# Patient Record
Sex: MALE | Race: WHITE | NOT HISPANIC OR LATINO | Employment: OTHER | URBAN - NONMETROPOLITAN AREA
[De-identification: names, ages, dates, MRNs, and addresses within clinical notes are randomized per-mention and may not be internally consistent; named-entity substitution may affect disease eponyms.]

---

## 2023-04-21 LAB
ALBUMIN (G/DL) IN SER/PLAS: 3.3 G/DL (ref 3.4–5)
ALBUMIN (MG/L) IN URINE: 2083.7 MG/L
ALBUMIN/CREATININE (UG/MG) IN URINE: 2782 UG/MG CRT (ref 0–30)
ANION GAP IN SER/PLAS: 13 MMOL/L (ref 10–20)
APPEARANCE, URINE: CLEAR
BILIRUBIN, URINE: NEGATIVE
BLOOD, URINE: NEGATIVE
CALCIUM (MG/DL) IN SER/PLAS: 8.3 MG/DL (ref 8.6–10.3)
CARBON DIOXIDE, TOTAL (MMOL/L) IN SER/PLAS: 23 MMOL/L (ref 21–32)
CHLORIDE (MMOL/L) IN SER/PLAS: 108 MMOL/L (ref 98–107)
COLOR, URINE: YELLOW
CREATININE (MG/DL) IN SER/PLAS: 2.15 MG/DL (ref 0.5–1.3)
CREATININE (MG/DL) IN URINE: 74.9 MG/DL (ref 20–370)
CREATININE (MG/DL) IN URINE: 74.9 MG/DL (ref 20–370)
GFR MALE: 34 ML/MIN/1.73M2
GLUCOSE (MG/DL) IN SER/PLAS: 211 MG/DL (ref 74–99)
GLUCOSE, URINE: ABNORMAL MG/DL
KETONES, URINE: NEGATIVE MG/DL
LEUKOCYTE ESTERASE, URINE: NEGATIVE
MUCUS, URINE: NORMAL /LPF
NITRITE, URINE: NEGATIVE
PH, URINE: 6 (ref 5–8)
PHOSPHATE (MG/DL) IN SER/PLAS: 4.1 MG/DL (ref 2.5–4.9)
POTASSIUM (MMOL/L) IN SER/PLAS: 3.8 MMOL/L (ref 3.5–5.3)
PROTEIN (MG/DL) IN URINE: 306 MG/DL (ref 5–25)
PROTEIN, URINE: ABNORMAL MG/DL
PROTEIN/CREATININE (MG/MG) IN URINE: 4.09 MG/MG CREAT (ref 0–0.17)
RBC, URINE: NORMAL /HPF (ref 0–5)
SODIUM (MMOL/L) IN SER/PLAS: 140 MMOL/L (ref 136–145)
SPECIFIC GRAVITY, URINE: 1.01 (ref 1–1.03)
SQUAMOUS EPITHELIAL CELLS, URINE: 1 /HPF
URATE (MG/DL) IN SER/PLAS: 9.3 MG/DL (ref 4–7.5)
UREA NITROGEN (MG/DL) IN SER/PLAS: 57 MG/DL (ref 6–23)
UROBILINOGEN, URINE: <2 MG/DL (ref 0–1.9)
WBC, URINE: NORMAL /HPF (ref 0–5)

## 2023-04-25 LAB — ANTI-HISTONE ANTIBODIES: 1 UNITS (ref 0–0.9)

## 2023-05-25 DIAGNOSIS — R53.83 OTHER FATIGUE: ICD-10-CM

## 2023-05-25 DIAGNOSIS — I10 BENIGN HYPERTENSION: ICD-10-CM

## 2023-05-25 PROBLEM — E11.9 DIABETES MELLITUS (MULTI): Status: ACTIVE | Noted: 2023-05-25

## 2023-05-25 PROBLEM — D64.9 ANEMIA: Status: ACTIVE | Noted: 2023-05-25

## 2023-05-25 RX ORDER — HYDRALAZINE HYDROCHLORIDE 50 MG/1
TABLET, FILM COATED ORAL
Qty: 90 TABLET | Refills: 0 | Status: SHIPPED | OUTPATIENT
Start: 2023-05-25 | End: 2023-09-19 | Stop reason: SDUPTHER

## 2023-05-25 RX ORDER — FOLIC ACID 1 MG/1
TABLET ORAL
Qty: 150 TABLET | Refills: 0 | Status: SHIPPED | OUTPATIENT
Start: 2023-05-25 | End: 2023-09-19 | Stop reason: SDUPTHER

## 2023-07-28 LAB
ALBUMIN (G/DL) IN SER/PLAS: 3.8 G/DL (ref 3.4–5)
ALBUMIN (MG/L) IN URINE: 328.9 MG/L
ALBUMIN/CREATININE (UG/MG) IN URINE: 548.2 UG/MG CRT (ref 0–30)
ANION GAP IN SER/PLAS: 15 MMOL/L (ref 10–20)
CALCIUM (MG/DL) IN SER/PLAS: 8.9 MG/DL (ref 8.6–10.3)
CARBON DIOXIDE, TOTAL (MMOL/L) IN SER/PLAS: 24 MMOL/L (ref 21–32)
CHLORIDE (MMOL/L) IN SER/PLAS: 103 MMOL/L (ref 98–107)
CREATININE (MG/DL) IN SER/PLAS: 3 MG/DL (ref 0.5–1.3)
CREATININE (MG/DL) IN URINE: 60 MG/DL (ref 20–370)
ERYTHROCYTE DISTRIBUTION WIDTH (RATIO) BY AUTOMATED COUNT: 12.6 % (ref 11.5–14.5)
ERYTHROCYTE MEAN CORPUSCULAR HEMOGLOBIN CONCENTRATION (G/DL) BY AUTOMATED: 32.8 G/DL (ref 32–36)
ERYTHROCYTE MEAN CORPUSCULAR VOLUME (FL) BY AUTOMATED COUNT: 95 FL (ref 80–100)
ERYTHROCYTES (10*6/UL) IN BLOOD BY AUTOMATED COUNT: 3.16 X10E12/L (ref 4.5–5.9)
FERRITIN (UG/LL) IN SER/PLAS: 286 UG/L (ref 20–300)
GFR MALE: 23 ML/MIN/1.73M2
GLUCOSE (MG/DL) IN SER/PLAS: 271 MG/DL (ref 74–99)
HEMATOCRIT (%) IN BLOOD BY AUTOMATED COUNT: 29.9 % (ref 41–52)
HEMOGLOBIN (G/DL) IN BLOOD: 9.8 G/DL (ref 13.5–17.5)
IRON (UG/DL) IN SER/PLAS: 67 UG/DL (ref 35–150)
IRON BINDING CAPACITY (UG/DL) IN SER/PLAS: 293 UG/DL (ref 240–445)
IRON SATURATION (%) IN SER/PLAS: 23 % (ref 25–45)
LEUKOCYTES (10*3/UL) IN BLOOD BY AUTOMATED COUNT: 7.8 X10E9/L (ref 4.4–11.3)
PHOSPHATE (MG/DL) IN SER/PLAS: 5.4 MG/DL (ref 2.5–4.9)
PLATELETS (10*3/UL) IN BLOOD AUTOMATED COUNT: 225 X10E9/L (ref 150–450)
POTASSIUM (MMOL/L) IN SER/PLAS: 4.5 MMOL/L (ref 3.5–5.3)
SODIUM (MMOL/L) IN SER/PLAS: 137 MMOL/L (ref 136–145)
URATE (MG/DL) IN SER/PLAS: 6.4 MG/DL (ref 4–7.5)
UREA NITROGEN (MG/DL) IN SER/PLAS: 69 MG/DL (ref 6–23)

## 2023-07-29 LAB — PARATHYRIN INTACT (PG/ML) IN SER/PLAS: 98.5 PG/ML (ref 18.5–88)

## 2023-08-23 LAB
BASOPHILS (10*3/UL) IN BLOOD BY AUTOMATED COUNT: 0.12 X10E9/L (ref 0–0.1)
BASOPHILS/100 LEUKOCYTES IN BLOOD BY AUTOMATED COUNT: 1.4 % (ref 0–2)
EOSINOPHILS (10*3/UL) IN BLOOD BY AUTOMATED COUNT: 0.45 X10E9/L (ref 0–0.7)
EOSINOPHILS/100 LEUKOCYTES IN BLOOD BY AUTOMATED COUNT: 5.2 % (ref 0–6)
ERYTHROCYTE DISTRIBUTION WIDTH (RATIO) BY AUTOMATED COUNT: 13 % (ref 11.5–14.5)
ERYTHROCYTE MEAN CORPUSCULAR HEMOGLOBIN CONCENTRATION (G/DL) BY AUTOMATED: 33.5 G/DL (ref 32–36)
ERYTHROCYTE MEAN CORPUSCULAR VOLUME (FL) BY AUTOMATED COUNT: 93 FL (ref 80–100)
ERYTHROCYTES (10*6/UL) IN BLOOD BY AUTOMATED COUNT: 3.35 X10E12/L (ref 4.5–5.9)
HEMATOCRIT (%) IN BLOOD BY AUTOMATED COUNT: 31.3 % (ref 41–52)
HEMOGLOBIN (G/DL) IN BLOOD: 10.5 G/DL (ref 13.5–17.5)
IMMATURE GRANULOCYTES/100 LEUKOCYTES IN BLOOD BY AUTOMATED COUNT: 0.3 % (ref 0–0.9)
LEUKOCYTES (10*3/UL) IN BLOOD BY AUTOMATED COUNT: 8.6 X10E9/L (ref 4.4–11.3)
LYMPHOCYTES (10*3/UL) IN BLOOD BY AUTOMATED COUNT: 1.69 X10E9/L (ref 1.2–4.8)
LYMPHOCYTES/100 LEUKOCYTES IN BLOOD BY AUTOMATED COUNT: 19.6 % (ref 13–44)
MONOCYTES (10*3/UL) IN BLOOD BY AUTOMATED COUNT: 1 X10E9/L (ref 0.1–1)
MONOCYTES/100 LEUKOCYTES IN BLOOD BY AUTOMATED COUNT: 11.6 % (ref 2–10)
NEUTROPHILS (10*3/UL) IN BLOOD BY AUTOMATED COUNT: 5.33 X10E9/L (ref 1.2–7.7)
NEUTROPHILS/100 LEUKOCYTES IN BLOOD BY AUTOMATED COUNT: 61.9 % (ref 40–80)
PLATELETS (10*3/UL) IN BLOOD AUTOMATED COUNT: 222 X10E9/L (ref 150–450)

## 2023-09-13 LAB
ALBUMIN (G/DL) IN SER/PLAS: 3.6 G/DL (ref 3.4–5)
ANION GAP IN SER/PLAS: 10 MMOL/L (ref 10–20)
CALCIUM (MG/DL) IN SER/PLAS: 8.8 MG/DL (ref 8.6–10.3)
CARBON DIOXIDE, TOTAL (MMOL/L) IN SER/PLAS: 26 MMOL/L (ref 21–32)
CHLORIDE (MMOL/L) IN SER/PLAS: 104 MMOL/L (ref 98–107)
CREATININE (MG/DL) IN SER/PLAS: 2.22 MG/DL (ref 0.5–1.3)
ERYTHROCYTE DISTRIBUTION WIDTH (RATIO) BY AUTOMATED COUNT: 13.4 % (ref 11.5–14.5)
ERYTHROCYTE MEAN CORPUSCULAR HEMOGLOBIN CONCENTRATION (G/DL) BY AUTOMATED: 32.2 G/DL (ref 32–36)
ERYTHROCYTE MEAN CORPUSCULAR VOLUME (FL) BY AUTOMATED COUNT: 94 FL (ref 80–100)
ERYTHROCYTES (10*6/UL) IN BLOOD BY AUTOMATED COUNT: 3.43 X10E12/L (ref 4.5–5.9)
FERRITIN (UG/LL) IN SER/PLAS: 341 UG/L (ref 20–300)
GFR MALE: 32 ML/MIN/1.73M2
GLUCOSE (MG/DL) IN SER/PLAS: 273 MG/DL (ref 74–99)
HEMATOCRIT (%) IN BLOOD BY AUTOMATED COUNT: 32.3 % (ref 41–52)
HEMOGLOBIN (G/DL) IN BLOOD: 10.4 G/DL (ref 13.5–17.5)
IRON (UG/DL) IN SER/PLAS: 71 UG/DL (ref 35–150)
IRON BINDING CAPACITY (UG/DL) IN SER/PLAS: 275 UG/DL (ref 240–445)
IRON SATURATION (%) IN SER/PLAS: 26 % (ref 25–45)
LEUKOCYTES (10*3/UL) IN BLOOD BY AUTOMATED COUNT: 7.5 X10E9/L (ref 4.4–11.3)
PHOSPHATE (MG/DL) IN SER/PLAS: 4 MG/DL (ref 2.5–4.9)
PLATELETS (10*3/UL) IN BLOOD AUTOMATED COUNT: 206 X10E9/L (ref 150–450)
POTASSIUM (MMOL/L) IN SER/PLAS: 4.1 MMOL/L (ref 3.5–5.3)
SODIUM (MMOL/L) IN SER/PLAS: 136 MMOL/L (ref 136–145)
URATE (MG/DL) IN SER/PLAS: 6.3 MG/DL (ref 4–7.5)
UREA NITROGEN (MG/DL) IN SER/PLAS: 42 MG/DL (ref 6–23)

## 2023-09-14 LAB
ALBUMIN (MG/L) IN URINE: 1025.8 MG/L
ALBUMIN/CREATININE (UG/MG) IN URINE: 2284.6 UG/MG CRT (ref 0–30)
CALCIDIOL (25 OH VITAMIN D3) (NG/ML) IN SER/PLAS: 19 NG/ML
CREATININE (MG/DL) IN URINE: 44.9 MG/DL (ref 20–370)
PARATHYRIN INTACT (PG/ML) IN SER/PLAS: 97.4 PG/ML (ref 18.5–88)

## 2023-09-18 PROBLEM — L97.509 DIABETIC FOOT ULCER (MULTI): Status: RESOLVED | Noted: 2022-05-06 | Resolved: 2023-09-18

## 2023-09-18 PROBLEM — E11.621 DIABETIC FOOT ULCER (MULTI): Status: RESOLVED | Noted: 2022-05-06 | Resolved: 2023-09-18

## 2023-09-18 PROBLEM — I73.9 PAD (PERIPHERAL ARTERY DISEASE) (CMS-HCC): Status: ACTIVE | Noted: 2023-09-18

## 2023-09-18 PROBLEM — R53.83 FATIGUE: Status: ACTIVE | Noted: 2023-09-18

## 2023-09-18 PROBLEM — E55.9 VITAMIN D DEFICIENCY: Status: ACTIVE | Noted: 2023-09-18

## 2023-09-18 PROBLEM — E87.6 HYPOKALEMIA: Status: ACTIVE | Noted: 2022-05-06

## 2023-09-18 PROBLEM — N04.9 NEPHROTIC SYNDROME: Status: ACTIVE | Noted: 2023-09-18

## 2023-09-18 PROBLEM — U07.1 COVID-19 VIRUS INFECTION: Status: RESOLVED | Noted: 2023-09-18 | Resolved: 2023-09-18

## 2023-09-18 PROBLEM — N40.0 BENIGN PROSTATIC HYPERPLASIA: Status: ACTIVE | Noted: 2023-09-18

## 2023-09-18 PROBLEM — I50.31 ACUTE DIASTOLIC HEART FAILURE (MULTI): Status: RESOLVED | Noted: 2022-11-23 | Resolved: 2023-09-18

## 2023-09-18 PROBLEM — Z28.310 UNVACCINATED FOR COVID-19: Status: ACTIVE | Noted: 2022-05-06

## 2023-09-18 PROBLEM — J81.1 PULMONARY EDEMA (HHS-HCC): Status: ACTIVE | Noted: 2023-09-18

## 2023-09-18 PROBLEM — N17.9 AKI (ACUTE KIDNEY INJURY) (CMS-HCC): Status: RESOLVED | Noted: 2023-09-18 | Resolved: 2023-09-18

## 2023-09-18 PROBLEM — E78.5 HLD (HYPERLIPIDEMIA): Status: ACTIVE | Noted: 2023-09-18

## 2023-09-18 PROBLEM — F41.9 ANXIETY: Status: ACTIVE | Noted: 2023-09-18

## 2023-09-18 PROBLEM — I82.409 DVT (DEEP VENOUS THROMBOSIS) (MULTI): Status: RESOLVED | Noted: 2023-09-18 | Resolved: 2023-09-18

## 2023-09-18 PROBLEM — N18.4 CHRONIC KIDNEY DISEASE, STAGE 4 (SEVERE) (MULTI): Status: ACTIVE | Noted: 2023-09-18

## 2023-09-18 PROBLEM — E78.5 DYSLIPIDEMIA: Status: ACTIVE | Noted: 2023-09-18

## 2023-09-18 PROBLEM — Z98.890 HISTORY OF FOOT OPERATION: Status: RESOLVED | Noted: 2023-09-18 | Resolved: 2023-09-18

## 2023-09-18 PROBLEM — H35.89: Status: ACTIVE | Noted: 2023-09-18

## 2023-09-18 RX ORDER — BLOOD SUGAR DIAGNOSTIC
STRIP MISCELLANEOUS
COMMUNITY
Start: 2016-08-22 | End: 2024-04-04 | Stop reason: ALTCHOICE

## 2023-09-18 RX ORDER — EPOETIN ALFA-EPBX 40000 [IU]/ML
40000 INJECTION, SOLUTION INTRAVENOUS; SUBCUTANEOUS
COMMUNITY
Start: 2022-11-23

## 2023-09-18 RX ORDER — PEN NEEDLE, DIABETIC 32 GX3/16"
NEEDLE, DISPOSABLE MISCELLANEOUS
COMMUNITY
Start: 2023-07-20 | End: 2024-01-11 | Stop reason: SDUPTHER

## 2023-09-18 RX ORDER — FUROSEMIDE 40 MG/1
40 TABLET ORAL 2 TIMES DAILY
COMMUNITY
End: 2024-04-24

## 2023-09-18 RX ORDER — PIOGLITAZONEHYDROCHLORIDE 45 MG/1
45 TABLET ORAL DAILY
COMMUNITY
Start: 2018-07-26 | End: 2023-12-21 | Stop reason: WASHOUT

## 2023-09-18 RX ORDER — CARVEDILOL 25 MG/1
25 TABLET ORAL
COMMUNITY
End: 2023-09-19 | Stop reason: SDUPTHER

## 2023-09-18 RX ORDER — VIT A/VIT C/VIT E/ZINC/COPPER 7160-113
1 TABLET, DELAYED RELEASE (ENTERIC COATED) ORAL EVERY 12 HOURS
COMMUNITY
Start: 2016-03-31 | End: 2023-12-21 | Stop reason: WASHOUT

## 2023-09-18 RX ORDER — POTASSIUM CHLORIDE 1500 MG/1
20 TABLET, EXTENDED RELEASE ORAL 2 TIMES DAILY
COMMUNITY
End: 2023-09-19 | Stop reason: WASHOUT

## 2023-09-18 RX ORDER — ALLOPURINOL 100 MG/1
100 TABLET ORAL DAILY
COMMUNITY
End: 2023-09-19 | Stop reason: WASHOUT

## 2023-09-18 RX ORDER — LISINOPRIL 40 MG/1
40 TABLET ORAL EVERY MORNING
COMMUNITY
End: 2023-12-21 | Stop reason: SDUPTHER

## 2023-09-18 RX ORDER — DAPAGLIFLOZIN 10 MG/1
10 TABLET, FILM COATED ORAL DAILY
COMMUNITY
End: 2024-03-20 | Stop reason: WASHOUT

## 2023-09-18 RX ORDER — INSULIN GLARGINE 100 [IU]/ML
20 INJECTION, SOLUTION SUBCUTANEOUS NIGHTLY
COMMUNITY
End: 2023-12-21 | Stop reason: SDUPTHER

## 2023-09-18 RX ORDER — INSULIN LISPRO 100 [IU]/ML
INJECTION, SOLUTION INTRAVENOUS; SUBCUTANEOUS
COMMUNITY
Start: 2022-02-18 | End: 2023-12-11 | Stop reason: SDUPTHER

## 2023-09-18 NOTE — PROGRESS NOTES
Subjective     HPI   Gregorio North is a 62 y.o. year old male patient with presenting to clinic with concern for   Chief Complaint   Patient presents with    New Patient Visit     Establish care.     Med Refill       Mr. North presents to clinic to establish care as a new patient. He was previously seen by Dr Haddad and then Aaliyah Yousif CNP.    Lyme Dx 2015.  Charcot Carolynn Tooth L foot.  DM last A1c reportedly ~6 at Dr Ferreira's around February, but unable to see results anywhere.     Needs meds refilled  Sees Dr Jacques PRN    NEEDS eye exam has been at least a few years    Skipping Budureon for DM- too expensive, skipping every other dose.    Patient Active Problem List   Diagnosis    Benign essential hypertension    Diabetes mellitus (CMS/HCC)    Anemia    Chronic kidney disease, stage 4 (severe) (CMS/HCC)    Dyslipidemia    Benign prostatic hyperplasia    Anxiety    Fatigue    Hypokalemia    HLD (hyperlipidemia)    Macular exudate    Nephrotic syndrome    Nystagmus    Optic atrophy    PAD (peripheral artery disease) (CMS/HCC)    Pulmonary edema    Vitamin D deficiency    Unvaccinated for covid-19       Past Medical History:   Diagnosis Date    Acute diastolic heart failure (CMS/HCC) 11/23/2022    STEVE (acute kidney injury) (CMS/HCC) 09/18/2023    COVID-19 virus infection 09/18/2023    Cutaneous abscess of back (any part, except buttock) 01/25/2021    Abscess of back    Deficiency of other specified B group vitamins 06/09/2021    Folate deficiency    Diabetic foot ulcer (CMS/HCC) 05/06/2022    Encounter for general adult medical examination without abnormal findings 05/19/2020    Medicare annual wellness visit, subsequent    Encounter for screening for malignant neoplasm of colon 02/21/2019    Screening for malignant neoplasm of colon    Encounter for screening for malignant neoplasm of prostate 08/12/2021    Prostate cancer screening    Focal chorioretinal inflammation, macular or paramacular, left  eye 10/15/2014    Acute macular neuroretinitis of left eye    Focal chorioretinal inflammation, macular or paramacular, left eye 11/05/2014    Acute macular neuroretinitis of left eye    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    History of foot operation 09/18/2023    Ischemic optic neuropathy, 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    Localized edema 01/11/2021    Bilateral edema of lower extremity    Other muscle spasm 04/24/2017    Trapezius muscle spasm    Other specified abnormal immunological findings in serum 09/17/2014    Positive Lyme disease serology    Other specified cataract 01/27/2016    Cataract, mature    Other specified health status     No pertinent past surgical history    Other specified postprocedural states 06/18/2015    Status post foot surgery    Other specified retinal disorders 01/27/2016    Macular exudate    Other symptoms and signs involving the musculoskeletal system 04/24/2017    Left arm weakness    Pain in right shoulder 04/24/2017    Right shoulder pain    Personal history of diseases of the skin and subcutaneous tissue 05/25/2021    History of cellulitis    Personal history of other diseases of male genital organs 01/11/2021    History of benign prostatic hyperplasia    Personal history of other diseases of the circulatory system 09/03/2014    History of hypertension    Personal history of other diseases of the musculoskeletal system and connective tissue 11/21/2016    History of joint pain    Personal history of other diseases of the musculoskeletal system and connective tissue 11/21/2016    History of muscle pain    Personal history of other diseases of the nervous system and sense organs 09/08/2014    History of nystagmus    Personal history of other diseases of the nervous system and sense organs 01/27/2016    History of optic atrophy    Personal history of other diseases of the nervous system and sense organs  09/08/2014    History of papilledema    Personal history of other diseases of the nervous system and sense organs 08/22/2016    History of tremor    Personal history of other endocrine, nutritional and metabolic disease 01/11/2021    History of elevated lipids    Personal history of other specified conditions     History of impaired glucose tolerance    Personal history of other specified conditions 09/08/2014    History of palpitations    Personal history of other specified conditions 08/12/2021    History of chronic fatigue    Personal history of other specified conditions 08/09/2018    History of headache    Personal history of other specified conditions 08/09/2018    History of balance disorder    Sebaceous cyst 11/20/2018    Infected sebaceous cyst of skin    Unspecified cataract 01/27/2016    Total cataract    Unspecified visual loss 02/18/2022    Vision loss      Past Surgical History:   Procedure Laterality Date    CT GUIDED PERCUTANEOUS BIOPSY BONE DEEP  8/29/2022    CT GUIDED PERCUTANEOUS BIOPSY BONE DEEP 8/29/2022 [facility]    FOOT SURGERY  03/31/2016    Foot Surgery    MR HEAD ANGIO WO IV CONTRAST  9/17/2014    MR HEAD ANGIO WO IV CONTRAST 9/17/2014 [facility]    OTHER SURGICAL HISTORY  02/04/2015    Eye Surgery Results Vision      Family History   Problem Relation Name Age of Onset    No Known Problems Mother      Heart attack Father      Diabetes Father        Social History     Tobacco Use    Smoking status: Never    Smokeless tobacco: Never   Substance Use Topics    Alcohol use: Not Currently        Current Outpatient Medications:     amLODIPine (Norvasc) 5 mg tablet, TAKE ONE TABLET BY MOUTH DAILY, Disp: 90 tablet, Rfl: 0    atorvastatin (Lipitor) 20 mg tablet, TAKE ONE TABLET BY MOUTH EVERY EVENING, Disp: 90 tablet, Rfl: 0    blood sugar diagnostic (ReliOn Prime Test Strips) strip, TEST 3 TIMES DAILY., Disp: , Rfl:     Bydureon BCise 2 mg/0.85 mL auto-injector, INJECT 0.85 ML  "SUBCUTANEOUSLY ONCE WEEKLY., Disp: 13.6 mL, Rfl: 0    CareFine Pen Needle 32 gauge x 3/16\" needle, USE 1 PEN NEEDLE TO INJECT INSULIN 3 TIMES A DAY BEFORE MEALS AND AT BEDTIME., Disp: , Rfl:     carvedilol (Coreg) 25 mg tablet, Take 1 tablet (25 mg) by mouth 2 times a day with meals., Disp: , Rfl:     clopidogrel (Plavix) 75 mg tablet, TAKE ONE TABLET BY MOUTH EVERY DAY, Disp: 90 tablet, Rfl: 0    cyanocobalamin, vitamin B-12, 500 mcg tablet, sublingual, Place 1 tablet under the tongue once daily., Disp: , Rfl:     folic acid (Folvite) 1 mg tablet, TAKE FIVE TABLETS BY MOUTH EVERY DAY, Disp: 150 tablet, Rfl: 0    furosemide (Lasix) 40 mg tablet, 1 tablet (40 mg)., Disp: , Rfl:     HumaLOG KwikPen Insulin 100 unit/mL injection, Inject under the skin. 8 units before meals, Disp: , Rfl:     Lantus Solostar U-100 Insulin 100 unit/mL (3 mL) pen, Inject 20 Units under the skin once daily at bedtime., Disp: , Rfl:     lisinopril 40 mg tablet, Take 1 tablet (40 mg) by mouth once daily in the morning., Disp: , Rfl:     pioglitazone (Actos) 45 mg tablet, Take 1 tablet (45 mg) by mouth once daily., Disp: , Rfl:     potassium chloride CR 20 mEq ER tablet, Take 1 tablet (20 mEq) by mouth once daily. Do not crush or chew., Disp: , Rfl:     epoetin mckenna-epbx (Retacrit) 40,000 unit/mL injection, Inject 1 mL (40,000 Units) under the skin every 30 (thirty) days., Disp: , Rfl:     Farxiga 10 mg, Take 1 tablet (10 mg) by mouth once daily., Disp: , Rfl:     hydrALAZINE (Apresoline) 50 mg tablet, TAKE ONE TABLET BY MOUTH THREE TIMES A DAY, Disp: 90 tablet, Rfl: 0    multivit-min-FA-lut-zeaxanth (Icaps MV) 100-1.66-0.83 mcg-mg-mg tablet,delayed release (DR/EC), Take 1 tablet by mouth every 12 hours., Disp: , Rfl:      Review of Systems  Constitutional: Denies fever  HEENT: Denies ST, earache  CVS: Denies Chest pain  Pulmonary: Denies wheezing, SOB  GI: Denies N/V  : Denies dysuria  Musculoskeletal:  Denies myalgia  Neuro: Denies focal " "weakness or numbness.  Skin: Denies Rashes.  *Review of Systems is negative unless otherwise mentioned in HPI or ROS above.    Objective   /80   Pulse 58   Temp 37 °C (98.6 °F)   Ht 1.753 m (5' 9\")   Wt 117 kg (258 lb 9.6 oz)   SpO2 97%   BMI 38.19 kg/m²  reviewed Body mass index is 38.19 kg/m².     Physical Exam  Constitutional: NAD.  Resting comfortably.  Head: Atraumatic, normocephalic.  ENT: Moist oral mucosa. Nasal mucosa wnl.   Cardiac: Regular rate & rhythm.   Pulmonary: Lungs clear bilat  GI: Soft, Nontender, nondistended.   Musculoskeletal: No peripheral edema.   Skin: No evidence of trauma. No rashes  Psych: Intact judgement and insight.    .Assessment/Plan   Problem List Items Addressed This Visit       Benign essential hypertension - Primary    Relevant Medications    carvedilol (Coreg) 25 mg tablet    Dyslipidemia    Relevant Medications    atorvastatin (Lipitor) 20 mg tablet    clopidogrel (Plavix) 75 mg tablet    Fatigue    Relevant Medications    folic acid (Folvite) 1 mg tablet     Other Visit Diagnoses       Benign hypertension        Relevant Medications    hydrALAZINE (Apresoline) 50 mg tablet    Primary hypertension        Relevant Medications    amLODIPine (Norvasc) 5 mg tablet          Concern for cost of bydureon.    "

## 2023-09-19 ENCOUNTER — OFFICE VISIT (OUTPATIENT)
Dept: PRIMARY CARE | Facility: CLINIC | Age: 63
End: 2023-09-19
Payer: MEDICARE

## 2023-09-19 VITALS
WEIGHT: 258.6 LBS | TEMPERATURE: 98.6 F | SYSTOLIC BLOOD PRESSURE: 142 MMHG | HEIGHT: 69 IN | BODY MASS INDEX: 38.3 KG/M2 | DIASTOLIC BLOOD PRESSURE: 80 MMHG | OXYGEN SATURATION: 97 % | HEART RATE: 58 BPM

## 2023-09-19 DIAGNOSIS — I10 PRIMARY HYPERTENSION: ICD-10-CM

## 2023-09-19 DIAGNOSIS — N18.4 CHRONIC KIDNEY DISEASE, STAGE 4 (SEVERE) (MULTI): ICD-10-CM

## 2023-09-19 DIAGNOSIS — I50.9 CHRONIC CONGESTIVE HEART FAILURE, UNSPECIFIED HEART FAILURE TYPE (MULTI): ICD-10-CM

## 2023-09-19 DIAGNOSIS — I10 BENIGN HYPERTENSION: ICD-10-CM

## 2023-09-19 DIAGNOSIS — E78.5 DYSLIPIDEMIA: ICD-10-CM

## 2023-09-19 DIAGNOSIS — R53.83 OTHER FATIGUE: ICD-10-CM

## 2023-09-19 DIAGNOSIS — E11.22 TYPE 2 DIABETES MELLITUS WITH STAGE 4 CHRONIC KIDNEY DISEASE, WITH LONG-TERM CURRENT USE OF INSULIN (MULTI): ICD-10-CM

## 2023-09-19 DIAGNOSIS — N18.4 TYPE 2 DIABETES MELLITUS WITH STAGE 4 CHRONIC KIDNEY DISEASE, WITH LONG-TERM CURRENT USE OF INSULIN (MULTI): ICD-10-CM

## 2023-09-19 DIAGNOSIS — Z79.4 TYPE 2 DIABETES MELLITUS WITH STAGE 4 CHRONIC KIDNEY DISEASE, WITH LONG-TERM CURRENT USE OF INSULIN (MULTI): ICD-10-CM

## 2023-09-19 DIAGNOSIS — I10 BENIGN ESSENTIAL HYPERTENSION: Primary | ICD-10-CM

## 2023-09-19 LAB — POC HEMOGLOBIN A1C: 8.7 % (ref 4.2–6.5)

## 2023-09-19 PROCEDURE — 83036 HEMOGLOBIN GLYCOSYLATED A1C: CPT | Performed by: PHYSICIAN ASSISTANT

## 2023-09-19 PROCEDURE — 3079F DIAST BP 80-89 MM HG: CPT | Performed by: PHYSICIAN ASSISTANT

## 2023-09-19 PROCEDURE — 3077F SYST BP >= 140 MM HG: CPT | Performed by: PHYSICIAN ASSISTANT

## 2023-09-19 PROCEDURE — 99213 OFFICE O/P EST LOW 20 MIN: CPT | Performed by: PHYSICIAN ASSISTANT

## 2023-09-19 PROCEDURE — 4010F ACE/ARB THERAPY RXD/TAKEN: CPT | Performed by: PHYSICIAN ASSISTANT

## 2023-09-19 PROCEDURE — 3066F NEPHROPATHY DOC TX: CPT | Performed by: PHYSICIAN ASSISTANT

## 2023-09-19 PROCEDURE — 1036F TOBACCO NON-USER: CPT | Performed by: PHYSICIAN ASSISTANT

## 2023-09-19 RX ORDER — HYDRALAZINE HYDROCHLORIDE 50 MG/1
50 TABLET, FILM COATED ORAL 3 TIMES DAILY
Qty: 270 TABLET | Refills: 0 | Status: SHIPPED | OUTPATIENT
Start: 2023-09-19 | End: 2023-12-21 | Stop reason: SDUPTHER

## 2023-09-19 RX ORDER — CARVEDILOL 25 MG/1
25 TABLET ORAL
Qty: 180 TABLET | Refills: 0 | Status: SHIPPED | OUTPATIENT
Start: 2023-09-19 | End: 2023-12-21 | Stop reason: SDUPTHER

## 2023-09-19 RX ORDER — POTASSIUM CHLORIDE 20 MEQ/1
20 TABLET, EXTENDED RELEASE ORAL DAILY
COMMUNITY
End: 2023-12-21 | Stop reason: WASHOUT

## 2023-09-19 RX ORDER — FOLIC ACID 1 MG/1
5 TABLET ORAL DAILY
Qty: 450 TABLET | Refills: 0 | Status: SHIPPED | OUTPATIENT
Start: 2023-09-19 | End: 2023-12-21 | Stop reason: SDUPTHER

## 2023-09-19 RX ORDER — CLOPIDOGREL BISULFATE 75 MG/1
75 TABLET ORAL DAILY
Qty: 90 TABLET | Refills: 0 | Status: SHIPPED | OUTPATIENT
Start: 2023-09-19 | End: 2023-12-21 | Stop reason: SDUPTHER

## 2023-09-19 RX ORDER — AMLODIPINE BESYLATE 5 MG/1
5 TABLET ORAL DAILY
Qty: 90 TABLET | Refills: 0 | Status: SHIPPED | OUTPATIENT
Start: 2023-09-19 | End: 2023-12-21 | Stop reason: SDUPTHER

## 2023-09-19 RX ORDER — ATORVASTATIN CALCIUM 20 MG/1
20 TABLET, FILM COATED ORAL NIGHTLY
Qty: 90 TABLET | Refills: 0 | Status: SHIPPED | OUTPATIENT
Start: 2023-09-19 | End: 2023-12-21 | Stop reason: SDUPTHER

## 2023-09-19 RX ORDER — CYANOCOBALAMIN (VITAMIN B-12) 1000 MCG
1 TABLET, SUBLINGUAL SUBLINGUAL DAILY
COMMUNITY
End: 2023-12-21 | Stop reason: WASHOUT

## 2023-09-19 ASSESSMENT — ANXIETY QUESTIONNAIRES
IF YOU CHECKED OFF ANY PROBLEMS ON THIS QUESTIONNAIRE, HOW DIFFICULT HAVE THESE PROBLEMS MADE IT FOR YOU TO DO YOUR WORK, TAKE CARE OF THINGS AT HOME, OR GET ALONG WITH OTHER PEOPLE: NOT DIFFICULT AT ALL
3. WORRYING TOO MUCH ABOUT DIFFERENT THINGS: SEVERAL DAYS
GAD7 TOTAL SCORE: 3
7. FEELING AFRAID AS IF SOMETHING AWFUL MIGHT HAPPEN: NOT AT ALL
5. BEING SO RESTLESS THAT IT IS HARD TO SIT STILL: NOT AT ALL
4. TROUBLE RELAXING: NOT AT ALL
2. NOT BEING ABLE TO STOP OR CONTROL WORRYING: NOT AT ALL
6. BECOMING EASILY ANNOYED OR IRRITABLE: SEVERAL DAYS
1. FEELING NERVOUS, ANXIOUS, OR ON EDGE: SEVERAL DAYS

## 2023-09-19 ASSESSMENT — PATIENT HEALTH QUESTIONNAIRE - PHQ9
SUM OF ALL RESPONSES TO PHQ9 QUESTIONS 1 AND 2: 0
10. IF YOU CHECKED OFF ANY PROBLEMS, HOW DIFFICULT HAVE THESE PROBLEMS MADE IT FOR YOU TO DO YOUR WORK, TAKE CARE OF THINGS AT HOME, OR GET ALONG WITH OTHER PEOPLE: NOT DIFFICULT AT ALL
8. MOVING OR SPEAKING SO SLOWLY THAT OTHER PEOPLE COULD HAVE NOTICED. OR THE OPPOSITE, BEING SO FIGETY OR RESTLESS THAT YOU HAVE BEEN MOVING AROUND A LOT MORE THAN USUAL: NOT AT ALL
3. TROUBLE FALLING OR STAYING ASLEEP OR SLEEPING TOO MUCH: SEVERAL DAYS
2. FEELING DOWN, DEPRESSED OR HOPELESS: NOT AT ALL
1. LITTLE INTEREST OR PLEASURE IN DOING THINGS: NOT AT ALL
9. THOUGHTS THAT YOU WOULD BE BETTER OFF DEAD, OR OF HURTING YOURSELF: NOT AT ALL
6. FEELING BAD ABOUT YOURSELF - OR THAT YOU ARE A FAILURE OR HAVE LET YOURSELF OR YOUR FAMILY DOWN: SEVERAL DAYS
SUM OF ALL RESPONSES TO PHQ QUESTIONS 1-9: 3
4. FEELING TIRED OR HAVING LITTLE ENERGY: SEVERAL DAYS
5. POOR APPETITE OR OVEREATING: NOT AT ALL
7. TROUBLE CONCENTRATING ON THINGS, SUCH AS READING THE NEWSPAPER OR WATCHING TELEVISION: NOT AT ALL

## 2023-09-19 NOTE — PATIENT INSTRUCTIONS
Please make an appointment with your ophthomologist.    Please make an appointment with the nutritionist.

## 2023-09-26 DIAGNOSIS — N18.4 ANEMIA DUE TO STAGE 4 CHRONIC KIDNEY DISEASE (MULTI): Primary | ICD-10-CM

## 2023-09-26 DIAGNOSIS — D63.1 ANEMIA DUE TO STAGE 4 CHRONIC KIDNEY DISEASE (MULTI): Primary | ICD-10-CM

## 2023-09-26 RX ORDER — ALBUTEROL SULFATE 0.83 MG/ML
3 SOLUTION RESPIRATORY (INHALATION) AS NEEDED
Status: CANCELLED | OUTPATIENT
Start: 2023-10-11

## 2023-09-26 RX ORDER — HEPARIN 100 UNIT/ML
500 SYRINGE INTRAVENOUS AS NEEDED
Status: CANCELLED | OUTPATIENT
Start: 2023-09-30

## 2023-09-26 RX ORDER — DIPHENHYDRAMINE HYDROCHLORIDE 50 MG/ML
50 INJECTION INTRAMUSCULAR; INTRAVENOUS AS NEEDED
Status: CANCELLED | OUTPATIENT
Start: 2023-10-11

## 2023-09-26 RX ORDER — HEPARIN SODIUM,PORCINE/PF 10 UNIT/ML
50 SYRINGE (ML) INTRAVENOUS AS NEEDED
Status: CANCELLED | OUTPATIENT
Start: 2023-09-30

## 2023-09-26 RX ORDER — EPINEPHRINE 0.3 MG/.3ML
0.3 INJECTION SUBCUTANEOUS EVERY 5 MIN PRN
Status: CANCELLED | OUTPATIENT
Start: 2023-10-11

## 2023-09-26 RX ORDER — FAMOTIDINE 10 MG/ML
20 INJECTION INTRAVENOUS ONCE AS NEEDED
Status: CANCELLED | OUTPATIENT
Start: 2023-10-11

## 2023-10-10 PROBLEM — Z85.528 HISTORY OF KIDNEY CANCER: Status: ACTIVE | Noted: 2022-08-10

## 2023-10-10 PROBLEM — M25.50 ARTHRALGIA: Status: ACTIVE | Noted: 2023-10-10

## 2023-10-10 PROBLEM — R26.89 IMPAIRMENT OF BALANCE: Status: ACTIVE | Noted: 2023-10-10

## 2023-10-10 PROBLEM — D50.9 IRON DEFICIENCY ANEMIA: Status: ACTIVE | Noted: 2022-08-29

## 2023-10-10 PROBLEM — E66.9 OBESITY WITH BODY MASS INDEX 30 OR GREATER: Status: ACTIVE | Noted: 2022-05-06

## 2023-10-10 PROBLEM — R80.9 PROTEINURIA: Status: ACTIVE | Noted: 2023-03-07

## 2023-10-10 PROBLEM — N17.9 ACUTE RENAL FAILURE (CMS-HCC): Status: ACTIVE | Noted: 2023-03-07

## 2023-10-10 PROBLEM — M35.00 SJOGREN'S SYNDROME (MULTI): Status: ACTIVE | Noted: 2023-03-07

## 2023-10-10 PROBLEM — Z86.79 HISTORY OF HYPERTENSION: Status: ACTIVE | Noted: 2023-10-10

## 2023-10-10 PROBLEM — R00.2 PALPITATIONS: Status: ACTIVE | Noted: 2023-10-10

## 2023-10-10 PROBLEM — M79.10 MUSCLE PAIN: Status: ACTIVE | Noted: 2023-10-10

## 2023-10-10 PROBLEM — R06.02 SHORTNESS OF BREATH: Status: ACTIVE | Noted: 2022-08-03

## 2023-10-10 PROBLEM — E66.01 CLASS 3 SEVERE OBESITY DUE TO EXCESS CALORIES IN ADULT (MULTI): Status: ACTIVE | Noted: 2023-10-10

## 2023-10-10 PROBLEM — A69.20 LYME DISEASE: Status: ACTIVE | Noted: 2022-05-06

## 2023-10-10 PROBLEM — J81.1 CHRONIC PULMONARY EDEMA (HHS-HCC): Status: ACTIVE | Noted: 2022-07-28

## 2023-10-10 PROBLEM — Z86.39 HISTORY OF ENDOCRINE DISORDER: Status: ACTIVE | Noted: 2023-10-10

## 2023-10-10 PROBLEM — R51.9 HEADACHE: Status: ACTIVE | Noted: 2023-10-10

## 2023-10-10 PROBLEM — R25.1 TREMOR: Status: ACTIVE | Noted: 2023-10-10

## 2023-10-10 PROBLEM — J96.90 RESPIRATORY FAILURE (MULTI): Status: ACTIVE | Noted: 2023-10-10

## 2023-10-10 PROBLEM — M79.89 OTHER SPECIFIED SOFT TISSUE DISORDERS: Status: ACTIVE | Noted: 2022-12-20

## 2023-10-10 PROBLEM — E66.813 CLASS 3 SEVERE OBESITY DUE TO EXCESS CALORIES IN ADULT: Status: ACTIVE | Noted: 2023-10-10

## 2023-10-10 PROBLEM — M79.89 SWELLING OF UPPER EXTREMITY: Status: ACTIVE | Noted: 2023-10-10

## 2023-10-10 RX ORDER — CHOLECALCIFEROL (VITAMIN D3) 125 MCG
5000 CAPSULE ORAL DAILY
COMMUNITY

## 2023-10-10 RX ORDER — DAPAGLIFLOZIN 5 MG/1
1 TABLET, FILM COATED ORAL
COMMUNITY
Start: 2022-06-15 | End: 2023-12-21 | Stop reason: WASHOUT

## 2023-10-10 RX ORDER — ALLOPURINOL 100 MG/1
100 TABLET ORAL
COMMUNITY
Start: 2023-10-09 | End: 2023-12-21 | Stop reason: SDUPTHER

## 2023-10-10 RX ORDER — HYDROCHLOROTHIAZIDE 25 MG/1
25 TABLET ORAL DAILY
COMMUNITY
Start: 2017-11-02 | End: 2023-12-21 | Stop reason: WASHOUT

## 2023-10-10 RX ORDER — DOXYCYCLINE HYCLATE 100 MG
100 TABLET ORAL EVERY 12 HOURS
COMMUNITY
Start: 2022-02-10 | End: 2023-12-21 | Stop reason: WASHOUT

## 2023-10-10 RX ORDER — ERGOCALCIFEROL 1.25 MG/1
1 CAPSULE ORAL WEEKLY
COMMUNITY
Start: 2022-10-27 | End: 2024-03-20 | Stop reason: WASHOUT

## 2023-10-10 RX ORDER — TAMSULOSIN HYDROCHLORIDE 0.4 MG/1
0.4 CAPSULE ORAL DAILY
COMMUNITY
Start: 2020-02-19 | End: 2023-12-21 | Stop reason: WASHOUT

## 2023-10-10 RX ORDER — NAPROXEN 500 MG/1
500 TABLET ORAL
COMMUNITY
Start: 2013-08-06 | End: 2023-12-21 | Stop reason: WASHOUT

## 2023-10-10 RX ORDER — EPINEPHRINE 0.22MG
100 AEROSOL WITH ADAPTER (ML) INHALATION DAILY
COMMUNITY
Start: 2016-10-20 | End: 2023-12-21 | Stop reason: WASHOUT

## 2023-10-10 RX ORDER — CLINDAMYCIN HYDROCHLORIDE 150 MG/1
3 CAPSULE ORAL EVERY 8 HOURS
COMMUNITY
Start: 2021-01-11 | End: 2023-12-21 | Stop reason: WASHOUT

## 2023-10-10 RX ORDER — ASPIRIN 81 MG/1
81 TABLET ORAL DAILY
COMMUNITY
Start: 2018-08-09 | End: 2023-12-21 | Stop reason: WASHOUT

## 2023-10-10 RX ORDER — METFORMIN HYDROCHLORIDE 1000 MG/1
500 TABLET ORAL
COMMUNITY
Start: 2014-09-08 | End: 2023-12-21 | Stop reason: WASHOUT

## 2023-10-10 RX ORDER — GLIPIZIDE 5 MG/1
5 TABLET, FILM COATED, EXTENDED RELEASE ORAL
COMMUNITY
Start: 2018-08-09 | End: 2023-12-21 | Stop reason: WASHOUT

## 2023-10-10 RX ORDER — OXYCODONE AND ACETAMINOPHEN 5; 325 MG/1; MG/1
1 TABLET ORAL EVERY 4 HOURS PRN
COMMUNITY
Start: 2015-06-26 | End: 2023-12-21 | Stop reason: WASHOUT

## 2023-10-10 RX ORDER — LANOLIN ALCOHOL/MO/W.PET/CERES
1000 CREAM (GRAM) TOPICAL DAILY
COMMUNITY
End: 2023-12-21 | Stop reason: WASHOUT

## 2023-10-10 RX ORDER — VIT C/E/ZN/COPPR/LUTEIN/ZEAXAN 250MG-90MG
1 CAPSULE ORAL 2 TIMES DAILY
COMMUNITY
End: 2023-12-21 | Stop reason: WASHOUT

## 2023-10-10 RX ORDER — AMLODIPINE BESYLATE 10 MG/1
1 TABLET ORAL DAILY
COMMUNITY
Start: 2022-02-10 | End: 2023-12-21 | Stop reason: SDUPTHER

## 2023-10-11 ENCOUNTER — LAB (OUTPATIENT)
Dept: LAB | Facility: LAB | Age: 63
End: 2023-10-11
Payer: MEDICARE

## 2023-10-11 ENCOUNTER — INFUSION (OUTPATIENT)
Dept: HEMATOLOGY/ONCOLOGY | Facility: HOSPITAL | Age: 63
End: 2023-10-11
Payer: MEDICARE

## 2023-10-11 VITALS
TEMPERATURE: 98.1 F | SYSTOLIC BLOOD PRESSURE: 132 MMHG | OXYGEN SATURATION: 96 % | RESPIRATION RATE: 17 BRPM | HEART RATE: 57 BPM | DIASTOLIC BLOOD PRESSURE: 58 MMHG

## 2023-10-11 DIAGNOSIS — N18.4 ANEMIA DUE TO STAGE 4 CHRONIC KIDNEY DISEASE (MULTI): ICD-10-CM

## 2023-10-11 DIAGNOSIS — D63.1 ANEMIA DUE TO STAGE 4 CHRONIC KIDNEY DISEASE (MULTI): ICD-10-CM

## 2023-10-11 LAB
BASOPHILS # BLD AUTO: 0.11 X10*3/UL (ref 0–0.1)
BASOPHILS NFR BLD AUTO: 1.6 %
EOSINOPHIL # BLD AUTO: 0.48 X10*3/UL (ref 0–0.7)
EOSINOPHIL NFR BLD AUTO: 6.9 %
ERYTHROCYTE [DISTWIDTH] IN BLOOD BY AUTOMATED COUNT: 13.8 % (ref 11.5–14.5)
FERRITIN SERPL-MCNC: 241 NG/ML (ref 20–300)
HCT VFR BLD AUTO: 32 % (ref 41–52)
HGB BLD-MCNC: 10.3 G/DL (ref 13.5–17.5)
IMM GRANULOCYTES # BLD AUTO: 0.01 X10*3/UL (ref 0–0.7)
IMM GRANULOCYTES NFR BLD AUTO: 0.1 % (ref 0–0.9)
IRON SATN MFR SERPL: 28 % (ref 25–45)
IRON SERPL-MCNC: 82 UG/DL (ref 35–150)
LYMPHOCYTES # BLD AUTO: 1.38 X10*3/UL (ref 1.2–4.8)
LYMPHOCYTES NFR BLD AUTO: 19.8 %
MCH RBC QN AUTO: 30.5 PG (ref 26–34)
MCHC RBC AUTO-ENTMCNC: 32.2 G/DL (ref 32–36)
MCV RBC AUTO: 95 FL (ref 80–100)
MONOCYTES # BLD AUTO: 0.88 X10*3/UL (ref 0.1–1)
MONOCYTES NFR BLD AUTO: 12.6 %
NEUTROPHILS # BLD AUTO: 4.12 X10*3/UL (ref 1.2–7.7)
NEUTROPHILS NFR BLD AUTO: 59 %
NRBC BLD-RTO: 0 /100 WBCS (ref 0–0)
PLATELET # BLD AUTO: 200 X10*3/UL (ref 150–450)
PMV BLD AUTO: 11.3 FL (ref 7.5–11.5)
RBC # BLD AUTO: 3.38 X10*6/UL (ref 4.5–5.9)
TIBC SERPL-MCNC: 288 UG/DL (ref 240–445)
UIBC SERPL-MCNC: 206 UG/DL (ref 110–370)
WBC # BLD AUTO: 7 X10*3/UL (ref 4.4–11.3)

## 2023-10-11 PROCEDURE — 83540 ASSAY OF IRON: CPT | Performed by: INTERNAL MEDICINE

## 2023-10-11 PROCEDURE — 6350000001 HC RX 635 EPOETIN >10,000 UNITS: Mod: JZ | Performed by: INTERNAL MEDICINE

## 2023-10-11 PROCEDURE — 36415 COLL VENOUS BLD VENIPUNCTURE: CPT

## 2023-10-11 PROCEDURE — 85025 COMPLETE CBC W/AUTO DIFF WBC: CPT

## 2023-10-11 PROCEDURE — 82728 ASSAY OF FERRITIN: CPT | Performed by: INTERNAL MEDICINE

## 2023-10-11 PROCEDURE — 96372 THER/PROPH/DIAG INJ SC/IM: CPT

## 2023-10-11 RX ORDER — FAMOTIDINE 10 MG/ML
20 INJECTION INTRAVENOUS ONCE AS NEEDED
Status: CANCELLED | OUTPATIENT
Start: 2023-10-25

## 2023-10-11 RX ORDER — EPINEPHRINE 0.3 MG/.3ML
0.3 INJECTION SUBCUTANEOUS EVERY 5 MIN PRN
Status: CANCELLED | OUTPATIENT
Start: 2023-10-25

## 2023-10-11 RX ORDER — DIPHENHYDRAMINE HYDROCHLORIDE 50 MG/ML
50 INJECTION INTRAMUSCULAR; INTRAVENOUS AS NEEDED
Status: CANCELLED | OUTPATIENT
Start: 2023-10-25

## 2023-10-11 RX ORDER — ALBUTEROL SULFATE 0.83 MG/ML
3 SOLUTION RESPIRATORY (INHALATION) AS NEEDED
Status: CANCELLED | OUTPATIENT
Start: 2023-10-25

## 2023-10-11 RX ADMIN — ERYTHROPOIETIN 10000 UNITS: 10000 INJECTION, SOLUTION INTRAVENOUS; SUBCUTANEOUS at 11:09

## 2023-10-11 ASSESSMENT — LIFESTYLE VARIABLES
SKIP TO QUESTIONS 9-10: 1
HOW OFTEN DO YOU HAVE A DRINK CONTAINING ALCOHOL: NEVER
AUDIT-C TOTAL SCORE: 0
HOW MANY STANDARD DRINKS CONTAINING ALCOHOL DO YOU HAVE ON A TYPICAL DAY: PATIENT DOES NOT DRINK
HOW OFTEN DO YOU HAVE SIX OR MORE DRINKS ON ONE OCCASION: NEVER

## 2023-10-11 ASSESSMENT — COLUMBIA-SUICIDE SEVERITY RATING SCALE - C-SSRS
2. HAVE YOU ACTUALLY HAD ANY THOUGHTS OF KILLING YOURSELF?: NO
1. IN THE PAST MONTH, HAVE YOU WISHED YOU WERE DEAD OR WISHED YOU COULD GO TO SLEEP AND NOT WAKE UP?: NO
6. HAVE YOU EVER DONE ANYTHING, STARTED TO DO ANYTHING, OR PREPARED TO DO ANYTHING TO END YOUR LIFE?: NO

## 2023-10-11 ASSESSMENT — PATIENT HEALTH QUESTIONNAIRE - PHQ9
SUM OF ALL RESPONSES TO PHQ9 QUESTIONS 1 AND 2: 0
1. LITTLE INTEREST OR PLEASURE IN DOING THINGS: NOT AT ALL
2. FEELING DOWN, DEPRESSED OR HOPELESS: NOT AT ALL

## 2023-10-11 ASSESSMENT — PAIN SCALES - GENERAL: PAINLEVEL: 0-NO PAIN

## 2023-10-19 ENCOUNTER — NUTRITION (OUTPATIENT)
Dept: NUTRITION | Facility: HOSPITAL | Age: 63
End: 2023-10-19
Payer: MEDICARE

## 2023-10-19 VITALS — WEIGHT: 262.2 LBS | BODY MASS INDEX: 38.83 KG/M2 | HEIGHT: 69 IN

## 2023-10-19 DIAGNOSIS — E11.22 TYPE 2 DIABETES MELLITUS WITH STAGE 4 CHRONIC KIDNEY DISEASE, WITH LONG-TERM CURRENT USE OF INSULIN (MULTI): ICD-10-CM

## 2023-10-19 DIAGNOSIS — I50.9 CHRONIC CONGESTIVE HEART FAILURE, UNSPECIFIED HEART FAILURE TYPE (MULTI): ICD-10-CM

## 2023-10-19 DIAGNOSIS — N18.4 TYPE 2 DIABETES MELLITUS WITH STAGE 4 CHRONIC KIDNEY DISEASE, WITH LONG-TERM CURRENT USE OF INSULIN (MULTI): ICD-10-CM

## 2023-10-19 DIAGNOSIS — N18.4 CHRONIC KIDNEY DISEASE, STAGE 4 (SEVERE) (MULTI): ICD-10-CM

## 2023-10-19 DIAGNOSIS — I10 BENIGN ESSENTIAL HYPERTENSION: ICD-10-CM

## 2023-10-19 DIAGNOSIS — Z79.4 TYPE 2 DIABETES MELLITUS WITH STAGE 4 CHRONIC KIDNEY DISEASE, WITH LONG-TERM CURRENT USE OF INSULIN (MULTI): ICD-10-CM

## 2023-10-19 PROCEDURE — 97802 MEDICAL NUTRITION INDIV IN: CPT | Performed by: PHYSICIAN ASSISTANT

## 2023-10-19 NOTE — PROGRESS NOTES
"Assessment     Reason for Visit:  Gregorio North is a 62 y.o. male who presents for Diabetes, Chronic Kidney Disease, Congestive Heart Failure, and Hypertension    Anthropometrics:  Anthropometrics  Height: 175.3 cm (5' 9.02\")  Weight: 119 kg (262 lb 3.2 oz)  BMI (Calculated): 38.7  IBW/kg (Dietitian Calculated): 72.7 kg  Percent of IBW: 164 %    Height:  5' 9\"  Weight 10/19/23:  262.2 lbs  BMI 10/19/23:  38.7    Labs:  9/27/23:  H/H 10.5/33.3 (L); MCHC 31.5 (L)  9/13/23:  Glu 273 (H); BUN/Cr 42/2.22 (H); GFR 32 (A); 25-OH Vit D19 (deficiency)       Food And Nutrient Intake:  Food and Nutrient History  Food and Nutrient History: Gregorio is a 62 year old male who states that he presents to nutrition consult due to diabetes and kidney problems.  His step-daughter Shelli and friend Iman also present.  Pt consented to nutrition consult with them present.  Says he was told to avoid salt, sugar, dairy.  Also states that is has a ~70 oz daily fluid limit; reports history of CHF and recent 30 lbs weight loss that he attributes to fluid.  He endorses history of healed foot wound, s/p 7 surgeries on foot last year.  Pt states that he has been working making diet and lifestyle changes.  Does reports food cravings at night.  Denies daily SMBG, although states that he is planning to start.  He reports weight loss goal of 10 lbs by December.  Energy Intake: Good > 75 %  Fluid Intake: Beverages- 68 oz water per day, milk, 12 oz to 16 oz juice, coffee (pt reports fluid limit of ~70 oz per day although has been consuming more than this amount).  Food Allergy: pt states that artificial sugars give him a headache.  Food Intolerance: none reported.  GI Symptoms: none  GI Symptoms greater than 2 weeks: n/a  Oral Problems: denies     Food Intake  Meal 1: B- bowl of cereal (Special K or Frosted Flakes) with whole milk OR 3 eggs cooked in unsalted butter, 2 slices ww toast, 2 sausage links, hashbrowns  Meal 2: L- spaghetti with 2 " "meatballs and garlic bread (from restaurant)  Meal 3: D- tossed salad with italian dressing, black olives, tomatoes, lettuce, cheese (from restaurant)  Snacks: fruit, microwave popcorn with butter    Food Preparation  Cooking: Family  Grocery Shopping: Family  Dining Out: 1 to 3 times a week  Other: [employer] two times per week                                  Micronutrient Intake  Vitamin Intake: D  Mineral/Element Intake: Magnesium                Food And Nutrient Administration:                        Factors:                         Physical Activities:  Physical Activity  Physical Activity History: Pt reports walking 1500 to 2000 steps per day.  Consistency: Yes           Knowledge Beliefs Attitudes and Behavior                                       Nutrition Focused Physical Exam:  Subcutaneous Fat Loss  Orbital Fat Pads: Defer (Pt with adequately nourished appearance, eating adequately, malnutrition not suspected.)        Energy Needs  Calculated Energy Needs Using Equations  Height: 175.3 cm (5' 9.02\")  Weight Used for Equation Calculations: 119 kg (262 lb 3.2 oz)  Jessika Suarez Equation (Overweight or Obese Patients): 1975  Equation Chosen to Use by RD: Dameon Hardin  Activity Factor: 1.2  Estimated Energy Needs  Total Energy Estimated Needs (kCal): 1870 kCal  Method for Estimating Needs: MSJ x AF 1.2 less 500 kcal per day for weight loss.  Estimated Fluid Needs  Method for Estimating Needs: per medical team  Estimated Protein Needs  Total Protein Estimated Needs (g): 72 g  Total Protein Estimated Needs (g/kg): 0.6 g/kg  Method for Estimating Needs: 72 to 95 g per day (0.6 to 0.8 g/kg actual weight)  Estimated Carbohydrate Needs  Method for Estimating Needs: 45 to 60 grams carbs per meal and 15 to 30 grams carbs per snack.  Micronutrient Needs  Total Estimated Mineral Needs: Sodium  Method for Estimating Mineral Needs: 1500 to 2000 mg per day        Diagnosis      Nutrition Diagnosis  Patient has " Nutrition Diagnosis: Yes  Diagnosis Status (1): New  Nutrition Diagnosis 1: Food and nutrition related knowledge deficit  Related to (1): lack of prior diabetes and CKD diet education  As Evidenced by (1): recent diagnosis of CKD 4, diet recall reflecting intake of restaurant foods, foods high in sodium    Interventions/Recommendations   Food and Nutrition Delivery  Meals & Snacks: Carbohydrate-modified diet  Goals: Eat 3 meals per day on a regular schedule and 2 to 3 small snacks; Limit carbohydrate to 45 to 60 grams per meal and 15 to 30 grams per snack each day; Limit sodium to 1500 to 2000 mg per day;  Choose foods low in phosphorus; read food labels to determine carbohydrate, sodium and phosphorus content.  Nutrition Education  Nutrition Education Content: Content related nutrition education  Goals: 1.  CKD 3 to 5 Nutrition Therapy (AND); 2.  Sodium Restricted Diet (AND); 3.  Phosphorus Diet Education (AND); 4.  List of non-starchy vegetables (AND); My Plate; Pt will demonstrate good understanding of nutrition education provided today.  Nutrition Counseling  Theoretical Basis/Approach: Nutrition counseling based on cognitive behavioral theory approach, Nutrition counseling based on transtheoretical model stages of change approach  Goals: Stage of chage:  preparation  Strategies: Nutrition counseling based on goal setting strategy, Nutrition counseling based on social support strategy  Coordination of Nutrition Care by a Nutrition Professional  Collaboration and referral of nutrition care: Collaboration by nutrition professional with other providers  Goals: cc:  referring provider and Nephrologist        Patient Instructions   Nutrition Goals and Recommendations:  1.  Eat 3 meals per day on a regular schedule plus 1 to 2 small snacks  -->  Use My Plate Meal Planning Tool  -->  Choose more home-prepared foods and fewer restaurant foods    2.   Limit carbohydrate to 3 to 4 portions (45 to 60 g) per meal and 1 to 2  "portions (15 to 30 g) per one snack each day     3.  Measure portions of carbohydrate containing foods with measuring cups and/or food scale    4.  Eat a lean protein source at each meal- ideas discussed    5.  Limit Sodium to 1500 to 2,000 mg per day  -->  choose \"low sodium\" foods  -->  avoid potassium-containing salt substitutes    6.  Choose foods that are low in phosphorus  -->  refer to food lists provided  -->  read food labels to determine phosphorus ingredients    7.  Fluid limit per Cardiology    Education Provided:  Chronic Kidney Disease Stage 3 to 5 Diet Education (AND)  Low Sodium Nutrition Therapy (AND)  Phosphorus Content of Foods (AND)  List of Non-Starchy vegetables (AND)  Sodium Free Seasoning Suggestions (AND)  My Plate    Monitoring and Evaluation   Food/Nutrient Related History Monitoring  Monitoring and Evaluation Plan: Mineral/element intake, Carbohydrate intake  Estimated carbohydrate intake: Estimated carbohydrate intake  Criteria: Pt will limit carbohydrate to 45 to 60 grams per meal and 15 to 30 grams per snack each day.  Mineral/Element Intake: Measured mineral/element intake  Criteria: Pt will limit intake of sodium to 1500 to 2000 mg per day; Pt will choose foods low in phosphorus (refer to list)  Additional Plan: Evaluate nutrition intervention as compared to nutrition goal(s) and estimated nutrient need criteria.  Knowledge/Beliefs/Attitudes  Monitoring and Evaluation Plan: Food and nutrition knowledge  Food and nutrition knowledge: Nutrition knowledge of individual client  Criteria: patient will demonstrate knowledge by implementing nutrition education presented today.  Biochemical Data, Medical Tests and Procedures  Monitoring and Evaluation Plan: Electrolyte/renal panel, Glucose/endocrine profile  Electrolyte and Renal Panel: Phosphorus  Criteria: Serum phosphorus wnl  Glucose/Endocrine Profile: Glucose, casual, Glucose, fasting  Criteria: Serum Glucose 80 to < 180 " mg/dL        Time Spent  Time spent directly with patient, family or caregiver: 60 minutes        Readiness to Change : Good  Level of Understanding : Good  Anticipated Compliant : Good

## 2023-10-19 NOTE — PATIENT INSTRUCTIONS
"Nutrition Goals and Recommendations:  1.  Eat 3 meals per day on a regular schedule plus 1 to 2 small snacks  -->  Use My Plate Meal Planning Tool  -->  Choose more home-prepared foods and fewer restaurant foods    2.   Limit carbohydrate to 3 to 4 portions (45 to 60 g) per meal and 1 to 2 portions (15 to 30 g) per one snack each day     3.  Measure portions of carbohydrate containing foods with measuring cups and/or food scale    4.  Eat a lean protein source at each meal- ideas discussed    5.  Limit Sodium to 1500 to 2,000 mg per day  -->  choose \"low sodium\" foods  -->  avoid potassium-containing salt substitutes    6.  Choose foods that are low in phosphorus  -->  refer to food lists provided  -->  read food labels to determine phosphorus ingredients    7.  Fluid limit per Cardiology    Education Provided:  Chronic Kidney Disease Stage 3 to 5 Diet Education (AND)  Low Sodium Nutrition Therapy (AND)  Phosphorus Content of Foods (AND)  List of Non-Starchy vegetables (AND)  Sodium Free Seasoning Suggestions (AND)  My Plate  "

## 2023-10-25 ENCOUNTER — INFUSION (OUTPATIENT)
Dept: HEMATOLOGY/ONCOLOGY | Facility: HOSPITAL | Age: 63
End: 2023-10-25
Payer: MEDICARE

## 2023-10-25 VITALS
SYSTOLIC BLOOD PRESSURE: 121 MMHG | OXYGEN SATURATION: 95 % | RESPIRATION RATE: 18 BRPM | DIASTOLIC BLOOD PRESSURE: 59 MMHG | TEMPERATURE: 97.5 F | HEART RATE: 61 BPM

## 2023-10-25 DIAGNOSIS — N18.4 ANEMIA DUE TO STAGE 4 CHRONIC KIDNEY DISEASE (MULTI): ICD-10-CM

## 2023-10-25 DIAGNOSIS — D63.1 ANEMIA DUE TO STAGE 4 CHRONIC KIDNEY DISEASE (MULTI): ICD-10-CM

## 2023-10-25 LAB
BASOPHILS # BLD AUTO: 0.1 X10*3/UL (ref 0–0.1)
BASOPHILS NFR BLD AUTO: 1.7 %
EOSINOPHIL # BLD AUTO: 0.3 X10*3/UL (ref 0–0.7)
EOSINOPHIL NFR BLD AUTO: 5.2 %
ERYTHROCYTE [DISTWIDTH] IN BLOOD BY AUTOMATED COUNT: 13.5 % (ref 11.5–14.5)
HCT VFR BLD AUTO: 34.2 % (ref 41–52)
HGB BLD-MCNC: 11 G/DL (ref 13.5–17.5)
IMM GRANULOCYTES # BLD AUTO: 0.02 X10*3/UL (ref 0–0.7)
IMM GRANULOCYTES NFR BLD AUTO: 0.3 % (ref 0–0.9)
LYMPHOCYTES # BLD AUTO: 1.24 X10*3/UL (ref 1.2–4.8)
LYMPHOCYTES NFR BLD AUTO: 21.6 %
MCH RBC QN AUTO: 30.3 PG (ref 26–34)
MCHC RBC AUTO-ENTMCNC: 32.2 G/DL (ref 32–36)
MCV RBC AUTO: 94 FL (ref 80–100)
MONOCYTES # BLD AUTO: 0.71 X10*3/UL (ref 0.1–1)
MONOCYTES NFR BLD AUTO: 12.4 %
NEUTROPHILS # BLD AUTO: 3.37 X10*3/UL (ref 1.2–7.7)
NEUTROPHILS NFR BLD AUTO: 58.8 %
NRBC BLD-RTO: 0 /100 WBCS (ref 0–0)
PLATELET # BLD AUTO: 208 X10*3/UL (ref 150–450)
PMV BLD AUTO: 11.2 FL (ref 7.5–11.5)
RBC # BLD AUTO: 3.63 X10*6/UL (ref 4.5–5.9)
WBC # BLD AUTO: 5.7 X10*3/UL (ref 4.4–11.3)

## 2023-10-25 PROCEDURE — 85025 COMPLETE CBC W/AUTO DIFF WBC: CPT

## 2023-10-25 PROCEDURE — 6350000001 HC RX 635 EPOETIN >10,000 UNITS: Mod: JZ | Performed by: INTERNAL MEDICINE

## 2023-10-25 PROCEDURE — 36415 COLL VENOUS BLD VENIPUNCTURE: CPT

## 2023-10-25 PROCEDURE — 96372 THER/PROPH/DIAG INJ SC/IM: CPT

## 2023-10-25 RX ORDER — DIPHENHYDRAMINE HYDROCHLORIDE 50 MG/ML
50 INJECTION INTRAMUSCULAR; INTRAVENOUS AS NEEDED
Status: DISCONTINUED | OUTPATIENT
Start: 2023-10-25 | End: 2023-10-25 | Stop reason: HOSPADM

## 2023-10-25 RX ORDER — FAMOTIDINE 10 MG/ML
20 INJECTION INTRAVENOUS ONCE AS NEEDED
Status: DISCONTINUED | OUTPATIENT
Start: 2023-10-25 | End: 2023-10-25 | Stop reason: HOSPADM

## 2023-10-25 RX ORDER — EPINEPHRINE 0.3 MG/.3ML
0.3 INJECTION SUBCUTANEOUS EVERY 5 MIN PRN
Status: DISCONTINUED | OUTPATIENT
Start: 2023-10-25 | End: 2023-10-25 | Stop reason: HOSPADM

## 2023-10-25 RX ORDER — EPINEPHRINE 0.3 MG/.3ML
0.3 INJECTION SUBCUTANEOUS EVERY 5 MIN PRN
Status: CANCELLED | OUTPATIENT
Start: 2023-11-08

## 2023-10-25 RX ORDER — ALBUTEROL SULFATE 0.83 MG/ML
3 SOLUTION RESPIRATORY (INHALATION) AS NEEDED
Status: CANCELLED | OUTPATIENT
Start: 2023-11-08

## 2023-10-25 RX ORDER — FAMOTIDINE 10 MG/ML
20 INJECTION INTRAVENOUS ONCE AS NEEDED
Status: CANCELLED | OUTPATIENT
Start: 2023-11-08

## 2023-10-25 RX ORDER — DIPHENHYDRAMINE HYDROCHLORIDE 50 MG/ML
50 INJECTION INTRAMUSCULAR; INTRAVENOUS AS NEEDED
Status: CANCELLED | OUTPATIENT
Start: 2023-11-08

## 2023-10-25 RX ORDER — ALBUTEROL SULFATE 0.83 MG/ML
3 SOLUTION RESPIRATORY (INHALATION) AS NEEDED
Status: DISCONTINUED | OUTPATIENT
Start: 2023-10-25 | End: 2023-10-25 | Stop reason: HOSPADM

## 2023-10-25 RX ADMIN — ERYTHROPOIETIN 10000 UNITS: 10000 INJECTION, SOLUTION INTRAVENOUS; SUBCUTANEOUS at 10:43

## 2023-10-25 ASSESSMENT — PAIN SCALES - GENERAL: PAINLEVEL: 0-NO PAIN

## 2023-11-08 ENCOUNTER — INFUSION (OUTPATIENT)
Dept: HEMATOLOGY/ONCOLOGY | Facility: HOSPITAL | Age: 63
End: 2023-11-08
Payer: MEDICARE

## 2023-11-08 VITALS
OXYGEN SATURATION: 95 % | SYSTOLIC BLOOD PRESSURE: 134 MMHG | TEMPERATURE: 97.5 F | RESPIRATION RATE: 18 BRPM | HEART RATE: 54 BPM | DIASTOLIC BLOOD PRESSURE: 52 MMHG

## 2023-11-08 DIAGNOSIS — D63.1 ANEMIA DUE TO STAGE 4 CHRONIC KIDNEY DISEASE (MULTI): ICD-10-CM

## 2023-11-08 DIAGNOSIS — N18.4 ANEMIA DUE TO STAGE 4 CHRONIC KIDNEY DISEASE (MULTI): ICD-10-CM

## 2023-11-08 LAB
BASOPHILS # BLD AUTO: 0.1 X10*3/UL (ref 0–0.1)
BASOPHILS NFR BLD AUTO: 1.7 %
EOSINOPHIL # BLD AUTO: 0.3 X10*3/UL (ref 0–0.7)
EOSINOPHIL NFR BLD AUTO: 5 %
ERYTHROCYTE [DISTWIDTH] IN BLOOD BY AUTOMATED COUNT: 13.8 % (ref 11.5–14.5)
HCT VFR BLD AUTO: 34.8 % (ref 41–52)
HGB BLD-MCNC: 11.2 G/DL (ref 13.5–17.5)
IMM GRANULOCYTES # BLD AUTO: 0.02 X10*3/UL (ref 0–0.7)
IMM GRANULOCYTES NFR BLD AUTO: 0.3 % (ref 0–0.9)
LYMPHOCYTES # BLD AUTO: 1.33 X10*3/UL (ref 1.2–4.8)
LYMPHOCYTES NFR BLD AUTO: 22.3 %
MCH RBC QN AUTO: 30.2 PG (ref 26–34)
MCHC RBC AUTO-ENTMCNC: 32.2 G/DL (ref 32–36)
MCV RBC AUTO: 94 FL (ref 80–100)
MONOCYTES # BLD AUTO: 0.67 X10*3/UL (ref 0.1–1)
MONOCYTES NFR BLD AUTO: 11.2 %
NEUTROPHILS # BLD AUTO: 3.54 X10*3/UL (ref 1.2–7.7)
NEUTROPHILS NFR BLD AUTO: 59.5 %
NRBC BLD-RTO: 0 /100 WBCS (ref 0–0)
PLATELET # BLD AUTO: 192 X10*3/UL (ref 150–450)
RBC # BLD AUTO: 3.71 X10*6/UL (ref 4.5–5.9)
WBC # BLD AUTO: 6 X10*3/UL (ref 4.4–11.3)

## 2023-11-08 PROCEDURE — 99195 PHLEBOTOMY: CPT

## 2023-11-08 PROCEDURE — 36415 COLL VENOUS BLD VENIPUNCTURE: CPT

## 2023-11-08 PROCEDURE — 85025 COMPLETE CBC W/AUTO DIFF WBC: CPT

## 2023-11-08 RX ORDER — FAMOTIDINE 10 MG/ML
20 INJECTION INTRAVENOUS ONCE AS NEEDED
Status: CANCELLED | OUTPATIENT
Start: 2023-11-22

## 2023-11-08 RX ORDER — DIPHENHYDRAMINE HYDROCHLORIDE 50 MG/ML
50 INJECTION INTRAMUSCULAR; INTRAVENOUS AS NEEDED
Status: CANCELLED | OUTPATIENT
Start: 2023-11-22

## 2023-11-08 RX ORDER — EPINEPHRINE 0.3 MG/.3ML
0.3 INJECTION SUBCUTANEOUS EVERY 5 MIN PRN
Status: CANCELLED | OUTPATIENT
Start: 2023-11-22

## 2023-11-08 RX ORDER — ALBUTEROL SULFATE 0.83 MG/ML
3 SOLUTION RESPIRATORY (INHALATION) AS NEEDED
Status: CANCELLED | OUTPATIENT
Start: 2023-11-22

## 2023-11-08 ASSESSMENT — PAIN SCALES - GENERAL: PAINLEVEL: 0-NO PAIN

## 2023-11-22 ENCOUNTER — INFUSION (OUTPATIENT)
Dept: HEMATOLOGY/ONCOLOGY | Facility: HOSPITAL | Age: 63
End: 2023-11-22
Payer: MEDICARE

## 2023-11-22 VITALS
OXYGEN SATURATION: 97 % | HEART RATE: 59 BPM | RESPIRATION RATE: 18 BRPM | DIASTOLIC BLOOD PRESSURE: 62 MMHG | TEMPERATURE: 98.4 F | SYSTOLIC BLOOD PRESSURE: 153 MMHG

## 2023-11-22 DIAGNOSIS — N18.4 ANEMIA DUE TO STAGE 4 CHRONIC KIDNEY DISEASE (MULTI): ICD-10-CM

## 2023-11-22 DIAGNOSIS — D63.1 ANEMIA DUE TO STAGE 4 CHRONIC KIDNEY DISEASE (MULTI): ICD-10-CM

## 2023-11-22 LAB
BASOPHILS # BLD AUTO: 0.11 X10*3/UL (ref 0–0.1)
BASOPHILS NFR BLD AUTO: 1.6 %
EOSINOPHIL # BLD AUTO: 0.38 X10*3/UL (ref 0–0.7)
EOSINOPHIL NFR BLD AUTO: 5.6 %
ERYTHROCYTE [DISTWIDTH] IN BLOOD BY AUTOMATED COUNT: 13.9 % (ref 11.5–14.5)
HCT VFR BLD AUTO: 34 % (ref 41–52)
HGB BLD-MCNC: 11.1 G/DL (ref 13.5–17.5)
IMM GRANULOCYTES # BLD AUTO: 0.02 X10*3/UL (ref 0–0.7)
IMM GRANULOCYTES NFR BLD AUTO: 0.3 % (ref 0–0.9)
LYMPHOCYTES # BLD AUTO: 1.32 X10*3/UL (ref 1.2–4.8)
LYMPHOCYTES NFR BLD AUTO: 19.3 %
MCH RBC QN AUTO: 30.5 PG (ref 26–34)
MCHC RBC AUTO-ENTMCNC: 32.6 G/DL (ref 32–36)
MCV RBC AUTO: 93 FL (ref 80–100)
MONOCYTES # BLD AUTO: 0.85 X10*3/UL (ref 0.1–1)
MONOCYTES NFR BLD AUTO: 12.4 %
NEUTROPHILS # BLD AUTO: 4.16 X10*3/UL (ref 1.2–7.7)
NEUTROPHILS NFR BLD AUTO: 60.8 %
NRBC BLD-RTO: 0 /100 WBCS (ref 0–0)
PLATELET # BLD AUTO: 201 X10*3/UL (ref 150–450)
RBC # BLD AUTO: 3.64 X10*6/UL (ref 4.5–5.9)
WBC # BLD AUTO: 6.8 X10*3/UL (ref 4.4–11.3)

## 2023-11-22 PROCEDURE — 36415 COLL VENOUS BLD VENIPUNCTURE: CPT

## 2023-11-22 PROCEDURE — 85025 COMPLETE CBC W/AUTO DIFF WBC: CPT

## 2023-11-22 RX ORDER — ALBUTEROL SULFATE 0.83 MG/ML
3 SOLUTION RESPIRATORY (INHALATION) AS NEEDED
Status: CANCELLED | OUTPATIENT
Start: 2023-12-06

## 2023-11-22 RX ORDER — FAMOTIDINE 10 MG/ML
20 INJECTION INTRAVENOUS ONCE AS NEEDED
Status: CANCELLED | OUTPATIENT
Start: 2023-12-06

## 2023-11-22 RX ORDER — DIPHENHYDRAMINE HYDROCHLORIDE 50 MG/ML
50 INJECTION INTRAMUSCULAR; INTRAVENOUS AS NEEDED
Status: CANCELLED | OUTPATIENT
Start: 2023-12-06

## 2023-11-22 RX ORDER — EPINEPHRINE 0.3 MG/.3ML
0.3 INJECTION SUBCUTANEOUS EVERY 5 MIN PRN
Status: CANCELLED | OUTPATIENT
Start: 2023-12-06

## 2023-11-22 ASSESSMENT — PAIN SCALES - GENERAL: PAINLEVEL: 0-NO PAIN

## 2023-11-22 NOTE — PROGRESS NOTES
Pt. Hgb lab result 11.1; per provider order parameters, no Procrit injection required today. Pt. updated.

## 2023-12-05 ENCOUNTER — LAB (OUTPATIENT)
Dept: LAB | Facility: LAB | Age: 63
End: 2023-12-05
Payer: MEDICARE

## 2023-12-05 DIAGNOSIS — D63.1 ANEMIA DUE TO STAGE 4 CHRONIC KIDNEY DISEASE (MULTI): ICD-10-CM

## 2023-12-05 DIAGNOSIS — N18.4 CHRONIC KIDNEY DISEASE, STAGE 4 (SEVERE) (MULTI): Primary | ICD-10-CM

## 2023-12-05 DIAGNOSIS — N18.4 ANEMIA DUE TO STAGE 4 CHRONIC KIDNEY DISEASE (MULTI): ICD-10-CM

## 2023-12-05 LAB
BASOPHILS # BLD AUTO: 0.12 X10*3/UL (ref 0–0.1)
BASOPHILS NFR BLD AUTO: 1.3 %
EOSINOPHIL # BLD AUTO: 0.4 X10*3/UL (ref 0–0.7)
EOSINOPHIL NFR BLD AUTO: 4.2 %
ERYTHROCYTE [DISTWIDTH] IN BLOOD BY AUTOMATED COUNT: 14.1 % (ref 11.5–14.5)
HCT VFR BLD AUTO: 35 % (ref 41–52)
HGB BLD-MCNC: 10.9 G/DL (ref 13.5–17.5)
IMM GRANULOCYTES # BLD AUTO: 0.02 X10*3/UL (ref 0–0.7)
IMM GRANULOCYTES NFR BLD AUTO: 0.2 % (ref 0–0.9)
LYMPHOCYTES # BLD AUTO: 1.91 X10*3/UL (ref 1.2–4.8)
LYMPHOCYTES NFR BLD AUTO: 20.1 %
MCH RBC QN AUTO: 29.8 PG (ref 26–34)
MCHC RBC AUTO-ENTMCNC: 31.1 G/DL (ref 32–36)
MCV RBC AUTO: 96 FL (ref 80–100)
MONOCYTES # BLD AUTO: 1.01 X10*3/UL (ref 0.1–1)
MONOCYTES NFR BLD AUTO: 10.6 %
NEUTROPHILS # BLD AUTO: 6.04 X10*3/UL (ref 1.2–7.7)
NEUTROPHILS NFR BLD AUTO: 63.6 %
NRBC BLD-RTO: 0 /100 WBCS (ref 0–0)
PLATELET # BLD AUTO: 230 X10*3/UL (ref 150–450)
RBC # BLD AUTO: 3.66 X10*6/UL (ref 4.5–5.9)
WBC # BLD AUTO: 9.5 X10*3/UL (ref 4.4–11.3)

## 2023-12-05 PROCEDURE — 36415 COLL VENOUS BLD VENIPUNCTURE: CPT

## 2023-12-05 PROCEDURE — 82570 ASSAY OF URINE CREATININE: CPT

## 2023-12-05 PROCEDURE — 82043 UR ALBUMIN QUANTITATIVE: CPT

## 2023-12-06 ENCOUNTER — INFUSION (OUTPATIENT)
Dept: HEMATOLOGY/ONCOLOGY | Facility: HOSPITAL | Age: 63
End: 2023-12-06
Payer: MEDICARE

## 2023-12-06 VITALS
DIASTOLIC BLOOD PRESSURE: 54 MMHG | BODY MASS INDEX: 39.62 KG/M2 | OXYGEN SATURATION: 98 % | WEIGHT: 268.41 LBS | TEMPERATURE: 97.2 F | HEART RATE: 57 BPM | RESPIRATION RATE: 18 BRPM | SYSTOLIC BLOOD PRESSURE: 110 MMHG

## 2023-12-06 DIAGNOSIS — N18.4 ANEMIA DUE TO STAGE 4 CHRONIC KIDNEY DISEASE (MULTI): ICD-10-CM

## 2023-12-06 DIAGNOSIS — D63.1 ANEMIA DUE TO STAGE 4 CHRONIC KIDNEY DISEASE (MULTI): ICD-10-CM

## 2023-12-06 LAB
CREAT UR-MCNC: 47 MG/DL (ref 20–370)
MICROALBUMIN UR-MCNC: 329.2 MG/L
MICROALBUMIN/CREAT UR: 700.4 UG/MG CREAT

## 2023-12-06 PROCEDURE — 96372 THER/PROPH/DIAG INJ SC/IM: CPT

## 2023-12-06 PROCEDURE — 2500000004 HC RX 250 GENERAL PHARMACY W/ HCPCS (ALT 636 FOR OP/ED): Mod: JZ | Performed by: INTERNAL MEDICINE

## 2023-12-06 RX ORDER — DIPHENHYDRAMINE HYDROCHLORIDE 50 MG/ML
50 INJECTION INTRAMUSCULAR; INTRAVENOUS AS NEEDED
Status: CANCELLED | OUTPATIENT
Start: 2023-12-20

## 2023-12-06 RX ORDER — FAMOTIDINE 10 MG/ML
20 INJECTION INTRAVENOUS ONCE AS NEEDED
Status: CANCELLED | OUTPATIENT
Start: 2023-12-20

## 2023-12-06 RX ORDER — ALBUTEROL SULFATE 0.83 MG/ML
3 SOLUTION RESPIRATORY (INHALATION) AS NEEDED
Status: CANCELLED | OUTPATIENT
Start: 2023-12-20

## 2023-12-06 RX ORDER — EPINEPHRINE 0.3 MG/.3ML
0.3 INJECTION SUBCUTANEOUS EVERY 5 MIN PRN
Status: CANCELLED | OUTPATIENT
Start: 2023-12-20

## 2023-12-06 RX ADMIN — ERYTHROPOIETIN 10000 UNITS: 10000 INJECTION, SOLUTION INTRAVENOUS; SUBCUTANEOUS at 10:39

## 2023-12-06 ASSESSMENT — PAIN SCALES - GENERAL: PAINLEVEL: 0-NO PAIN

## 2023-12-07 ENCOUNTER — LAB (OUTPATIENT)
Dept: LAB | Facility: LAB | Age: 63
End: 2023-12-07
Payer: MEDICARE

## 2023-12-07 ENCOUNTER — OFFICE VISIT (OUTPATIENT)
Dept: NEPHROLOGY | Facility: CLINIC | Age: 63
End: 2023-12-07
Payer: MEDICARE

## 2023-12-07 VITALS
DIASTOLIC BLOOD PRESSURE: 64 MMHG | WEIGHT: 267.4 LBS | HEART RATE: 60 BPM | HEIGHT: 69 IN | TEMPERATURE: 98 F | SYSTOLIC BLOOD PRESSURE: 121 MMHG | BODY MASS INDEX: 39.6 KG/M2 | OXYGEN SATURATION: 97 % | RESPIRATION RATE: 16 BRPM

## 2023-12-07 DIAGNOSIS — I73.9 PAD (PERIPHERAL ARTERY DISEASE) (CMS-HCC): ICD-10-CM

## 2023-12-07 DIAGNOSIS — N18.32 STAGE 3B CHRONIC KIDNEY DISEASE (MULTI): ICD-10-CM

## 2023-12-07 DIAGNOSIS — E87.6 HYPOKALEMIA: ICD-10-CM

## 2023-12-07 DIAGNOSIS — N04.9 NEPHROTIC SYNDROME: ICD-10-CM

## 2023-12-07 DIAGNOSIS — E78.00 PURE HYPERCHOLESTEROLEMIA: ICD-10-CM

## 2023-12-07 DIAGNOSIS — I10 BENIGN ESSENTIAL HYPERTENSION: ICD-10-CM

## 2023-12-07 DIAGNOSIS — E08.00 DIABETES MELLITUS DUE TO UNDERLYING CONDITION WITH HYPEROSMOLARITY WITHOUT COMA, WITHOUT LONG-TERM CURRENT USE OF INSULIN (MULTI): ICD-10-CM

## 2023-12-07 DIAGNOSIS — E55.9 VITAMIN D DEFICIENCY: ICD-10-CM

## 2023-12-07 DIAGNOSIS — E66.01 CLASS 3 SEVERE OBESITY DUE TO EXCESS CALORIES WITH SERIOUS COMORBIDITY IN ADULT, UNSPECIFIED BMI (MULTI): ICD-10-CM

## 2023-12-07 DIAGNOSIS — N18.32 STAGE 3B CHRONIC KIDNEY DISEASE (MULTI): Primary | ICD-10-CM

## 2023-12-07 DIAGNOSIS — I50.20 SYSTOLIC CONGESTIVE HEART FAILURE, UNSPECIFIED HF CHRONICITY (MULTI): ICD-10-CM

## 2023-12-07 LAB
ALBUMIN SERPL BCP-MCNC: 3.9 G/DL (ref 3.4–5)
ANION GAP SERPL CALC-SCNC: 16 MMOL/L (ref 10–20)
BUN SERPL-MCNC: 60 MG/DL (ref 6–23)
CALCIUM SERPL-MCNC: 8.6 MG/DL (ref 8.6–10.3)
CHLORIDE SERPL-SCNC: 108 MMOL/L (ref 98–107)
CO2 SERPL-SCNC: 21 MMOL/L (ref 21–32)
CREAT SERPL-MCNC: 2.63 MG/DL (ref 0.5–1.3)
GFR SERPL CREATININE-BSD FRML MDRD: 26 ML/MIN/1.73M*2
GLUCOSE SERPL-MCNC: 236 MG/DL (ref 74–99)
PHOSPHATE SERPL-MCNC: 4.6 MG/DL (ref 2.5–4.9)
POTASSIUM SERPL-SCNC: 4.8 MMOL/L (ref 3.5–5.3)
SODIUM SERPL-SCNC: 140 MMOL/L (ref 136–145)

## 2023-12-07 PROCEDURE — 80069 RENAL FUNCTION PANEL: CPT

## 2023-12-07 PROCEDURE — 99215 OFFICE O/P EST HI 40 MIN: CPT | Performed by: INTERNAL MEDICINE

## 2023-12-07 PROCEDURE — 4010F ACE/ARB THERAPY RXD/TAKEN: CPT | Performed by: INTERNAL MEDICINE

## 2023-12-07 PROCEDURE — 36415 COLL VENOUS BLD VENIPUNCTURE: CPT

## 2023-12-07 PROCEDURE — 3062F POS MACROALBUMINURIA REV: CPT | Performed by: INTERNAL MEDICINE

## 2023-12-07 PROCEDURE — 3074F SYST BP LT 130 MM HG: CPT | Performed by: INTERNAL MEDICINE

## 2023-12-07 PROCEDURE — 3078F DIAST BP <80 MM HG: CPT | Performed by: INTERNAL MEDICINE

## 2023-12-07 PROCEDURE — 3066F NEPHROPATHY DOC TX: CPT | Performed by: INTERNAL MEDICINE

## 2023-12-07 PROCEDURE — 1036F TOBACCO NON-USER: CPT | Performed by: INTERNAL MEDICINE

## 2023-12-07 NOTE — PROGRESS NOTES
Subjective     Mr. 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  We dropped Lasix due to dizziness-significantly improved.  We continued Farxiga 10 mg and lisinopril 40 mg.  Today, Mr. North came alone.  Improved leg swelling.  No kidney related complaints or concerns.  He missed diabetes medication for few weeks-hemoglobin A1c is worsening 8.7.  He did not do blood work before the visit as instructed-we will get it done.  He continues to get Epogen shots for anemia-improved.  We discussed starting finerenone today for diabetic nephropathy management and discussed risk for hyperkalemia      Prior notes  Last office visit April 2023. Was started Farxiga 10 mg for volume/hypertension/CKD management. Was started allopurinol 100 mg for hyperuricemia. Today, Mr. North reports feeling well. He started feeling dizzy when he stands up after starting Farxiga. I advised to go down on furosemide dose for possible volume depletion. He had blood work done last week that showed slightly worsening serum creatinine 3 from 2.1 and GFR down to 23 from 34 from 40 prior. Possibly due to hemodynamic injury. Uric acid is now normal after starting allopurinol. Leg swelling improved. No shortness of breath. He does not see hematology anymore in [place] shots have been on hold. Repeat CBC shows worsening hemoglobin 9.8. He requested us to take care of his anemia management as part of CKD management-we will do     Per prior notes     Last office visit February 2023. Significant hypervolemia with nephrotic syndrome. We obtained kidney biopsy-came back with insufficient tissue not able to get much information. We uptitrated diuretics. We uptitrated her  ACE inhibitors. In the interim, Mr. North reports marked improvement in leg swelling and shortness of breath. He is adherent to low-salt diet. Today he came with his stepdaughter. He is aware of the insufficient sample of kidney biopsy-mutual decision not to repeat kidney biopsy at this time. He had blood work done recently and results were discussed with him in details including worsening CKD and within normal electrolytes. Improved albuminuria and proteinuria-remains to be significant but much better from prior. Blood pressure is accepted     Her prior notes         Patient has a past medical history of T2DM, Lyme disease, HTN, CHF, FILOMENA, Neuronitis, CKD, and prostate CA. 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 He denies a family history of kidney problems, but does endorse a family history of heart attack and diabetes. He was hospitalized in February 2022 for foot pain. He was placed on Vancomycin and Zosyn for a foot infection. He states that there is foam in his urine and has some eye swelling in the mornings. He denies NSAID use and only uses Tylenol for pain. He was diagnosed with Lyme disease in November 2015 and was on Doxycycline. Bloodwork was obtained and reviewed. GFR was 43 and Cr was 1.76. UA in October 2022 showed +3 protein. UA micro revealed RBCs. Alb/Cr ratio in October was significantly elevated. Since January of 2022, he has gained roughly 20 pounds. A kidney US done in February 2022 revealed large, but unremarkable kidneys. Discussed repeating bloodwork after today's visit for most up to date labs. We will increase Lasix to 80 mg Am and 40 mg PM. We will increase potassium chloride as a result of the increased Lasix. Continue Lisinopril 40 mg daily - beneficial for protein leak. Continue Atorvastatin. We discussed need for a kidney biopsy for further testing, and Plavix will need to be stopped 5 days prior to biopsy. Follow-up in one month with repeat bloodwork before the visit. If you  become anuric, swelling increases, or you become SOB go to the Emergency Room immediately. Educated on a low salt diet with fresh fruits and vegetables.            Objective   There were no vitals taken for this visit.  Wt Readings from Last 3 Encounters:   12/06/23 122 kg (268 lb 6.6 oz)   10/19/23 119 kg (262 lb 3.2 oz)   09/19/23 117 kg (258 lb 9.6 oz)       Physical Exam    General appearance: no distress awake and alert on room air, euvolemic on exam  Eyes: non-icteric  HEENT: atrumatic head, PEERLA, moist mucosa  Skin: no apparent rash  Heart: NSR, S1, S2 normal, no murmur or gallop  Lungs: Symmetrical expansion,CTA bilat no wheezing/crackles  Abdomen: soft, nt/nd, obese  Extremities: no edema bilat  Neuro: No FND,asterixis, no focal deficits noticed        Review of Systems     Constitutional: no fever, no chills, no recent weight gain and no recent weight loss.   Eyes: no blurred vision and no diplopia.   ENT: no hearing loss, no earache, no sore throat, no swollen glands in the neck and no nasal discharge.   Cardiovascular: no chest pain, no palpitations and no lower extremity edema.   Respiratory: no shortness of breath, no chronic cough and no shortness of breath during exertion.   Gastrointestinal: no abdominal pain, no constipation, no heartburn, no vomiting, no bloody stools and no change in bowel movements.   Genitourinary: no dysuria and no hematuria.   Musculoskeletal: no arthralgias and no myalgias.   Skin: no rashes and no skin lesions.   Neurological: no headaches and no dizziness.   Psychiatric: no confusion, no depression and no anxiety.   Endocrine: no heat intolerance, no cold intolerance, appetite not increased, no thyroid disorder, no increased urinary frequency and no dry skin.   Hematologic/Lymphatic: does not bleed easily and does not bruise easily.   All other systems have been reviewed and are negative for complaint.         Data Review        Results from last 7 days   Lab Units  "12/05/23  0906   WBC AUTO x10*3/uL 9.5   HEMOGLOBIN g/dL 10.9*   HEMATOCRIT % 35.0*   PLATELETS AUTO x10*3/uL 230              No lab exists for component: \"LABALBU\"    Lab Results   Component Value Date    URICACID 6.3 09/13/2023       No components found for: \"[id number]\"      Lab Results   Component Value Date    HGBA1C 8.7 (A) 09/19/2023         Lab Results   Component Value Date    CALCIUM 8.8 09/13/2023    PHOS 4.0 09/13/2023         No results found for requested labs within last 365 days.              No lab exists for component: \"CR\", \"PHOSPHORUS\"          Albumin/Creatine Ratio   Date Value Ref Range Status   12/05/2023 700.4 (H) <30.0 ug/mg Creat Final   09/13/2023 2,284.6 (H) 0.0 - 30.0 ug/mg crt Final   07/28/2023 548.2 (H) 0.0 - 30.0 ug/mg crt Final       Recent Labs     09/13/23  0959 07/28/23  1010 04/21/23  1146 02/09/23  1349 01/05/23  0825 12/30/22  1300 10/17/22  1205 06/02/22  0929 05/06/22  0604 05/05/22  0124 04/14/22  1247 02/09/22  0921 02/08/22  0623 02/07/22  0719    137 140 140 141 143 141   < > 142   < >  --    < > 140 140   K 4.1 4.5 3.8 4.0 3.8 3.8 4.2   < > 3.3*   < >  --    < > 3.5 3.4*   CO2 26 24 23 21 24 23 24   < > 21   < >  --    < > 23 22   BUN 42* 69* 57* 28* 33* 39* 31*   < > 22   < > 23   < > 16 16   GLUCOSE 273* 271* 211* 155* 189* 126* 227*   < > 109*   < >  --    < > 152* 161*   CALCIUM 8.8 8.9 8.3* 8.0* 8.6 8.3* 8.7   < > 8.4*   < >  --    < > 7.5* 7.7*   PHOS 4.0 5.4* 4.1 4.5  --   --   --   --  3.8  --   --   --  4.0 4.3   CREATININE 2.22* 3.00* 2.15* 1.90* 1.76* 1.86* 1.74*   < > 1.51*   < > 1.5   < > 2.17* 2.08*   EGFR  --   --   --   --   --   --   --   --   --   --  52  --   --   --    ANIONGAP 10 15 13 12 9* 11 11   < > 12   < >  --    < > 14 14    < > = values in this interval not displayed.            Current Outpatient Medications:     allopurinol (Zyloprim) 100 mg tablet, 1 tablet (100 mg)., Disp: , Rfl:     amLODIPine (Norvasc) 5 mg tablet, Take 1 " "tablet (5 mg) by mouth once daily., Disp: 90 tablet, Rfl: 0    atorvastatin (Lipitor) 20 mg tablet, Take 1 tablet (20 mg) by mouth once daily at bedtime., Disp: 90 tablet, Rfl: 0    blood sugar diagnostic (ReliOn Prime Test Strips) strip, TEST 3 TIMES DAILY., Disp: , Rfl:     Bydureon BCise 2 mg/0.85 mL auto-injector, INJECT 0.85 ML SUBCUTANEOUSLY ONCE WEEKLY., Disp: 13.6 mL, Rfl: 0    CareFine Pen Needle 32 gauge x 3/16\" needle, USE 1 PEN NEEDLE TO INJECT INSULIN 3 TIMES A DAY BEFORE MEALS AND AT BEDTIME., Disp: , Rfl:     carvedilol (Coreg) 25 mg tablet, Take 1 tablet (25 mg) by mouth 2 times a day with meals., Disp: 180 tablet, Rfl: 0    clopidogrel (Plavix) 75 mg tablet, Take 1 tablet (75 mg) by mouth once daily., Disp: 90 tablet, Rfl: 0    cyanocobalamin, vitamin B-12, 500 mcg tablet, sublingual, Place 1 tablet under the tongue once daily., Disp: , Rfl:     epoetin mckenna-epbx (Retacrit) 40,000 unit/mL injection, Inject 1 mL (40,000 Units) under the skin every 30 (thirty) days., Disp: , Rfl:     Farxiga 10 mg, Take 1 tablet (10 mg) by mouth once daily., Disp: , Rfl:     folic acid (Folvite) 1 mg tablet, Take 5 tablets (5 mg) by mouth once daily., Disp: 450 tablet, Rfl: 0    furosemide (Lasix) 40 mg tablet, 1 tablet (40 mg)., Disp: , Rfl:     HumaLOG KwikPen Insulin 100 unit/mL injection, Inject under the skin. 8 units before meals, Disp: , Rfl:     hydrALAZINE (Apresoline) 50 mg tablet, Take 1 tablet (50 mg) by mouth 3 times a day., Disp: 270 tablet, Rfl: 0    hydroCHLOROthiazide (HYDRODiuril) 25 mg tablet, Take 1 tablet (25 mg) by mouth once daily., Disp: , Rfl:     Lantus Solostar U-100 Insulin 100 unit/mL (3 mL) pen, Inject 20 Units under the skin once daily at bedtime., Disp: , Rfl:     lisinopril 40 mg tablet, Take 1 tablet (40 mg) by mouth once daily in the morning., Disp: , Rfl:     MAGNESIUM ORAL, Take 1 tablet by mouth once daily., Disp: , Rfl:     potassium chloride CR 20 mEq ER tablet, Take 1 tablet " (20 mEq) by mouth once daily. Do not crush or chew., Disp: , Rfl:     rivaroxaban (Xarelto) 20 mg tablet, Take 1 tablet (20 mg) by mouth once daily at bedtime., Disp: , Rfl:     tamsulosin (Flomax) 0.4 mg 24 hr capsule, Take 1 capsule (0.4 mg) by mouth once daily., Disp: , Rfl:     amLODIPine (Norvasc) 10 mg tablet, Take 1 tablet (10 mg) by mouth once daily., Disp: , Rfl:     aspirin 81 mg EC tablet, Take 1 tablet (81 mg) by mouth once daily., Disp: , Rfl:     cholecalciferol (Vitamin D-3) 1,250 mcg (50,000 unit) capsule, Take 1 capsule (50,000 Units) by mouth 1 (one) time per week., Disp: , Rfl:     clindamycin (Cleocin) 150 mg capsule, Take 3 capsules (450 mg) by mouth every 8 hours., Disp: , Rfl:     coenzyme Q-10 100 mg capsule, Take 1 capsule (100 mg) by mouth once daily., Disp: , Rfl:     cyanocobalamin (Vitamin B-12) 1,000 mcg tablet, Take 1 tablet (1,000 mcg) by mouth once daily., Disp: , Rfl:     dapagliflozin propanediol (Farxiga) 5 mg, Take 1 tablet (5 mg) by mouth once daily with a meal., Disp: , Rfl:     doxycycline (Vibra-Tabs) 100 mg tablet, Take 1 tablet (100 mg) by mouth every 12 hours., Disp: , Rfl:     ergocalciferol (Vitamin D-2) 1.25 MG (29889 UT) capsule, Take 1 capsule (1,250 mcg) by mouth once a week., Disp: , Rfl:     finerenone (Kerendia) 10 mg tablet tablet, Take 1 tablet (10 mg) by mouth once daily., Disp: 90 tablet, Rfl: 3    glipiZIDE XL (Glucotrol XL) 5 mg 24 hr tablet, Take 1 tablet (5 mg) by mouth 2 times a day with meals., Disp: , Rfl:     metFORMIN (Glucophage) 1,000 mg tablet, Take 0.5 tablets (500 mg) by mouth 2 times a day with meals., Disp: , Rfl:     multivit-min-FA-lut-zeaxanth (Icaps MV) 100-1.66-0.83 mcg-mg-mg tablet,delayed release (DR/EC), Take 1 tablet by mouth every 12 hours., Disp: , Rfl:     naproxen (Naprosyn) 500 mg tablet, Take 1 tablet (500 mg) by mouth 2 times a day with meals., Disp: , Rfl:     oxyCODONE-acetaminophen (Percocet) 5-325 mg tablet, Take 1 tablet by  mouth every 4 hours if needed for moderate pain (4 - 6)., Disp: , Rfl:     pioglitazone (Actos) 45 mg tablet, Take 1 tablet (45 mg) by mouth once daily., Disp: , Rfl:     SITagliptin phosphate (Januvia) 100 mg tablet, Take 1 tablet (100 mg) by mouth once daily., Disp: , Rfl:     vit C,G-Vu-isxrn-lutein-zeaxan (PreserVision AREDS-2) 250-90-40-1 mg capsule, Take 1 capsule by mouth 2 times a day., Disp: , Rfl:      Assessment and Plan     Mr. 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 He had hospital admission in February 2022 with infected foot received vancomycin and Zosyn (elevated trough levels of vancomycin noticed). During the admission he developed acute kidney injury with a serum creatinine up to 1.7-2, GFR 35-40. Serum creatinine has been stable since then with no improvement. He had another admission in April with respiratory issues.  -Urine dipstick earlier was significant albuminuria. Prior spot test albumin creatinine shows above limit of the lab concerning for nephrotic range proteinuria/nephrotic syndrome. He gained about 30 pounds in the last year  -He got nephrology evaluation in February 2022 that revealed negative basic GN work-up (C3, C4, syphilis, hep C, hep B, HIV, SPEP, UPEP). More work-up came back negative for antiplat 2R antibodies, antihistone. Attempted kidney biopsy in February 2023-insufficient tissue sampling. [place] decision to hold off repeat kidney biopsy at this time  -Chronic kidney  disease and nephrotic range proteinuria-most likely related to diabetic nephropathy        #Relatively improved albuminuria from outside the lab limit then 2.7 g/g then most recent spot test albumin creatinine ratio 548 mg/g in July 2023. Improved proteinuria as well.  Most recent  mg/g-worsening from prior.  We will start finerenone  -We will continue conservative measures. We will continue maximum tolerated dose of RAAS inhibitor. We will continue SGL 2 inhibitors.      #Lyme disease 2015-status post doxycycline. Less concerns about Lyme disease induced kidney injury     ##Hypertension-current medication carvedilol 25 mg twice daily, hydralazine 50 mg 3 times daily, amlodipine 5 mg, lisinopril 40 mg, Lasix 40 mg in the morning, 40 mg in the p.m (reduced due to dizziness-improved).  Will hold potassium chloride 20 mg twice daily since we are starting Kerendia-will monitor potassium in 2 weeks. Blood pressure is excellent.        #Chronic anemia- He used to get Retacrit 40,000 units every 2 weeks.  Currently he gets 1000 units every 2 weeks.  Will take care of anemia management       #Peripheral artery disease status post stent-on Plavix.      #Type 2 diabetes-uncontrolled with neuropathy and retinopathy.  Continue SGL 2 inhibitors for GFR preservation     #Significant hyperuricemia uric acid 9.3-now improved 6.4-continue allopurinol 100 mg     #CKD-MBD-secondary hyperparathyroidism 98 with elevated phosphorus 5.4-discussed low phosphorus diet today. Low threshold to start phosphate binders as needed     Blood work today  Blood work in 2 weeks to monitor potassium     Follow-up in 3 months with repeat blood work and urinalysis prior to next visit        Imani Kaplan MD, MS, WILBER KIDD  Clinical  - [employer]  Nephrologist - [facility] - [facility]

## 2023-12-11 DIAGNOSIS — E11.9 TYPE 2 DIABETES MELLITUS WITHOUT COMPLICATION, UNSPECIFIED WHETHER LONG TERM INSULIN USE (MULTI): ICD-10-CM

## 2023-12-11 RX ORDER — INSULIN LISPRO 100 [IU]/ML
8 INJECTION, SOLUTION INTRAVENOUS; SUBCUTANEOUS
Qty: 15 EACH | Refills: 3 | Status: SHIPPED | OUTPATIENT
Start: 2023-12-11 | End: 2024-04-10 | Stop reason: SDUPTHER

## 2023-12-20 ENCOUNTER — INFUSION (OUTPATIENT)
Dept: HEMATOLOGY/ONCOLOGY | Facility: HOSPITAL | Age: 63
End: 2023-12-20
Payer: MEDICARE

## 2023-12-20 VITALS
DIASTOLIC BLOOD PRESSURE: 53 MMHG | HEART RATE: 51 BPM | BODY MASS INDEX: 38.35 KG/M2 | WEIGHT: 259.7 LBS | OXYGEN SATURATION: 97 % | SYSTOLIC BLOOD PRESSURE: 113 MMHG | TEMPERATURE: 98.1 F | RESPIRATION RATE: 18 BRPM

## 2023-12-20 DIAGNOSIS — D63.1 ANEMIA DUE TO STAGE 4 CHRONIC KIDNEY DISEASE (MULTI): ICD-10-CM

## 2023-12-20 DIAGNOSIS — I10 BENIGN ESSENTIAL HYPERTENSION: ICD-10-CM

## 2023-12-20 DIAGNOSIS — I73.9 PAD (PERIPHERAL ARTERY DISEASE) (CMS-HCC): ICD-10-CM

## 2023-12-20 DIAGNOSIS — E87.6 HYPOKALEMIA: ICD-10-CM

## 2023-12-20 DIAGNOSIS — E08.00 DIABETES MELLITUS DUE TO UNDERLYING CONDITION WITH HYPEROSMOLARITY WITHOUT COMA, WITHOUT LONG-TERM CURRENT USE OF INSULIN (MULTI): ICD-10-CM

## 2023-12-20 DIAGNOSIS — N18.4 ANEMIA DUE TO STAGE 4 CHRONIC KIDNEY DISEASE (MULTI): ICD-10-CM

## 2023-12-20 DIAGNOSIS — E66.01 CLASS 3 SEVERE OBESITY DUE TO EXCESS CALORIES WITH SERIOUS COMORBIDITY IN ADULT, UNSPECIFIED BMI (MULTI): ICD-10-CM

## 2023-12-20 DIAGNOSIS — E55.9 VITAMIN D DEFICIENCY: ICD-10-CM

## 2023-12-20 DIAGNOSIS — N04.9 NEPHROTIC SYNDROME: ICD-10-CM

## 2023-12-20 DIAGNOSIS — I50.20 SYSTOLIC CONGESTIVE HEART FAILURE, UNSPECIFIED HF CHRONICITY (MULTI): ICD-10-CM

## 2023-12-20 DIAGNOSIS — N18.32 STAGE 3B CHRONIC KIDNEY DISEASE (MULTI): ICD-10-CM

## 2023-12-20 DIAGNOSIS — E78.00 PURE HYPERCHOLESTEROLEMIA: ICD-10-CM

## 2023-12-20 LAB
ALBUMIN SERPL BCP-MCNC: 3.9 G/DL (ref 3.4–5)
ANION GAP SERPL CALC-SCNC: 12 MMOL/L (ref 10–20)
BASOPHILS # BLD AUTO: 0.08 X10*3/UL (ref 0–0.1)
BASOPHILS NFR BLD AUTO: 1.3 %
BUN SERPL-MCNC: 74 MG/DL (ref 6–23)
CALCIUM SERPL-MCNC: 8.7 MG/DL (ref 8.6–10.3)
CHLORIDE SERPL-SCNC: 104 MMOL/L (ref 98–107)
CO2 SERPL-SCNC: 23 MMOL/L (ref 21–32)
CREAT SERPL-MCNC: 3.11 MG/DL (ref 0.5–1.3)
EOSINOPHIL # BLD AUTO: 0.29 X10*3/UL (ref 0–0.7)
EOSINOPHIL NFR BLD AUTO: 4.6 %
ERYTHROCYTE [DISTWIDTH] IN BLOOD BY AUTOMATED COUNT: 14.6 % (ref 11.5–14.5)
GFR SERPL CREATININE-BSD FRML MDRD: 22 ML/MIN/1.73M*2
GLUCOSE SERPL-MCNC: 194 MG/DL (ref 74–99)
HCT VFR BLD AUTO: 33.2 % (ref 41–52)
HGB BLD-MCNC: 10.8 G/DL (ref 13.5–17.5)
IMM GRANULOCYTES # BLD AUTO: 0.01 X10*3/UL (ref 0–0.7)
IMM GRANULOCYTES NFR BLD AUTO: 0.2 % (ref 0–0.9)
LYMPHOCYTES # BLD AUTO: 1.52 X10*3/UL (ref 1.2–4.8)
LYMPHOCYTES NFR BLD AUTO: 24.1 %
MCH RBC QN AUTO: 29.7 PG (ref 26–34)
MCHC RBC AUTO-ENTMCNC: 32.5 G/DL (ref 32–36)
MCV RBC AUTO: 91 FL (ref 80–100)
MONOCYTES # BLD AUTO: 0.69 X10*3/UL (ref 0.1–1)
MONOCYTES NFR BLD AUTO: 11 %
NEUTROPHILS # BLD AUTO: 3.71 X10*3/UL (ref 1.2–7.7)
NEUTROPHILS NFR BLD AUTO: 58.8 %
NRBC BLD-RTO: 0 /100 WBCS (ref 0–0)
PHOSPHATE SERPL-MCNC: 5 MG/DL (ref 2.5–4.9)
PLATELET # BLD AUTO: 206 X10*3/UL (ref 150–450)
POTASSIUM SERPL-SCNC: 4.8 MMOL/L (ref 3.5–5.3)
RBC # BLD AUTO: 3.64 X10*6/UL (ref 4.5–5.9)
SODIUM SERPL-SCNC: 134 MMOL/L (ref 136–145)
WBC # BLD AUTO: 6.3 X10*3/UL (ref 4.4–11.3)

## 2023-12-20 PROCEDURE — 36415 COLL VENOUS BLD VENIPUNCTURE: CPT

## 2023-12-20 PROCEDURE — 96372 THER/PROPH/DIAG INJ SC/IM: CPT

## 2023-12-20 PROCEDURE — 2500000004 HC RX 250 GENERAL PHARMACY W/ HCPCS (ALT 636 FOR OP/ED): Mod: JZ | Performed by: INTERNAL MEDICINE

## 2023-12-20 PROCEDURE — 80069 RENAL FUNCTION PANEL: CPT

## 2023-12-20 PROCEDURE — 85025 COMPLETE CBC W/AUTO DIFF WBC: CPT

## 2023-12-20 RX ORDER — FAMOTIDINE 10 MG/ML
20 INJECTION INTRAVENOUS ONCE AS NEEDED
Status: CANCELLED | OUTPATIENT
Start: 2024-01-03

## 2023-12-20 RX ORDER — ALBUTEROL SULFATE 0.83 MG/ML
3 SOLUTION RESPIRATORY (INHALATION) AS NEEDED
Status: CANCELLED | OUTPATIENT
Start: 2024-01-03

## 2023-12-20 RX ORDER — DIPHENHYDRAMINE HYDROCHLORIDE 50 MG/ML
50 INJECTION INTRAMUSCULAR; INTRAVENOUS AS NEEDED
Status: CANCELLED | OUTPATIENT
Start: 2024-01-03

## 2023-12-20 RX ORDER — EPINEPHRINE 0.3 MG/.3ML
0.3 INJECTION SUBCUTANEOUS EVERY 5 MIN PRN
Status: CANCELLED | OUTPATIENT
Start: 2024-01-03

## 2023-12-20 RX ADMIN — ERYTHROPOIETIN 10000 UNITS: 10000 INJECTION, SOLUTION INTRAVENOUS; SUBCUTANEOUS at 10:16

## 2023-12-20 ASSESSMENT — PATIENT HEALTH QUESTIONNAIRE - PHQ9
2. FEELING DOWN, DEPRESSED OR HOPELESS: NOT AT ALL
SUM OF ALL RESPONSES TO PHQ9 QUESTIONS 1 & 2: 0
1. LITTLE INTEREST OR PLEASURE IN DOING THINGS: NOT AT ALL

## 2023-12-20 ASSESSMENT — PAIN SCALES - GENERAL: PAINLEVEL: 1

## 2023-12-21 ENCOUNTER — OFFICE VISIT (OUTPATIENT)
Dept: PRIMARY CARE | Facility: CLINIC | Age: 63
End: 2023-12-21
Payer: MEDICARE

## 2023-12-21 ENCOUNTER — TELEPHONE (OUTPATIENT)
Dept: PRIMARY CARE | Facility: CLINIC | Age: 63
End: 2023-12-21

## 2023-12-21 VITALS
WEIGHT: 262.2 LBS | HEIGHT: 69 IN | SYSTOLIC BLOOD PRESSURE: 130 MMHG | OXYGEN SATURATION: 96 % | BODY MASS INDEX: 38.83 KG/M2 | DIASTOLIC BLOOD PRESSURE: 70 MMHG | HEART RATE: 56 BPM | TEMPERATURE: 95.7 F

## 2023-12-21 DIAGNOSIS — Z12.11 SPECIAL SCREENING FOR MALIGNANT NEOPLASM OF COLON: ICD-10-CM

## 2023-12-21 DIAGNOSIS — I10 BENIGN ESSENTIAL HYPERTENSION: ICD-10-CM

## 2023-12-21 DIAGNOSIS — E11.21 TYPE 2 DIABETES MELLITUS WITH DIABETIC NEPHROPATHY, WITHOUT LONG-TERM CURRENT USE OF INSULIN (MULTI): Primary | ICD-10-CM

## 2023-12-21 DIAGNOSIS — E78.5 DYSLIPIDEMIA: ICD-10-CM

## 2023-12-21 DIAGNOSIS — M1A.30X1 CHRONIC GOUT DUE TO RENAL IMPAIRMENT WITH TOPHUS, UNSPECIFIED SITE: ICD-10-CM

## 2023-12-21 DIAGNOSIS — I10 PRIMARY HYPERTENSION: ICD-10-CM

## 2023-12-21 DIAGNOSIS — M86.471 CHRONIC OSTEOMYELITIS WITH DRAINING SINUS, RIGHT ANKLE AND FOOT (MULTI): ICD-10-CM

## 2023-12-21 DIAGNOSIS — I10 BENIGN HYPERTENSION: ICD-10-CM

## 2023-12-21 DIAGNOSIS — I70.223 ATHEROSCLEROSIS OF NATIVE ARTERIES OF EXTREMITIES WITH REST PAIN, BILATERAL LEGS (MULTI): ICD-10-CM

## 2023-12-21 DIAGNOSIS — L97.424 NON-PRESSURE CHRONIC ULCER OF LEFT HEEL AND MIDFOOT WITH NECROSIS OF BONE (MULTI): ICD-10-CM

## 2023-12-21 DIAGNOSIS — R53.83 OTHER FATIGUE: ICD-10-CM

## 2023-12-21 DIAGNOSIS — Z91.89 AT INCREASED RISK FOR CARDIOVASCULAR DISEASE: ICD-10-CM

## 2023-12-21 DIAGNOSIS — M35.00 SJOGREN'S SYNDROME, WITH UNSPECIFIED ORGAN INVOLVEMENT (MULTI): ICD-10-CM

## 2023-12-21 PROBLEM — J96.90 RESPIRATORY FAILURE (MULTI): Status: RESOLVED | Noted: 2023-10-10 | Resolved: 2023-12-21

## 2023-12-21 PROBLEM — M79.89 OTHER SPECIFIED SOFT TISSUE DISORDERS: Status: RESOLVED | Noted: 2022-12-20 | Resolved: 2023-12-21

## 2023-12-21 PROBLEM — N04.9 NEPHROTIC SYNDROME: Status: RESOLVED | Noted: 2023-09-18 | Resolved: 2023-12-21

## 2023-12-21 PROBLEM — N17.9 ACUTE RENAL FAILURE (CMS-HCC): Status: RESOLVED | Noted: 2023-03-07 | Resolved: 2023-12-21

## 2023-12-21 PROCEDURE — 99214 OFFICE O/P EST MOD 30 MIN: CPT | Performed by: PHYSICIAN ASSISTANT

## 2023-12-21 PROCEDURE — 1036F TOBACCO NON-USER: CPT | Performed by: PHYSICIAN ASSISTANT

## 2023-12-21 PROCEDURE — 3062F POS MACROALBUMINURIA REV: CPT | Performed by: PHYSICIAN ASSISTANT

## 2023-12-21 PROCEDURE — 4010F ACE/ARB THERAPY RXD/TAKEN: CPT | Performed by: PHYSICIAN ASSISTANT

## 2023-12-21 PROCEDURE — 3078F DIAST BP <80 MM HG: CPT | Performed by: PHYSICIAN ASSISTANT

## 2023-12-21 PROCEDURE — 3066F NEPHROPATHY DOC TX: CPT | Performed by: PHYSICIAN ASSISTANT

## 2023-12-21 PROCEDURE — 3075F SYST BP GE 130 - 139MM HG: CPT | Performed by: PHYSICIAN ASSISTANT

## 2023-12-21 RX ORDER — ALLOPURINOL 100 MG/1
100 TABLET ORAL DAILY
Qty: 90 TABLET | Refills: 3 | Status: SHIPPED | OUTPATIENT
Start: 2023-12-21 | End: 2024-12-20

## 2023-12-21 RX ORDER — CARVEDILOL 25 MG/1
25 TABLET ORAL
Qty: 180 TABLET | Refills: 3 | Status: SHIPPED | OUTPATIENT
Start: 2023-12-21 | End: 2024-02-03 | Stop reason: HOSPADM

## 2023-12-21 RX ORDER — FOLIC ACID 1 MG/1
5 TABLET ORAL DAILY
Qty: 450 TABLET | Refills: 3 | Status: SHIPPED | OUTPATIENT
Start: 2023-12-21 | End: 2024-12-20

## 2023-12-21 RX ORDER — CLOPIDOGREL BISULFATE 75 MG/1
75 TABLET ORAL DAILY
Qty: 90 TABLET | Refills: 3 | Status: SHIPPED | OUTPATIENT
Start: 2023-12-21 | End: 2024-12-20

## 2023-12-21 RX ORDER — AMLODIPINE BESYLATE 10 MG/1
10 TABLET ORAL DAILY
Qty: 90 TABLET | Refills: 3 | Status: SHIPPED | OUTPATIENT
Start: 2023-12-21 | End: 2024-02-03 | Stop reason: HOSPADM

## 2023-12-21 RX ORDER — ATORVASTATIN CALCIUM 20 MG/1
20 TABLET, FILM COATED ORAL NIGHTLY
Qty: 90 TABLET | Refills: 3 | Status: SHIPPED | OUTPATIENT
Start: 2023-12-21 | End: 2024-12-20

## 2023-12-21 RX ORDER — HYDRALAZINE HYDROCHLORIDE 50 MG/1
50 TABLET, FILM COATED ORAL 3 TIMES DAILY
Qty: 270 TABLET | Refills: 3 | Status: SHIPPED | OUTPATIENT
Start: 2023-12-21 | End: 2024-02-03 | Stop reason: HOSPADM

## 2023-12-21 RX ORDER — AMLODIPINE BESYLATE 5 MG/1
5 TABLET ORAL DAILY
Qty: 90 TABLET | Refills: 3 | Status: SHIPPED | OUTPATIENT
Start: 2023-12-21 | End: 2023-12-21 | Stop reason: DRUGHIGH

## 2023-12-21 RX ORDER — INSULIN GLARGINE 100 [IU]/ML
20 INJECTION, SOLUTION SUBCUTANEOUS NIGHTLY
Qty: 18 ML | Refills: 0 | Status: SHIPPED | OUTPATIENT
Start: 2023-12-21 | End: 2024-04-10 | Stop reason: SDUPTHER

## 2023-12-21 RX ORDER — LISINOPRIL 40 MG/1
40 TABLET ORAL EVERY MORNING
Qty: 90 TABLET | Refills: 3 | Status: SHIPPED | OUTPATIENT
Start: 2023-12-21 | End: 2024-12-20

## 2023-12-21 ASSESSMENT — ACTIVITIES OF DAILY LIVING (ADL)
GROCERY_SHOPPING: INDEPENDENT
DOING_HOUSEWORK: INDEPENDENT
BATHING: INDEPENDENT
MANAGING_FINANCES: INDEPENDENT
DRESSING: INDEPENDENT
TAKING_MEDICATION: INDEPENDENT

## 2023-12-21 ASSESSMENT — PATIENT HEALTH QUESTIONNAIRE - PHQ9
2. FEELING DOWN, DEPRESSED OR HOPELESS: NOT AT ALL
SUM OF ALL RESPONSES TO PHQ9 QUESTIONS 1 AND 2: 0
1. LITTLE INTEREST OR PLEASURE IN DOING THINGS: NOT AT ALL

## 2023-12-21 NOTE — ASSESSMENT & PLAN NOTE
Recommend lubricating eye drops, referred to ophtho. Recommended biotene, should see dentist annually also.

## 2023-12-21 NOTE — PATIENT INSTRUCTIONS
"  Current Outpatient Medications:     allopurinol (Zyloprim) 100 mg tablet, Take 1 tablet (100 mg) by mouth once daily., Disp: 90 tablet, Rfl: 3    amLODIPine (Norvasc) 10 mg tablet, Take 1 tablet (10 mg) by mouth once daily., Disp: 90 tablet, Rfl: 3    amLODIPine (Norvasc) 5 mg tablet, Take 1 tablet (5 mg) by mouth once daily., Disp: 90 tablet, Rfl: 3    atorvastatin (Lipitor) 20 mg tablet, Take 1 tablet (20 mg) by mouth once daily at bedtime., Disp: 90 tablet, Rfl: 3    blood sugar diagnostic (ReliOn Prime Test Strips) strip, TEST 3 TIMES DAILY., Disp: , Rfl:     Bydureon BCise 2 mg/0.85 mL auto-injector, INJECT 0.85 ML SUBCUTANEOUSLY ONCE WEEKLY., Disp: 13.6 mL, Rfl: 0    CareFine Pen Needle 32 gauge x 3/16\" needle, USE 1 PEN NEEDLE TO INJECT INSULIN 3 TIMES A DAY BEFORE MEALS AND AT BEDTIME., Disp: , Rfl:     carvedilol (Coreg) 25 mg tablet, Take 1 tablet (25 mg) by mouth 2 times a day with meals., Disp: 180 tablet, Rfl: 3    cholecalciferol (Vitamin D-3) 1,250 mcg (50,000 unit) capsule, Take 1 capsule (50,000 Units) by mouth 1 (one) time per week., Disp: , Rfl:     clopidogrel (Plavix) 75 mg tablet, Take 1 tablet (75 mg) by mouth once daily., Disp: 90 tablet, Rfl: 3    epoetin mckenna-epbx (Retacrit) 40,000 unit/mL injection, Inject 1 mL (40,000 Units) under the skin every 30 (thirty) days., Disp: , Rfl:     ergocalciferol (Vitamin D-2) 1.25 MG (87325 UT) capsule, Take 1 capsule (1,250 mcg) by mouth once a week., Disp: , Rfl:     Farxiga 10 mg, Take 1 tablet (10 mg) by mouth once daily., Disp: , Rfl:     finerenone (Kerendia) 10 mg tablet tablet, Take 1 tablet (10 mg) by mouth once daily., Disp: 90 tablet, Rfl: 3    folic acid (Folvite) 1 mg tablet, Take 5 tablets (5 mg) by mouth once daily., Disp: 450 tablet, Rfl: 3    furosemide (Lasix) 40 mg tablet, 1 tablet (40 mg)., Disp: , Rfl:     HumaLOG KwikPen Insulin 100 unit/mL injection, Inject 8 Units under the skin 3 times a day with meals. 8 units before meals, " Disp: 15 each, Rfl: 3    hydrALAZINE (Apresoline) 50 mg tablet, Take 1 tablet (50 mg) by mouth 3 times a day., Disp: 270 tablet, Rfl: 3    insulin glargine (Lantus Solostar U-100 Insulin) 100 unit/mL (3 mL) pen, Inject 20 Units under the skin once daily at bedtime., Disp: 18 mL, Rfl: 0    lisinopril 40 mg tablet, Take 1 tablet (40 mg) by mouth once daily in the morning., Disp: 90 tablet, Rfl: 3    MAGNESIUM ORAL, Take 1 tablet by mouth once daily., Disp: , Rfl:

## 2023-12-21 NOTE — PROGRESS NOTES
Subjective   HPI   Gregorio North is a 63 y.o. year old male patient with presenting to clinic with concern for Medicare Visit     Chief Complaint   Patient presents with    Medicare Annual Wellness Visit Initial    Fatigue       Recently had lab work done through work. A1c was 8.0% done 2 weeks ago.  Skipping Budureon doses for DM- too expensive, skipping every other dose and extending dosing to make it last longer.     Lyme Dx .  Charcot Carolynn Tooth L foot.    Nephrology CKD 4 EPO mckenna q14d Nephrology Dr. Salinas    Sees Dr Jacques PRN- needs annual visit  NEEDS eye exam has been at least a few years- needs to be annual      Patient Care Team:  Latisha Vines PA-C as PCP - General (Family Medicine)  Aquiles Haddad MD as PCP - [employer] PCP   No Patient Care Coordination Note on file.       Specialists  Cardiol Miriam Cota & Dr. Campbell CHF, HTN, HLD  Nephrology- Dr Salinas  CKD 4  Podiatry- Dr. Jacques      Screenings  A1c was <6- went off meds after doc left. This week is 8.0 down from 8.7%  *Bydureon too expensive skipping doses*- send to  Referring to [facility] for DM managment  Colonoscopy DUE- refuses- family member  from perforation during colonoscopy. Willing to do cologuard.  Cardiol Miriam Cota CHF, HTN, HLD  Coronary calcifications noted on CTA - ordered CT cardiac calcium score. Has not seen cardiology in a few years, referred back to cardiol.  Nephrology CKD IV - EPO every 2 weeks.   Podiatry- healing foot- sees Dr. Jacques regularly.  Ophtho- NEEDS eval.     Body mass index is 38.72 kg/m². BMI (annual wellness)- morbid>40, nutritionist <19     Preventative Health   Health Maintenance Due   Topic Date Due    MMR Vaccine (1 of 1 - Standard series) Never done    Pneumococcal Vaccination (1 - PCV) Never done    Pneumococcal Vaccination (1 - PCV) Never done    Foot Exam  Never done    DTaP/Tdap/Td Vaccines (1 - Tdap) Never done    Eye  Exam  01/27/2017    Influenza Vaccine (1) Never done    Hemoglobin A1C  12/19/2023    Lipid (cholesterol) test  02/09/2024            Topic Date Due    MMR Vaccines (1 of 1 - Standard series) Never done    DTaP/Tdap/Td Vaccines (1 - Tdap) Never done        Immunizations Reviewed    There is no immunization history on file for this patient.     Problem List Reviewed  Patient Active Problem List   Diagnosis    Benign essential hypertension    Diabetes mellitus (CMS/HCC)    Anemia    Chronic kidney disease, stage 4 (severe) (CMS/HCC)    Dyslipidemia    Benign prostatic hyperplasia    Anxiety    Fatigue    Hypokalemia    HLD (hyperlipidemia)    Macular exudate    Nystagmus    Optic atrophy    Atherosclerosis of native arteries of extremities with rest pain, bilateral legs (CMS/HCC)    Pulmonary edema    Vitamin D deficiency    Unvaccinated for covid-19    CHF (congestive heart failure) (CMS/HCC)    Tremor    Swelling of upper extremity    Papilledema    Palpitations    Obesity with body mass index 30 or greater    Muscle pain    Arthralgia    Impairment of balance    History of hypertension    History of endocrine disorder    Headache    Class 3 severe obesity due to excess calories in adult (CMS/HCC)    Sjogren's syndrome (CMS/[facility])    Shortness of breath    Proteinuria    History of kidney cancer    Lyme disease    Iron deficiency anemia    Chronic pulmonary edema    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 (CMS/HCC)    Chronic osteomyelitis with draining sinus, right ankle and foot (CMS/HCC)       Past Medical History:   Diagnosis Date    Acute diastolic heart failure (CMS/HCC) 11/23/2022    STEVE (acute kidney injury) (CMS/HCC) 09/18/2023    COVID-19 virus infection 09/18/2023    Cutaneous abscess of back (any part, except buttock) 01/25/2021    Abscess of back    Deficiency of other specified B group vitamins 06/09/2021    Folate deficiency    Diabetic foot ulcer (CMS/HCC) 05/06/2022     Encounter for general adult medical examination without abnormal findings 05/19/2020    Medicare annual wellness visit, subsequent    Encounter for screening for malignant neoplasm of colon 02/21/2019    Screening for malignant neoplasm of colon    Encounter for screening for malignant neoplasm of prostate 08/12/2021    Prostate cancer screening    Focal chorioretinal inflammation, macular or paramacular, 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    Acute macular neuroretinitis of left eye    Focal chorioretinal inflammation, macular or paramacular, left eye 11/05/2014    Acute macular neuroretinitis of left eye    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    History of foot operation 09/18/2023    Ischemic optic neuropathy, 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    Localized edema 01/11/2021    Bilateral edema of lower extremity    Other muscle spasm 04/24/2017    Trapezius muscle spasm    Other specified abnormal immunological findings in serum 09/17/2014    Positive Lyme disease serology    Other specified cataract 01/27/2016    Cataract, mature    Other specified health status     No pertinent past surgical history    Other specified postprocedural states 06/18/2015    Status post foot surgery    Other specified retinal disorders 01/27/2016    Macular exudate    Other symptoms and signs involving the musculoskeletal system 04/24/2017    Left arm weakness    Pain in right shoulder 04/24/2017    Right shoulder pain    Personal history of diseases of the skin and subcutaneous tissue 05/25/2021    History of cellulitis    Personal history of other diseases of male genital organs 01/11/2021    History of benign prostatic hyperplasia    Personal history of other diseases of the circulatory system 09/03/2014    History of hypertension    Personal history of other diseases of the musculoskeletal system and connective tissue 11/21/2016    History of joint pain    Personal  history of other diseases of the musculoskeletal system and connective tissue 11/21/2016    History of muscle pain    Personal history of other diseases of the nervous system and sense organs 09/08/2014    History of nystagmus    Personal history of other diseases of the nervous system and sense organs 01/27/2016    History of optic atrophy    Personal history of other diseases of the nervous system and sense organs 09/08/2014    History of papilledema    Personal history of other diseases of the nervous system and sense organs 08/22/2016    History of tremor    Personal history of other endocrine, nutritional and metabolic disease 01/11/2021    History of elevated lipids    Personal history of other specified conditions     History of impaired glucose tolerance    Personal history of other specified conditions 09/08/2014    History of palpitations    Personal history of other specified conditions 08/12/2021    History of chronic fatigue    Personal history of other specified conditions 08/09/2018    History of headache    Personal history of other specified conditions 08/09/2018    History of balance disorder    Respiratory failure (CMS/HCC) 10/10/2023    Sebaceous cyst 11/20/2018    Infected sebaceous cyst of skin    Unspecified cataract 01/27/2016    Total cataract    Unspecified visual loss 02/18/2022    Vision loss      Past Surgical History:   Procedure Laterality Date    CT GUIDED PERCUTANEOUS BIOPSY BONE DEEP  8/29/2022    CT GUIDED PERCUTANEOUS BIOPSY BONE DEEP 8/29/2022 [facility]    FOOT SURGERY  03/31/2016    Foot Surgery    MR HEAD ANGIO WO IV CONTRAST  9/17/2014    MR HEAD ANGIO WO IV CONTRAST 9/17/2014 [facility]    OTHER SURGICAL HISTORY  02/04/2015    Eye Surgery Results Vision      Family History   Problem Relation Name Age of Onset    No Known Problems Mother      Heart attack Father      Diabetes Father        Social History     Tobacco Use    Smoking status: Never    Smokeless  "tobacco: Never   Substance Use Topics    Alcohol use: Not Currently        Medications Reviewed  Current Outpatient Medications on File Prior to Visit   Medication Sig Dispense Refill    [DISCONTINUED] carvedilol (Coreg) 25 mg tablet Take 1 tablet (25 mg) by mouth 2 times a day with meals. 180 tablet 0    [DISCONTINUED] hydrALAZINE (Apresoline) 50 mg tablet Take 1 tablet (50 mg) by mouth 3 times a day. 270 tablet 0    blood sugar diagnostic (ReliOn Prime Test Strips) strip TEST 3 TIMES DAILY.      Bydureon BCise 2 mg/0.85 mL auto-injector INJECT 0.85 ML SUBCUTANEOUSLY ONCE WEEKLY. 13.6 mL 0    CareFine Pen Needle 32 gauge x 3/16\" needle USE 1 PEN NEEDLE TO INJECT INSULIN 3 TIMES A DAY BEFORE MEALS AND AT BEDTIME.      cholecalciferol (Vitamin D-3) 1,250 mcg (50,000 unit) capsule Take 1 capsule (50,000 Units) by mouth 1 (one) time per week.      epoetin mckenna-epbx (Retacrit) 40,000 unit/mL injection Inject 1 mL (40,000 Units) under the skin every 30 (thirty) days.      ergocalciferol (Vitamin D-2) 1.25 MG (32644 UT) capsule Take 1 capsule (1,250 mcg) by mouth once a week.      Farxiga 10 mg Take 1 tablet (10 mg) by mouth once daily.      finerenone (Kerendia) 10 mg tablet tablet Take 1 tablet (10 mg) by mouth once daily. 90 tablet 3    furosemide (Lasix) 40 mg tablet 1 tablet (40 mg).      HumaLOG KwikPen Insulin 100 unit/mL injection Inject 8 Units under the skin 3 times a day with meals. 8 units before meals 15 each 3    MAGNESIUM ORAL Take 1 tablet by mouth once daily.      [DISCONTINUED] allopurinol (Zyloprim) 100 mg tablet 1 tablet (100 mg).      [DISCONTINUED] amLODIPine (Norvasc) 10 mg tablet Take 1 tablet (10 mg) by mouth once daily.      [DISCONTINUED] amLODIPine (Norvasc) 5 mg tablet Take 1 tablet (5 mg) by mouth once daily. 90 tablet 0    [DISCONTINUED] aspirin 81 mg EC tablet Take 1 tablet (81 mg) by mouth once daily.      [DISCONTINUED] atorvastatin (Lipitor) 20 mg tablet Take 1 tablet (20 mg) by mouth " once daily at bedtime. 90 tablet 0    [DISCONTINUED] clindamycin (Cleocin) 150 mg capsule Take 3 capsules (450 mg) by mouth every 8 hours.      [DISCONTINUED] clopidogrel (Plavix) 75 mg tablet Take 1 tablet (75 mg) by mouth once daily. 90 tablet 0    [DISCONTINUED] coenzyme Q-10 100 mg capsule Take 1 capsule (100 mg) by mouth once daily.      [DISCONTINUED] cyanocobalamin (Vitamin B-12) 1,000 mcg tablet Take 1 tablet (1,000 mcg) by mouth once daily.      [DISCONTINUED] cyanocobalamin, vitamin B-12, 500 mcg tablet, sublingual Place 1 tablet under the tongue once daily.      [DISCONTINUED] dapagliflozin propanediol (Farxiga) 5 mg Take 1 tablet (5 mg) by mouth once daily with breakfast.      [DISCONTINUED] doxycycline (Vibra-Tabs) 100 mg tablet Take 1 tablet (100 mg) by mouth every 12 hours.      [DISCONTINUED] folic acid (Folvite) 1 mg tablet Take 5 tablets (5 mg) by mouth once daily. 450 tablet 0    [DISCONTINUED] glipiZIDE XL (Glucotrol XL) 5 mg 24 hr tablet Take 1 tablet (5 mg) by mouth 2 times a day with meals.      [DISCONTINUED] hydroCHLOROthiazide (HYDRODiuril) 25 mg tablet Take 1 tablet (25 mg) by mouth once daily.      [DISCONTINUED] Lantus Solostar U-100 Insulin 100 unit/mL (3 mL) pen Inject 20 Units under the skin once daily at bedtime.      [DISCONTINUED] lisinopril 40 mg tablet Take 1 tablet (40 mg) by mouth once daily in the morning.      [DISCONTINUED] metFORMIN (Glucophage) 1,000 mg tablet Take 0.5 tablets (500 mg) by mouth 2 times a day with meals.      [DISCONTINUED] multivit-min-FA-lut-zeaxanth (Icaps MV) 100-1.66-0.83 mcg-mg-mg tablet,delayed release (DR/EC) Take 1 tablet by mouth every 12 hours.      [DISCONTINUED] naproxen (Naprosyn) 500 mg tablet Take 1 tablet (500 mg) by mouth 2 times a day with meals.      [DISCONTINUED] oxyCODONE-acetaminophen (Percocet) 5-325 mg tablet Take 1 tablet by mouth every 4 hours if needed for moderate pain (4 - 6).      [DISCONTINUED] pioglitazone (Actos) 45 mg  "tablet Take 1 tablet (45 mg) by mouth once daily.      [DISCONTINUED] potassium chloride CR 20 mEq ER tablet Take 1 tablet (20 mEq) by mouth once daily. Do not crush or chew.      [DISCONTINUED] rivaroxaban (Xarelto) 20 mg tablet Take 1 tablet (20 mg) by mouth once daily at bedtime.      [DISCONTINUED] SITagliptin phosphate (Januvia) 100 mg tablet Take 1 tablet (100 mg) by mouth once daily.      [DISCONTINUED] tamsulosin (Flomax) 0.4 mg 24 hr capsule Take 1 capsule (0.4 mg) by mouth once daily.      [DISCONTINUED] vit C,K-Te-gelwl-lutein-zeaxan (PreserVision AREDS-2) 250-90-40-1 mg capsule Take 1 capsule by mouth 2 times a day.       No current facility-administered medications on file prior to visit.        Review of Systems  Constitutional: Denies fever  HEENT: Denies ST, earache  CVS: Denies Chest pain  Pulmonary: Denies wheezing, SOB  GI: Denies N/V  : Denies dysuria  Musculoskeletal:  Denies myalgia  Neuro: Denies focal weakness or numbness.  Skin: Denies Rashes.  *Review of Systems is negative unless otherwise mentioned in HPI or ROS above.      @OBJECTIVEBEGIN  /70   Pulse 56   Temp 35.4 °C (95.7 °F)   Ht 1.753 m (5' 9\")   Wt 119 kg (262 lb 3.2 oz)   SpO2 96%   BMI 38.72 kg/m²  reviewed Body mass index is 38.72 kg/m².     Physical Exam  Constitutional: NAD. Afebrile. Resting comfortably.  ENT: Nasal mucosa and oropharynx: moist oral mucosa. Posterior oropharynx nonerythematous. No posterior pharyngeal streaking.  Eyes: PERRLA. EOM intact. No vertical or circular nystagmus.  Lymph: No anterior cervical chain or submandibular lymphadenopathy. No sentinel lymph nodes.  Cardiac: Regular rate & rhythm. No murmur, gallops, or rubs.  Pulmonary: Lungs clear to auscultation bilaterally with good aeration. No wheezes, rhonchi, or rales. Normal WOB.  GI: Soft, Nontender, nondistended. No guarding. Normal BS x4.  : No suprapubic tenderness. No CVA tenderness to percussion.   Musculoskeletal: No peripheral " "edema.   Skin: No evidence of trauma. No rashes  Neuro: No focal neuro deficits. Normal gait without assistive devices.  Psych: Intact judgement and insight.    Plan    Current Outpatient Medications:     allopurinol (Zyloprim) 100 mg tablet, Take 1 tablet (100 mg) by mouth once daily., Disp: 90 tablet, Rfl: 3    amLODIPine (Norvasc) 10 mg tablet, Take 1 tablet (10 mg) by mouth once daily., Disp: 90 tablet, Rfl: 3    amLODIPine (Norvasc) 5 mg tablet, Take 1 tablet (5 mg) by mouth once daily., Disp: 90 tablet, Rfl: 3    atorvastatin (Lipitor) 20 mg tablet, Take 1 tablet (20 mg) by mouth once daily at bedtime., Disp: 90 tablet, Rfl: 3    blood sugar diagnostic (ReliOn Prime Test Strips) strip, TEST 3 TIMES DAILY., Disp: , Rfl:     Bydureon BCise 2 mg/0.85 mL auto-injector, INJECT 0.85 ML SUBCUTANEOUSLY ONCE WEEKLY., Disp: 13.6 mL, Rfl: 0    CareFine Pen Needle 32 gauge x 3/16\" needle, USE 1 PEN NEEDLE TO INJECT INSULIN 3 TIMES A DAY BEFORE MEALS AND AT BEDTIME., Disp: , Rfl:     carvedilol (Coreg) 25 mg tablet, Take 1 tablet (25 mg) by mouth 2 times a day with meals., Disp: 180 tablet, Rfl: 3    cholecalciferol (Vitamin D-3) 1,250 mcg (50,000 unit) capsule, Take 1 capsule (50,000 Units) by mouth 1 (one) time per week., Disp: , Rfl:     clopidogrel (Plavix) 75 mg tablet, Take 1 tablet (75 mg) by mouth once daily., Disp: 90 tablet, Rfl: 3    epoetin mckenna-epbx (Retacrit) 40,000 unit/mL injection, Inject 1 mL (40,000 Units) under the skin every 30 (thirty) days., Disp: , Rfl:     ergocalciferol (Vitamin D-2) 1.25 MG (57965 UT) capsule, Take 1 capsule (1,250 mcg) by mouth once a week., Disp: , Rfl:     Farxiga 10 mg, Take 1 tablet (10 mg) by mouth once daily., Disp: , Rfl:     finerenone (Kerendia) 10 mg tablet tablet, Take 1 tablet (10 mg) by mouth once daily., Disp: 90 tablet, Rfl: 3    folic acid (Folvite) 1 mg tablet, Take 5 tablets (5 mg) by mouth once daily., Disp: 450 tablet, Rfl: 3    furosemide (Lasix) 40 mg " tablet, 1 tablet (40 mg)., Disp: , Rfl:     HumaLOG KwikPen Insulin 100 unit/mL injection, Inject 8 Units under the skin 3 times a day with meals. 8 units before meals, Disp: 15 each, Rfl: 3    hydrALAZINE (Apresoline) 50 mg tablet, Take 1 tablet (50 mg) by mouth 3 times a day., Disp: 270 tablet, Rfl: 3    insulin glargine (Lantus Solostar U-100 Insulin) 100 unit/mL (3 mL) pen, Inject 20 Units under the skin once daily at bedtime., Disp: 18 mL, Rfl: 0    lisinopril 40 mg tablet, Take 1 tablet (40 mg) by mouth once daily in the morning., Disp: 90 tablet, Rfl: 3    MAGNESIUM ORAL, Take 1 tablet by mouth once daily., Disp: , Rfl:        MDM  Meds refilled.   Needs DM better managed. Not taking meds properly. On lisinopril and seeing Nephrology.   Needs eye exam.   Continue with podiatry  Needs reeval cardiology. CT cardiac scoring for calcifications seen in 2015- DM HTN HLD, high risk  Due for cologuard, refused colonoscopy.  Immunizations- flu, covid, RSV recommended. Think he had PNE previously d/t DM    .Assessment/Plan   Problem List Items Addressed This Visit       Benign essential hypertension    Relevant Medications    carvedilol (Coreg) 25 mg tablet    Other Relevant Orders    Referral to Cardiology    Diabetes mellitus (CMS/[facility]) - Primary    Relevant Medications    insulin glargine (Lantus Solostar U-100 Insulin) 100 unit/mL (3 mL) pen    Other Relevant Orders    Referral to Clinical Pharmacy    Referral to Ophthalmology    Dyslipidemia    Relevant Medications    clopidogrel (Plavix) 75 mg tablet    atorvastatin (Lipitor) 20 mg tablet    Fatigue    Relevant Medications    folic acid (Folvite) 1 mg tablet    Atherosclerosis of native arteries of extremities with rest pain, bilateral legs (CMS/HCC)     Continue current meds.         Sjogren's syndrome (CMS/[facility])     Recommend lubricating eye drops, referred to ophtho. Recommended biotene, should see dentist annually also.         Non-pressure chronic ulcer of  left heel and midfoot with necrosis of bone (CMS/HCC)     Follow with Dr. Jacques podiatry, appears to be improving, needs better DM control         Chronic osteomyelitis with draining sinus, right ankle and foot (CMS/HCC)     Continue regular podiatry visits as usual for care          Other Visit Diagnoses       At increased risk for cardiovascular disease        Relevant Orders    CT cardiac scoring wo IV contrast    Referral to Cardiology    Special screening for malignant neoplasm of colon        Relevant Orders    Cologuard® colon cancer screening    Primary hypertension        Relevant Medications    amLODIPine (Norvasc) 5 mg tablet    amLODIPine (Norvasc) 10 mg tablet    lisinopril 40 mg tablet    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

## 2024-01-03 ENCOUNTER — INFUSION (OUTPATIENT)
Dept: HEMATOLOGY/ONCOLOGY | Facility: HOSPITAL | Age: 64
End: 2024-01-03
Payer: MEDICARE

## 2024-01-03 VITALS
RESPIRATION RATE: 18 BRPM | SYSTOLIC BLOOD PRESSURE: 115 MMHG | OXYGEN SATURATION: 97 % | DIASTOLIC BLOOD PRESSURE: 64 MMHG | HEART RATE: 52 BPM | TEMPERATURE: 97.9 F

## 2024-01-03 DIAGNOSIS — N18.4 ANEMIA DUE TO STAGE 4 CHRONIC KIDNEY DISEASE (MULTI): ICD-10-CM

## 2024-01-03 DIAGNOSIS — D63.1 ANEMIA DUE TO STAGE 4 CHRONIC KIDNEY DISEASE (MULTI): ICD-10-CM

## 2024-01-03 LAB
BASOPHILS # BLD AUTO: 0.1 X10*3/UL (ref 0–0.1)
BASOPHILS NFR BLD AUTO: 1.6 %
EOSINOPHIL # BLD AUTO: 0.33 X10*3/UL (ref 0–0.7)
EOSINOPHIL NFR BLD AUTO: 5.3 %
ERYTHROCYTE [DISTWIDTH] IN BLOOD BY AUTOMATED COUNT: 14.7 % (ref 11.5–14.5)
HCT VFR BLD AUTO: 32.6 % (ref 41–52)
HGB BLD-MCNC: 10.6 G/DL (ref 13.5–17.5)
IMM GRANULOCYTES # BLD AUTO: 0.01 X10*3/UL (ref 0–0.7)
IMM GRANULOCYTES NFR BLD AUTO: 0.2 % (ref 0–0.9)
LYMPHOCYTES # BLD AUTO: 1.39 X10*3/UL (ref 1.2–4.8)
LYMPHOCYTES NFR BLD AUTO: 22.2 %
MCH RBC QN AUTO: 30 PG (ref 26–34)
MCHC RBC AUTO-ENTMCNC: 32.5 G/DL (ref 32–36)
MCV RBC AUTO: 92 FL (ref 80–100)
MONOCYTES # BLD AUTO: 0.66 X10*3/UL (ref 0.1–1)
MONOCYTES NFR BLD AUTO: 10.6 %
NEUTROPHILS # BLD AUTO: 3.76 X10*3/UL (ref 1.2–7.7)
NEUTROPHILS NFR BLD AUTO: 60.1 %
NRBC BLD-RTO: 0 /100 WBCS (ref 0–0)
PLATELET # BLD AUTO: 206 X10*3/UL (ref 150–450)
RBC # BLD AUTO: 3.53 X10*6/UL (ref 4.5–5.9)
WBC # BLD AUTO: 6.3 X10*3/UL (ref 4.4–11.3)

## 2024-01-03 PROCEDURE — 85025 COMPLETE CBC W/AUTO DIFF WBC: CPT

## 2024-01-03 PROCEDURE — 36415 COLL VENOUS BLD VENIPUNCTURE: CPT

## 2024-01-03 PROCEDURE — 96372 THER/PROPH/DIAG INJ SC/IM: CPT

## 2024-01-03 PROCEDURE — 2500000004 HC RX 250 GENERAL PHARMACY W/ HCPCS (ALT 636 FOR OP/ED): Mod: JZ | Performed by: INTERNAL MEDICINE

## 2024-01-03 RX ORDER — DIPHENHYDRAMINE HYDROCHLORIDE 50 MG/ML
50 INJECTION INTRAMUSCULAR; INTRAVENOUS AS NEEDED
Status: CANCELLED | OUTPATIENT
Start: 2024-01-17

## 2024-01-03 RX ORDER — EPINEPHRINE 0.3 MG/.3ML
0.3 INJECTION SUBCUTANEOUS EVERY 5 MIN PRN
Status: CANCELLED | OUTPATIENT
Start: 2024-01-17

## 2024-01-03 RX ORDER — FAMOTIDINE 10 MG/ML
20 INJECTION INTRAVENOUS ONCE AS NEEDED
Status: CANCELLED | OUTPATIENT
Start: 2024-01-17

## 2024-01-03 RX ORDER — ALBUTEROL SULFATE 0.83 MG/ML
3 SOLUTION RESPIRATORY (INHALATION) AS NEEDED
Status: CANCELLED | OUTPATIENT
Start: 2024-01-17

## 2024-01-03 RX ADMIN — ERYTHROPOIETIN 10000 UNITS: 10000 INJECTION, SOLUTION INTRAVENOUS; SUBCUTANEOUS at 10:37

## 2024-01-03 ASSESSMENT — PATIENT HEALTH QUESTIONNAIRE - PHQ9
1. LITTLE INTEREST OR PLEASURE IN DOING THINGS: NOT AT ALL
SUM OF ALL RESPONSES TO PHQ9 QUESTIONS 1 & 2: 0
2. FEELING DOWN, DEPRESSED OR HOPELESS: NOT AT ALL

## 2024-01-03 ASSESSMENT — PAIN SCALES - GENERAL: PAINLEVEL: 2

## 2024-01-11 DIAGNOSIS — E13.69 OTHER SPECIFIED DIABETES MELLITUS WITH OTHER SPECIFIED COMPLICATION, WITH LONG-TERM CURRENT USE OF INSULIN (MULTI): ICD-10-CM

## 2024-01-11 DIAGNOSIS — Z79.4 OTHER SPECIFIED DIABETES MELLITUS WITH OTHER SPECIFIED COMPLICATION, WITH LONG-TERM CURRENT USE OF INSULIN (MULTI): ICD-10-CM

## 2024-01-11 NOTE — TELEPHONE ENCOUNTER
Would like to cancel cologuard and order colonoscopy, advised pt to call cologuard and I would ask you for colonoscopy referral

## 2024-01-12 RX ORDER — PEN NEEDLE, DIABETIC 32 GX3/16"
NEEDLE, DISPOSABLE MISCELLANEOUS
Qty: 100 EACH | Refills: 3 | Status: SHIPPED | OUTPATIENT
Start: 2024-01-12 | End: 2024-05-10

## 2024-01-17 ENCOUNTER — INFUSION (OUTPATIENT)
Dept: HEMATOLOGY/ONCOLOGY | Facility: HOSPITAL | Age: 64
End: 2024-01-17
Payer: MEDICARE

## 2024-01-17 VITALS
BODY MASS INDEX: 38.6 KG/M2 | TEMPERATURE: 97.5 F | OXYGEN SATURATION: 96 % | HEART RATE: 50 BPM | WEIGHT: 261.36 LBS | SYSTOLIC BLOOD PRESSURE: 101 MMHG | RESPIRATION RATE: 16 BRPM | DIASTOLIC BLOOD PRESSURE: 51 MMHG

## 2024-01-17 DIAGNOSIS — D63.1 ANEMIA DUE TO STAGE 4 CHRONIC KIDNEY DISEASE (MULTI): ICD-10-CM

## 2024-01-17 DIAGNOSIS — N18.4 ANEMIA DUE TO STAGE 4 CHRONIC KIDNEY DISEASE (MULTI): ICD-10-CM

## 2024-01-17 LAB
BASOPHILS # BLD AUTO: 0.07 X10*3/UL (ref 0–0.1)
BASOPHILS NFR BLD AUTO: 1.2 %
EOSINOPHIL # BLD AUTO: 0.24 X10*3/UL (ref 0–0.7)
EOSINOPHIL NFR BLD AUTO: 4 %
ERYTHROCYTE [DISTWIDTH] IN BLOOD BY AUTOMATED COUNT: 14.8 % (ref 11.5–14.5)
HCT VFR BLD AUTO: 30.9 % (ref 41–52)
HGB BLD-MCNC: 10 G/DL (ref 13.5–17.5)
IMM GRANULOCYTES # BLD AUTO: 0.01 X10*3/UL (ref 0–0.7)
IMM GRANULOCYTES NFR BLD AUTO: 0.2 % (ref 0–0.9)
LYMPHOCYTES # BLD AUTO: 1.05 X10*3/UL (ref 1.2–4.8)
LYMPHOCYTES NFR BLD AUTO: 17.5 %
MCH RBC QN AUTO: 31 PG (ref 26–34)
MCHC RBC AUTO-ENTMCNC: 32.4 G/DL (ref 32–36)
MCV RBC AUTO: 96 FL (ref 80–100)
MONOCYTES # BLD AUTO: 0.62 X10*3/UL (ref 0.1–1)
MONOCYTES NFR BLD AUTO: 10.4 %
NEUTROPHILS # BLD AUTO: 4 X10*3/UL (ref 1.2–7.7)
NEUTROPHILS NFR BLD AUTO: 66.7 %
NRBC BLD-RTO: 0 /100 WBCS (ref 0–0)
PLATELET # BLD AUTO: 193 X10*3/UL (ref 150–450)
RBC # BLD AUTO: 3.23 X10*6/UL (ref 4.5–5.9)
WBC # BLD AUTO: 6 X10*3/UL (ref 4.4–11.3)

## 2024-01-17 PROCEDURE — 85025 COMPLETE CBC W/AUTO DIFF WBC: CPT

## 2024-01-17 PROCEDURE — 96372 THER/PROPH/DIAG INJ SC/IM: CPT | Mod: JZ | Performed by: INTERNAL MEDICINE

## 2024-01-17 PROCEDURE — 2500000004 HC RX 250 GENERAL PHARMACY W/ HCPCS (ALT 636 FOR OP/ED): Mod: JZ,EC | Performed by: INTERNAL MEDICINE

## 2024-01-17 PROCEDURE — 96372 THER/PROPH/DIAG INJ SC/IM: CPT

## 2024-01-17 RX ORDER — ALBUTEROL SULFATE 0.83 MG/ML
3 SOLUTION RESPIRATORY (INHALATION) AS NEEDED
Status: CANCELLED | OUTPATIENT
Start: 2024-01-31

## 2024-01-17 RX ORDER — DIPHENHYDRAMINE HYDROCHLORIDE 50 MG/ML
50 INJECTION INTRAMUSCULAR; INTRAVENOUS AS NEEDED
Status: CANCELLED | OUTPATIENT
Start: 2024-01-31

## 2024-01-17 RX ORDER — EPINEPHRINE 0.3 MG/.3ML
0.3 INJECTION SUBCUTANEOUS EVERY 5 MIN PRN
Status: CANCELLED | OUTPATIENT
Start: 2024-01-31

## 2024-01-17 RX ORDER — FAMOTIDINE 10 MG/ML
20 INJECTION INTRAVENOUS ONCE AS NEEDED
Status: CANCELLED | OUTPATIENT
Start: 2024-01-31

## 2024-01-17 RX ADMIN — ERYTHROPOIETIN 20000 UNITS: 20000 INJECTION, SOLUTION INTRAVENOUS; SUBCUTANEOUS at 11:15

## 2024-01-17 ASSESSMENT — PAIN SCALES - GENERAL: PAINLEVEL: 0-NO PAIN

## 2024-01-24 ENCOUNTER — TELEMEDICINE (OUTPATIENT)
Dept: PHARMACY | Facility: HOSPITAL | Age: 64
End: 2024-01-24
Payer: MEDICARE

## 2024-01-24 DIAGNOSIS — E11.21 TYPE 2 DIABETES MELLITUS WITH DIABETIC NEPHROPATHY, WITHOUT LONG-TERM CURRENT USE OF INSULIN (MULTI): ICD-10-CM

## 2024-01-24 NOTE — PROGRESS NOTES
Subjective     Patient ID: Gregorio North is a 63 y.o. male who presents for Diabetes.    Referring Provider: Latisha Vines PA-C     Diabetes  He presents for his initial diabetic visit. He has type 2 diabetes mellitus. There are no hypoglycemic associated symptoms. Diabetic complications include heart disease and nephropathy. Risk factors for coronary artery disease include diabetes mellitus, dyslipidemia, male sex and obesity. Current diabetic treatment includes insulin injections and oral agent (monotherapy). He is compliant with treatment all of the time. An ACE inhibitor/angiotensin II receptor blocker is being taken.       No Known Allergies    Objective     Current DM Pharmacotherapy:   Faxrxiga 10 mg: Take 1 Tablet every morning  Lantus (units-100) : Inject 22 Units under the skin once daily at bedtime.   Humalog Pen: inject 8 Units under the skin 3 times a day with meals. 8 units before meals   SECONDARY PREVENTION  - Statin? Yes  - ACE-I/ARB? Yes  - Aspirin? No    Current monitoring regimen:   Patient is using: glucometer    Testing frequency: 2-3 times a day    SMBG Readings: FBG 80-90 mg/dL  PPB-150 mg/dL    Any episodes of hypoglycemia? No  Hypoglycemia awareness? Yes      Pertinent PMH Review:  - PMH of Pancreatitis: No  - PMH/FH of Medullary Thyroid Cancer: No  - PMH of Retinopathy: Yes ( Monitored)  - PMH of Urinary Tract Infections: No    Lab Review  Lab Results   Component Value Date    BILITOT 0.5 10/17/2022    CALCIUM 8.7 2023    CO2 23 2023     2023    CREATININE 3.11 (H) 2023    GLUCOSE 194 (H) 2023    ALKPHOS 80 10/17/2022    K 4.8 2023    PROT 6.7 10/17/2022     (L) 2023    AST 21 10/17/2022    ALT 18 10/17/2022    BUN 74 (H) 2023    ANIONGAP 12 2023    MG 1.96 2023    PHOS 5.0 (H) 2023     2022    ALBUMIN 3.9 2023    GFRMALE 32 (A) 2023     Lab Results   Component Value Date     TRIG 147 02/09/2023    CHOL 133 02/09/2023    HDL 43.6 02/09/2023     Lab Results   Component Value Date    HGBA1C 8.7 (A) 09/19/2023     The 10-year ASCVD risk score (Niels CLEVELAND, et al., 2019) is: 13.8%    Values used to calculate the score:      Age: 63 years      Sex: Male      Is Non- : No      Diabetic: Yes      Tobacco smoker: No      Systolic Blood Pressure: 101 mmHg      Is BP treated: Yes      HDL Cholesterol: 43.6 mg/dL      Total Cholesterol: 133 mg/dL      Assessment/Plan     Problem List Items Addressed This Visit       Diabetes mellitus (CMS/formerly [facility])     Current Assessment:    Patient is having Diarrhea while using Farxiga 10 mg  Patient current diet is :  Breakfast: Fruits, Eggs, Cereal special K  Lunch: Low sodium soup / Grilled Cheese [place]  Dinner: Fish / Stuffed Pepper / Chicken / Pork Chops  Patient drinks adequate water. Cranberry juice 2 glasses a day / Milk 1-2 Glasses a day / 2 cups of coffee a day with sugar free creamer  Used to walk [id number] steps almost everyday  Feels less energetic due to low Hemoglobin levels recently    Plan:    Replace all white bread / pasta / rice with whole grain or wheat to reduce the carbohydrates in diet  Eat oatmeal with no sugar or can add little honey and try to avoid any sugary cereal  Always go for baked or grilled food, avoid fried or using a lot of butter and use unsalted butter.   Increase number of meals to be 3-4 times a day and cut the meals portion size.  Limit the cranberry juice to 1 glass a day and get the no sugar added ones. Limit 1 glass of milk 1-2% and avoid whole milk due to carbohydrate and fat contents.  Continue on Procrit injection and the hemoglobin level will increase gradually and the energy level will increase along that. To get back walking [id number] Steps 3-4 times a week.  Change Farxiga 10 mg to Jardiance 10 mg to monitor the diarrhea side effects.  To start Ozempic 0.25 mg / 0.5 mL to help with glucose  levels and reduce the HbA1c levels.  Reduce the the number of unit of Lantus from 22 Units to 20 units which was the previous dose.              Type 2 diabetes mellitus, is not at goal (8.7 %). Goal A1C: <8%    Follow up: I recommend diabetes care be 3 weeks.    Jos Alcocer, BCPS, BCMTMS, [facility]  PGY1 Pharmacy Resident  [phone]    Continue all meds under the continuation of care with the referring provider and clinical pharmacy team

## 2024-01-24 NOTE — ASSESSMENT & PLAN NOTE
Current Assessment:    Patient believe he is having Diarrhea due to the use of Farxiga 10 mg, explained to the patient as that is not a side effect of Farxiga, and it might be due to other medication or one of the excipients .  Patient current diet is :  Breakfast: Fruits, Eggs, Cereal special K  Lunch: Low sodium soup / Grilled Cheese Griffin  Dinner: Fish / Stuffed Pepper / Chicken / Pork Chops  Patient drinks adequate water. Cranberry juice 2 glasses a day / Milk 1-2 Glasses a day / 2 cups of coffee a day with sugar free creamer  Used to walk 1285-6130 steps almost everyday  Feels less energetic due to low Hemoglobin levels recently    Plan:    Replace all white bread / pasta / rice with whole grain or wheat to reduce the carbohydrates in diet  Eat oatmeal with no sugar or can add little honey and try to avoid any sugary cereal  Always go for baked or grilled food, avoid fried or using a lot of butter and use unsalted butter.   Increase number of meals to be 3-4 times a day and cut the meals portion size.  Limit the cranberry juice to 1 glass a day and get the no sugar added ones. Limit 1 glass of milk 1-2% and avoid whole milk due to carbohydrate and fat contents.  Continue on Procrit injection and the hemoglobin level will increase gradually and the energy level will increase along that. To get back walking [id number] Steps 3-4 times a week.  Change Farxiga 10 mg to Jardiance 10 mg to monitor the assumed diarrhea side effects that might be due to excipient and to make sure to continue the therapeutic plan and be adherent to therapy.  To start Ozempic 0.25 mg / 0.5 mL to help with glucose levels and reduce the HbA1c levels.  Reduce the the number of unit of Lantus from 22 Units to 20 units which was the previous dose.

## 2024-01-31 ENCOUNTER — INFUSION (OUTPATIENT)
Dept: HEMATOLOGY/ONCOLOGY | Facility: HOSPITAL | Age: 64
End: 2024-01-31
Payer: MEDICARE

## 2024-01-31 ENCOUNTER — APPOINTMENT (OUTPATIENT)
Dept: RADIOLOGY | Facility: HOSPITAL | Age: 64
End: 2024-01-31
Payer: MEDICARE

## 2024-01-31 ENCOUNTER — HOSPITAL ENCOUNTER (INPATIENT)
Facility: HOSPITAL | Age: 64
LOS: 3 days | Discharge: HOME | DRG: 683 | End: 2024-02-03
Attending: STUDENT IN AN ORGANIZED HEALTH CARE EDUCATION/TRAINING PROGRAM | Admitting: INTERNAL MEDICINE
Payer: MEDICARE

## 2024-01-31 ENCOUNTER — APPOINTMENT (OUTPATIENT)
Dept: CARDIOLOGY | Facility: HOSPITAL | Age: 64
End: 2024-01-31
Payer: MEDICARE

## 2024-01-31 ENCOUNTER — HOSPITAL ENCOUNTER (EMERGENCY)
Facility: HOSPITAL | Age: 64
Discharge: OTHER INSTITUTION | End: 2024-01-31
Attending: EMERGENCY MEDICINE
Payer: MEDICARE

## 2024-01-31 VITALS
HEART RATE: 42 BPM | OXYGEN SATURATION: 96 % | DIASTOLIC BLOOD PRESSURE: 55 MMHG | RESPIRATION RATE: 16 BRPM | TEMPERATURE: 97.2 F | SYSTOLIC BLOOD PRESSURE: 96 MMHG

## 2024-01-31 VITALS
TEMPERATURE: 97.9 F | HEART RATE: 55 BPM | OXYGEN SATURATION: 97 % | WEIGHT: 260.8 LBS | RESPIRATION RATE: 18 BRPM | SYSTOLIC BLOOD PRESSURE: 109 MMHG | BODY MASS INDEX: 38.63 KG/M2 | HEIGHT: 69 IN | DIASTOLIC BLOOD PRESSURE: 59 MMHG

## 2024-01-31 DIAGNOSIS — D63.1 ANEMIA DUE TO STAGE 4 CHRONIC KIDNEY DISEASE (MULTI): ICD-10-CM

## 2024-01-31 DIAGNOSIS — R00.1 BRADYCARDIA: ICD-10-CM

## 2024-01-31 DIAGNOSIS — N18.9 ACUTE KIDNEY INJURY SUPERIMPOSED ON CKD (CMS-HCC): Primary | ICD-10-CM

## 2024-01-31 DIAGNOSIS — E87.5 HYPERKALEMIA: ICD-10-CM

## 2024-01-31 DIAGNOSIS — N17.9 ACUTE RENAL FAILURE, UNSPECIFIED ACUTE RENAL FAILURE TYPE (CMS-HCC): Primary | ICD-10-CM

## 2024-01-31 DIAGNOSIS — N17.9 ACUTE KIDNEY INJURY SUPERIMPOSED ON CKD (CMS-HCC): Primary | ICD-10-CM

## 2024-01-31 DIAGNOSIS — N18.4 ANEMIA DUE TO STAGE 4 CHRONIC KIDNEY DISEASE (MULTI): ICD-10-CM

## 2024-01-31 DIAGNOSIS — Z86.79 HISTORY OF HYPERTENSION: ICD-10-CM

## 2024-01-31 LAB
ALBUMIN SERPL BCP-MCNC: 3.9 G/DL (ref 3.4–5)
ALP SERPL-CCNC: 65 U/L (ref 33–136)
ALT SERPL W P-5'-P-CCNC: 29 U/L (ref 10–52)
AMMONIA PLAS-SCNC: 20 UMOL/L (ref 16–53)
ANION GAP SERPL CALC-SCNC: 16 MMOL/L (ref 10–20)
AST SERPL W P-5'-P-CCNC: 17 U/L (ref 9–39)
ATRIAL RATE: 48 BPM
BASOPHILS # BLD AUTO: 0.07 X10*3/UL (ref 0–0.1)
BASOPHILS NFR BLD AUTO: 1.2 %
BILIRUB SERPL-MCNC: 0.5 MG/DL (ref 0–1.2)
BNP SERPL-MCNC: 73 PG/ML (ref 0–99)
BUN SERPL-MCNC: 141 MG/DL (ref 6–23)
CALCIUM SERPL-MCNC: 8.5 MG/DL (ref 8.6–10.3)
CARDIAC TROPONIN I PNL SERPL HS: 6 NG/L (ref 0–20)
CARDIAC TROPONIN I PNL SERPL HS: 6 NG/L (ref 0–20)
CHLORIDE SERPL-SCNC: 110 MMOL/L (ref 98–107)
CO2 SERPL-SCNC: 15 MMOL/L (ref 21–32)
CREAT SERPL-MCNC: 4.46 MG/DL (ref 0.5–1.3)
EGFRCR SERPLBLD CKD-EPI 2021: 14 ML/MIN/1.73M*2
EOSINOPHIL # BLD AUTO: 0.29 X10*3/UL (ref 0–0.7)
EOSINOPHIL NFR BLD AUTO: 4.9 %
ERYTHROCYTE [DISTWIDTH] IN BLOOD BY AUTOMATED COUNT: 15.3 % (ref 11.5–14.5)
FLUAV RNA RESP QL NAA+PROBE: NOT DETECTED
FLUBV RNA RESP QL NAA+PROBE: NOT DETECTED
GLUCOSE BLD MANUAL STRIP-MCNC: 145 MG/DL (ref 74–99)
GLUCOSE BLD MANUAL STRIP-MCNC: 93 MG/DL (ref 74–99)
GLUCOSE SERPL-MCNC: 222 MG/DL (ref 74–99)
HCT VFR BLD AUTO: 34 % (ref 41–52)
HGB BLD-MCNC: 10.7 G/DL (ref 13.5–17.5)
HOLD SPECIMEN: NORMAL
IMM GRANULOCYTES # BLD AUTO: 0.01 X10*3/UL (ref 0–0.7)
IMM GRANULOCYTES NFR BLD AUTO: 0.2 % (ref 0–0.9)
INR PPP: 1 (ref 0.9–1.1)
LACTATE SERPL-SCNC: 0.8 MMOL/L (ref 0.4–2)
LYMPHOCYTES # BLD AUTO: 1.05 X10*3/UL (ref 1.2–4.8)
LYMPHOCYTES NFR BLD AUTO: 17.9 %
MAGNESIUM SERPL-MCNC: 3.05 MG/DL (ref 1.6–2.4)
MCH RBC QN AUTO: 30.7 PG (ref 26–34)
MCHC RBC AUTO-ENTMCNC: 31.5 G/DL (ref 32–36)
MCV RBC AUTO: 98 FL (ref 80–100)
MONOCYTES # BLD AUTO: 0.7 X10*3/UL (ref 0.1–1)
MONOCYTES NFR BLD AUTO: 11.9 %
NEUTROPHILS # BLD AUTO: 3.74 X10*3/UL (ref 1.2–7.7)
NEUTROPHILS NFR BLD AUTO: 63.9 %
NRBC BLD-RTO: 0 /100 WBCS (ref 0–0)
P AXIS: 37 DEGREES
P OFFSET: 146 MS
P ONSET: 106 MS
PLATELET # BLD AUTO: 193 X10*3/UL (ref 150–450)
POTASSIUM SERPL-SCNC: 5.5 MMOL/L (ref 3.5–5.3)
POTASSIUM SERPL-SCNC: 6.8 MMOL/L (ref 3.5–5.3)
PR INTERVAL: 202 MS
PROT SERPL-MCNC: 6.9 G/DL (ref 6.4–8.2)
PROTHROMBIN TIME: 11.7 SECONDS (ref 9.8–12.8)
Q ONSET: 207 MS
QRS COUNT: 8 BEATS
QRS DURATION: 114 MS
QT INTERVAL: 468 MS
QTC CALCULATION(BAZETT): 418 MS
QTC FREDERICIA: 434 MS
R AXIS: -9 DEGREES
RBC # BLD AUTO: 3.48 X10*6/UL (ref 4.5–5.9)
RSV RNA RESP QL NAA+PROBE: NOT DETECTED
SARS-COV-2 RNA RESP QL NAA+PROBE: NOT DETECTED
SODIUM SERPL-SCNC: 134 MMOL/L (ref 136–145)
T AXIS: 42 DEGREES
T OFFSET: 441 MS
VENTRICULAR RATE: 48 BPM
WBC # BLD AUTO: 5.9 X10*3/UL (ref 4.4–11.3)

## 2024-01-31 PROCEDURE — 99223 1ST HOSP IP/OBS HIGH 75: CPT | Performed by: INTERNAL MEDICINE

## 2024-01-31 PROCEDURE — 85610 PROTHROMBIN TIME: CPT | Performed by: EMERGENCY MEDICINE

## 2024-01-31 PROCEDURE — 36415 COLL VENOUS BLD VENIPUNCTURE: CPT | Performed by: EMERGENCY MEDICINE

## 2024-01-31 PROCEDURE — 2500000004 HC RX 250 GENERAL PHARMACY W/ HCPCS (ALT 636 FOR OP/ED): Performed by: EMERGENCY MEDICINE

## 2024-01-31 PROCEDURE — 84484 ASSAY OF TROPONIN QUANT: CPT | Performed by: EMERGENCY MEDICINE

## 2024-01-31 PROCEDURE — 82947 ASSAY GLUCOSE BLOOD QUANT: CPT | Mod: 59

## 2024-01-31 PROCEDURE — 2500000001 HC RX 250 WO HCPCS SELF ADMINISTERED DRUGS (ALT 637 FOR MEDICARE OP): Performed by: INTERNAL MEDICINE

## 2024-01-31 PROCEDURE — 2500000002 HC RX 250 W HCPCS SELF ADMINISTERED DRUGS (ALT 637 FOR MEDICARE OP, ALT 636 FOR OP/ED): Performed by: EMERGENCY MEDICINE

## 2024-01-31 PROCEDURE — 83880 ASSAY OF NATRIURETIC PEPTIDE: CPT | Performed by: EMERGENCY MEDICINE

## 2024-01-31 PROCEDURE — 71045 X-RAY EXAM CHEST 1 VIEW: CPT | Performed by: RADIOLOGY

## 2024-01-31 PROCEDURE — 2500000004 HC RX 250 GENERAL PHARMACY W/ HCPCS (ALT 636 FOR OP/ED): Mod: JZ,EC | Performed by: INTERNAL MEDICINE

## 2024-01-31 PROCEDURE — 1200000002 HC GENERAL ROOM WITH TELEMETRY DAILY

## 2024-01-31 PROCEDURE — 87637 SARSCOV2&INF A&B&RSV AMP PRB: CPT | Performed by: EMERGENCY MEDICINE

## 2024-01-31 PROCEDURE — 84132 ASSAY OF SERUM POTASSIUM: CPT | Performed by: EMERGENCY MEDICINE

## 2024-01-31 PROCEDURE — 83605 ASSAY OF LACTIC ACID: CPT | Performed by: EMERGENCY MEDICINE

## 2024-01-31 PROCEDURE — 82140 ASSAY OF AMMONIA: CPT | Performed by: EMERGENCY MEDICINE

## 2024-01-31 PROCEDURE — 82947 ASSAY GLUCOSE BLOOD QUANT: CPT

## 2024-01-31 PROCEDURE — 2500000005 HC RX 250 GENERAL PHARMACY W/O HCPCS: Performed by: EMERGENCY MEDICINE

## 2024-01-31 PROCEDURE — 80053 COMPREHEN METABOLIC PANEL: CPT | Performed by: EMERGENCY MEDICINE

## 2024-01-31 PROCEDURE — 99291 CRITICAL CARE FIRST HOUR: CPT | Mod: 25 | Performed by: EMERGENCY MEDICINE

## 2024-01-31 PROCEDURE — 96372 THER/PROPH/DIAG INJ SC/IM: CPT | Performed by: INTERNAL MEDICINE

## 2024-01-31 PROCEDURE — 96365 THER/PROPH/DIAG IV INF INIT: CPT

## 2024-01-31 PROCEDURE — 2500000002 HC RX 250 W HCPCS SELF ADMINISTERED DRUGS (ALT 637 FOR MEDICARE OP, ALT 636 FOR OP/ED): Performed by: INTERNAL MEDICINE

## 2024-01-31 PROCEDURE — 85025 COMPLETE CBC W/AUTO DIFF WBC: CPT

## 2024-01-31 PROCEDURE — 93005 ELECTROCARDIOGRAM TRACING: CPT

## 2024-01-31 PROCEDURE — 2500000004 HC RX 250 GENERAL PHARMACY W/ HCPCS (ALT 636 FOR OP/ED): Performed by: INTERNAL MEDICINE

## 2024-01-31 PROCEDURE — 2500000001 HC RX 250 WO HCPCS SELF ADMINISTERED DRUGS (ALT 637 FOR MEDICARE OP): Performed by: EMERGENCY MEDICINE

## 2024-01-31 PROCEDURE — 96372 THER/PROPH/DIAG INJ SC/IM: CPT

## 2024-01-31 PROCEDURE — 83735 ASSAY OF MAGNESIUM: CPT | Performed by: EMERGENCY MEDICINE

## 2024-01-31 PROCEDURE — 71045 X-RAY EXAM CHEST 1 VIEW: CPT

## 2024-01-31 PROCEDURE — 96375 TX/PRO/DX INJ NEW DRUG ADDON: CPT

## 2024-01-31 RX ORDER — DEXTROSE 50 % IN WATER (D50W) INTRAVENOUS SYRINGE
25
Status: DISCONTINUED | OUTPATIENT
Start: 2024-01-31 | End: 2024-02-03 | Stop reason: HOSPADM

## 2024-01-31 RX ORDER — FAMOTIDINE 10 MG/ML
20 INJECTION INTRAVENOUS ONCE AS NEEDED
Status: CANCELLED | OUTPATIENT
Start: 2024-02-14

## 2024-01-31 RX ORDER — SODIUM POLYSTYRENE SULFONATE 15 G/60ML
30 SUSPENSION ORAL; RECTAL ONCE
Status: COMPLETED | OUTPATIENT
Start: 2024-01-31 | End: 2024-01-31

## 2024-01-31 RX ORDER — ONDANSETRON HYDROCHLORIDE 2 MG/ML
4 INJECTION, SOLUTION INTRAVENOUS EVERY 8 HOURS PRN
Status: DISCONTINUED | OUTPATIENT
Start: 2024-01-31 | End: 2024-02-03 | Stop reason: HOSPADM

## 2024-01-31 RX ORDER — ALBUTEROL SULFATE 0.83 MG/ML
3 SOLUTION RESPIRATORY (INHALATION) AS NEEDED
Status: CANCELLED | OUTPATIENT
Start: 2024-02-14

## 2024-01-31 RX ORDER — ASPIRIN 325 MG
50000 TABLET, DELAYED RELEASE (ENTERIC COATED) ORAL
Status: DISCONTINUED | OUTPATIENT
Start: 2024-02-01 | End: 2024-01-31

## 2024-01-31 RX ORDER — SODIUM CHLORIDE 9 MG/ML
125 INJECTION, SOLUTION INTRAVENOUS CONTINUOUS
Status: DISCONTINUED | OUTPATIENT
Start: 2024-01-31 | End: 2024-01-31 | Stop reason: HOSPADM

## 2024-01-31 RX ORDER — ERGOCALCIFEROL 1.25 MG/1
1250 CAPSULE ORAL
Status: DISCONTINUED | OUTPATIENT
Start: 2024-02-01 | End: 2024-02-03 | Stop reason: HOSPADM

## 2024-01-31 RX ORDER — ACETAMINOPHEN 650 MG/1
650 SUPPOSITORY RECTAL EVERY 4 HOURS PRN
Status: DISCONTINUED | OUTPATIENT
Start: 2024-01-31 | End: 2024-02-03 | Stop reason: HOSPADM

## 2024-01-31 RX ORDER — ONDANSETRON 4 MG/1
4 TABLET, FILM COATED ORAL EVERY 8 HOURS PRN
Status: DISCONTINUED | OUTPATIENT
Start: 2024-01-31 | End: 2024-02-03 | Stop reason: HOSPADM

## 2024-01-31 RX ORDER — CLOPIDOGREL BISULFATE 75 MG/1
75 TABLET ORAL DAILY
Status: DISCONTINUED | OUTPATIENT
Start: 2024-01-31 | End: 2024-02-03 | Stop reason: HOSPADM

## 2024-01-31 RX ORDER — CALCIUM GLUCONATE 20 MG/ML
2 INJECTION, SOLUTION INTRAVENOUS ONCE
Status: COMPLETED | OUTPATIENT
Start: 2024-01-31 | End: 2024-01-31

## 2024-01-31 RX ORDER — INSULIN LISPRO 100 [IU]/ML
0-5 INJECTION, SOLUTION INTRAVENOUS; SUBCUTANEOUS
Status: DISCONTINUED | OUTPATIENT
Start: 2024-02-01 | End: 2024-02-03 | Stop reason: HOSPADM

## 2024-01-31 RX ORDER — ALLOPURINOL 100 MG/1
100 TABLET ORAL DAILY
Status: DISCONTINUED | OUTPATIENT
Start: 2024-01-31 | End: 2024-02-03 | Stop reason: HOSPADM

## 2024-01-31 RX ORDER — DEXTROSE MONOHYDRATE 100 MG/ML
0.3 INJECTION, SOLUTION INTRAVENOUS ONCE AS NEEDED
Status: DISCONTINUED | OUTPATIENT
Start: 2024-01-31 | End: 2024-02-03 | Stop reason: HOSPADM

## 2024-01-31 RX ORDER — ACETAMINOPHEN 160 MG/5ML
650 SOLUTION ORAL EVERY 4 HOURS PRN
Status: DISCONTINUED | OUTPATIENT
Start: 2024-01-31 | End: 2024-02-03 | Stop reason: HOSPADM

## 2024-01-31 RX ORDER — EPINEPHRINE 0.3 MG/.3ML
0.3 INJECTION SUBCUTANEOUS EVERY 5 MIN PRN
Status: CANCELLED | OUTPATIENT
Start: 2024-02-14

## 2024-01-31 RX ORDER — INSULIN GLARGINE 100 [IU]/ML
20 INJECTION, SOLUTION SUBCUTANEOUS NIGHTLY
Status: DISCONTINUED | OUTPATIENT
Start: 2024-01-31 | End: 2024-02-03 | Stop reason: HOSPADM

## 2024-01-31 RX ORDER — HEPARIN SODIUM 5000 [USP'U]/ML
5000 INJECTION, SOLUTION INTRAVENOUS; SUBCUTANEOUS EVERY 8 HOURS
Status: DISCONTINUED | OUTPATIENT
Start: 2024-01-31 | End: 2024-02-03 | Stop reason: HOSPADM

## 2024-01-31 RX ORDER — DIPHENHYDRAMINE HYDROCHLORIDE 50 MG/ML
50 INJECTION INTRAMUSCULAR; INTRAVENOUS AS NEEDED
Status: CANCELLED | OUTPATIENT
Start: 2024-02-14

## 2024-01-31 RX ORDER — ATORVASTATIN CALCIUM 20 MG/1
20 TABLET, FILM COATED ORAL NIGHTLY
Status: DISCONTINUED | OUTPATIENT
Start: 2024-01-31 | End: 2024-02-03 | Stop reason: HOSPADM

## 2024-01-31 RX ORDER — POLYETHYLENE GLYCOL 3350 17 G/17G
17 POWDER, FOR SOLUTION ORAL DAILY PRN
Status: DISCONTINUED | OUTPATIENT
Start: 2024-01-31 | End: 2024-02-03 | Stop reason: HOSPADM

## 2024-01-31 RX ORDER — ALBUTEROL SULFATE 5 MG/ML
10 SOLUTION RESPIRATORY (INHALATION) ONCE
Status: DISCONTINUED | OUTPATIENT
Start: 2024-01-31 | End: 2024-01-31 | Stop reason: HOSPADM

## 2024-01-31 RX ORDER — ACETAMINOPHEN 325 MG/1
650 TABLET ORAL EVERY 4 HOURS PRN
Status: DISCONTINUED | OUTPATIENT
Start: 2024-01-31 | End: 2024-02-03 | Stop reason: HOSPADM

## 2024-01-31 RX ORDER — DEXTROSE 50 % IN WATER (D50W) INTRAVENOUS SYRINGE
25 ONCE
Status: COMPLETED | OUTPATIENT
Start: 2024-01-31 | End: 2024-01-31

## 2024-01-31 RX ORDER — FOLIC ACID 1 MG/1
5 TABLET ORAL DAILY
Status: DISCONTINUED | OUTPATIENT
Start: 2024-01-31 | End: 2024-02-03 | Stop reason: HOSPADM

## 2024-01-31 RX ADMIN — HEPARIN SODIUM 5000 UNITS: 5000 INJECTION INTRAVENOUS; SUBCUTANEOUS at 21:47

## 2024-01-31 RX ADMIN — ATORVASTATIN CALCIUM 20 MG: 20 TABLET, FILM COATED ORAL at 21:47

## 2024-01-31 RX ADMIN — CALCIUM GLUCONATE 2 G: 20 INJECTION, SOLUTION INTRAVENOUS at 15:13

## 2024-01-31 RX ADMIN — ERYTHROPOIETIN 20000 UNITS: 20000 INJECTION, SOLUTION INTRAVENOUS; SUBCUTANEOUS at 11:40

## 2024-01-31 RX ADMIN — INSULIN GLARGINE 20 UNITS: 100 INJECTION, SOLUTION SUBCUTANEOUS at 21:45

## 2024-01-31 RX ADMIN — SODIUM CHLORIDE 500 ML: 9 INJECTION, SOLUTION INTRAVENOUS at 15:35

## 2024-01-31 RX ADMIN — SODIUM CHLORIDE 125 ML/HR: 9 INJECTION, SOLUTION INTRAVENOUS at 13:55

## 2024-01-31 RX ADMIN — SODIUM POLYSTYRENE SULFONATE 30 G: 15 SUSPENSION ORAL; RECTAL at 15:14

## 2024-01-31 RX ADMIN — DEXTROSE MONOHYDRATE 25 G: 25 INJECTION, SOLUTION INTRAVENOUS at 15:13

## 2024-01-31 RX ADMIN — INSULIN HUMAN 10 UNITS: 100 INJECTION, SOLUTION PARENTERAL at 15:13

## 2024-01-31 SDOH — SOCIAL STABILITY: SOCIAL INSECURITY: ARE THERE ANY APPARENT SIGNS OF INJURIES/BEHAVIORS THAT COULD BE RELATED TO ABUSE/NEGLECT?: NO

## 2024-01-31 SDOH — SOCIAL STABILITY: SOCIAL INSECURITY: WERE YOU ABLE TO COMPLETE ALL THE BEHAVIORAL HEALTH SCREENINGS?: YES

## 2024-01-31 SDOH — SOCIAL STABILITY: SOCIAL INSECURITY: HAS ANYONE EVER THREATENED TO HURT YOUR FAMILY OR YOUR PETS?: NO

## 2024-01-31 SDOH — SOCIAL STABILITY: SOCIAL INSECURITY: DOES ANYONE TRY TO KEEP YOU FROM HAVING/CONTACTING OTHER FRIENDS OR DOING THINGS OUTSIDE YOUR HOME?: NO

## 2024-01-31 SDOH — SOCIAL STABILITY: SOCIAL INSECURITY: ABUSE: ADULT

## 2024-01-31 SDOH — SOCIAL STABILITY: SOCIAL INSECURITY: HAVE YOU HAD THOUGHTS OF HARMING ANYONE ELSE?: NO

## 2024-01-31 SDOH — SOCIAL STABILITY: SOCIAL INSECURITY: ARE YOU OR HAVE YOU BEEN THREATENED OR ABUSED PHYSICALLY, EMOTIONALLY, OR SEXUALLY BY ANYONE?: NO

## 2024-01-31 SDOH — SOCIAL STABILITY: SOCIAL INSECURITY: DO YOU FEEL ANYONE HAS EXPLOITED OR TAKEN ADVANTAGE OF YOU FINANCIALLY OR OF YOUR PERSONAL PROPERTY?: NO

## 2024-01-31 SDOH — SOCIAL STABILITY: SOCIAL INSECURITY: DO YOU FEEL UNSAFE GOING BACK TO THE PLACE WHERE YOU ARE LIVING?: NO

## 2024-01-31 ASSESSMENT — COLUMBIA-SUICIDE SEVERITY RATING SCALE - C-SSRS
2. HAVE YOU ACTUALLY HAD ANY THOUGHTS OF KILLING YOURSELF?: NO
1. IN THE PAST MONTH, HAVE YOU WISHED YOU WERE DEAD OR WISHED YOU COULD GO TO SLEEP AND NOT WAKE UP?: NO
2. HAVE YOU ACTUALLY HAD ANY THOUGHTS OF KILLING YOURSELF?: NO
6. HAVE YOU EVER DONE ANYTHING, STARTED TO DO ANYTHING, OR PREPARED TO DO ANYTHING TO END YOUR LIFE?: NO
1. IN THE PAST MONTH, HAVE YOU WISHED YOU WERE DEAD OR WISHED YOU COULD GO TO SLEEP AND NOT WAKE UP?: NO
6. HAVE YOU EVER DONE ANYTHING, STARTED TO DO ANYTHING, OR PREPARED TO DO ANYTHING TO END YOUR LIFE?: NO

## 2024-01-31 ASSESSMENT — PATIENT HEALTH QUESTIONNAIRE - PHQ9
1. LITTLE INTEREST OR PLEASURE IN DOING THINGS: NOT AT ALL
SUM OF ALL RESPONSES TO PHQ9 QUESTIONS 1 & 2: 0
2. FEELING DOWN, DEPRESSED OR HOPELESS: NOT AT ALL
2. FEELING DOWN, DEPRESSED OR HOPELESS: NOT AT ALL
SUM OF ALL RESPONSES TO PHQ9 QUESTIONS 1 & 2: 0
1. LITTLE INTEREST OR PLEASURE IN DOING THINGS: NOT AT ALL

## 2024-01-31 ASSESSMENT — PAIN SCALES - GENERAL
PAINLEVEL_OUTOF10: 0 - NO PAIN
PAINLEVEL: 4
PAINLEVEL_OUTOF10: 0 - NO PAIN
PAINLEVEL_OUTOF10: 0 - NO PAIN

## 2024-01-31 ASSESSMENT — COGNITIVE AND FUNCTIONAL STATUS - GENERAL
DAILY ACTIVITIY SCORE: 24
STANDING UP FROM CHAIR USING ARMS: A LITTLE
WALKING IN HOSPITAL ROOM: A LITTLE
PATIENT BASELINE BEDBOUND: NO
MOBILITY SCORE: 22
WALKING IN HOSPITAL ROOM: A LITTLE
DAILY ACTIVITIY SCORE: 24
CLIMB 3 TO 5 STEPS WITH RAILING: A LITTLE
CLIMB 3 TO 5 STEPS WITH RAILING: A LITTLE
MOBILITY SCORE: 21

## 2024-01-31 ASSESSMENT — LIFESTYLE VARIABLES
HOW MANY STANDARD DRINKS CONTAINING ALCOHOL DO YOU HAVE ON A TYPICAL DAY: PATIENT DOES NOT DRINK
AUDIT-C TOTAL SCORE: 0
AUDIT-C TOTAL SCORE: 0
SKIP TO QUESTIONS 9-10: 1
PRESCIPTION_ABUSE_PAST_12_MONTHS: NO
HOW OFTEN DO YOU HAVE A DRINK CONTAINING ALCOHOL: NEVER
HOW OFTEN DO YOU HAVE 6 OR MORE DRINKS ON ONE OCCASION: NEVER
SUBSTANCE_ABUSE_PAST_12_MONTHS: NO

## 2024-01-31 ASSESSMENT — ACTIVITIES OF DAILY LIVING (ADL)
ASSISTIVE_DEVICE: EYEGLASSES
JUDGMENT_ADEQUATE_SAFELY_COMPLETE_DAILY_ACTIVITIES: YES
HEARING - RIGHT EAR: FUNCTIONAL
PATIENT'S MEMORY ADEQUATE TO SAFELY COMPLETE DAILY ACTIVITIES?: YES
BATHING: INDEPENDENT
ADEQUATE_TO_COMPLETE_ADL: YES
HEARING - LEFT EAR: FUNCTIONAL
WALKS IN HOME: INDEPENDENT
TOILETING: INDEPENDENT
LACK_OF_TRANSPORTATION: NO
DRESSING YOURSELF: INDEPENDENT
FEEDING YOURSELF: INDEPENDENT
GROOMING: INDEPENDENT

## 2024-01-31 ASSESSMENT — PAIN - FUNCTIONAL ASSESSMENT: PAIN_FUNCTIONAL_ASSESSMENT: 0-10

## 2024-01-31 NOTE — ED PROVIDER NOTES
Department of Emergency Medicine   ED  Provider Note  Admit Date/RoomTime: 1/31/2024 12:44 PM  ED Room: AC03/03                  History of Present Illness:   Gregorio North is a 63 y.o. male presenting to the ED for low heart rate and low blood pressure, beginning noted this morning when he went to receive his Procrit injection.  The complaint has been persistent, mild in severity, and worsened by nothing.  Patient reports this morning he went to go receive his Procrit injection.  They check his vital signs.  He was found to be bradycardic and a little bit hypotensive with a blood pressure 92/42 and a heart rate of 48.  He denies any james chest pain or shortness of breath.  He says he will get short of breath with walking a long ways.  He denies any fever or chills.  No cough runny nose or significant URI symptoms.  He has some intermittent nausea but no vomiting.  He says he has had 1 episode of diarrhea daily for the last few weeks.  No black tarry or bloody stools.  He is on Plavix but is not on any other blood thinners.  He does state that he is intermittently felt a little lightheaded.  He is on multiple medications.      Review of Systems:   Pertinent positives and review of systems as noted above.  Remaining 10 review of systems is negative or noncontributory to today's episode of care.  Review of Systems   A complete review of systems is otherwise negative except above    --------------------------------------------- PAST HISTORY ---------------------------------------------  Past Medical History:  has a past medical history of Acute diastolic heart failure (CMS/[facility]) (11/23/2022), STEVE (acute kidney injury) (CMS/[facility]) (09/18/2023), COVID-19 virus infection (09/18/2023), Cutaneous abscess of back (any part, except buttock) (01/25/2021), Deficiency of other specified B group vitamins (06/09/2021), Diabetic foot ulcer (CMS/[facility]) (05/06/2022), Encounter for general adult medical examination without abnormal  findings (05/19/2020), Encounter for screening for malignant neoplasm of colon (02/21/2019), Encounter for screening for malignant neoplasm of prostate (08/12/2021), Focal chorioretinal inflammation, macular or paramacular, 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), Focal chorioretinal inflammation, macular or paramacular, left eye (11/05/2014), 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), History of foot operation (09/18/2023), Ischemic optic neuropathy, 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), Localized edema (01/11/2021), Other muscle spasm (04/24/2017), Other specified abnormal immunological findings in serum (09/17/2014), Other specified cataract (01/27/2016), Other specified health status, Other specified postprocedural states (06/18/2015), Other specified retinal disorders (01/27/2016), Other symptoms and signs involving the musculoskeletal system (04/24/2017), Pain in right shoulder (04/24/2017), Personal history of diseases of the skin and subcutaneous tissue (05/25/2021), Personal history of other diseases of male genital organs (01/11/2021), Personal history of other diseases of the circulatory system (09/03/2014), Personal history of other diseases of the musculoskeletal system and connective tissue (11/21/2016), Personal history of other diseases of the musculoskeletal system and connective tissue (11/21/2016), Personal history of other diseases of the nervous system and sense organs (09/08/2014), Personal history of other diseases of the nervous system and sense organs (01/27/2016), Personal history of other diseases of the nervous system and sense organs (09/08/2014), Personal history of other diseases of the nervous system and sense organs (08/22/2016), Personal history of other endocrine, nutritional and metabolic disease (01/11/2021), Personal history of other specified conditions, Personal history of other specified conditions (09/08/2014), Personal history of other specified conditions (08/12/2021), Personal history  "of other specified conditions (08/09/2018), Personal history of other specified conditions (08/09/2018), Respiratory failure (CMS/HCC) (10/10/2023), Sebaceous cyst (11/20/2018), Unspecified cataract (01/27/2016), and Unspecified visual loss (02/18/2022).    Past Surgical History:  has a past surgical history that includes Foot surgery (03/31/2016); Other surgical history (02/04/2015); MR angio head wo IV contrast (9/17/2014); and CT guided percutaneous biopsy bone deep (8/29/2022).    Social History:  reports that he has never smoked. He has never used smokeless tobacco. He reports that he does not currently use alcohol. He reports that he does not use drugs.    Family History: family history includes Diabetes in his father; Heart attack in his father; No Known Problems in his mother. Unless otherwise noted, family history is non contributory    Discharge Medication List as of 1/31/2024  6:24 PM        CONTINUE these medications which have NOT CHANGED    Details   allopurinol (Zyloprim) 100 mg tablet Take 1 tablet (100 mg) by mouth once daily., Starting Thu 12/21/2023, Until Fri 12/20/2024, Normal      amLODIPine (Norvasc) 10 mg tablet Take 1 tablet (10 mg) by mouth once daily., Starting Thu 12/21/2023, Until Fri 12/20/2024, Normal      atorvastatin (Lipitor) 20 mg tablet Take 1 tablet (20 mg) by mouth once daily at bedtime., Starting u 12/21/2023, Until Fri 12/20/2024, Normal      blood sugar diagnostic (ReliOn Prime Test Strips) strip TEST 3 TIMES DAILY., Historical Med      Bydureon BCise 2 mg/0.85 mL auto-injector INJECT 0.85 ML SUBCUTANEOUSLY ONCE WEEKLY., Normal      CareFine Pen Needle 32 gauge x 3/16\" needle USE 1 PEN NEEDLE TO INJECT INSULIN 3 TIMES A DAY BEFORE MEALS AND AT BEDTIME., Normal      carvedilol (Coreg) 25 mg tablet Take 1 tablet (25 mg) by mouth 2 times a day with meals., Starting u 12/21/2023, Until Fri 12/20/2024, Normal      cholecalciferol (Vitamin D-3) 1,250 mcg (50,000 unit) capsule " Take 1 capsule (50,000 Units) by mouth 1 (one) time per week., Historical Med      clopidogrel (Plavix) 75 mg tablet Take 1 tablet (75 mg) by mouth once daily., Starting Thu 12/21/2023, Until Fri 12/20/2024, Normal      empagliflozin (Jardiance) 10 mg Take 1 tablet (10 mg) by mouth once daily in the morning., Starting Thu 1/25/2024, Normal      epoetin mckenna-epbx (Retacrit) 40,000 unit/mL injection Inject 1 mL (40,000 Units) under the skin every 30 (thirty) days., Starting Wed 11/23/2022, Historical Med      ergocalciferol (Vitamin D-2) 1.25 MG (99000 UT) capsule Take 1 capsule (1,250 mcg) by mouth once a week., Starting Thu 10/27/2022, Historical Med      Farxiga 10 mg Take 1 tablet (10 mg) by mouth once daily., Historical Med      finerenone (Kerendia) 10 mg tablet tablet Take 1 tablet (10 mg) by mouth once daily., Starting Thu 12/7/2023, Until Fri 12/6/2024, Normal      folic acid (Folvite) 1 mg tablet Take 5 tablets (5 mg) by mouth once daily., Starting Thu 12/21/2023, Until Fri 12/20/2024, Normal      furosemide (Lasix) 40 mg tablet 1 tablet (40 mg)., Historical Med      HumaLOG KwikPen Insulin 100 unit/mL injection Inject 8 Units under the skin 3 times a day with meals. 8 units before meals, Starting Mon 12/11/2023, Normal      hydrALAZINE (Apresoline) 50 mg tablet Take 1 tablet (50 mg) by mouth 3 times a day., Starting Thu 12/21/2023, Until Fri 12/20/2024, Normal      insulin glargine (Lantus Solostar U-100 Insulin) 100 unit/mL (3 mL) pen Inject 20 Units under the skin once daily at bedtime., Starting Thu 12/21/2023, Until Wed 3/20/2024, Normal      lisinopril 40 mg tablet Take 1 tablet (40 mg) by mouth once daily in the morning., Starting Thu 12/21/2023, Until Fri 12/20/2024, Normal      MAGNESIUM ORAL Take 1 tablet by mouth once daily., Historical Med      semaglutide 0.25 mg or 0.5 mg (2 mg/3 mL) pen injector Inject 0.25 mg under the skin 1 (one) time per week., Starting Thu 1/25/2024, Normal            The  patient’s home medications have been reviewed.    Allergies: Patient has no known allergies.    -------------------------------------------------- RESULTS -------------------------------------------------  All laboratory and radiology results have been personally reviewed by myself   LABS:  Labs Reviewed   COMPREHENSIVE METABOLIC PANEL - Abnormal       Result Value    Glucose 222 (*)     Sodium 134 (*)     Potassium 6.8 (*)     Chloride 110 (*)     Bicarbonate 15 (*)     Anion Gap 16      Urea Nitrogen 141 (*)     Creatinine 4.46 (*)     eGFR 14 (*)     Calcium 8.5 (*)     Albumin 3.9      Alkaline Phosphatase 65      Total Protein 6.9      AST 17      Bilirubin, Total 0.5      ALT 29     MAGNESIUM - Abnormal    Magnesium 3.05 (*)    POTASSIUM - Abnormal    Potassium 5.5 (*)    LACTATE - Normal    Lactate 0.8      Narrative:     Venipuncture immediately after or during the administration of Metamizole may lead to falsely low results. Testing should be performed immediately  prior to Metamizole dosing.   AMMONIA - Normal    Ammonia 20     PROTIME-INR - Normal    Protime 11.7      INR 1.0     B-TYPE NATRIURETIC PEPTIDE - Normal    BNP 73      Narrative:        <100 pg/mL - Heart failure unlikely  100-299 pg/mL - Intermediate probability of acute heart                  failure exacerbation. Correlate with clinical                  context and patient history.    >=300 pg/mL - Heart Failure likely. Correlate with clinical                  context and patient history.    BNP testing is performed using different testing methodology at [facility] than at other [facility]. Direct result comparisons should only be made within the same method.      SARS-COV-2 AND INFLUENZA A/B PCR - Normal    Flu A Result Not Detected      Flu B Result Not Detected      Coronavirus 2019, PCR Not Detected      Narrative:     This assay has received FDA Emergency Use Authorization (EUA) and  is only authorized for the  duration of time that circumstances exist to justify the authorization of the emergency use of in vitro diagnostic tests for the detection of SARS-CoV-2 virus and/or diagnosis of COVID-19 infection under section 564(b)(1) of the Act, 21 U.S.C. 360bbb-3(b)(1). Testing for SARS-CoV-2 is only recommended for patients who meet current clinical and/or epidemiological criteria as defined by federal, state, or local public health directives. This assay is an in vitro diagnostic nucleic acid amplification test for the qualitative detection of SARS-CoV-2, Influenza A, and Influenza B from nasopharyngeal specimens and has been validated for use at [facility]. Negative results do not preclude COVID-19 infections or Influenza A/B infections, and should not be used as the sole basis for diagnosis, treatment, or other management decisions. If Influenza A/B and RSV PCR results are negative, testing for Parainfluenza virus, Adenovirus and Metapneumovirus is routinely performed for [facility] pediatric oncology and intensive care inpatients, and is available on other patients by placing an add-on request.    RSV PCR - Normal    RSV PCR Not Detected      Narrative:     This assay is an FDA-cleared, in vitro diagnostic nucleic acid amplification test for the detection of RSV from nasopharyngeal specimens, and has been validated for use at [facility]. Negative results do not preclude RSV infections, and should not be used as the sole basis for diagnosis, treatment, or other management decisions. If Influenza A/B and RSV PCR results are negative, testing for Parainfluenza virus, Adenovirus and Metapneumovirus is routinely performed for pediatric oncology and intensive care inpatients at [facility], and is available on other patients by placing an add-on request.       SERIAL TROPONIN-INITIAL - Normal    Troponin I, High Sensitivity 6      Narrative:     Less than 99th percentile of normal range  cutoff-  Female and children under 18 years old <14 ng/L; Male <21 ng/L: Negative  Repeat testing should be performed if clinically indicated.     Female and children under 18 years old 14-50 ng/L; Male 21-50 ng/L:  Consistent with possible cardiac damage and possible increased clinical   risk. Serial measurements may help to assess extent of myocardial damage.     >50 ng/L: Consistent with cardiac damage, increased clinical risk and  myocardial infarction. Serial measurements may help assess extent of   myocardial damage.      NOTE: Children less than 1 year old may have higher baseline troponin   levels and results should be interpreted in conjunction with the overall   clinical context.     NOTE: Troponin I testing is performed using a different   testing methodology at [facility] than at other   [facility]. Direct result comparisons should only   be made within the same method.   SERIAL TROPONIN, 1 HOUR - Normal    Troponin I, High Sensitivity 6      Narrative:     Less than 99th percentile of normal range cutoff-  Female and children under 18 years old <14 ng/L; Male <21 ng/L: Negative  Repeat testing should be performed if clinically indicated.     Female and children under 18 years old 14-50 ng/L; Male 21-50 ng/L:  Consistent with possible cardiac damage and possible increased clinical   risk. Serial measurements may help to assess extent of myocardial damage.     >50 ng/L: Consistent with cardiac damage, increased clinical risk and  myocardial infarction. Serial measurements may help assess extent of   myocardial damage.      NOTE: Children less than 1 year old may have higher baseline troponin   levels and results should be interpreted in conjunction with the overall   clinical context.     NOTE: Troponin I testing is performed using a different   testing methodology at [facility] than at other   [facility]. Direct result comparisons should only   be made within the  same method.   POCT GLUCOSE - Normal    POCT Glucose 93     TROPONIN SERIES- (INITIAL, 1 HR)    Narrative:     The following orders were created for panel order Troponin Series, (0, 1 HR).  Procedure                               Abnormality         Status                     ---------                               -----------         ------                     Troponin I, High Sensiti...[[id number]]  Normal              Final result               Troponin, High Sensitivi...[[id number]]  Normal              Final result                 Please view results for these tests on the individual orders.   URINALYSIS WITH REFLEX CULTURE AND MICROSCOPIC    Narrative:     The following orders were created for panel order Urinalysis with Reflex Culture and Microscopic.  Procedure                               Abnormality         Status                     ---------                               -----------         ------                     Urinalysis with Reflex C...[[id number]]                                                 Extra Urine Gray Tube[[id number]]                                                         Please view results for these tests on the individual orders.   URINALYSIS WITH REFLEX CULTURE AND MICROSCOPIC   EXTRA URINE GRAY TUBE   POCT GLUCOSE METER         RADIOLOGY:  Interpreted by Radiologist.  XR chest 1 view   Final Result   Left basilar airspace consolidation, as above. Clinical correlation   and continued follow-up until clearing is recommended.        MACRO:   None.        Signed by: Sriram Garcia 1/31/2024 1:15 PM   Dictation workstation:   [id number]          Encounter Date: 01/31/24   ECG 12 lead   Result Value    Ventricular Rate 48    Atrial Rate 48    NH Interval 202    QRS Duration 114    QT Interval 468    QTC Calculation(Bazett) 418    P Axis 37    R Axis -9    T Axis 42    QRS Count 8    Q Onset 207    P Onset 106    P Offset 146    T Offset 441    QTC Fredericia 434    Narrative    Sinus  "bradycardia  Otherwise normal ECG  When compared with ECG of 05-MAY-2022 00:39,  Sinus rhythm has replaced Junctional rhythm  Vent. rate has decreased BY  38 BPM  Questionable change in QRS duration  See ED provider note for full interpretation and clinical correlation  Confirmed by Alejandro Evans ([id number]) on 1/31/2024 6:26:52 PM     ------------------------- NURSING NOTES AND VITALS REVIEWED ---------------------------   The nursing notes within the ED encounter and vital signs as below have been reviewed.   /59   Pulse 55   Temp 36.6 °C (97.9 °F)   Resp 18   Ht 1.753 m (5' 9\")   Wt 118 kg (260 lb 12.9 oz)   SpO2 97%   BMI 38.51 kg/m²   Oxygen Saturation Interpretation: Normal      ---------------------------------------------------PHYSICAL EXAM--------------------------------------  Physical Exam  Vitals and nursing note reviewed.   Constitutional:       General: He is not in acute distress.     Appearance: He is well-developed. He is obese. He is not ill-appearing or toxic-appearing.   HENT:      Head: Normocephalic and atraumatic.      Right Ear: Tympanic membrane, ear canal and external ear normal.      Left Ear: Tympanic membrane, ear canal and external ear normal.      Nose: Nose normal.      Mouth/Throat:      Mouth: Mucous membranes are moist.      Pharynx: Oropharynx is clear. No oropharyngeal exudate or posterior oropharyngeal erythema.   Eyes:      General: No scleral icterus.     Extraocular Movements: Extraocular movements intact.      Conjunctiva/sclera: Conjunctivae normal.      Pupils: Pupils are equal, round, and reactive to light.   Cardiovascular:      Rate and Rhythm: Regular rhythm. Bradycardia present.      Heart sounds: No murmur heard.     No friction rub. No gallop.   Pulmonary:      Effort: Pulmonary effort is normal. No respiratory distress.      Breath sounds: Normal breath sounds. No wheezing, rhonchi or rales.   Chest:      Chest wall: No tenderness.   Abdominal:      " General: There is no distension.      Palpations: Abdomen is soft.      Tenderness: There is no abdominal tenderness. There is no right CVA tenderness, left CVA tenderness, guarding or rebound.      Hernia: No hernia is present.   Musculoskeletal:         General: No swelling, tenderness or deformity.      Cervical back: Normal range of motion and neck supple. No rigidity or tenderness.      Right lower leg: No edema.      Left lower leg: No edema.   Lymphadenopathy:      Cervical: No cervical adenopathy.   Skin:     General: Skin is warm and dry.      Capillary Refill: Capillary refill takes less than 2 seconds.      Findings: No rash.   Neurological:      Mental Status: He is alert and oriented to person, place, and time.   Psychiatric:         Mood and Affect: Mood normal.            Procedures  A complete review of systems is otherwise negative except as noted above.  ------------------------------ ED COURSE/MEDICAL DECISION MAKING----------------------    Medical Decision Making:   An IV was started.  Appropriate labs were obtained.  Will obtain orthostatic vital signs.    Labs reveal acute renal failure.  , creatinine 4.46, GFR 14  Sodium 134, potassium 6.8, chloride 110, bicarb 15, anion gap 16  Patient was treated with hyperkalemia cocktail please see orders.  Patient received IV D50, IV insulin regular 10 units, IV calcium gluconate 2 g, IV fluids.  Patient received Kayexalate 30 g p.o.    I discussed the case with the patient and his wife.  They are agreeable to transfer.  We do not have dialysis here.  I discussed case with Dr. Andre at [facility] she asked I d/w nephrology.  I discussed with Dr. Barlow from nephrology,  ok to send to  [facility]  The patient was accepted in transfer by Dr. Andre to TELEMETRY  Patient agreeable to transfer      ED Course as of 01/31/24 2002 Wed Jan 31, 2024   1311 An EKG at 1253 interpreted by me at 1258.  Sinus bradycardia rate 48 bpm.  Normal axis.   ms   ms  ms.  Nonspecific T wave flattening.  No evidence of acute ST segment elevation or depression.  No STEMI. [EC]   1339 Chest x-ray:  FINDINGS:  Left basilar airspace consolidation is seen and may represent small  pleural effusion, atelectasis and/or pneumonia. No pneumothorax is  identified. The cardiac silhouette is within normal limits for size.      IMPRESSION:  Left basilar airspace consolidation, as above. Clinical correlation  and continued follow-up until clearing is recommended.   [EC]   1341 CBC done earlier today revealed white count 5.9, hemoglobin 10.7, medic at 34, platelet count 193,000, no shift [EC]   1518 Comprehensive metabolic panel revealed glucose of 222, sodium 134, potassium 6.8, chloride 110, bicarb 15, anion gap 16  , creatinine 4.46  Estimated GFR 14  Calcium 8.5    Patient is being treated with hyperkalemia cocktail including IV D50, IV insulin 10 units IV, 2 g IV calcium gluconate, Kayexalate 30 g p.o., patient has been giving some IV fluids. [EC]   1627 Repeat EKG at 1619 interpreted by me at 1623, sinus bradycardia 54 bpm.  Axis grossly normal.  MN, QRS, QT are normal full is grossly normal.  There is no acute ST-T change.  No evidence of ischemia.  No STEMI. [EC]      ED Course User Index  [EC] Josse Amanda DO         Diagnoses as of 01/31/24 2002   Acute renal failure, unspecified acute renal failure type (CMS/HCC)   Bradycardia   Hyperkalemia      Counseling:   The emergency provider has spoken with the patient and spouse  and discussed today’s results, in addition to providing specific details for the plan of care and counseling regarding the diagnosis and prognosis.  Questions are answered at this time and they are agreeable with the plan.      --------------------------------- IMPRESSION AND DISPOSITION ---------------------------------        IMPRESSION  1. Acute renal failure, unspecified acute renal failure type (CMS/HCC)    2. Bradycardia    3.  Hyperkalemia        DISPOSITION  Disposition: Patient will be transferred to [facility] to telemetry.  Dr. Carroll excepting  Patient condition is fair      Billing Provider Critical Care Time: 40 minutes     Josse Amanda, DO  01/31/24 1703       Josse Amanda, DO  01/31/24 2003

## 2024-01-31 NOTE — PROGRESS NOTES
Reached out to Latisha Vines via secure chat at 1030 regarding patient's BP, heart rate, Tiredness, SOB with exertion and diarrhea.  Did not receive a secure message back before the patient left.  Patient was going to call her office as soon as he got home and follow up.

## 2024-01-31 NOTE — ED PROCEDURE NOTE
Procedure  Critical Care    Performed by: Josse Amanda DO  Authorized by: Josse Amanda DO    Critical care provider statement:     Critical care time (minutes):  40    Critical care time was exclusive of:  Separately billable procedures and treating other patients    Critical care was necessary to treat or prevent imminent or life-threatening deterioration of the following conditions:  Cardiac failure and renal failure (Acute renal failure, bradycardia)    Critical care was time spent personally by me on the following activities:  Blood draw for specimens, development of treatment plan with patient or surrogate, discussions with consultants, evaluation of patient's response to treatment, examination of patient, ordering and performing treatments and interventions, ordering and review of laboratory studies, ordering and review of radiographic studies, pulse oximetry, re-evaluation of patient's condition and review of old charts    Care discussed with: accepting provider at another facility    Comments:      Patient was discussed with the hospitalist Dr Andre as well as nephrologist Dr Barlow at [facility] and was accepted in transfer there               Josse Amanda DO  01/31/24 3173

## 2024-02-01 ENCOUNTER — APPOINTMENT (OUTPATIENT)
Dept: CARDIOLOGY | Facility: HOSPITAL | Age: 64
End: 2024-02-01
Payer: MEDICARE

## 2024-02-01 LAB
ALBUMIN SERPL BCP-MCNC: 3.6 G/DL (ref 3.4–5)
ANION GAP SERPL CALC-SCNC: 17 MMOL/L (ref 10–20)
ANION GAP SERPL CALC-SCNC: 20 MMOL/L (ref 10–20)
APPEARANCE UR: CLEAR
BACTERIA #/AREA URNS AUTO: ABNORMAL /HPF
BILIRUB UR STRIP.AUTO-MCNC: NEGATIVE MG/DL
BUN SERPL-MCNC: 108 MG/DL (ref 6–23)
BUN SERPL-MCNC: 118 MG/DL (ref 6–23)
CALCIUM SERPL-MCNC: 8.2 MG/DL (ref 8.6–10.3)
CALCIUM SERPL-MCNC: 8.2 MG/DL (ref 8.6–10.3)
CHLORIDE SERPL-SCNC: 111 MMOL/L (ref 98–107)
CHLORIDE SERPL-SCNC: 112 MMOL/L (ref 98–107)
CHLORIDE UR-SCNC: 59 MMOL/L
CHLORIDE/CREATININE (MMOL/G) IN URINE: 131 MMOL/G CREAT (ref 23–275)
CO2 SERPL-SCNC: 13 MMOL/L (ref 21–32)
CO2 SERPL-SCNC: 15 MMOL/L (ref 21–32)
COLOR UR: ABNORMAL
CREAT SERPL-MCNC: 3.48 MG/DL (ref 0.5–1.3)
CREAT SERPL-MCNC: 3.8 MG/DL (ref 0.5–1.3)
CREAT UR-MCNC: 44.9 MG/DL (ref 20–370)
EGFRCR SERPLBLD CKD-EPI 2021: 17 ML/MIN/1.73M*2
EGFRCR SERPLBLD CKD-EPI 2021: 19 ML/MIN/1.73M*2
ERYTHROCYTE [DISTWIDTH] IN BLOOD BY AUTOMATED COUNT: 15.1 % (ref 11.5–14.5)
GLUCOSE BLD MANUAL STRIP-MCNC: 154 MG/DL (ref 74–99)
GLUCOSE BLD MANUAL STRIP-MCNC: 156 MG/DL (ref 74–99)
GLUCOSE BLD MANUAL STRIP-MCNC: 189 MG/DL (ref 74–99)
GLUCOSE BLD MANUAL STRIP-MCNC: 199 MG/DL (ref 74–99)
GLUCOSE SERPL-MCNC: 165 MG/DL (ref 74–99)
GLUCOSE SERPL-MCNC: 204 MG/DL (ref 74–99)
GLUCOSE UR STRIP.AUTO-MCNC: ABNORMAL MG/DL
HCT VFR BLD AUTO: 31.6 % (ref 41–52)
HGB BLD-MCNC: 10 G/DL (ref 13.5–17.5)
KETONES UR STRIP.AUTO-MCNC: NEGATIVE MG/DL
LEUKOCYTE ESTERASE UR QL STRIP.AUTO: NEGATIVE
MCH RBC QN AUTO: 30.1 PG (ref 26–34)
MCHC RBC AUTO-ENTMCNC: 31.6 G/DL (ref 32–36)
MCV RBC AUTO: 95 FL (ref 80–100)
NITRITE UR QL STRIP.AUTO: NEGATIVE
NRBC BLD-RTO: 0 /100 WBCS (ref 0–0)
PH UR STRIP.AUTO: 5 [PH]
PHOSPHATE SERPL-MCNC: 6.9 MG/DL (ref 2.5–4.9)
PLATELET # BLD AUTO: 182 X10*3/UL (ref 150–450)
POTASSIUM SERPL-SCNC: 5.5 MMOL/L (ref 3.5–5.3)
POTASSIUM SERPL-SCNC: 5.9 MMOL/L (ref 3.5–5.3)
POTASSIUM UR-SCNC: 23 MMOL/L
POTASSIUM/CREAT UR-RTO: 51 MMOL/G CREAT
PROT UR STRIP.AUTO-MCNC: ABNORMAL MG/DL
RBC # BLD AUTO: 3.32 X10*6/UL (ref 4.5–5.9)
RBC # UR STRIP.AUTO: ABNORMAL /UL
RBC #/AREA URNS AUTO: ABNORMAL /HPF
SODIUM SERPL-SCNC: 137 MMOL/L (ref 136–145)
SODIUM SERPL-SCNC: 139 MMOL/L (ref 136–145)
SODIUM UR-SCNC: 61 MMOL/L
SODIUM/CREAT UR-RTO: 136 MMOL/G CREAT
SP GR UR STRIP.AUTO: 1.01
UROBILINOGEN UR STRIP.AUTO-MCNC: <2 MG/DL
WBC # BLD AUTO: 5.2 X10*3/UL (ref 4.4–11.3)
WBC #/AREA URNS AUTO: ABNORMAL /HPF

## 2024-02-01 PROCEDURE — 82436 ASSAY OF URINE CHLORIDE: CPT | Mod: GEALAB | Performed by: INTERNAL MEDICINE

## 2024-02-01 PROCEDURE — 99223 1ST HOSP IP/OBS HIGH 75: CPT | Performed by: INTERNAL MEDICINE

## 2024-02-01 PROCEDURE — 2500000002 HC RX 250 W HCPCS SELF ADMINISTERED DRUGS (ALT 637 FOR MEDICARE OP, ALT 636 FOR OP/ED): Performed by: INTERNAL MEDICINE

## 2024-02-01 PROCEDURE — 2500000001 HC RX 250 WO HCPCS SELF ADMINISTERED DRUGS (ALT 637 FOR MEDICARE OP): Performed by: INTERNAL MEDICINE

## 2024-02-01 PROCEDURE — 99232 SBSQ HOSP IP/OBS MODERATE 35: CPT | Performed by: INTERNAL MEDICINE

## 2024-02-01 PROCEDURE — 93005 ELECTROCARDIOGRAM TRACING: CPT

## 2024-02-01 PROCEDURE — 85027 COMPLETE CBC AUTOMATED: CPT | Performed by: INTERNAL MEDICINE

## 2024-02-01 PROCEDURE — 36415 COLL VENOUS BLD VENIPUNCTURE: CPT | Performed by: INTERNAL MEDICINE

## 2024-02-01 PROCEDURE — 82947 ASSAY GLUCOSE BLOOD QUANT: CPT

## 2024-02-01 PROCEDURE — 1200000002 HC GENERAL ROOM WITH TELEMETRY DAILY

## 2024-02-01 PROCEDURE — 2500000004 HC RX 250 GENERAL PHARMACY W/ HCPCS (ALT 636 FOR OP/ED): Performed by: INTERNAL MEDICINE

## 2024-02-01 PROCEDURE — 80069 RENAL FUNCTION PANEL: CPT | Performed by: INTERNAL MEDICINE

## 2024-02-01 PROCEDURE — 81001 URINALYSIS AUTO W/SCOPE: CPT | Performed by: INTERNAL MEDICINE

## 2024-02-01 PROCEDURE — 6350000001 HC RX 635 EPOETIN >10,000 UNITS: Mod: JZ | Performed by: INTERNAL MEDICINE

## 2024-02-01 RX ORDER — SODIUM BICARBONATE 650 MG/1
650 TABLET ORAL 3 TIMES DAILY
Status: DISCONTINUED | OUTPATIENT
Start: 2024-02-01 | End: 2024-02-03 | Stop reason: HOSPADM

## 2024-02-01 RX ADMIN — INSULIN LISPRO 1 UNITS: 100 INJECTION, SOLUTION INTRAVENOUS; SUBCUTANEOUS at 08:47

## 2024-02-01 RX ADMIN — SODIUM BICARBONATE 650 MG: 650 TABLET ORAL at 17:09

## 2024-02-01 RX ADMIN — INSULIN LISPRO 1 UNITS: 100 INJECTION, SOLUTION INTRAVENOUS; SUBCUTANEOUS at 12:07

## 2024-02-01 RX ADMIN — HEPARIN SODIUM 5000 UNITS: 5000 INJECTION INTRAVENOUS; SUBCUTANEOUS at 05:37

## 2024-02-01 RX ADMIN — HEPARIN SODIUM 5000 UNITS: 5000 INJECTION INTRAVENOUS; SUBCUTANEOUS at 20:16

## 2024-02-01 RX ADMIN — CLOPIDOGREL 75 MG: 75 TABLET ORAL at 08:47

## 2024-02-01 RX ADMIN — SODIUM BICARBONATE 650 MG: 650 TABLET ORAL at 10:39

## 2024-02-01 RX ADMIN — EPOETIN ALFA-EPBX 40000 UNITS: 40000 INJECTION, SOLUTION INTRAVENOUS; SUBCUTANEOUS at 10:39

## 2024-02-01 RX ADMIN — HEPARIN SODIUM 5000 UNITS: 5000 INJECTION INTRAVENOUS; SUBCUTANEOUS at 12:07

## 2024-02-01 RX ADMIN — ERGOCALCIFEROL 1250 MCG: 1.25 CAPSULE ORAL at 08:47

## 2024-02-01 RX ADMIN — INSULIN LISPRO 1 UNITS: 100 INJECTION, SOLUTION INTRAVENOUS; SUBCUTANEOUS at 17:09

## 2024-02-01 RX ADMIN — ATORVASTATIN CALCIUM 20 MG: 20 TABLET, FILM COATED ORAL at 20:15

## 2024-02-01 RX ADMIN — ALLOPURINOL 100 MG: 100 TABLET ORAL at 08:47

## 2024-02-01 RX ADMIN — SODIUM ZIRCONIUM CYCLOSILICATE 10 G: 10 POWDER, FOR SUSPENSION ORAL at 12:36

## 2024-02-01 RX ADMIN — FOLIC ACID 5 MG: 1 TABLET ORAL at 08:47

## 2024-02-01 RX ADMIN — SODIUM BICARBONATE 650 MG: 650 TABLET ORAL at 20:15

## 2024-02-01 RX ADMIN — INSULIN GLARGINE 20 UNITS: 100 INJECTION, SOLUTION SUBCUTANEOUS at 20:16

## 2024-02-01 ASSESSMENT — COGNITIVE AND FUNCTIONAL STATUS - GENERAL
MOBILITY SCORE: 21
MOBILITY SCORE: 21
DAILY ACTIVITIY SCORE: 24
CLIMB 3 TO 5 STEPS WITH RAILING: A LITTLE
WALKING IN HOSPITAL ROOM: A LITTLE
STANDING UP FROM CHAIR USING ARMS: A LITTLE
STANDING UP FROM CHAIR USING ARMS: A LITTLE
CLIMB 3 TO 5 STEPS WITH RAILING: A LITTLE
DAILY ACTIVITIY SCORE: 24
WALKING IN HOSPITAL ROOM: A LITTLE

## 2024-02-01 ASSESSMENT — PAIN - FUNCTIONAL ASSESSMENT
PAIN_FUNCTIONAL_ASSESSMENT: 0-10

## 2024-02-01 ASSESSMENT — ENCOUNTER SYMPTOMS
ENDOCRINE NEGATIVE: 1
DIZZINESS: 1
PSYCHIATRIC NEGATIVE: 1
WEAKNESS: 1
FATIGUE: 1
MUSCULOSKELETAL NEGATIVE: 1
SHORTNESS OF BREATH: 1
EYES NEGATIVE: 1
NAUSEA: 1
CARDIOVASCULAR NEGATIVE: 1
HEMATOLOGIC/LYMPHATIC NEGATIVE: 1
ALLERGIC/IMMUNOLOGIC NEGATIVE: 1

## 2024-02-01 ASSESSMENT — PAIN SCALES - GENERAL
PAINLEVEL_OUTOF10: 0 - NO PAIN

## 2024-02-01 NOTE — CONSULTS
"Inpatient consult to Nephrology  Consult performed by: Imani Kaplan MD  Consult ordered by: Clarence Carreno DO        Reason For Consult STEVE on CKD, Hypokalemia   Subjective     Gregorio North is a 63 y.o. male  with a past medical history of chronic kidney disease stage IV, diabetic nephropathy, diabetes mellitus type 2, diabetic neuropathy, Charcot foot, chronic anemia, dyslipidemia, BPH, anxiety, chronic fatigue, hyperlipidemia, macular exudate, pulmonary edema, CHF, papilledema, arthralgia, hypertension, Sjogren's syndrome and many other comorbidities who was admitted to the hospital for STEVE on CKD with hyperkalemia. Patient went to the hospital to get her Procrit on the day of admission, he was found to be hypotensive and bradycardic.  He stated that his heart rate was in the 40s.  As per the chart his heart rate was 42 and his blood pressure was 92/42. His stepdaughter was at bedside, he endorsed feeling tired for the last two weeks as well as feeling dizzy and nauseous. Denies any f/c, n/v, new onset headaches, vision changes, chest pain, dyspnea, abdominal pain, changes in BM, or urinary changes.    Nephrology is consulted for STEVE on CKD with hyperkalemia.     Objective   /70 (BP Location: Right arm, Patient Position: Lying)   Pulse 62   Temp 36.5 °C (97.7 °F) (Temporal)   Resp 18   Ht 1.753 m (5' 9.02\")   Wt 118 kg (260 lb 12.9 oz)   SpO2 96%   BMI 38.50 kg/m²   Wt Readings from Last 3 Encounters:   01/31/24 118 kg (260 lb 12.9 oz)   01/31/24 118 kg (260 lb 12.9 oz)   01/17/24 119 kg (261 lb 5.7 oz)       Physical Exam  Constitutional:       General: He is not in acute distress.     Appearance: He is obese. He is ill-appearing.   HENT:      Head: Normocephalic and atraumatic.      Nose: Nose normal.      Mouth/Throat:      Mouth: Mucous membranes are moist.      Pharynx: Oropharynx is clear.   Eyes:      Extraocular Movements: Extraocular movements intact.      Conjunctiva/sclera: " Conjunctivae normal.      Pupils: Pupils are equal, round, and reactive to light.   Cardiovascular:      Rate and Rhythm: Normal rate and regular rhythm.      Pulses: Normal pulses.      Heart sounds: Normal heart sounds.   Pulmonary:      Effort: Pulmonary effort is normal.      Breath sounds: Normal breath sounds.   Abdominal:      General: Abdomen is flat. Bowel sounds are normal.      Palpations: Abdomen is soft.   Musculoskeletal:         General: Normal range of motion.      Cervical back: Normal range of motion and neck supple.   Skin:     General: Skin is warm and dry.   Neurological:      General: No focal deficit present.      Mental Status: He is alert and oriented to person, place, and time. Mental status is at baseline.   Psychiatric:         Mood and Affect: Mood normal.         Behavior: Behavior normal.         Thought Content: Thought content normal.         Judgment: Judgment normal.           Intake/Output Summary (Last 24 hours) at 2/1/2024 1209  Last data filed at 2/1/2024 0930  Gross per 24 hour   Intake 596 ml   Output 675 ml   Net -79 ml             Review of Systems   Constitutional:  Positive for fatigue.   Eyes: Negative.    Respiratory:  Positive for shortness of breath (on exertion).    Cardiovascular: Negative.    Gastrointestinal:  Positive for nausea.   Endocrine: Negative.    Genitourinary: Negative.    Musculoskeletal: Negative.    Skin: Negative.    Allergic/Immunologic: Negative.    Neurological:  Positive for dizziness and weakness.   Hematological: Negative.    Psychiatric/Behavioral: Negative.            Results from last 7 days   Lab Units 02/01/24  0605 01/31/24  1013   WBC AUTO x10*3/uL 5.2 5.9   HEMOGLOBIN g/dL 10.0* 10.7*   HEMATOCRIT % 31.6* 34.0*   PLATELETS AUTO x10*3/uL 182 193            Lab Results   Component Value Date    URICACID 6.3 09/13/2023           Lab Results   Component Value Date    HGBA1C 8.7 (A) 09/19/2023           Results from last 7 days   Lab Units  02/01/24  0605 01/31/24  1603 01/31/24  1341   SODIUM mmol/L 139  --  134*   POTASSIUM mmol/L 5.9* 5.5* 6.8*   CHLORIDE mmol/L 112*  --  110*   CO2 mmol/L 13*  --  15*   BUN mg/dL 118*  --  141*   GLUCOSE mg/dL 165*  --  222*   CALCIUM mg/dL 8.2*  --  8.5*   ANION GAP mmol/L 20  --  16   CREATININE mg/dL 3.80*  --  4.46*   EGFR mL/min/1.73m*2 17*  --  14*           Albumin/Creatine Ratio   Date Value Ref Range Status   12/05/2023 700.4 (H) <30.0 ug/mg Creat Final   09/13/2023 2,284.6 (H) 0.0 - 30.0 ug/mg crt Final   07/28/2023 548.2 (H) 0.0 - 30.0 ug/mg crt Final            RFP  Recent Labs     02/01/24  0605 01/31/24  1603 01/31/24  1341 12/20/23  1023 12/07/23  1325 09/13/23  0959 07/28/23  1010 04/21/23  1146 02/09/23  1349 06/02/22  0929 05/06/22  0604 05/05/22  0124 04/14/22  1247     --  134* 134* 140 136 137 140 140   < > 142   < >  --    K 5.9* 5.5* 6.8* 4.8 4.8 4.1 4.5 3.8 4.0   < > 3.3*   < >  --    *  --  110* 104 108* 104 103 108* 111*   < > 112*   < >  --    CO2 13*  --  15* 23 21 26 24 23 21   < > 21   < >  --    *  --  141* 74* 60* 42* 69* 57* 28*   < > 22   < > 23   CREATININE 3.80*  --  4.46* 3.11* 2.63* 2.22* 3.00* 2.15* 1.90*   < > 1.51*   < > 1.5   GLUCOSE 165*  --  222* 194* 236* 273* 271* 211* 155*   < > 109*   < >  --    CALCIUM 8.2*  --  8.5* 8.7 8.6 8.8 8.9 8.3* 8.0*   < > 8.4*   < >  --    PHOS  --   --   --  5.0* 4.6 4.0 5.4* 4.1 4.5  --  3.8  --   --    EGFR 17*  --  14* 22* 26*  --   --   --   --   --   --   --  52   ANIONGAP 20  --  16 12 16 10 15 13 12   < > 12   < >  --     < > = values in this interval not displayed.        Urineanalysis  Recent Labs     04/21/23  1146 02/09/23  1315 10/17/22  1205 02/03/22  1231   COLORU YELLOW YELLOW STRAW YELLOW   APPEARANCEU CLEAR CLEAR CLEAR CLEAR   SPECGRAVU 1.010 1.014 1.011 1.006   YEISON 6.0 6.0 6.0 7.0   PROTUR 306*  >=500(3+)* 683*  >=500(3+)* >=500(3+)* 100(2+)*   GLUCOSEU 150(2+)* 150(2+)* 150(2+)* NEGATIVE   BLOODU  NEGATIVE NEGATIVE SMALL(1+)* NEGATIVE   KETONESU NEGATIVE NEGATIVE NEGATIVE NEGATIVE   BILIRUBINU NEGATIVE NEGATIVE NEGATIVE NEGATIVE   NITRITEU NEGATIVE NEGATIVE NEGATIVE NEGATIVE   LEUKOCYTESU NEGATIVE NEGATIVE NEGATIVE NEGATIVE       Urine Electrolytes  Recent Labs     12/05/23  0906 09/13/23  0959 07/28/23  1010 04/21/23  1146 02/09/23  1315 10/17/22  1205 02/05/22  2004 02/03/22  1231 06/09/21  1154 02/19/20  1237 05/28/19  0909 05/04/18  0928   SODIUMURR  --   --   --   --  56  --  30  --   --   --   --   --    NACREATUR  --   --   --   --  66  --  20  --   --   --   --   --    KUR  --   --   --   --  49  --   --   --   --   --   --   --    KCREATUR  --   --   --   --  58  --   --   --   --   --   --   --    CREATU 47.0 44.9 60.0 74.9  74.9 84.5  84.5  84.5 45.5 152.0  --  114.0 182.0 107.0 66.6   PROTUR  --   --   --  306*  >=500(3+)* 683*  >=500(3+)* >=500(3+)*  --  100(2+)*  --   --   --   --    UTPCR  --   --   --  4.09* 8.08*  --   --   --   --   --   --   --    ALBUMINUR 329.2  --   --   --   --   --   --   --   --   --   --   --    MICROALBCREA 700.4* 2,284.6* 548.2* 2,782.0* SEE COMMENT SEE COMMENT  --   --  1,057.8* 252.5* 37.0* <7.5        Urine Micro  Recent Labs     04/21/23  1146 02/09/23  1315 10/17/22  1205 02/03/22  1231   WBCU NONE 1 2* 1   RBCU NONE 3 3* 1   HYALCASTU  --  2+*  --   --    SQUAMEPIU 1 1 1  --    MUCUSU FEW FEW  --  FEW        Iron  Recent Labs     10/11/23  1620 09/13/23  0959 07/28/23  1010 09/08/22  0921 02/04/22  0645 06/09/21  1115 02/01/21  0904 05/19/20  1022   IRON 82 71 67 64 23* 77 95 127   TIBC 288 275 293 318 185* 335 354 365   IRONSAT 28 26 23* 20* 12* 23* 27 35   FERRITIN 241 341* 286 224 552* 264 209 201        allopurinol, 100 mg, oral, Daily  atorvastatin, 20 mg, oral, Nightly  clopidogrel, 75 mg, oral, Daily  epoetin mckenna-epbx, 40,000 Units, subcutaneous, q30 days  ergocalciferol, 1,250 mcg, oral, Weekly  folic acid, 5 mg, oral, Daily  heparin (porcine),  5,000 Units, subcutaneous, q8h  insulin glargine, 20 Units, subcutaneous, Nightly  insulin lispro, 0-5 Units, subcutaneous, TID with meals  sodium bicarbonate, 650 mg, oral, TID  sodium zirconium cyclosilicate, 10 g, oral, Daily        Past Medical History  He has a past medical history of Acute diastolic heart failure (CMS/[facility]) (11/23/2022), STEVE (acute kidney injury) (CMS/[facility]) (09/18/2023), COVID-19 virus infection (09/18/2023), Cutaneous abscess of back (any part, except buttock) (01/25/2021), Deficiency of other specified B group vitamins (06/09/2021), Diabetic foot ulcer (CMS/[facility]) (05/06/2022), Encounter for general adult medical examination without abnormal findings (05/19/2020), Encounter for screening for malignant neoplasm of colon (02/21/2019), Encounter for screening for malignant neoplasm of prostate (08/12/2021), Focal chorioretinal inflammation, macular or paramacular, 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), Focal chorioretinal inflammation, macular or paramacular, left eye (11/05/2014), 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), History of foot operation (09/18/2023), Ischemic optic neuropathy, 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), Localized edema (01/11/2021), Other muscle spasm (04/24/2017), Other specified abnormal immunological findings in serum (09/17/2014), Other specified cataract (01/27/2016), Other specified health status, Other specified postprocedural states (06/18/2015), Other specified retinal disorders (01/27/2016), Other symptoms and signs involving the musculoskeletal system (04/24/2017), Pain in right shoulder (04/24/2017), Personal history of diseases of the skin and subcutaneous tissue (05/25/2021), Personal history of other diseases of male genital organs (01/11/2021), Personal history of other diseases of the circulatory system (09/03/2014), Personal history of other diseases of the musculoskeletal system and connective tissue (11/21/2016), Personal history of other diseases of the  musculoskeletal system and connective tissue (11/21/2016), Personal history of other diseases of the nervous system and sense organs (09/08/2014), Personal history of other diseases of the nervous system and sense organs (01/27/2016), Personal history of other diseases of the nervous system and sense organs (09/08/2014), Personal history of other diseases of the nervous system and sense organs (08/22/2016), Personal history of other endocrine, nutritional and metabolic disease (01/11/2021), Personal history of other specified conditions, Personal history of other specified conditions (09/08/2014), Personal history of other specified conditions (08/12/2021), Personal history of other specified conditions (08/09/2018), Personal history of other specified conditions (08/09/2018), Respiratory failure (CMS/HCC) (10/10/2023), Sebaceous cyst (11/20/2018), Unspecified cataract (01/27/2016), and Unspecified visual loss (02/18/2022).    Surgical History  He has a past surgical history that includes Foot surgery (03/31/2016); Other surgical history (02/04/2015); MR angio head wo IV contrast (9/17/2014); and CT guided percutaneous biopsy bone deep (8/29/2022).     Social History  He reports that he has never smoked. He has never used smokeless tobacco. He reports that he does not currently use alcohol. He reports that he does not use drugs.    Family History  Family History   Problem Relation Name Age of Onset    No Known Problems Mother      Heart attack Father      Diabetes Father          Allergies  Patient has no known allergies.      Assessment and Plan      Gregorio North is a 63 y.o. male  with a past medical history of chronic kidney disease stage IV, diabetic nephropathy, diabetes mellitus type 2, diabetic neuropathy, Charcot foot, chronic anemia, dyslipidemia, BPH, anxiety, chronic fatigue, hyperlipidemia, macular exudate, pulmonary edema, CHF, papilledema, arthralgia, hypertension, Sjogren's syndrome and many other  comorbidities who was admitted to the hospital for STEVE on CKD with hyperkalemia.    # STEVE on  Chronic kidney disease - G3B4A   - Baseline SCr 1.5-2.2  - On admission, Scr 4.46, in the morning 3.8  - No need for urgent dialysis, will continue to monitor.  - Pre-renal vs ATN secondary to hemodynamic instability (bradycardia and hypotension  - Most likely related to atherosclerotic HTN/diabetic   - Prior urine analysis showed 700.4 protien in urine  - Prior kidney US showed bilateral normal kidneys in 2022  - Lyes, UA : Pending,   -Continue to hold  BP meds including Norvasc, carvedilol, hydralazine, lisinopril, Lasix, finerenone,  - Continue to hold Jardiance and semaglutide.  - Reintroduce medications gradually.  - Will need to reassess his medications for medications induced bradycardia/hypotension.       # BP - Low blood pressure on admission  - Current meds: Norvasc, carvedilol, hydralazine, lisinopril, Lasix, finerenone,  - Continue to hold and reintroduce gradually.     #Acidosis:  - Start sodium bicarbonate 650 mg three times daily.    #Hyperkalemia:  - Start Lokelma 10 mg daily.   - Coketial as needed.  - Continue patient on telemetry.    #Anemia Hb 10   -Patient receives Procrit as outpatient.     #(CKD)-MBD  - Ca 8.2, Phos 4.6 , PTH 97.4  4 months ago          #Others  - No NSAIDs, no contrast as possible. If to be done- we recommend holding ACEi/ARBS/diuretics 24 hrs prior to contrast exposure and ensure appropriate hydration   - Ensure well hydration  - Limit salt in diet      Thank you for your consult, will continue to follow,  Discussed with Dr. Kaplan who is in agreement.    Please Haiku with any questions.         02/01/24 at 12:58 PM - Raven Srivastava, DO

## 2024-02-01 NOTE — PROGRESS NOTES
02/01/24 1019   Discharge Planning   Living Arrangements Children  (step daughter  Shelli)   Support Systems Children   Assistance Needed patient is alert and oriented x3, independent in ADL's, uses a rollator for long distances, DME, drives   Type of Residence Private residence   Number of Stairs to Enter Residence 3   Do you have animals or pets at home? Yes   Type of Animals or Pets 1 dog   Who is requesting discharge planning? Provider   Patient expects to be discharged to: Home   Does the patient need discharge transport arranged? Yes   RoundTrip coordination needed? Yes   Has discharge transport been arranged? No

## 2024-02-01 NOTE — H&P
History Of Present Illness  Gregorio North is a 63 y.o. male with a past medical history of chronic kidney disease stage IV, diabetic nephropathy, diabetes mellitus type 2, diabetic neuropathy, Charcot foot, chronic anemia, dyslipidemia, BPH, anxiety, chronic fatigue, hyperlipidemia, macular exudate, pulmonary edema, CHF, papilledema, arthralgia, hypertension, Sjogren's syndrome and many other comorbidities who was admitted to the hospital for STEVE on CKD with hyperkalemia.  Patient went to the hospital to get her Procrit this morning.  And was found to be hypotensive and bradycardic.  He states that his heart rate was in the 40s.  As per the chart his heart rate was 42 and his blood pressure was 92/42.  Patient complains of chronic dyspnea on exertion.  No shortness of breath at rest.  Denies chest pain, palpitation, cough, headache, dizziness, fever, chills, abdominal pain, melena, hematochezia, hematuria, or skin rash.  He has diarrhea usually in the morning after eating breakfast for the last 2-1/2 months.  He denies any recent change in his medication.  He was supposed to be started on Ozempic and Jardiance with a plan to discontinue Farxiga but that has not happened yet.     Past Medical History  He has a past medical history of Acute diastolic heart failure (CMS/[facility]) (11/23/2022), STEVE (acute kidney injury) (CMS/[facility]) (09/18/2023), COVID-19 virus infection (09/18/2023), Cutaneous abscess of back (any part, except buttock) (01/25/2021), Deficiency of other specified B group vitamins (06/09/2021), Diabetic foot ulcer (CMS/[facility]) (05/06/2022), Encounter for general adult medical examination without abnormal findings (05/19/2020), Encounter for screening for malignant neoplasm of colon (02/21/2019), Encounter for screening for malignant neoplasm of prostate (08/12/2021), Focal chorioretinal inflammation, macular or paramacular, 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), Focal chorioretinal inflammation, macular or paramacular, left eye  (11/05/2014), 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), History of foot operation (09/18/2023), Ischemic optic neuropathy, 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), Localized edema (01/11/2021), Other muscle spasm (04/24/2017), Other specified abnormal immunological findings in serum (09/17/2014), Other specified cataract (01/27/2016), Other specified health status, Other specified postprocedural states (06/18/2015), Other specified retinal disorders (01/27/2016), Other symptoms and signs involving the musculoskeletal system (04/24/2017), Pain in right shoulder (04/24/2017), Personal history of diseases of the skin and subcutaneous tissue (05/25/2021), Personal history of other diseases of male genital organs (01/11/2021), Personal history of other diseases of the circulatory system (09/03/2014), Personal history of other diseases of the musculoskeletal system and connective tissue (11/21/2016), Personal history of other diseases of the musculoskeletal system and connective tissue (11/21/2016), Personal history of other diseases of the nervous system and sense organs (09/08/2014), Personal history of other diseases of the nervous system and sense organs (01/27/2016), Personal history of other diseases of the nervous system and sense organs (09/08/2014), Personal history of other diseases of the nervous system and sense organs (08/22/2016), Personal history of other endocrine, nutritional and metabolic disease (01/11/2021), Personal history of other specified conditions, Personal history of other specified conditions (09/08/2014), Personal history of other specified conditions (08/12/2021), Personal history of other specified conditions (08/09/2018), Personal history of other specified conditions (08/09/2018), Respiratory failure (CMS/HCC) (10/10/2023), Sebaceous cyst (11/20/2018), Unspecified cataract (01/27/2016), and Unspecified visual loss (02/18/2022).    Surgical History  He has a past surgical history that  includes Foot surgery (03/31/2016); Other surgical history (02/04/2015); MR angio head wo IV contrast (9/17/2014); and CT guided percutaneous biopsy bone deep (8/29/2022).     Social History  He reports that he has never smoked. He has never used smokeless tobacco. He reports that he does not currently use alcohol. He reports that he does not use drugs.    Family History  Family History   Problem Relation Name Age of Onset    No Known Problems Mother      Heart attack Father      Diabetes Father          Allergies  Patient has no known allergies.    Medications  Scheduled medications  allopurinol, 100 mg, oral, Daily  atorvastatin, 20 mg, oral, Nightly  clopidogrel, 75 mg, oral, Daily  [START ON 2/1/2024] epoetin mckenna-epbx, 40,000 Units, subcutaneous, q30 days  [START ON 2/1/2024] ergocalciferol, 1,250 mcg, oral, Weekly  folic acid, 5 mg, oral, Daily  heparin (porcine), 5,000 Units, subcutaneous, q8h      Continuous medications     PRN medications  PRN medications: acetaminophen **OR** acetaminophen **OR** acetaminophen, ondansetron **OR** ondansetron, polyethylene glycol    Review of systems: 10-point review of systems is negative.     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  ENMT: mucous membranes moist  Cardio: Regular rate and rhythm, no murmur  Resp: CTA bilaterally, good respiratory effort  Gastrointestinal: Soft, nontender, obese, bowel sounds present  Musculoskeletal: Chronic deformities in bilateral lower extremities due to previous surgeries  Extremities: Trace edema in bilateral lower extremities  Neuro: lert and oriented x 3, sensation is intact.  Patient moves all limbs against resistance.  Psychological: Appropriate mood and behavior     Last Recorded Vitals  /62 (BP Location: Right arm)   Pulse  90   Temp 37.1 °C (98.8 °F) (Temporal)   Resp 18   Wt 118 kg (260 lb 12.9 oz)   SpO2 95%     Relevant Results  Results for orders placed or performed during the hospital encounter of 01/31/24 (from the past 24 hour(s))   ECG 12 lead   Result Value Ref Range    Ventricular Rate 48 BPM    Atrial Rate 48 BPM    MI Interval 202 ms    QRS Duration 114 ms    QT Interval 468 ms    QTC Calculation(Bazett) 418 ms    P Axis 37 degrees    R Axis -9 degrees    T Axis 42 degrees    QRS Count 8 beats    Q Onset 207 ms    P Onset 106 ms    P Offset 146 ms    T Offset 441 ms    QTC Fredericia 434 ms   Comprehensive metabolic panel   Result Value Ref Range    Glucose 222 (H) 74 - 99 mg/dL    Sodium 134 (L) 136 - 145 mmol/L    Potassium 6.8 (HH) 3.5 - 5.3 mmol/L    Chloride 110 (H) 98 - 107 mmol/L    Bicarbonate 15 (L) 21 - 32 mmol/L    Anion Gap 16 10 - 20 mmol/L    Urea Nitrogen 141 (HH) 6 - 23 mg/dL    Creatinine 4.46 (H) 0.50 - 1.30 mg/dL    eGFR 14 (L) >60 mL/min/1.73m*2    Calcium 8.5 (L) 8.6 - 10.3 mg/dL    Albumin 3.9 3.4 - 5.0 g/dL    Alkaline Phosphatase 65 33 - 136 U/L    Total Protein 6.9 6.4 - 8.2 g/dL    AST 17 9 - 39 U/L    Bilirubin, Total 0.5 0.0 - 1.2 mg/dL    ALT 29 10 - 52 U/L   Magnesium   Result Value Ref Range    Magnesium 3.05 (H) 1.60 - 2.40 mg/dL   Lactate   Result Value Ref Range    Lactate 0.8 0.4 - 2.0 mmol/L   Ammonia   Result Value Ref Range    Ammonia 20 16 - 53 umol/L   Protime-INR   Result Value Ref Range    Protime 11.7 9.8 - 12.8 seconds    INR 1.0 0.9 - 1.1   B-Type Natriuretic Peptide   Result Value Ref Range    BNP 73 0 - 99 pg/mL   Troponin I, High Sensitivity, Initial   Result Value Ref Range    Troponin I, High Sensitivity 6 0 - 20 ng/L   SST TOP   Result Value Ref Range    Extra Tube Hold for add-ons.    Sars-CoV-2 and Influenza A/B PCR   Result Value Ref Range    Flu A Result Not Detected Not Detected    Flu B Result Not Detected Not Detected    Coronavirus 2019, PCR Not Detected Not  Detected   RSV PCR   Result Value Ref Range    RSV PCR Not Detected Not Detected   Troponin, High Sensitivity, 1 Hour   Result Value Ref Range    Troponin I, High Sensitivity 6 0 - 20 ng/L   Potassium   Result Value Ref Range    Potassium 5.5 (H) 3.5 - 5.3 mmol/L   POCT GLUCOSE   Result Value Ref Range    POCT Glucose 93 74 - 99 mg/dL     ECG 12 lead    Result Date: 1/31/2024  Sinus bradycardia Otherwise normal ECG When compared with ECG of 05-MAY-2022 00:39, Sinus rhythm has replaced Junctional rhythm Vent. rate has decreased BY  38 BPM Questionable change in QRS duration See ED provider note for full interpretation and clinical correlation Confirmed by Alejandro Evans ([id number]) on 1/31/2024 6:26:52 PM    XR chest 1 view    Result Date: 1/31/2024  Interpreted By:  Sriram Garcia, STUDY: XR CHEST 1 VIEW  1/31/2024 1:10 pm   INDICATION: Signs/Symptoms:sob/weakness   COMPARISON: 12/20/2022   ACCESSION NUMBER(S): [id number]   ORDERING CLINICIAN: MACK LE   TECHNIQUE: A single AP portable radiograph of the chest was obtained.   FINDINGS: Left basilar airspace consolidation is seen and may represent small pleural effusion, atelectasis and/or pneumonia. No pneumothorax is identified. The cardiac silhouette is within normal limits for size.       Left basilar airspace consolidation, as above. Clinical correlation and continued follow-up until clearing is recommended.   MACRO: None.   Signed by: Sriram Garcia 1/31/2024 1:15 PM Dictation workstation:   [id number]       Assessment/Plan   Principal Problem:    Acute kidney injury superimposed on CKD (CMS/HCC)  Hyperkalemia    Jayesh North is a 63-year-old male with multiple comorbidities including chronic kidney disease stage IV, diabetic nephropathy, and chronic anemia who was admitted to the hospital with acute on chronic kidney disease with hyperkalemia, bradycardia and hypotension.  Patient has been having diarrhea once a day in the morning.  He reports no  significant change in his appetite and there has not been any change in his medication.  Bradycardia and hypotension may be secondary to worsening renal failure with hyperkalemia.  His potassium has improved from 6.8-5.5.  He states that he is making urine.    Admit to telemetry  -Strict NAZARIO's  -Daily weight  -Hold BP meds including Norvasc, carvedilol, hydralazine, lisinopril, Lasix, finerenone,  -We are also holding Jardiance and semaglutide  -Watch for fluid overload since we are holding diuretics  -Diabetic/renal diet  -Consult nephrology  -Sliding scale insulin  -Will resume Lantus  -Monitor blood sugar  -Continue supportive care    -Heparin for DVT prophylaxis    CODE STATUS: Full code       Parish Jean-Baptiste MD

## 2024-02-01 NOTE — CARE PLAN
The patient's goals for the shift include  hydration    The clinical goals for the shift include to remain HDS throughout the shift    Over the shift, the patient is making progress toward the following goals. Barriers to progression include diagnosis of CHF, fluid restriction. Recommendations to address these barriers include drink when thirsty but be mindful of fluid restrictions per cardiologist.

## 2024-02-01 NOTE — CARE PLAN
The patient's goals for the shift include  rest    The clinical goals for the shift include to remain HDS throughout the shift      Problem: Skin  Goal: Decreased wound size/increased tissue granulation at next dressing change  Flowsheets (Taken 2/1/2024 0200)  Decreased wound size/increased tissue granulation at next dressing change:   Promote sleep for wound healing   Protective dressings over bony prominences  Goal: Participates in plan/prevention/treatment measures  Flowsheets (Taken 2/1/2024 0200)  Participates in plan/prevention/treatment measures: Elevate heels  Goal: Prevent/manage excess moisture  Flowsheets (Taken 2/1/2024 0200)  Prevent/manage excess moisture: Moisturize dry skin  Goal: Prevent/minimize sheer/friction injuries  Flowsheets (Taken 2/1/2024 0200)  Prevent/minimize sheer/friction injuries: HOB 30 degrees or less  Goal: Promote/optimize nutrition  Flowsheets (Taken 2/1/2024 0200)  Promote/optimize nutrition:   Monitor/record intake including meals   Offer water/supplements/favorite foods  Goal: Promote skin healing  Flowsheets (Taken 2/1/2024 0200)  Promote skin healing: Assess skin/pad under line(s)/device(s)

## 2024-02-01 NOTE — PROGRESS NOTES
Gregorio North is a 63 y.o. male on day 1 of admission presenting with Acute kidney injury superimposed on CKD (CMS/HCC).      Subjective   No acute events overnight. Pt denies any new or acute concerns this morning. He thinks he is feeling much better today compared to yesterday.         Objective     Last Recorded Vitals  /70 (BP Location: Right arm, Patient Position: Lying)   Pulse 62   Temp 36.5 °C (97.7 °F) (Temporal)   Resp 18   Wt 118 kg (260 lb 12.9 oz)   SpO2 96%   Intake/Output last 3 Shifts:    Intake/Output Summary (Last 24 hours) at 2/1/2024 1132  Last data filed at 2/1/2024 0930  Gross per 24 hour   Intake 596 ml   Output 675 ml   Net -79 ml       Admission Weight  Weight: 118 kg (260 lb 12.9 oz) (01/31/24 1943)    Daily Weight  01/31/24 : 118 kg (260 lb 12.9 oz)    Image Results  ECG 12 lead  Sinus bradycardia  Otherwise normal ECG  When compared with ECG of 05-MAY-2022 00:39,  Sinus rhythm has replaced Junctional rhythm  Vent. rate has decreased BY  38 BPM  Questionable change in QRS duration  See ED provider note for full interpretation and clinical correlation  Confirmed by Alejandro Evans ([id number]) on 1/31/2024 6:26:52 PM  XR chest 1 view  Narrative: Interpreted By:  Sriram Garcia,   STUDY:  XR CHEST 1 VIEW  1/31/2024 1:10 pm      INDICATION:  Signs/Symptoms:sob/weakness      COMPARISON:  12/20/2022      ACCESSION NUMBER(S):  [id number]      ORDERING CLINICIAN:  MACK LE      TECHNIQUE:  A single AP portable radiograph of the chest was obtained.      FINDINGS:  Left basilar airspace consolidation is seen and may represent small  pleural effusion, atelectasis and/or pneumonia. No pneumothorax is  identified. The cardiac silhouette is within normal limits for size.      Impression: Left basilar airspace consolidation, as above. Clinical correlation  and continued follow-up until clearing is recommended.      MACRO:  None.      Signed by: Sriram Garcia 1/31/2024 1:15 PM  Dictation  workstation:   [id number]      Physical Exam  Constitutional:       General: He is not in acute distress.  HENT:      Head: Normocephalic.   Eyes:      Extraocular Movements: Extraocular movements intact.   Cardiovascular:      Pulses: Normal pulses.   Pulmonary:      Effort: Pulmonary effort is normal. No respiratory distress.   Abdominal:      General: Abdomen is flat. There is no distension.   Musculoskeletal:      Right lower leg: No edema.      Left lower leg: No edema.   Skin:     Coloration: Skin is not jaundiced or pale.   Neurological:      General: No focal deficit present.      Mental Status: He is alert and oriented to person, place, and time.   Psychiatric:         Mood and Affect: Mood normal.         Thought Content: Thought content normal.         Relevant Results  Scheduled medications  allopurinol, 100 mg, oral, Daily  atorvastatin, 20 mg, oral, Nightly  clopidogrel, 75 mg, oral, Daily  epoetin mckenna-epbx, 40,000 Units, subcutaneous, q30 days  ergocalciferol, 1,250 mcg, oral, Weekly  folic acid, 5 mg, oral, Daily  heparin (porcine), 5,000 Units, subcutaneous, q8h  insulin glargine, 20 Units, subcutaneous, Nightly  insulin lispro, 0-5 Units, subcutaneous, TID with meals  sodium bicarbonate, 650 mg, oral, TID  sodium zirconium cyclosilicate, 10 g, oral, Daily      Continuous medications     PRN medications  PRN medications: acetaminophen **OR** acetaminophen **OR** acetaminophen, dextrose 10 % in water (D10W), dextrose, glucagon, ondansetron **OR** ondansetron, polyethylene glycol    Results for orders placed or performed during the hospital encounter of 01/31/24 (from the past 24 hour(s))   POCT GLUCOSE   Result Value Ref Range    POCT Glucose 145 (H) 74 - 99 mg/dL   CBC   Result Value Ref Range    WBC 5.2 4.4 - 11.3 x10*3/uL    nRBC 0.0 0.0 - 0.0 /100 WBCs    RBC 3.32 (L) 4.50 - 5.90 x10*6/uL    Hemoglobin 10.0 (L) 13.5 - 17.5 g/dL    Hematocrit 31.6 (L) 41.0 - 52.0 %    MCV 95 80 - 100 fL    MCH  30.1 26.0 - 34.0 pg    MCHC 31.6 (L) 32.0 - 36.0 g/dL    RDW 15.1 (H) 11.5 - 14.5 %    Platelets 182 150 - 450 x10*3/uL   Basic metabolic panel   Result Value Ref Range    Glucose 165 (H) 74 - 99 mg/dL    Sodium 139 136 - 145 mmol/L    Potassium 5.9 (H) 3.5 - 5.3 mmol/L    Chloride 112 (H) 98 - 107 mmol/L    Bicarbonate 13 (L) 21 - 32 mmol/L    Anion Gap 20 10 - 20 mmol/L    Urea Nitrogen 118 (HH) 6 - 23 mg/dL    Creatinine 3.80 (H) 0.50 - 1.30 mg/dL    eGFR 17 (L) >60 mL/min/1.73m*2    Calcium 8.2 (L) 8.6 - 10.3 mg/dL   POCT GLUCOSE   Result Value Ref Range    POCT Glucose 156 (H) 74 - 99 mg/dL       ECG 12 lead    Result Date: 1/31/2024  Sinus bradycardia Otherwise normal ECG When compared with ECG of 05-MAY-2022 00:39, Sinus rhythm has replaced Junctional rhythm Vent. rate has decreased BY  38 BPM Questionable change in QRS duration See ED provider note for full interpretation and clinical correlation Confirmed by Alejandro Evans ([id number]) on 1/31/2024 6:26:52 PM    XR chest 1 view    Result Date: 1/31/2024  Interpreted By:  Sriram Garcia, STUDY: XR CHEST 1 VIEW  1/31/2024 1:10 pm   INDICATION: Signs/Symptoms:sob/weakness   COMPARISON: 12/20/2022   ACCESSION NUMBER(S): [id number]   ORDERING CLINICIAN: MACK LE   TECHNIQUE: A single AP portable radiograph of the chest was obtained.   FINDINGS: Left basilar airspace consolidation is seen and may represent small pleural effusion, atelectasis and/or pneumonia. No pneumothorax is identified. The cardiac silhouette is within normal limits for size.       Left basilar airspace consolidation, as above. Clinical correlation and continued follow-up until clearing is recommended.   MACRO: None.   Signed by: Sriram Garcia 1/31/2024 1:15 PM Dictation workstation:   [id number]      Assessment/Plan      Principal Problem:    Acute kidney injury superimposed on CKD (CMS/HCC)    62 yo M with a hx of CKD, CHF, who was admitted with STEVE on CKD with hypotension and  bradycardia at the infusion center yesterday. He is overall improving today    1. STEVE on CKD: creatinine downtrending today; etiology likely 2/2 #2 below  -nephro following  -trend RFP  -avoid nephrotoxic meds  -holding home farxiga and lasix    2. Bradycardia/hypotension: etiology unclear; he does complain of diarrhea recently and has been limiting his fluid intake which could lead to some hypotension, but does not explain the bradycardia  -carvedilol and farxiga on hold; will need dose adjustment prior to DC  -telemetry monitoring  -routine vitals    3. IDDM: cont home insulin schedule and SSI/accuchecks    4. AOCD: s/p procrit as an outpatient PTA; monitor on routine labs      FEN: diabetic diet, ad deshaun fluids  Dispo: monitor vitals and labs overnight; if stable then anticipate DC tomorrow          Clarence Carreno, DO

## 2024-02-02 LAB
ALBUMIN SERPL BCP-MCNC: 3.8 G/DL (ref 3.4–5)
ANION GAP SERPL CALC-SCNC: 15 MMOL/L (ref 10–20)
ATRIAL RATE: 54 BPM
BUN SERPL-MCNC: 95 MG/DL (ref 6–23)
CALCIUM SERPL-MCNC: 8.4 MG/DL (ref 8.6–10.3)
CHLORIDE SERPL-SCNC: 116 MMOL/L (ref 98–107)
CO2 SERPL-SCNC: 18 MMOL/L (ref 21–32)
CREAT SERPL-MCNC: 3.05 MG/DL (ref 0.5–1.3)
EGFRCR SERPLBLD CKD-EPI 2021: 22 ML/MIN/1.73M*2
ERYTHROCYTE [DISTWIDTH] IN BLOOD BY AUTOMATED COUNT: 14.7 % (ref 11.5–14.5)
GLUCOSE BLD MANUAL STRIP-MCNC: 155 MG/DL (ref 74–99)
GLUCOSE BLD MANUAL STRIP-MCNC: 165 MG/DL (ref 74–99)
GLUCOSE BLD MANUAL STRIP-MCNC: 176 MG/DL (ref 74–99)
GLUCOSE BLD MANUAL STRIP-MCNC: 180 MG/DL (ref 74–99)
GLUCOSE BLD MANUAL STRIP-MCNC: 182 MG/DL (ref 74–99)
GLUCOSE SERPL-MCNC: 153 MG/DL (ref 74–99)
HCT VFR BLD AUTO: 33.6 % (ref 41–52)
HGB BLD-MCNC: 10.7 G/DL (ref 13.5–17.5)
MCH RBC QN AUTO: 30.1 PG (ref 26–34)
MCHC RBC AUTO-ENTMCNC: 31.8 G/DL (ref 32–36)
MCV RBC AUTO: 95 FL (ref 80–100)
NRBC BLD-RTO: 0 /100 WBCS (ref 0–0)
P AXIS: 47 DEGREES
P OFFSET: 148 MS
P ONSET: 116 MS
PHOSPHATE SERPL-MCNC: 6.3 MG/DL (ref 2.5–4.9)
PLATELET # BLD AUTO: 194 X10*3/UL (ref 150–450)
POTASSIUM SERPL-SCNC: 5.1 MMOL/L (ref 3.5–5.3)
PR INTERVAL: 188 MS
Q ONSET: 210 MS
QRS COUNT: 9 BEATS
QRS DURATION: 94 MS
QT INTERVAL: 424 MS
QTC CALCULATION(BAZETT): 402 MS
QTC FREDERICIA: 409 MS
R AXIS: -12 DEGREES
RBC # BLD AUTO: 3.55 X10*6/UL (ref 4.5–5.9)
SODIUM SERPL-SCNC: 144 MMOL/L (ref 136–145)
T AXIS: 36 DEGREES
T OFFSET: 422 MS
VENTRICULAR RATE: 54 BPM
WBC # BLD AUTO: 5.4 X10*3/UL (ref 4.4–11.3)

## 2024-02-02 PROCEDURE — 2500000004 HC RX 250 GENERAL PHARMACY W/ HCPCS (ALT 636 FOR OP/ED): Performed by: INTERNAL MEDICINE

## 2024-02-02 PROCEDURE — 85027 COMPLETE CBC AUTOMATED: CPT | Performed by: INTERNAL MEDICINE

## 2024-02-02 PROCEDURE — 99233 SBSQ HOSP IP/OBS HIGH 50: CPT | Performed by: INTERNAL MEDICINE

## 2024-02-02 PROCEDURE — 99232 SBSQ HOSP IP/OBS MODERATE 35: CPT | Performed by: INTERNAL MEDICINE

## 2024-02-02 PROCEDURE — 2500000001 HC RX 250 WO HCPCS SELF ADMINISTERED DRUGS (ALT 637 FOR MEDICARE OP): Performed by: INTERNAL MEDICINE

## 2024-02-02 PROCEDURE — 36415 COLL VENOUS BLD VENIPUNCTURE: CPT | Performed by: INTERNAL MEDICINE

## 2024-02-02 PROCEDURE — 1200000002 HC GENERAL ROOM WITH TELEMETRY DAILY

## 2024-02-02 PROCEDURE — 82947 ASSAY GLUCOSE BLOOD QUANT: CPT

## 2024-02-02 PROCEDURE — 80069 RENAL FUNCTION PANEL: CPT | Performed by: INTERNAL MEDICINE

## 2024-02-02 PROCEDURE — 2500000002 HC RX 250 W HCPCS SELF ADMINISTERED DRUGS (ALT 637 FOR MEDICARE OP, ALT 636 FOR OP/ED): Performed by: INTERNAL MEDICINE

## 2024-02-02 RX ORDER — CARVEDILOL 6.25 MG/1
6.25 TABLET ORAL
Status: DISCONTINUED | OUTPATIENT
Start: 2024-02-02 | End: 2024-02-03 | Stop reason: HOSPADM

## 2024-02-02 RX ADMIN — CLOPIDOGREL 75 MG: 75 TABLET ORAL at 09:40

## 2024-02-02 RX ADMIN — HEPARIN SODIUM 5000 UNITS: 5000 INJECTION INTRAVENOUS; SUBCUTANEOUS at 21:02

## 2024-02-02 RX ADMIN — ALLOPURINOL 100 MG: 100 TABLET ORAL at 09:40

## 2024-02-02 RX ADMIN — HEPARIN SODIUM 5000 UNITS: 5000 INJECTION INTRAVENOUS; SUBCUTANEOUS at 12:04

## 2024-02-02 RX ADMIN — SODIUM BICARBONATE 650 MG: 650 TABLET ORAL at 21:02

## 2024-02-02 RX ADMIN — HEPARIN SODIUM 5000 UNITS: 5000 INJECTION INTRAVENOUS; SUBCUTANEOUS at 03:19

## 2024-02-02 RX ADMIN — SODIUM BICARBONATE 650 MG: 650 TABLET ORAL at 09:40

## 2024-02-02 RX ADMIN — FOLIC ACID 5 MG: 1 TABLET ORAL at 09:40

## 2024-02-02 RX ADMIN — SODIUM ZIRCONIUM CYCLOSILICATE 10 G: 10 POWDER, FOR SUSPENSION ORAL at 09:40

## 2024-02-02 RX ADMIN — ATORVASTATIN CALCIUM 20 MG: 20 TABLET, FILM COATED ORAL at 21:02

## 2024-02-02 RX ADMIN — INSULIN LISPRO 1 UNITS: 100 INJECTION, SOLUTION INTRAVENOUS; SUBCUTANEOUS at 16:54

## 2024-02-02 RX ADMIN — INSULIN LISPRO 1 UNITS: 100 INJECTION, SOLUTION INTRAVENOUS; SUBCUTANEOUS at 12:04

## 2024-02-02 RX ADMIN — INSULIN GLARGINE 20 UNITS: 100 INJECTION, SOLUTION SUBCUTANEOUS at 21:02

## 2024-02-02 RX ADMIN — SODIUM BICARBONATE 650 MG: 650 TABLET ORAL at 15:31

## 2024-02-02 RX ADMIN — CARVEDILOL 6.25 MG: 6.25 TABLET, FILM COATED ORAL at 16:54

## 2024-02-02 RX ADMIN — INSULIN LISPRO 1 UNITS: 100 INJECTION, SOLUTION INTRAVENOUS; SUBCUTANEOUS at 09:40

## 2024-02-02 ASSESSMENT — COGNITIVE AND FUNCTIONAL STATUS - GENERAL
STANDING UP FROM CHAIR USING ARMS: A LITTLE
WALKING IN HOSPITAL ROOM: A LITTLE
CLIMB 3 TO 5 STEPS WITH RAILING: A LITTLE
STANDING UP FROM CHAIR USING ARMS: A LITTLE
DAILY ACTIVITIY SCORE: 24
MOBILITY SCORE: 21
CLIMB 3 TO 5 STEPS WITH RAILING: A LITTLE
WALKING IN HOSPITAL ROOM: A LITTLE
MOBILITY SCORE: 21
DAILY ACTIVITIY SCORE: 24

## 2024-02-02 ASSESSMENT — PAIN SCALES - GENERAL
PAINLEVEL_OUTOF10: 0 - NO PAIN

## 2024-02-02 ASSESSMENT — PAIN - FUNCTIONAL ASSESSMENT
PAIN_FUNCTIONAL_ASSESSMENT: 0-10
PAIN_FUNCTIONAL_ASSESSMENT: 0-10

## 2024-02-02 NOTE — CARE PLAN
The patient's goals for the shift include  rest    The clinical goals for the shift include to remain HDS throughout the shift      Problem: Skin  Goal: Decreased wound size/increased tissue granulation at next dressing change  Flowsheets (Taken 2/2/2024 0042)  Decreased wound size/increased tissue granulation at next dressing change: Utilize specialty bed per algorithm  Goal: Participates in plan/prevention/treatment measures  Flowsheets (Taken 2/2/2024 0042)  Participates in plan/prevention/treatment measures: Elevate heels  Goal: Prevent/manage excess moisture  Flowsheets (Taken 2/2/2024 0042)  Prevent/manage excess moisture: Moisturize dry skin  Goal: Prevent/minimize sheer/friction injuries  Flowsheets (Taken 2/2/2024 0042)  Prevent/minimize sheer/friction injuries: HOB 30 degrees or less  Goal: Promote/optimize nutrition  Flowsheets (Taken 2/2/2024 0042)  Promote/optimize nutrition: Consume > 50% meals/supplements  Goal: Promote skin healing  Flowsheets (Taken 2/2/2024 0042)  Promote skin healing: Assess skin/pad under line(s)/device(s)

## 2024-02-02 NOTE — PROGRESS NOTES
Gregorio North is a 63 y.o. male on day 2 of admission presenting with Acute kidney injury superimposed on CKD (CMS/HCC).      Subjective   No acute events overnight. Pt denies any new or acute concerns this morning.  Feeling much better today         Objective     Last Recorded Vitals  /76 (BP Location: Right arm, Patient Position: Sitting)   Pulse 66   Temp 36.1 °C (97 °F) (Temporal)   Resp 18   Wt 118 kg (260 lb 12.9 oz)   SpO2 97%   Intake/Output last 3 Shifts:    Intake/Output Summary (Last 24 hours) at 2/2/2024 1702  Last data filed at 2/2/2024 1400  Gross per 24 hour   Intake 840 ml   Output 2000 ml   Net -1160 ml         Admission Weight  Weight: 118 kg (260 lb 12.9 oz) (01/31/24 1943)    Daily Weight  01/31/24 : 118 kg (260 lb 12.9 oz)    Image Results  ECG 12 lead  Sinus bradycardia  Otherwise normal ECG  When compared with ECG of 31-JAN-2024 12:53,  No significant change was found  See ED provider note for full interpretation and clinical correlation  Confirmed by Sridevi Khan ([id number]) on 2/2/2024 10:43:48 AM      Physical Exam  Constitutional:       General: He is not in acute distress.  HENT:      Head: Normocephalic.   Eyes:      Extraocular Movements: Extraocular movements intact.   Cardiovascular:      Pulses: Normal pulses.   Pulmonary:      Effort: Pulmonary effort is normal. No respiratory distress.   Abdominal:      General: Abdomen is flat. There is no distension.   Musculoskeletal:      Right lower leg: No edema.      Left lower leg: No edema.   Skin:     Coloration: Skin is not jaundiced or pale.   Neurological:      General: No focal deficit present.      Mental Status: He is alert and oriented to person, place, and time.   Psychiatric:         Mood and Affect: Mood normal.         Thought Content: Thought content normal.         Relevant Results  Scheduled medications  allopurinol, 100 mg, oral, Daily  atorvastatin, 20 mg, oral, Nightly  carvedilol, 6.25 mg, oral, BID with  meals  clopidogrel, 75 mg, oral, Daily  epoetin mckenna-epbx, 40,000 Units, subcutaneous, q30 days  ergocalciferol, 1,250 mcg, oral, Weekly  folic acid, 5 mg, oral, Daily  heparin (porcine), 5,000 Units, subcutaneous, q8h  insulin glargine, 20 Units, subcutaneous, Nightly  insulin lispro, 0-5 Units, subcutaneous, TID with meals  sodium bicarbonate, 650 mg, oral, TID  sodium zirconium cyclosilicate, 10 g, oral, Daily      Continuous medications     PRN medications  PRN medications: acetaminophen **OR** acetaminophen **OR** acetaminophen, dextrose 10 % in water (D10W), dextrose, glucagon, ondansetron **OR** ondansetron, polyethylene glycol    Results for orders placed or performed during the hospital encounter of 01/31/24 (from the past 24 hour(s))   POCT GLUCOSE   Result Value Ref Range    POCT Glucose 189 (H) 74 - 99 mg/dL   Renal function panel   Result Value Ref Range    Glucose 204 (H) 74 - 99 mg/dL    Sodium 137 136 - 145 mmol/L    Potassium 5.5 (H) 3.5 - 5.3 mmol/L    Chloride 111 (H) 98 - 107 mmol/L    Bicarbonate 15 (L) 21 - 32 mmol/L    Anion Gap 17 10 - 20 mmol/L    Urea Nitrogen 108 (HH) 6 - 23 mg/dL    Creatinine 3.48 (H) 0.50 - 1.30 mg/dL    eGFR 19 (L) >60 mL/min/1.73m*2    Calcium 8.2 (L) 8.6 - 10.3 mg/dL    Phosphorus 6.9 (H) 2.5 - 4.9 mg/dL    Albumin 3.6 3.4 - 5.0 g/dL   POCT GLUCOSE   Result Value Ref Range    POCT Glucose 155 (H) 74 - 99 mg/dL   CBC   Result Value Ref Range    WBC 5.4 4.4 - 11.3 x10*3/uL    nRBC 0.0 0.0 - 0.0 /100 WBCs    RBC 3.55 (L) 4.50 - 5.90 x10*6/uL    Hemoglobin 10.7 (L) 13.5 - 17.5 g/dL    Hematocrit 33.6 (L) 41.0 - 52.0 %    MCV 95 80 - 100 fL    MCH 30.1 26.0 - 34.0 pg    MCHC 31.8 (L) 32.0 - 36.0 g/dL    RDW 14.7 (H) 11.5 - 14.5 %    Platelets 194 150 - 450 x10*3/uL   POCT GLUCOSE   Result Value Ref Range    POCT Glucose 165 (H) 74 - 99 mg/dL   Renal Function Panel   Result Value Ref Range    Glucose 153 (H) 74 - 99 mg/dL    Sodium 144 136 - 145 mmol/L    Potassium 5.1 3.5  - 5.3 mmol/L    Chloride 116 (H) 98 - 107 mmol/L    Bicarbonate 18 (L) 21 - 32 mmol/L    Anion Gap 15 10 - 20 mmol/L    Urea Nitrogen 95 (HH) 6 - 23 mg/dL    Creatinine 3.05 (H) 0.50 - 1.30 mg/dL    eGFR 22 (L) >60 mL/min/1.73m*2    Calcium 8.4 (L) 8.6 - 10.3 mg/dL    Phosphorus 6.3 (H) 2.5 - 4.9 mg/dL    Albumin 3.8 3.4 - 5.0 g/dL   POCT GLUCOSE   Result Value Ref Range    POCT Glucose 182 (H) 74 - 99 mg/dL   POCT GLUCOSE   Result Value Ref Range    POCT Glucose 176 (H) 74 - 99 mg/dL       ECG 12 lead    Result Date: 1/31/2024  Sinus bradycardia Otherwise normal ECG When compared with ECG of 05-MAY-2022 00:39, Sinus rhythm has replaced Junctional rhythm Vent. rate has decreased BY  38 BPM Questionable change in QRS duration See ED provider note for full interpretation and clinical correlation Confirmed by Alejandro Evans ([id number]) on 1/31/2024 6:26:52 PM    XR chest 1 view    Result Date: 1/31/2024  Interpreted By:  Sriram Gacria, STUDY: XR CHEST 1 VIEW  1/31/2024 1:10 pm   INDICATION: Signs/Symptoms:sob/weakness   COMPARISON: 12/20/2022   ACCESSION NUMBER(S): [id number]   ORDERING CLINICIAN: MACK LE   TECHNIQUE: A single AP portable radiograph of the chest was obtained.   FINDINGS: Left basilar airspace consolidation is seen and may represent small pleural effusion, atelectasis and/or pneumonia. No pneumothorax is identified. The cardiac silhouette is within normal limits for size.       Left basilar airspace consolidation, as above. Clinical correlation and continued follow-up until clearing is recommended.   MACRO: None.   Signed by: Sriram Garcia 1/31/2024 1:15 PM Dictation workstation:   [id number]      Assessment/Plan      Principal Problem:    Acute kidney injury superimposed on CKD (CMS/HCC)    64 yo M with a hx of CKD, CHF, who was admitted with STEVE on CKD with hypotension and bradycardia at the infusion center yesterday. He is overall improving today    1. STEVE on CKD: creatinine downtrending  today; etiology likely 2/2 #2 below  -nephro following  -trend RFP  -avoid nephrotoxic meds  -holding home farxiga and lasix    2. Hyperkalemia: improving as creatinine and acidosis resolve; high-normal today  -monitor on repeat labs I nam    3. Bradycardia/hypotension: possibly from BB  -resume carvedilol at lower dose  -resume farxiga on discharge  -telemetry monitoring  -routine vitals    4. IDDM: cont home insulin schedule and SSI/accuchecks    5. AOCD: s/p procrit as an outpatient PTA; monitor on routine labs      FEN: diabetic diet, ad deshaun fluids  Dispo: monitor vitals and labs overnight; if stable then anticipate DC tomorrow          Clarence Carreno, DO

## 2024-02-02 NOTE — PROGRESS NOTES
02/02/24 1137   Discharge Planning   Living Arrangements Children  (step daughter  Shelli)   Support Systems Children   Assistance Needed patient is alert and oriented x3, independent in ADL's, uses a rollator for long distances, DME, drives   Type of Residence Private residence   Number of Stairs to Enter Residence 3   Do you have animals or pets at home? Yes   Type of Animals or Pets 1 dog   Who is requesting discharge planning? Provider   Patient expects to be discharged to: [facility]

## 2024-02-02 NOTE — PROGRESS NOTES
Gregorio North is a 63 y.o. male on day 2 of admission presenting with Acute kidney injury superimposed on CKD (CMS/HCC).      Subjective   Patient is seen and examined at bedside, his stepdaughter was sitting with him, he reported marked improvement of his symptoms. Denies any f/c, n/v, new onset headaches, vision changes, chest pain, dyspnea, abdominal pain, changes in BM, or urinary changes.         Objective        Vitals 24HR  Heart Rate:  [70-74]   Temp:  [36.2 °C (97.2 °F)-36.8 °C (98.2 °F)]   Resp:  [18]   BP: (122-174)/(69-83)   SpO2:  [95 %-97 %]         Intake/Output last 3 Shifts:    Intake/Output Summary (Last 24 hours) at 2/2/2024 1105  Last data filed at 2/2/2024 0456  Gross per 24 hour   Intake 120 ml   Output 1050 ml   Net -930 ml       Physical Exam  Constitutional:       General: He is not in acute distress.     Appearance: Normal appearance. He is obese.   HENT:      Head: Normocephalic and atraumatic.      Nose: Nose normal.      Mouth/Throat:      Mouth: Mucous membranes are moist.      Pharynx: Oropharynx is clear.   Eyes:      Extraocular Movements: Extraocular movements intact.      Pupils: Pupils are equal, round, and reactive to light.   Cardiovascular:      Rate and Rhythm: Normal rate and regular rhythm.   Pulmonary:      Effort: Pulmonary effort is normal.      Breath sounds: Normal breath sounds.   Abdominal:      General: Abdomen is flat. Bowel sounds are normal.      Palpations: Abdomen is soft.   Musculoskeletal:         General: Normal range of motion.      Cervical back: Normal range of motion and neck supple.   Skin:     General: Skin is warm and dry.   Neurological:      General: No focal deficit present.      Mental Status: He is alert and oriented to person, place, and time. Mental status is at baseline.   Psychiatric:         Mood and Affect: Mood normal.         Behavior: Behavior normal.         Thought Content: Thought content normal.         Judgment: Judgment normal.          Relevant Results    Scheduled medications  allopurinol, 100 mg, oral, Daily  atorvastatin, 20 mg, oral, Nightly  clopidogrel, 75 mg, oral, Daily  epoetin mckenna-epbx, 40,000 Units, subcutaneous, q30 days  ergocalciferol, 1,250 mcg, oral, Weekly  folic acid, 5 mg, oral, Daily  heparin (porcine), 5,000 Units, subcutaneous, q8h  insulin glargine, 20 Units, subcutaneous, Nightly  insulin lispro, 0-5 Units, subcutaneous, TID with meals  sodium bicarbonate, 650 mg, oral, TID  sodium zirconium cyclosilicate, 10 g, oral, Daily      Continuous medications     PRN medications  PRN medications: acetaminophen **OR** acetaminophen **OR** acetaminophen, dextrose 10 % in water (D10W), dextrose, glucagon, ondansetron **OR** ondansetron, polyethylene glycol    Results for orders placed or performed during the hospital encounter of 01/31/24 (from the past 24 hour(s))   POCT GLUCOSE   Result Value Ref Range    POCT Glucose 199 (H) 74 - 99 mg/dL   Urine electrolytes   Result Value Ref Range    Sodium, Urine Random 61 mmol/L    Sodium/Creatinine Ratio 136 Not established. mmol/g Creat    Potassium, Urine Random 23 mmol/L    Potassium/Creatinine Ratio 51 Not established mmol/g Creat    Chloride, Urine Random 59 mmol/L    Chloride/Creatinine Ratio 131 23 - 275 mmol/g creat    Creatinine, Urine Random 44.9 20.0 - 370.0 mg/dL   Urinalysis with Reflex Microscopic   Result Value Ref Range    Color, Urine Straw Straw, Yellow    Appearance, Urine Clear Clear    Specific Gravity, Urine 1.008 1.005 - 1.035    pH, Urine 5.0 5.0, 5.5, 6.0, 6.5, 7.0, 7.5, 8.0    Protein, Urine 30 (1+) (N) NEGATIVE mg/dL    Glucose, Urine >=500 (3+) (A) NEGATIVE mg/dL    Blood, Urine SMALL (1+) (A) NEGATIVE    Ketones, Urine NEGATIVE NEGATIVE mg/dL    Bilirubin, Urine NEGATIVE NEGATIVE    Urobilinogen, Urine <2.0 <2.0 mg/dL    Nitrite, Urine NEGATIVE NEGATIVE    Leukocyte Esterase, Urine NEGATIVE NEGATIVE   Microscopic Only, Urine   Result Value Ref Range     WBC, Urine NONE 1-5, NONE /HPF    RBC, Urine 1-2 NONE, 1-2, 3-5 /HPF    Bacteria, Urine 1+ (A) NONE SEEN /HPF   POCT GLUCOSE   Result Value Ref Range    POCT Glucose 154 (H) 74 - 99 mg/dL   POCT GLUCOSE   Result Value Ref Range    POCT Glucose 189 (H) 74 - 99 mg/dL   Renal function panel   Result Value Ref Range    Glucose 204 (H) 74 - 99 mg/dL    Sodium 137 136 - 145 mmol/L    Potassium 5.5 (H) 3.5 - 5.3 mmol/L    Chloride 111 (H) 98 - 107 mmol/L    Bicarbonate 15 (L) 21 - 32 mmol/L    Anion Gap 17 10 - 20 mmol/L    Urea Nitrogen 108 (HH) 6 - 23 mg/dL    Creatinine 3.48 (H) 0.50 - 1.30 mg/dL    eGFR 19 (L) >60 mL/min/1.73m*2    Calcium 8.2 (L) 8.6 - 10.3 mg/dL    Phosphorus 6.9 (H) 2.5 - 4.9 mg/dL    Albumin 3.6 3.4 - 5.0 g/dL   POCT GLUCOSE   Result Value Ref Range    POCT Glucose 155 (H) 74 - 99 mg/dL   CBC   Result Value Ref Range    WBC 5.4 4.4 - 11.3 x10*3/uL    nRBC 0.0 0.0 - 0.0 /100 WBCs    RBC 3.55 (L) 4.50 - 5.90 x10*6/uL    Hemoglobin 10.7 (L) 13.5 - 17.5 g/dL    Hematocrit 33.6 (L) 41.0 - 52.0 %    MCV 95 80 - 100 fL    MCH 30.1 26.0 - 34.0 pg    MCHC 31.8 (L) 32.0 - 36.0 g/dL    RDW 14.7 (H) 11.5 - 14.5 %    Platelets 194 150 - 450 x10*3/uL   POCT GLUCOSE   Result Value Ref Range    POCT Glucose 165 (H) 74 - 99 mg/dL   Renal Function Panel   Result Value Ref Range    Glucose 153 (H) 74 - 99 mg/dL    Sodium 144 136 - 145 mmol/L    Potassium 5.1 3.5 - 5.3 mmol/L    Chloride 116 (H) 98 - 107 mmol/L    Bicarbonate 18 (L) 21 - 32 mmol/L    Anion Gap 15 10 - 20 mmol/L    Urea Nitrogen 95 (HH) 6 - 23 mg/dL    Creatinine 3.05 (H) 0.50 - 1.30 mg/dL    eGFR 22 (L) >60 mL/min/1.73m*2    Calcium 8.4 (L) 8.6 - 10.3 mg/dL    Phosphorus 6.3 (H) 2.5 - 4.9 mg/dL    Albumin 3.8 3.4 - 5.0 g/dL       ECG 12 lead    Result Date: 2/2/2024  Sinus bradycardia Otherwise normal ECG When compared with ECG of 31-JAN-2024 12:53, No significant change was found See ED provider note for full interpretation and clinical correlation  Confirmed by Sridevi Khan ([id number]) on 2/2/2024 10:43:48 AM    ECG 12 lead    Result Date: 1/31/2024  Sinus bradycardia Otherwise normal ECG When compared with ECG of 05-MAY-2022 00:39, Sinus rhythm has replaced Junctional rhythm Vent. rate has decreased BY  38 BPM Questionable change in QRS duration See ED provider note for full interpretation and clinical correlation Confirmed by Alejandro Evans ([id number]) on 1/31/2024 6:26:52 PM    XR chest 1 view    Result Date: 1/31/2024  Interpreted By:  Sriram Garcia, STUDY: XR CHEST 1 VIEW  1/31/2024 1:10 pm   INDICATION: Signs/Symptoms:sob/weakness   COMPARISON: 12/20/2022   ACCESSION NUMBER(S): [id number]   ORDERING CLINICIAN: MACK LE   TECHNIQUE: A single AP portable radiograph of the chest was obtained.   FINDINGS: Left basilar airspace consolidation is seen and may represent small pleural effusion, atelectasis and/or pneumonia. No pneumothorax is identified. The cardiac silhouette is within normal limits for size.       Left basilar airspace consolidation, as above. Clinical correlation and continued follow-up until clearing is recommended.   MACRO: None.   Signed by: Sriram Garcia 1/31/2024 1:15 PM Dictation workstation:   [id number]         Assessment/Plan        Principal Problem:    Acute kidney injury superimposed on CKD (CMS/HCC)    Gregorio North is a 63 y.o. male  with a past medical history of chronic kidney disease stage IV, diabetic nephropathy, diabetes mellitus type 2, diabetic neuropathy, Charcot foot, chronic anemia, dyslipidemia, BPH, anxiety, chronic fatigue, hyperlipidemia, macular exudate, pulmonary edema, CHF, papilledema, arthralgia, hypertension, Sjogren's syndrome and many other comorbidities who was admitted to the hospital for STEVE on CKD with hyperkalemia.     # STEVE on  Chronic kidney disease - G3B4A(improving)   - Baseline SCr 1.5-2.2  - On admission, Scr 4.46, in the morning 3.8  - No need for urgent dialysis, will continue to  monitor.  - Likely ATN secondary to hemodynamic instability (bradycardia and hypotension  - Most likely related to atherosclerotic HTN/diabetic   - Prior urine analysis showed 700.4 protien in urine  - Prior kidney US showed bilateral normal kidneys in 2022  - Lyes, UA : Na 61  -May start resuming his BP meds gradually start with Norvasc and hydralazine,Continue to hold  lisinopril, Lasix, finerenone temporarily for improvement of his STEVE .  - Continue to hold Jardiance and semaglutide.  - Recommend lowering carvedilol from 25 mg twice daily to 6.25 mg twice daily and increase as needed, patient follows up with Dr. Abraham as outpatient (high suspicion his bradycardia was medication induced, patient's HR is within normal since admission and holding carvedilol/ no arrhythmias on telemetry).          # BP - Low blood pressure on admission  - Current meds: Norvasc, carvedilol, hydralazine, lisinopril, Lasix, finerenone,  - Continue to hold and reintroduce gradually (As before).      #Acidosis:  - Continue sodium bicarbonate 650 mg three times daily.     #Hyperkalemia (improving):  - Continue  Lokelma 10 mg daily, K5.1.   - Coketial as needed .  - Continue patient on telemetry.     #Anemia Hb 10   -Patient receives Procrit as outpatient.      #(CKD)-MBD  - Ca 8.2, Phos 4.6 , PTH 97.4  4 months ago              #Others  - No NSAIDs, no contrast as possible. If to be done- we recommend holding ACEi/ARBS/diuretics 24 hrs prior to contrast exposure and ensure appropriate hydration   - Ensure well hydration  - Limit salt in diet        Thank you for your consult, will continue to follow,  Discussed with Dr. Kaplan who is in agreement.     Please Haiku with any questions.     Raven Srivastava, DO

## 2024-02-03 VITALS
SYSTOLIC BLOOD PRESSURE: 138 MMHG | HEART RATE: 63 BPM | OXYGEN SATURATION: 96 % | BODY MASS INDEX: 38.63 KG/M2 | WEIGHT: 260.8 LBS | DIASTOLIC BLOOD PRESSURE: 60 MMHG | TEMPERATURE: 97 F | HEIGHT: 69 IN | RESPIRATION RATE: 18 BRPM

## 2024-02-03 LAB
ALBUMIN SERPL BCP-MCNC: 3.8 G/DL (ref 3.4–5)
ANION GAP SERPL CALC-SCNC: 13 MMOL/L (ref 10–20)
BUN SERPL-MCNC: 72 MG/DL (ref 6–23)
CALCIUM SERPL-MCNC: 8.6 MG/DL (ref 8.6–10.3)
CHLORIDE SERPL-SCNC: 110 MMOL/L (ref 98–107)
CO2 SERPL-SCNC: 21 MMOL/L (ref 21–32)
CREAT SERPL-MCNC: 2.63 MG/DL (ref 0.5–1.3)
EGFRCR SERPLBLD CKD-EPI 2021: 26 ML/MIN/1.73M*2
GLUCOSE BLD MANUAL STRIP-MCNC: 166 MG/DL (ref 74–99)
GLUCOSE BLD MANUAL STRIP-MCNC: 178 MG/DL (ref 74–99)
GLUCOSE SERPL-MCNC: 156 MG/DL (ref 74–99)
PHOSPHATE SERPL-MCNC: 4.7 MG/DL (ref 2.5–4.9)
POTASSIUM SERPL-SCNC: 4.7 MMOL/L (ref 3.5–5.3)
SODIUM SERPL-SCNC: 139 MMOL/L (ref 136–145)

## 2024-02-03 PROCEDURE — 2500000002 HC RX 250 W HCPCS SELF ADMINISTERED DRUGS (ALT 637 FOR MEDICARE OP, ALT 636 FOR OP/ED): Performed by: INTERNAL MEDICINE

## 2024-02-03 PROCEDURE — 82947 ASSAY GLUCOSE BLOOD QUANT: CPT

## 2024-02-03 PROCEDURE — 84100 ASSAY OF PHOSPHORUS: CPT | Performed by: INTERNAL MEDICINE

## 2024-02-03 PROCEDURE — 2500000004 HC RX 250 GENERAL PHARMACY W/ HCPCS (ALT 636 FOR OP/ED): Performed by: INTERNAL MEDICINE

## 2024-02-03 PROCEDURE — 36415 COLL VENOUS BLD VENIPUNCTURE: CPT | Performed by: INTERNAL MEDICINE

## 2024-02-03 PROCEDURE — 2500000001 HC RX 250 WO HCPCS SELF ADMINISTERED DRUGS (ALT 637 FOR MEDICARE OP): Performed by: INTERNAL MEDICINE

## 2024-02-03 PROCEDURE — 99239 HOSP IP/OBS DSCHRG MGMT >30: CPT | Performed by: INTERNAL MEDICINE

## 2024-02-03 RX ORDER — CARVEDILOL 6.25 MG/1
6.25 TABLET ORAL
Qty: 30 TABLET | Refills: 3 | Status: SHIPPED | OUTPATIENT
Start: 2024-02-03 | End: 2024-06-04

## 2024-02-03 RX ADMIN — CARVEDILOL 6.25 MG: 6.25 TABLET, FILM COATED ORAL at 08:31

## 2024-02-03 RX ADMIN — HEPARIN SODIUM 5000 UNITS: 5000 INJECTION INTRAVENOUS; SUBCUTANEOUS at 11:45

## 2024-02-03 RX ADMIN — INSULIN LISPRO 1 UNITS: 100 INJECTION, SOLUTION INTRAVENOUS; SUBCUTANEOUS at 11:45

## 2024-02-03 RX ADMIN — HEPARIN SODIUM 5000 UNITS: 5000 INJECTION INTRAVENOUS; SUBCUTANEOUS at 04:49

## 2024-02-03 RX ADMIN — SODIUM ZIRCONIUM CYCLOSILICATE 10 G: 10 POWDER, FOR SUSPENSION ORAL at 08:37

## 2024-02-03 RX ADMIN — INSULIN LISPRO 1 UNITS: 100 INJECTION, SOLUTION INTRAVENOUS; SUBCUTANEOUS at 08:34

## 2024-02-03 RX ADMIN — CLOPIDOGREL 75 MG: 75 TABLET ORAL at 08:31

## 2024-02-03 RX ADMIN — FOLIC ACID 5 MG: 1 TABLET ORAL at 08:31

## 2024-02-03 RX ADMIN — ALLOPURINOL 100 MG: 100 TABLET ORAL at 08:31

## 2024-02-03 RX ADMIN — SODIUM BICARBONATE 650 MG: 650 TABLET ORAL at 08:31

## 2024-02-03 ASSESSMENT — PAIN SCALES - GENERAL: PAINLEVEL_OUTOF10: 0 - NO PAIN

## 2024-02-03 ASSESSMENT — PAIN - FUNCTIONAL ASSESSMENT: PAIN_FUNCTIONAL_ASSESSMENT: 0-10

## 2024-02-03 NOTE — DISCHARGE SUMMARY
"Discharge Diagnosis  Acute kidney injury superimposed on CKD (CMS/[facility])    Issues Requiring Follow-Up  Held: amlodipine and hydralazine due to low blood pressure  Changed: carvedilol decreased to 6.25mg BID, from 25mg prior to admission  Resumed: lasix and lisinopril resumed    Discharge Meds     Your medication list        CHANGE how you take these medications        Instructions Last Dose Given Next Dose Due   carvedilol 6.25 mg tablet  Commonly known as: Coreg  What changed:   medication strength  how much to take      Take 1 tablet (6.25 mg) by mouth 2 times a day with meals.              CONTINUE taking these medications        Instructions Last Dose Given Next Dose Due   allopurinol 100 mg tablet  Commonly known as: Zyloprim      Take 1 tablet (100 mg) by mouth once daily.       atorvastatin 20 mg tablet  Commonly known as: Lipitor      Take 1 tablet (20 mg) by mouth once daily at bedtime.       Bydureon BCise 2 mg/0.85 mL auto-injector  Generic drug: exenatide microspheres      INJECT 0.85 ML SUBCUTANEOUSLY ONCE WEEKLY.       CareFine Pen Needle 32 gauge x 3/16\" needle  Generic drug: pen needle, diabetic      USE 1 PEN NEEDLE TO INJECT INSULIN 3 TIMES A DAY BEFORE MEALS AND AT BEDTIME.       cholecalciferol 50,000 unit capsule  Commonly known as: Vitamin D-3           clopidogrel 75 mg tablet  Commonly known as: Plavix      Take 1 tablet (75 mg) by mouth once daily.       empagliflozin 10 mg  Commonly known as: Jardiance      Take 1 tablet (10 mg) by mouth once daily in the morning.       ergocalciferol 1.25 MG (32479 UT) capsule  Commonly known as: Vitamin D-2           Farxiga 10 mg  Generic drug: dapagliflozin propanediol           finerenone 10 mg tablet tablet  Commonly known as: Kerendia      Take 1 tablet (10 mg) by mouth once daily.       folic acid 1 mg tablet  Commonly known as: Folvite      Take 5 tablets (5 mg) by mouth once daily.       furosemide 40 mg tablet  Commonly known as: Lasix         "   HumaLOG KwikPen Insulin 100 unit/mL injection  Generic drug: insulin lispro      Inject 8 Units under the skin 3 times a day with meals. 8 units before meals       insulin glargine 100 unit/mL (3 mL) pen  Commonly known as: Lantus Solostar U-100 Insulin      Inject 20 Units under the skin once daily at bedtime.       lisinopril 40 mg tablet      Take 1 tablet (40 mg) by mouth once daily in the morning.       MAGNESIUM ORAL           ReliOn Prime Test Strips strip  Generic drug: blood sugar diagnostic           Retacrit 40,000 unit/mL injection  Generic drug: epoetin mckenna-epbx           semaglutide 0.25 mg or 0.5 mg (2 mg/3 mL) pen injector      Inject 0.25 mg under the skin 1 (one) time per week.              STOP taking these medications      amLODIPine 10 mg tablet  Commonly known as: Norvasc        hydrALAZINE 50 mg tablet  Commonly known as: Apresoline                  Where to Get Your Medications        These medications were sent to [id number] [address] [id number]      Phone: [phone]   carvedilol 6.25 mg tablet         Test Results Pending At Discharge  Pending Labs       No current pending labs.            Hospital Course  62 yo M with a hx of DM, CKD 4, CHF, who was admitted following an episode of bradycardia and hypotension at home, with subsequent kidney injury. Creatinine 4.8 on admission, baseline is in mid-2s. On admission his carvedilol, lasix, amlodipine, hydralazine were held. Nephro consulted. His vitals stabilized with just fluid resuscitation and holding meds. The following day his creatinine was down-trending. On 2/3 his labs and vitals remained stable; creatinine was near his baseline. His carvedilol was resumed but at a dose of 6.25mg, down from 25mg prior to admission. Hydralazine and amlodipine held. Lasix and lisinopril resumed. He will follow up with PCP and cards.     Pertinent Physical Exam At Time of  Discharge  Physical Exam  Vitals reviewed.   Constitutional:       General: He is not in acute distress.     Appearance: Normal appearance. He is not ill-appearing.   HENT:      Head: Normocephalic and atraumatic.   Cardiovascular:      Rate and Rhythm: Normal rate and regular rhythm.      Heart sounds: No murmur heard.  Pulmonary:      Effort: Pulmonary effort is normal. No respiratory distress.      Breath sounds: No wheezing or rhonchi.   Abdominal:      General: Bowel sounds are normal. There is no distension.      Palpations: Abdomen is soft.      Tenderness: There is no abdominal tenderness.   Musculoskeletal:         General: No swelling.   Skin:     General: Skin is warm and dry.      Coloration: Skin is not jaundiced.      Findings: No erythema.   Neurological:      General: No focal deficit present.      Mental Status: He is alert and oriented to person, place, and time.      Cranial Nerves: No cranial nerve deficit.   Psychiatric:         Mood and Affect: Mood normal.         Thought Content: Thought content normal.         Outpatient Follow-Up  Future Appointments   Date Time Provider Department Center   2/14/2024  9:00 AM PHARMACY [id number]   2/14/2024 10:30 AM INF 01 [facility]   2/28/2024 10:30 AM INF 01 [facility]   3/13/2024 10:30 AM INF 01 [facility]   3/14/2024  2:20 PM Imani Kaplan MD [id number] [facility]   3/21/2024 11:00 AM Latisha Vines PA-C [id number] [facility]         Clarence Carreno, DO

## 2024-02-05 ENCOUNTER — PATIENT OUTREACH (OUTPATIENT)
Dept: PRIMARY CARE | Facility: CLINIC | Age: 64
End: 2024-02-05
Payer: MEDICARE

## 2024-02-05 NOTE — PROGRESS NOTES
Discharge Facility: [facility]   Discharge Diagnosis: Acute kidney injury superimposed on CKD  Admission Date: 1-  Discharge Date: 2-3-2024    PCP Appointment Date: 2-7-2024  Specialist Appointment Date:   -CARDIOLOGY- DR. LANE     [facility] Encounter and Summary: Linked   See discharge assessment below for further details  Engagement  Call Start Time: 1435 (2/5/2024  3:08 PM)    Medications  Medications reviewed with patient/caregiver?: Yes (2/5/2024  3:08 PM)  Is the patient having any side effects they believe may be caused by any medication additions or changes?: No (2/5/2024  3:08 PM)  Does the patient have all medications ordered at discharge?: Yes (2/5/2024  3:08 PM)  Care Management Interventions: Provided patient education (2/5/2024  3:08 PM)  Prescription Comments: STOP taking:  amLODIPine 10 mg tablet (Norvasc)  hydrALAZINE 50 mg tablet (Apresoline / CHANGE how you take:  carvedilol (Coreg) 6.25 mg tablet  Take 1 tablet (6.25 mg) by mouth 2 times a day  with meals./ Resumed: lasix and lisinopril (2/5/2024  3:08 PM)  Is the patient taking all medications as directed (includes completed medication regime)?: Yes (2/5/2024  3:08 PM)  Care Management Interventions: Provided patient education (2/5/2024  3:08 PM)  Medication Comments: Patient denies any questions or concerns about their medications once they are home. Verbalize an understanding regarding how to take their medications and which medications they should be taking. No issues reported in regards to accessibility affordability adherence.They deny the need for any medication refills at this time. (2/5/2024  3:08 PM)    Appointments  Does the patient have a primary care provider?: Yes (confirmed pcp Latisha Vines) (2/5/2024  3:08 PM)  Care Management Interventions: Educated patient on importance of making appointment; Verified appointment date/time/provider (2/5/2024  3:08 PM)  Has the patient kept scheduled appointments due by today?: Yes  "(2024  3:08 PM)  Care Management Interventions: Advised to schedule with specialist (Emphsazied importance of all specialty follow up appointments.  will call to schedule with cardiology) (2024  3:08 PM)    Self Management  What is the home health agency?: NA (2024  3:08 PM)  Has home health visited the patient within 72 hours of discharge?: Not applicable (2024  3:08 PM)  What Durable Medical Equipment (DME) was ordered?: NA (2024  3:08 PM)    Patient Teaching  Does the patient have access to their discharge instructions?: Yes (2024  3:08 PM)  Care Management Interventions: Reviewed instructions with patient (2024  3:08 PM)  What is the patient's perception of their health status since discharge?: Improving (2024  3:08 PM)  Is the patient/caregiver able to teach back the hierarchy of who to call/visit for symptoms/problems? PCP, Specialist, Home Health nurse, Urgent Care, ED, 911: Yes (You need to call your doctor,  return to the closest ED if you develop any new  or worsening symptoms: concerning symptoms call 911 for emergency situations  Call your doctor for questions / concerrns.) (2024  3:08 PM)  Patient/Caregiver Education Comments: REPORTS DOING WELL AT HOME. HE MONITORS BP AND HR 3X DAILY AT HOME AND STATES THEY HAVE RESUMED TO HIS \"NORMAL\"DENIES ANY LIGHTHEADEDNESS OR DIZZINESS.  OUT AMBULATING AT [facility] TODAY. (2024  3:08 PM)    Wrap Up  Wrap Up Additional Comments: Spoke w/ pt. Pt identified by name and .                                                                                                                                                      Reviewed discharge instructions, medications, and follow up. Patient denies any further discharge questions/needs at this time.Please take all medications as prescribed and keep all follow-up appointments. Emphasized the importance of hospital follow up.Follow up is needed after discharge to review the hospital " recommendations,assess your response to your treatment and make further recommendations for your transition of care.                                                                                                                                                                                                                  Contact your primary care physician with any questions or concerns that arise.You may contact your outpatient specialists office for any questions regarding specifics relating to their recommendations. Confirms they will attend the scheduled follow up appointment Aware of my availability for non emergent concerns (2/5/2024  3:08 PM)  Call End Time: 1445 (2/5/2024  3:08 PM)

## 2024-02-06 NOTE — SIGNIFICANT EVENT
Follow Up Phone Call    Outgoing phone call    Spoke to: Gregorio North Relationship:self   Phone number: [phone]      Outcome: contacted patient/ family   No chief complaint on file.         Diagnosis:Not applicable     States he is feeling better. No further questions or concerns.

## 2024-02-07 ENCOUNTER — OFFICE VISIT (OUTPATIENT)
Dept: PRIMARY CARE | Facility: CLINIC | Age: 64
End: 2024-02-07
Payer: MEDICARE

## 2024-02-07 VITALS
WEIGHT: 262.8 LBS | BODY MASS INDEX: 38.92 KG/M2 | HEART RATE: 58 BPM | TEMPERATURE: 98.6 F | HEIGHT: 69 IN | SYSTOLIC BLOOD PRESSURE: 158 MMHG | DIASTOLIC BLOOD PRESSURE: 72 MMHG | OXYGEN SATURATION: 98 %

## 2024-02-07 DIAGNOSIS — N17.9 ACUTE KIDNEY INJURY SUPERIMPOSED ON CKD (CMS-HCC): Primary | ICD-10-CM

## 2024-02-07 DIAGNOSIS — E87.5 HYPERKALEMIA: ICD-10-CM

## 2024-02-07 DIAGNOSIS — Z12.11 SCREENING FOR COLON CANCER: ICD-10-CM

## 2024-02-07 DIAGNOSIS — N18.9 ACUTE KIDNEY INJURY SUPERIMPOSED ON CKD (CMS-HCC): Primary | ICD-10-CM

## 2024-02-07 DIAGNOSIS — N18.4 CHRONIC KIDNEY DISEASE, STAGE 4 (SEVERE) (MULTI): ICD-10-CM

## 2024-02-07 PROCEDURE — 3060F POS MICROALBUMINURIA REV: CPT | Performed by: PHYSICIAN ASSISTANT

## 2024-02-07 PROCEDURE — 3078F DIAST BP <80 MM HG: CPT | Performed by: PHYSICIAN ASSISTANT

## 2024-02-07 PROCEDURE — 3077F SYST BP >= 140 MM HG: CPT | Performed by: PHYSICIAN ASSISTANT

## 2024-02-07 PROCEDURE — 4010F ACE/ARB THERAPY RXD/TAKEN: CPT | Performed by: PHYSICIAN ASSISTANT

## 2024-02-07 PROCEDURE — 1036F TOBACCO NON-USER: CPT | Performed by: PHYSICIAN ASSISTANT

## 2024-02-07 PROCEDURE — 99496 TRANSJ CARE MGMT HIGH F2F 7D: CPT | Performed by: PHYSICIAN ASSISTANT

## 2024-02-07 RX ORDER — SODIUM BICARBONATE 650 MG/1
650 TABLET ORAL 3 TIMES DAILY
Qty: 90 TABLET | Refills: 0 | Status: SHIPPED | OUTPATIENT
Start: 2024-02-07 | End: 2024-03-08

## 2024-02-07 NOTE — PROGRESS NOTES
"Subjective    HPI    Gregorio North is a 63 y.o. year old male patient with presenting to clinic with concern for   Chief Complaint   Patient presents with    Hospital Follow-up     TCM, Hypotension. Kidney disease.     for Transitional Care Management visit after hospitalization    Gregorio North is presenting today for follow- up after being discharged from hospitalization 4 days ago.   The main problem requiring admission was Acute kidney injury superimposed on CKD (CMS/HCC).   The discharge summary and/or Transitional Care Management documentation was reviewed. Medication reconciliation was performed as indicated via the \"[place] as Reviewed\" time stamp.       Was at [facility] for procrit. Found to be hypotensive and bradycardic. Referred to ER. Bradycardia and hypotension may be secondary to worsening renal failure with hyperkalemia. Hyperkalemia at 6.8. Found to have acute on chronic renal failure. Creat 4/8 vs baseline around 2.5. Improved to baseline with fluids and holding offending medications. His potassium has improved from 6.8-5.5. Stabilized at [place] and Carvedilol dose reduced from 25 to 6.25mg. Stop Hydralazine and amlodipine. Continue lasix and lisinopril.    Bradycardia and hypotension may be secondary to worsening renal failure with hyperkalemia. His potassium has improved from 6.8-5.5     Discussed lokelma for hyperkalemia and socium bicarb 650tid- during hospitalization only? Checked with nephro. Continue procrit as outpatient.       History    Patient Active Problem List   Diagnosis    Benign essential hypertension    Diabetes mellitus (CMS/HCC)    Anemia    Chronic kidney disease, stage 4 (severe) (CMS/HCC)    Dyslipidemia    Benign prostatic hyperplasia    Anxiety    Fatigue    Hypokalemia    HLD (hyperlipidemia)    Macular exudate    Nystagmus    Optic atrophy    Atherosclerosis of native arteries of extremities with rest pain, bilateral legs (CMS/HCC)    Pulmonary edema    Vitamin D " deficiency    Unvaccinated for covid-19    CHF (congestive heart failure) (CMS/[facility])    Tremor    Swelling of upper extremity    Papilledema    Palpitations    Obesity with body mass index 30 or greater    Muscle pain    Arthralgia    Impairment of balance    History of hypertension    History of endocrine disorder    Headache    Class 3 severe obesity due to excess calories in adult (CMS/[facility])    Sjogren's syndrome (CMS/[facility])    Shortness of breath    Proteinuria    History of kidney cancer    Lyme disease    Iron deficiency anemia    Chronic pulmonary edema    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 (CMS/[facility])    Chronic osteomyelitis with draining sinus, right ankle and foot (CMS/[facility])    Acute kidney injury superimposed on CKD (CMS/[facility])       Past Medical History:   Diagnosis Date    Acute diastolic heart failure (CMS/[facility]) 11/23/2022    STEVE (acute kidney injury) (CMS/[facility]) 09/18/2023    COVID-19 virus infection 09/18/2023    Cutaneous abscess of back (any part, except buttock) 01/25/2021    Abscess of back    Deficiency of other specified B group vitamins 06/09/2021    Folate deficiency    Diabetic foot ulcer (CMS/[facility]) 05/06/2022    Encounter for general adult medical examination without abnormal findings 05/19/2020    Medicare annual wellness visit, subsequent    Encounter for screening for malignant neoplasm of colon 02/21/2019    Screening for malignant neoplasm of colon    Encounter for screening for malignant neoplasm of prostate 08/12/2021    Prostate cancer screening    Focal chorioretinal inflammation, macular or paramacular, 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    Acute macular neuroretinitis of left eye    Focal chorioretinal inflammation, macular or paramacular, left eye 11/05/2014    Acute macular neuroretinitis of left eye    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    History of foot operation 09/18/2023    Ischemic optic neuropathy, 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    Anterior ischemic optic neuropathy    Localized edema 01/11/2021    Bilateral edema of lower extremity    Other muscle spasm 04/24/2017    Trapezius muscle spasm    Other specified abnormal immunological findings in serum 09/17/2014    Positive Lyme disease serology    Other specified cataract 01/27/2016    Cataract, mature    Other specified health status     No pertinent past surgical history    Other specified postprocedural states 06/18/2015    Status post foot surgery    Other specified retinal disorders 01/27/2016    Macular exudate    Other symptoms and signs involving the musculoskeletal system 04/24/2017    Left arm weakness    Pain in right shoulder 04/24/2017    Right shoulder pain    Personal history of diseases of the skin and subcutaneous tissue 05/25/2021    History of cellulitis    Personal history of other diseases of male genital organs 01/11/2021    History of benign prostatic hyperplasia    Personal history of other diseases of the circulatory system 09/03/2014    History of hypertension    Personal history of other diseases of the musculoskeletal system and connective tissue 11/21/2016    History of joint pain    Personal history of other diseases of the musculoskeletal system and connective tissue 11/21/2016    History of muscle pain    Personal history of other diseases of the nervous system and sense organs 09/08/2014    History of nystagmus    Personal history of other diseases of the nervous system and sense organs 01/27/2016    History of optic atrophy    Personal history of other diseases of the nervous system and sense organs 09/08/2014    History of papilledema    Personal history of other diseases of the nervous system and sense organs 08/22/2016    History of tremor    Personal history of other endocrine, nutritional and metabolic disease 01/11/2021    History of elevated lipids    Personal history of other specified conditions     History of impaired glucose  "tolerance    Personal history of other specified conditions 09/08/2014    History of palpitations    Personal history of other specified conditions 08/12/2021    History of chronic fatigue    Personal history of other specified conditions 08/09/2018    History of headache    Personal history of other specified conditions 08/09/2018    History of balance disorder    Respiratory failure (CMS/HCC) 10/10/2023    Sebaceous cyst 11/20/2018    Infected sebaceous cyst of skin    Unspecified cataract 01/27/2016    Total cataract    Unspecified visual loss 02/18/2022    Vision loss      Past Surgical History:   Procedure Laterality Date    CT GUIDED PERCUTANEOUS BIOPSY BONE DEEP  8/29/2022    CT GUIDED PERCUTANEOUS BIOPSY BONE DEEP 8/29/2022 [facility]    FOOT SURGERY  03/31/2016    Foot Surgery    MR HEAD ANGIO WO IV CONTRAST  9/17/2014    MR HEAD ANGIO WO IV CONTRAST 9/17/2014 [facility]    OTHER SURGICAL HISTORY  02/04/2015    Eye Surgery Results Vision      Family History   Problem Relation Name Age of Onset    No Known Problems Mother      Heart attack Father      Diabetes Father        Social History     Tobacco Use    Smoking status: Never    Smokeless tobacco: Never   Substance Use Topics    Alcohol use: Not Currently          Current Outpatient Medications:     allopurinol (Zyloprim) 100 mg tablet, Take 1 tablet (100 mg) by mouth once daily., Disp: 90 tablet, Rfl: 3    atorvastatin (Lipitor) 20 mg tablet, Take 1 tablet (20 mg) by mouth once daily at bedtime., Disp: 90 tablet, Rfl: 3    blood sugar diagnostic (ReliOn Prime Test Strips) strip, TEST 3 TIMES DAILY., Disp: , Rfl:     Bydureon BCise 2 mg/0.85 mL auto-injector, INJECT 0.85 ML SUBCUTANEOUSLY ONCE WEEKLY., Disp: 13.6 mL, Rfl: 0    CareFine Pen Needle 32 gauge x 3/16\" needle, USE 1 PEN NEEDLE TO INJECT INSULIN 3 TIMES A DAY BEFORE MEALS AND AT BEDTIME., Disp: 100 each, Rfl: 3    carvedilol (Coreg) 6.25 mg tablet, Take 1 tablet (6.25 mg) by mouth " 2 times a day with meals., Disp: 30 tablet, Rfl: 3    cholecalciferol (Vitamin D-3) 1,250 mcg (50,000 unit) capsule, Take 1 capsule (50,000 Units) by mouth 1 (one) time per week., Disp: , Rfl:     clopidogrel (Plavix) 75 mg tablet, Take 1 tablet (75 mg) by mouth once daily., Disp: 90 tablet, Rfl: 3    empagliflozin (Jardiance) 10 mg, Take 1 tablet (10 mg) by mouth once daily in the morning., Disp: 30 tablet, Rfl: 1    epoetin mckenna-epbx (Retacrit) 40,000 unit/mL injection, Inject 1 mL (40,000 Units) under the skin every 30 (thirty) days., Disp: , Rfl:     ergocalciferol (Vitamin D-2) 1.25 MG (21799 UT) capsule, Take 1 capsule (1,250 mcg) by mouth once a week., Disp: , Rfl:     finerenone (Kerendia) 10 mg tablet tablet, Take 1 tablet (10 mg) by mouth once daily., Disp: 90 tablet, Rfl: 3    folic acid (Folvite) 1 mg tablet, Take 5 tablets (5 mg) by mouth once daily., Disp: 450 tablet, Rfl: 3    furosemide (Lasix) 40 mg tablet, 1 tablet (40 mg)., Disp: , Rfl:     HumaLOG KwikPen Insulin 100 unit/mL injection, Inject 8 Units under the skin 3 times a day with meals. 8 units before meals, Disp: 15 each, Rfl: 3    insulin glargine (Lantus Solostar U-100 Insulin) 100 unit/mL (3 mL) pen, Inject 20 Units under the skin once daily at bedtime., Disp: 18 mL, Rfl: 0    lisinopril 40 mg tablet, Take 1 tablet (40 mg) by mouth once daily in the morning., Disp: 90 tablet, Rfl: 3    MAGNESIUM ORAL, Take 1 tablet by mouth once daily., Disp: , Rfl:     semaglutide 0.25 mg or 0.5 mg (2 mg/3 mL) pen injector, Inject 0.25 mg under the skin 1 (one) time per week., Disp: 3 mL, Rfl: 0    Farxiga 10 mg, Take 1 tablet (10 mg) by mouth once daily., Disp: , Rfl:     sodium bicarbonate 650 mg tablet, Take 1 tablet (650 mg) by mouth 3 times a day., Disp: 90 tablet, Rfl: 0    sodium zirconium cyclosilicate (Lokelma) 5 gram packet, Take 5 g by mouth once daily., Disp: 30 packet, Rfl: 0     Review of Systems    Constitutional: Denies fever  HEENT:  "Denies ST, earache  CVS: Denies Chest pain  Pulmonary: Denies wheezing, SOB  GI: Denies N/V  : Denies dysuria  Musculoskeletal:  Denies myalgia  Neuro: Denies focal weakness or numbness.  Skin: Denies Rashes.  *Review of Systems is negative unless otherwise mentioned in HPI or ROS above.    Objective        Exam    /72   Pulse 58   Temp 37 °C (98.6 °F)   Ht 1.753 m (5' 9.02\")   Wt 119 kg (262 lb 12.8 oz)   SpO2 98%   BMI 38.79 kg/m²  reviewed   Body mass index is 38.79 kg/m².   Constitutional: NAD.  Resting comfortably.  Head: Atraumatic, normocephalic.  ENT: moist oral mucosa. Nasal mucosa mildly edematous and nonerythematous. Posterior oropharynx nonerythematous with mild clear posterior pharyngeal streaking.  TM: Bilat TM nonerythematous, pearly grey, landmarks intact. EAC wnl bilat.  Eyes: PERRLA. EOM intact.   Lymph: No anterior cervical chain lymphadenopathy or submandibular lymphadenopathy.   Cardiac: Regular rate & rhythm.   Pulmonary: Lungs clear bilat  GI: Soft, Nontender, nondistended.   Musculoskeletal: No peripheral edema.   Skin: No evidence of trauma. No rashes  Psych: Intact judgement and insight.      Plan    Current Outpatient Medications:     allopurinol (Zyloprim) 100 mg tablet, Take 1 tablet (100 mg) by mouth once daily., Disp: 90 tablet, Rfl: 3    atorvastatin (Lipitor) 20 mg tablet, Take 1 tablet (20 mg) by mouth once daily at bedtime., Disp: 90 tablet, Rfl: 3    blood sugar diagnostic (ReliOn Prime Test Strips) strip, TEST 3 TIMES DAILY., Disp: , Rfl:     Bydureon BCise 2 mg/0.85 mL auto-injector, INJECT 0.85 ML SUBCUTANEOUSLY ONCE WEEKLY., Disp: 13.6 mL, Rfl: 0    CareFine Pen Needle 32 gauge x 3/16\" needle, USE 1 PEN NEEDLE TO INJECT INSULIN 3 TIMES A DAY BEFORE MEALS AND AT BEDTIME., Disp: 100 each, Rfl: 3    carvedilol (Coreg) 6.25 mg tablet, Take 1 tablet (6.25 mg) by mouth 2 times a day with meals., Disp: 30 tablet, Rfl: 3    cholecalciferol (Vitamin D-3) 1,250 mcg (50,000 " unit) capsule, Take 1 capsule (50,000 Units) by mouth 1 (one) time per week., Disp: , Rfl:     clopidogrel (Plavix) 75 mg tablet, Take 1 tablet (75 mg) by mouth once daily., Disp: 90 tablet, Rfl: 3    empagliflozin (Jardiance) 10 mg, Take 1 tablet (10 mg) by mouth once daily in the morning., Disp: 30 tablet, Rfl: 1    epoetin mckenna-epbx (Retacrit) 40,000 unit/mL injection, Inject 1 mL (40,000 Units) under the skin every 30 (thirty) days., Disp: , Rfl:     ergocalciferol (Vitamin D-2) 1.25 MG (50172 UT) capsule, Take 1 capsule (1,250 mcg) by mouth once a week., Disp: , Rfl:     finerenone (Kerendia) 10 mg tablet tablet, Take 1 tablet (10 mg) by mouth once daily., Disp: 90 tablet, Rfl: 3    folic acid (Folvite) 1 mg tablet, Take 5 tablets (5 mg) by mouth once daily., Disp: 450 tablet, Rfl: 3    furosemide (Lasix) 40 mg tablet, 1 tablet (40 mg)., Disp: , Rfl:     HumaLOG KwikPen Insulin 100 unit/mL injection, Inject 8 Units under the skin 3 times a day with meals. 8 units before meals, Disp: 15 each, Rfl: 3    insulin glargine (Lantus Solostar U-100 Insulin) 100 unit/mL (3 mL) pen, Inject 20 Units under the skin once daily at bedtime., Disp: 18 mL, Rfl: 0    lisinopril 40 mg tablet, Take 1 tablet (40 mg) by mouth once daily in the morning., Disp: 90 tablet, Rfl: 3    MAGNESIUM ORAL, Take 1 tablet by mouth once daily., Disp: , Rfl:     semaglutide 0.25 mg or 0.5 mg (2 mg/3 mL) pen injector, Inject 0.25 mg under the skin 1 (one) time per week., Disp: 3 mL, Rfl: 0    Farxiga 10 mg, Take 1 tablet (10 mg) by mouth once daily., Disp: , Rfl:     sodium bicarbonate 650 mg tablet, Take 1 tablet (650 mg) by mouth 3 times a day., Disp: 90 tablet, Rfl: 0    sodium zirconium cyclosilicate (Lokelma) 5 gram packet, Take 5 g by mouth once daily., Disp: 30 packet, Rfl: 0       MDM  Mr. North is doing much better than previous visits. He feels better overall, stronger, legs are less heavy. Contacted Dr. Kaplan regarding Lokelma and  sodium bicarb Rx at discharge. She did recommend continuing these medications. Rx sent to pharmacy.    Assessment    Problem List Items Addressed This Visit       Chronic kidney disease, stage 4 (severe) (CMS/HCC)    Relevant Medications    sodium bicarbonate 650 mg tablet    Acute kidney injury superimposed on CKD (CMS/HCC) - Primary     Other Visit Diagnoses       Hyperkalemia        Relevant Medications    sodium zirconium cyclosilicate (Lokelma) 5 gram packet    sodium bicarbonate 650 mg tablet    Screening for colon cancer        Relevant Orders    Colonoscopy Screening; Average Risk Patient

## 2024-02-08 ENCOUNTER — TELEPHONE (OUTPATIENT)
Dept: PRIMARY CARE | Facility: CLINIC | Age: 64
End: 2024-02-08
Payer: MEDICARE

## 2024-02-08 NOTE — TELEPHONE ENCOUNTER
----- Message from Latisha Vines PA-C sent at 2/7/2024  7:57 PM EST -----  Regarding: Kidney medicines  Please let him know that I contacted Dr. Kaplan regarding Lokelma and sodium bicarb Rx at discharge. (we discussed these).  She did recommend continuing these medications. I sent Rx to pharmacy.  Latisha

## 2024-02-09 DIAGNOSIS — Z12.11 SCREEN FOR COLON CANCER: ICD-10-CM

## 2024-02-09 RX ORDER — SODIUM, POTASSIUM,MAG SULFATES 17.5-3.13G
1 SOLUTION, RECONSTITUTED, ORAL ORAL AS NEEDED
Qty: 2 EACH | Refills: 0 | Status: SHIPPED | OUTPATIENT
Start: 2024-02-09

## 2024-02-14 ENCOUNTER — INFUSION (OUTPATIENT)
Dept: HEMATOLOGY/ONCOLOGY | Facility: HOSPITAL | Age: 64
End: 2024-02-14
Payer: MEDICARE

## 2024-02-14 ENCOUNTER — TELEMEDICINE (OUTPATIENT)
Dept: PHARMACY | Facility: HOSPITAL | Age: 64
End: 2024-02-14
Payer: MEDICARE

## 2024-02-14 VITALS
RESPIRATION RATE: 18 BRPM | HEART RATE: 76 BPM | SYSTOLIC BLOOD PRESSURE: 141 MMHG | OXYGEN SATURATION: 96 % | TEMPERATURE: 97.3 F | DIASTOLIC BLOOD PRESSURE: 71 MMHG

## 2024-02-14 DIAGNOSIS — N18.4 ANEMIA DUE TO STAGE 4 CHRONIC KIDNEY DISEASE (MULTI): ICD-10-CM

## 2024-02-14 DIAGNOSIS — D63.1 ANEMIA DUE TO STAGE 4 CHRONIC KIDNEY DISEASE (MULTI): ICD-10-CM

## 2024-02-14 DIAGNOSIS — E11.9 TYPE 2 DIABETES MELLITUS WITH INSULIN THERAPY (MULTI): Primary | ICD-10-CM

## 2024-02-14 DIAGNOSIS — Z79.4 TYPE 2 DIABETES MELLITUS WITH INSULIN THERAPY (MULTI): Primary | ICD-10-CM

## 2024-02-14 LAB
BASOPHILS # BLD AUTO: 0.08 X10*3/UL (ref 0–0.1)
BASOPHILS NFR BLD AUTO: 1.3 %
EOSINOPHIL # BLD AUTO: 0.31 X10*3/UL (ref 0–0.7)
EOSINOPHIL NFR BLD AUTO: 4.9 %
ERYTHROCYTE [DISTWIDTH] IN BLOOD BY AUTOMATED COUNT: 14.6 % (ref 11.5–14.5)
HCT VFR BLD AUTO: 38.7 % (ref 41–52)
HGB BLD-MCNC: 12.3 G/DL (ref 13.5–17.5)
IMM GRANULOCYTES # BLD AUTO: 0.02 X10*3/UL (ref 0–0.7)
IMM GRANULOCYTES NFR BLD AUTO: 0.3 % (ref 0–0.9)
LYMPHOCYTES # BLD AUTO: 1.27 X10*3/UL (ref 1.2–4.8)
LYMPHOCYTES NFR BLD AUTO: 20.3 %
MCH RBC QN AUTO: 30.7 PG (ref 26–34)
MCHC RBC AUTO-ENTMCNC: 31.8 G/DL (ref 32–36)
MCV RBC AUTO: 97 FL (ref 80–100)
MONOCYTES # BLD AUTO: 0.91 X10*3/UL (ref 0.1–1)
MONOCYTES NFR BLD AUTO: 14.5 %
NEUTROPHILS # BLD AUTO: 3.68 X10*3/UL (ref 1.2–7.7)
NEUTROPHILS NFR BLD AUTO: 58.7 %
NRBC BLD-RTO: 0 /100 WBCS (ref 0–0)
PLATELET # BLD AUTO: 203 X10*3/UL (ref 150–450)
RBC # BLD AUTO: 4.01 X10*6/UL (ref 4.5–5.9)
WBC # BLD AUTO: 6.3 X10*3/UL (ref 4.4–11.3)

## 2024-02-14 PROCEDURE — 36415 COLL VENOUS BLD VENIPUNCTURE: CPT

## 2024-02-14 PROCEDURE — 85025 COMPLETE CBC W/AUTO DIFF WBC: CPT

## 2024-02-14 RX ORDER — FAMOTIDINE 10 MG/ML
20 INJECTION INTRAVENOUS ONCE AS NEEDED
OUTPATIENT
Start: 2024-02-28

## 2024-02-14 RX ORDER — HEPARIN SODIUM,PORCINE/PF 10 UNIT/ML
50 SYRINGE (ML) INTRAVENOUS AS NEEDED
OUTPATIENT
Start: 2024-02-14

## 2024-02-14 RX ORDER — DIPHENHYDRAMINE HYDROCHLORIDE 50 MG/ML
50 INJECTION INTRAMUSCULAR; INTRAVENOUS AS NEEDED
OUTPATIENT
Start: 2024-02-28

## 2024-02-14 RX ORDER — HEPARIN 100 UNIT/ML
500 SYRINGE INTRAVENOUS AS NEEDED
OUTPATIENT
Start: 2024-02-14

## 2024-02-14 RX ORDER — EPINEPHRINE 0.3 MG/.3ML
0.3 INJECTION SUBCUTANEOUS EVERY 5 MIN PRN
OUTPATIENT
Start: 2024-02-28

## 2024-02-14 RX ORDER — ALBUTEROL SULFATE 0.83 MG/ML
3 SOLUTION RESPIRATORY (INHALATION) AS NEEDED
OUTPATIENT
Start: 2024-02-28

## 2024-02-14 ASSESSMENT — PATIENT HEALTH QUESTIONNAIRE - PHQ9
2. FEELING DOWN, DEPRESSED OR HOPELESS: NOT AT ALL
1. LITTLE INTEREST OR PLEASURE IN DOING THINGS: NOT AT ALL
SUM OF ALL RESPONSES TO PHQ9 QUESTIONS 1 & 2: 0

## 2024-02-14 ASSESSMENT — PAIN SCALES - GENERAL: PAINLEVEL: 0-NO PAIN

## 2024-02-14 NOTE — ASSESSMENT & PLAN NOTE
Last Visit Anastasia:  Switched from Farxiga to Jardiance d/t GI upset/diarrhea  Started Ozempic 0.25mg weekly injection    Assessment:  Current DM Medication Regimen:  Jardiance 10mg daily  Insulin Glargine (Lantus Solostar): 20 units at bedtime  Insulin Lispro (Humalog Kwikpen): 8 units TID w/meals  Ozempic 0.25mg weekly injection  New medications:  Started Jardiance 2 weeks ago, tolerating well  Started Ozempic 1 week ago, tolerating well  BG levels:  Tests TID  AM: 199s  Afternoon/evening readings: 140s-150s  Denies hypoglycemic episodes or Sx  Received diabetic education at recent hospital discharge  Hospitalization:  STEVE with hyperkalemia and hypotension; since resolved/improved  Has follow-up with Nephrology in March  Recommended discussing Ozempic and renal fxn with them  Had follow-up w/PCP post-discharge  Home BP has improved: ~135/87  No refills on meds/supplies needed at this time    Plan:  Continue the following DM medications:  Jardiance 10mg daily  Insulin Glargine (Lantus Solostar): 20 units at bedtime  Insulin Lispro (Humalog Kwikpen): 8 units TID w/meals  Ozempic 0.25mg weekly injection  Increase Ozempic dose to 0.5mg once a week, starting on 3/6, per PCP  Continue testing BG TID  Follow-up with Nephrology and Cardiology in March  Follow-up w/Pharmacy in5 weeks

## 2024-02-14 NOTE — SIGNIFICANT EVENT
02/14/24 1043   Stress   Do you feel stress - tense, restless, nervous, or anxious, or unable to sleep at night because your mind is troubled all the time - these days? Not at all

## 2024-02-14 NOTE — SIGNIFICANT EVENT
02/14/24 1043   Over the past 2 weeks, how often have you been bothered by any of the following problems?   Little interest or pleasure in doing things Not at all   Feeling down, depressed, or hopeless Not at all   Patient Health Questionnaire-2 Score 0

## 2024-02-14 NOTE — PROGRESS NOTES
I reviewed the progress note and agree with the resident’s findings and plans as written. Case discussed with resident.    Brandie Tipton, PharmD

## 2024-02-14 NOTE — PROGRESS NOTES
Subjective   Patient ID: Gregorio North is a 63 y.o. male who presents for Diabetes.    Referring Provider: Latisha Vines PA-C     Diabetes  He presents for his follow-up diabetic visit. He has type 2 diabetes mellitus. His disease course has been improving. There are no hypoglycemic associated symptoms. There are no hypoglycemic complications. Risk factors for coronary artery disease include diabetes mellitus, hypertension, male sex and obesity. Current diabetic treatment includes oral agent (monotherapy) and insulin injections (and Ozempic 0.25mg weekly injection). An ACE inhibitor/angiotensin II receptor blocker is being taken.       Review of Systems    Medication System Management:  Affordability/Accessibility: Not addressed this visit  Adherence/Organization: Not addressed this visit  Adverse Effects: None    Objective     There were no vitals taken for this visit.     Labs  Lab Results   Component Value Date    BILITOT 0.5 01/31/2024    CALCIUM 8.6 02/03/2024    CO2 21 02/03/2024     (H) 02/03/2024    CREATININE 2.63 (H) 02/03/2024    GLUCOSE 156 (H) 02/03/2024    ALKPHOS 65 01/31/2024    K 4.7 02/03/2024    PROT 6.9 01/31/2024     02/03/2024    AST 17 01/31/2024    ALT 29 01/31/2024    BUN 72 (H) 02/03/2024    ANIONGAP 13 02/03/2024    MG 3.05 (H) 01/31/2024    PHOS 4.7 02/03/2024     09/08/2022    ALBUMIN 3.8 02/03/2024    GFRMALE 32 (A) 09/13/2023     Lab Results   Component Value Date    TRIG 147 02/09/2023    CHOL 133 02/09/2023    HDL 43.6 02/09/2023     Lab Results   Component Value Date    HGBA1C 8.7 (A) 09/19/2023       Current Outpatient Medications on File Prior to Visit   Medication Sig Dispense Refill    allopurinol (Zyloprim) 100 mg tablet Take 1 tablet (100 mg) by mouth once daily. 90 tablet 3    atorvastatin (Lipitor) 20 mg tablet Take 1 tablet (20 mg) by mouth once daily at bedtime. 90 tablet 3    carvedilol (Coreg) 6.25 mg tablet Take 1 tablet (6.25 mg) by mouth 2  "times a day with meals. 30 tablet 3    cholecalciferol (Vitamin D-3) 1,250 mcg (50,000 unit) capsule Take 1 capsule (50,000 Units) by mouth 1 (one) time per week.      clopidogrel (Plavix) 75 mg tablet Take 1 tablet (75 mg) by mouth once daily. 90 tablet 3    empagliflozin (Jardiance) 10 mg Take 1 tablet (10 mg) by mouth once daily in the morning. 30 tablet 1    epoetin mckenna-epbx (Retacrit) 40,000 unit/mL injection Inject 1 mL (40,000 Units) under the skin every 30 (thirty) days.      finerenone (Kerendia) 10 mg tablet tablet Take 1 tablet (10 mg) by mouth once daily. 90 tablet 3    folic acid (Folvite) 1 mg tablet Take 5 tablets (5 mg) by mouth once daily. 450 tablet 3    furosemide (Lasix) 40 mg tablet 1 tablet (40 mg).      HumaLOG KwikPen Insulin 100 unit/mL injection Inject 8 Units under the skin 3 times a day with meals. 8 units before meals 15 each 3    insulin glargine (Lantus Solostar U-100 Insulin) 100 unit/mL (3 mL) pen Inject 20 Units under the skin once daily at bedtime. 18 mL 0    lisinopril 40 mg tablet Take 1 tablet (40 mg) by mouth once daily in the morning. 90 tablet 3    MAGNESIUM ORAL Take 1 tablet by mouth once daily.      semaglutide 0.25 mg or 0.5 mg (2 mg/3 mL) pen injector Inject 0.25 mg under the skin 1 (one) time per week. 3 mL 0    sodium bicarbonate 650 mg tablet Take 1 tablet (650 mg) by mouth 3 times a day. 90 tablet 0    sodium zirconium cyclosilicate (Lokelma) 5 gram packet Take 5 g by mouth once daily. 30 packet 0    blood sugar diagnostic (ReliOn Prime Test Strips) strip TEST 3 TIMES DAILY.      Bydureon BCise 2 mg/0.85 mL auto-injector INJECT 0.85 ML SUBCUTANEOUSLY ONCE WEEKLY. 13.6 mL 0    CareFine Pen Needle 32 gauge x 3/16\" needle USE 1 PEN NEEDLE TO INJECT INSULIN 3 TIMES A DAY BEFORE MEALS AND AT BEDTIME. 100 each 3    ergocalciferol (Vitamin D-2) 1.25 MG (07713 UT) capsule Take 1 capsule (1,250 mcg) by mouth once a week.      Farxiga 10 mg Take 1 tablet (10 mg) by mouth once " daily.      sodium,potassium,mag sulfates (Suprep) 17.5-3.13-1.6 gram recon soln solution Take 1 bottle by mouth if needed (take 1 bottle at 6pm night before procedure and take one bottle at 4am the day of procedure). 2 each 0     Current Facility-Administered Medications on File Prior to Visit   Medication Dose Route Frequency Provider Last Rate Last Admin    [DISCONTINUED] epoetin mckenna (Epogen,Procrit) injection 20,000 Units  20,000 Units subcutaneous Once Ervinreen LY Kaplan MD            Assessment/Plan   Problem List Items Addressed This Visit             ICD-10-CM    Type 2 diabetes mellitus with insulin therapy (CMS/[facility]) - Primary E11.9, Z79.4     Last Visit Simmary:  Switched from Farxiga to Jardiance d/t GI upset/diarrhea  Started Ozempic 0.25mg weekly injection    Assessment:  Current DM Medication Regimen:  Jardiance 10mg daily  Insulin Glargine (Lantus Solostar): 20 units at bedtime  Insulin Lispro (Humalog Kwikpen): 8 units TID w/meals  Ozempic 0.25mg weekly injection  New medications:  Started Jardiance 2 weeks ago, tolerating well  Started Ozempic 1 week ago, tolerating well  BG levels:  Tests TID  AM: 199s  Afternoon/evening readings: 140s-150s  Denies hypoglycemic episodes or Sx  Received diabetic education at recent hospital discharge  Hospitalization:  [facility] with hyperkalemia and hypotension; since resolved/improved  Has follow-up with Nephrology in March  Recommended discussing Ozempic and renal fxn with them  Had follow-up w/PCP post-discharge  Home BP has improved: ~135/87  No refills on meds/supplies needed at this time    Plan:  Continue the following DM medications:  Jardiance 10mg daily  Insulin Glargine (Lantus Solostar): 20 units at bedtime  Insulin Lispro (Humalog Kwikpen): 8 units TID w/meals  Ozempic 0.25mg weekly injection  Increase Ozempic dose to 0.5mg once a week, starting on 3/6, per PCP  Continue testing BG TID  Follow-up with Nephrology and Cardiology in March  Follow-up  w/Pharmacy in5 weeks            Lauren Anderson RPh    Continue all meds under the continuation of care with the referring provider and clinical pharmacy team.

## 2024-02-16 DIAGNOSIS — Z12.11 SPECIAL SCREENING FOR MALIGNANT NEOPLASMS, COLON: ICD-10-CM

## 2024-02-23 ENCOUNTER — PATIENT OUTREACH (OUTPATIENT)
Dept: PRIMARY CARE | Facility: CLINIC | Age: 64
End: 2024-02-23
Payer: MEDICARE

## 2024-02-23 LAB — NONINV COLON CA DNA+OCC BLD SCRN STL QL: NORMAL

## 2024-02-23 NOTE — PROGRESS NOTES
Call placed regarding one month post discharge follow up call.  At time of outreach call the patient feels as if their condition has improved.  Reports some N and D from Ozempic.  He is due to increase his dose. I encouraged him to outreach PCP in follow up.  They  deny any questions or concerns regarding  the recovery period  or follow up appointment,  They are  agreeable to continued outreach by this TCM provider.

## 2024-02-28 ENCOUNTER — LAB (OUTPATIENT)
Dept: LAB | Facility: LAB | Age: 64
End: 2024-02-28
Payer: MEDICARE

## 2024-02-28 ENCOUNTER — INFUSION (OUTPATIENT)
Dept: HEMATOLOGY/ONCOLOGY | Facility: HOSPITAL | Age: 64
End: 2024-02-28
Payer: MEDICARE

## 2024-02-28 VITALS
RESPIRATION RATE: 18 BRPM | TEMPERATURE: 98.6 F | OXYGEN SATURATION: 95 % | HEART RATE: 79 BPM | DIASTOLIC BLOOD PRESSURE: 65 MMHG | SYSTOLIC BLOOD PRESSURE: 120 MMHG

## 2024-02-28 DIAGNOSIS — D63.1 ANEMIA DUE TO STAGE 4 CHRONIC KIDNEY DISEASE (MULTI): ICD-10-CM

## 2024-02-28 DIAGNOSIS — N18.4 ANEMIA DUE TO STAGE 4 CHRONIC KIDNEY DISEASE (MULTI): ICD-10-CM

## 2024-02-28 LAB
BASOPHILS # BLD AUTO: 0.07 X10*3/UL (ref 0–0.1)
BASOPHILS NFR BLD AUTO: 1 %
EOSINOPHIL # BLD AUTO: 0.28 X10*3/UL (ref 0–0.7)
EOSINOPHIL NFR BLD AUTO: 4.1 %
ERYTHROCYTE [DISTWIDTH] IN BLOOD BY AUTOMATED COUNT: 13.6 % (ref 11.5–14.5)
HCT VFR BLD AUTO: 36.8 % (ref 41–52)
HGB BLD-MCNC: 11.9 G/DL (ref 13.5–17.5)
IMM GRANULOCYTES # BLD AUTO: 0.01 X10*3/UL (ref 0–0.7)
IMM GRANULOCYTES NFR BLD AUTO: 0.1 % (ref 0–0.9)
LYMPHOCYTES # BLD AUTO: 1.63 X10*3/UL (ref 1.2–4.8)
LYMPHOCYTES NFR BLD AUTO: 23.9 %
MCH RBC QN AUTO: 30.4 PG (ref 26–34)
MCHC RBC AUTO-ENTMCNC: 32.3 G/DL (ref 32–36)
MCV RBC AUTO: 94 FL (ref 80–100)
MONOCYTES # BLD AUTO: 0.65 X10*3/UL (ref 0.1–1)
MONOCYTES NFR BLD AUTO: 9.5 %
NEUTROPHILS # BLD AUTO: 4.17 X10*3/UL (ref 1.2–7.7)
NEUTROPHILS NFR BLD AUTO: 61.4 %
NRBC BLD-RTO: 0 /100 WBCS (ref 0–0)
PLATELET # BLD AUTO: 193 X10*3/UL (ref 150–450)
RBC # BLD AUTO: 3.91 X10*6/UL (ref 4.5–5.9)
WBC # BLD AUTO: 6.8 X10*3/UL (ref 4.4–11.3)

## 2024-02-28 PROCEDURE — 36415 COLL VENOUS BLD VENIPUNCTURE: CPT

## 2024-02-28 PROCEDURE — 85025 COMPLETE CBC W/AUTO DIFF WBC: CPT

## 2024-02-28 ASSESSMENT — PAIN SCALES - GENERAL: PAINLEVEL: 0-NO PAIN

## 2024-02-28 ASSESSMENT — PATIENT HEALTH QUESTIONNAIRE - PHQ9
1. LITTLE INTEREST OR PLEASURE IN DOING THINGS: NOT AT ALL
2. FEELING DOWN, DEPRESSED OR HOPELESS: NOT AT ALL
SUM OF ALL RESPONSES TO PHQ9 QUESTIONS 1 & 2: 0

## 2024-03-04 ENCOUNTER — PATIENT OUTREACH (OUTPATIENT)
Dept: PRIMARY CARE | Facility: CLINIC | Age: 64
End: 2024-03-04
Payer: MEDICARE

## 2024-03-07 DIAGNOSIS — E11.21 TYPE 2 DIABETES MELLITUS WITH DIABETIC NEPHROPATHY, WITHOUT LONG-TERM CURRENT USE OF INSULIN (MULTI): ICD-10-CM

## 2024-03-13 ENCOUNTER — APPOINTMENT (OUTPATIENT)
Dept: HEMATOLOGY/ONCOLOGY | Facility: HOSPITAL | Age: 64
End: 2024-03-13
Payer: MEDICARE

## 2024-03-13 ENCOUNTER — LAB (OUTPATIENT)
Dept: LAB | Facility: LAB | Age: 64
End: 2024-03-13
Payer: MEDICARE

## 2024-03-13 ENCOUNTER — TELEPHONE (OUTPATIENT)
Dept: HEMATOLOGY/ONCOLOGY | Facility: HOSPITAL | Age: 64
End: 2024-03-13

## 2024-03-13 DIAGNOSIS — D63.1 ANEMIA DUE TO STAGE 4 CHRONIC KIDNEY DISEASE (MULTI): ICD-10-CM

## 2024-03-13 DIAGNOSIS — I10 BENIGN ESSENTIAL HYPERTENSION: ICD-10-CM

## 2024-03-13 DIAGNOSIS — E78.00 PURE HYPERCHOLESTEROLEMIA: ICD-10-CM

## 2024-03-13 DIAGNOSIS — E55.9 VITAMIN D DEFICIENCY: ICD-10-CM

## 2024-03-13 DIAGNOSIS — N18.4 ANEMIA DUE TO STAGE 4 CHRONIC KIDNEY DISEASE (MULTI): ICD-10-CM

## 2024-03-13 DIAGNOSIS — I73.9 PAD (PERIPHERAL ARTERY DISEASE) (CMS-HCC): ICD-10-CM

## 2024-03-13 DIAGNOSIS — N18.32 STAGE 3B CHRONIC KIDNEY DISEASE (MULTI): ICD-10-CM

## 2024-03-13 DIAGNOSIS — N04.9 NEPHROTIC SYNDROME: ICD-10-CM

## 2024-03-13 DIAGNOSIS — I50.20 SYSTOLIC CONGESTIVE HEART FAILURE, UNSPECIFIED HF CHRONICITY (MULTI): ICD-10-CM

## 2024-03-13 DIAGNOSIS — E66.01 CLASS 3 SEVERE OBESITY DUE TO EXCESS CALORIES WITH SERIOUS COMORBIDITY IN ADULT, UNSPECIFIED BMI (MULTI): ICD-10-CM

## 2024-03-13 DIAGNOSIS — E08.00 DIABETES MELLITUS DUE TO UNDERLYING CONDITION WITH HYPEROSMOLARITY WITHOUT COMA, WITHOUT LONG-TERM CURRENT USE OF INSULIN (MULTI): ICD-10-CM

## 2024-03-13 DIAGNOSIS — E87.6 HYPOKALEMIA: ICD-10-CM

## 2024-03-13 LAB
25(OH)D3 SERPL-MCNC: 38 NG/ML (ref 30–100)
ALBUMIN SERPL BCP-MCNC: 3.8 G/DL (ref 3.4–5)
ANION GAP SERPL CALC-SCNC: 16 MMOL/L (ref 10–20)
BASOPHILS # BLD AUTO: 0.08 X10*3/UL (ref 0–0.1)
BASOPHILS NFR BLD AUTO: 1 %
BUN SERPL-MCNC: 52 MG/DL (ref 6–23)
CALCIUM SERPL-MCNC: 9.1 MG/DL (ref 8.6–10.6)
CHLORIDE SERPL-SCNC: 105 MMOL/L (ref 98–107)
CO2 SERPL-SCNC: 25 MMOL/L (ref 21–32)
CREAT SERPL-MCNC: 3.04 MG/DL (ref 0.5–1.3)
CREAT UR-MCNC: 90.4 MG/DL (ref 20–370)
EGFRCR SERPLBLD CKD-EPI 2021: 22 ML/MIN/1.73M*2
EOSINOPHIL # BLD AUTO: 0.49 X10*3/UL (ref 0–0.7)
EOSINOPHIL NFR BLD AUTO: 6.3 %
ERYTHROCYTE [DISTWIDTH] IN BLOOD BY AUTOMATED COUNT: 13.6 % (ref 11.5–14.5)
GLUCOSE SERPL-MCNC: 170 MG/DL (ref 74–99)
HCT VFR BLD AUTO: 36.7 % (ref 41–52)
HGB BLD-MCNC: 11.8 G/DL (ref 13.5–17.5)
IMM GRANULOCYTES # BLD AUTO: 0.03 X10*3/UL (ref 0–0.7)
IMM GRANULOCYTES NFR BLD AUTO: 0.4 % (ref 0–0.9)
LYMPHOCYTES # BLD AUTO: 1.7 X10*3/UL (ref 1.2–4.8)
LYMPHOCYTES NFR BLD AUTO: 21.8 %
MCH RBC QN AUTO: 29.9 PG (ref 26–34)
MCHC RBC AUTO-ENTMCNC: 32.2 G/DL (ref 32–36)
MCV RBC AUTO: 93 FL (ref 80–100)
MICROALBUMIN UR-MCNC: 422 MG/L
MICROALBUMIN/CREAT UR: 466.8 UG/MG CREAT
MONOCYTES # BLD AUTO: 0.94 X10*3/UL (ref 0.1–1)
MONOCYTES NFR BLD AUTO: 12.1 %
NEUTROPHILS # BLD AUTO: 4.56 X10*3/UL (ref 1.2–7.7)
NEUTROPHILS NFR BLD AUTO: 58.4 %
NRBC BLD-RTO: 0 /100 WBCS (ref 0–0)
PHOSPHATE SERPL-MCNC: 3.6 MG/DL (ref 2.5–4.9)
PLATELET # BLD AUTO: 207 X10*3/UL (ref 150–450)
POTASSIUM SERPL-SCNC: 4.7 MMOL/L (ref 3.5–5.3)
PTH-INTACT SERPL-MCNC: 161 PG/ML (ref 18.5–88)
RBC # BLD AUTO: 3.94 X10*6/UL (ref 4.5–5.9)
SODIUM SERPL-SCNC: 141 MMOL/L (ref 136–145)
URATE SERPL-MCNC: 7.9 MG/DL (ref 4–7.5)
WBC # BLD AUTO: 7.8 X10*3/UL (ref 4.4–11.3)

## 2024-03-13 PROCEDURE — 36415 COLL VENOUS BLD VENIPUNCTURE: CPT

## 2024-03-13 PROCEDURE — 82306 VITAMIN D 25 HYDROXY: CPT

## 2024-03-13 PROCEDURE — 85025 COMPLETE CBC W/AUTO DIFF WBC: CPT

## 2024-03-13 PROCEDURE — 82043 UR ALBUMIN QUANTITATIVE: CPT

## 2024-03-13 PROCEDURE — 82570 ASSAY OF URINE CREATININE: CPT

## 2024-03-13 PROCEDURE — 84550 ASSAY OF BLOOD/URIC ACID: CPT

## 2024-03-13 PROCEDURE — 80069 RENAL FUNCTION PANEL: CPT

## 2024-03-13 PROCEDURE — 83970 ASSAY OF PARATHORMONE: CPT

## 2024-03-13 NOTE — TELEPHONE ENCOUNTER
Insurance authorization pending for today's scheduled treatment. Appointment cancelled. Pt has a follow up with Dr. Kaplan tomorrow. Dr. Kaplan made aware of insurance status for Retacrit.

## 2024-03-14 ENCOUNTER — OFFICE VISIT (OUTPATIENT)
Dept: NEPHROLOGY | Facility: CLINIC | Age: 64
End: 2024-03-14
Payer: MEDICARE

## 2024-03-14 VITALS
RESPIRATION RATE: 16 BRPM | HEART RATE: 75 BPM | SYSTOLIC BLOOD PRESSURE: 131 MMHG | DIASTOLIC BLOOD PRESSURE: 75 MMHG | TEMPERATURE: 97.3 F | WEIGHT: 254.4 LBS | BODY MASS INDEX: 37.68 KG/M2 | HEIGHT: 69 IN | OXYGEN SATURATION: 98 %

## 2024-03-14 DIAGNOSIS — E08.00 DIABETES MELLITUS DUE TO UNDERLYING CONDITION WITH HYPEROSMOLARITY WITHOUT COMA, WITHOUT LONG-TERM CURRENT USE OF INSULIN (MULTI): ICD-10-CM

## 2024-03-14 DIAGNOSIS — J81.0 ACUTE PULMONARY EDEMA (MULTI): ICD-10-CM

## 2024-03-14 DIAGNOSIS — N18.32 ANEMIA DUE TO STAGE 3B CHRONIC KIDNEY DISEASE (MULTI): Primary | ICD-10-CM

## 2024-03-14 DIAGNOSIS — Z85.528 HISTORY OF KIDNEY CANCER: ICD-10-CM

## 2024-03-14 DIAGNOSIS — I10 BENIGN ESSENTIAL HYPERTENSION: ICD-10-CM

## 2024-03-14 DIAGNOSIS — D63.1 ANEMIA DUE TO STAGE 3B CHRONIC KIDNEY DISEASE (MULTI): Primary | ICD-10-CM

## 2024-03-14 DIAGNOSIS — D50.0 IRON DEFICIENCY ANEMIA DUE TO CHRONIC BLOOD LOSS: ICD-10-CM

## 2024-03-14 PROCEDURE — 99215 OFFICE O/P EST HI 40 MIN: CPT | Performed by: INTERNAL MEDICINE

## 2024-03-14 PROCEDURE — 3078F DIAST BP <80 MM HG: CPT | Performed by: INTERNAL MEDICINE

## 2024-03-14 PROCEDURE — 4010F ACE/ARB THERAPY RXD/TAKEN: CPT | Performed by: INTERNAL MEDICINE

## 2024-03-14 PROCEDURE — 3075F SYST BP GE 130 - 139MM HG: CPT | Performed by: INTERNAL MEDICINE

## 2024-03-14 PROCEDURE — 1036F TOBACCO NON-USER: CPT | Performed by: INTERNAL MEDICINE

## 2024-03-14 PROCEDURE — 3062F POS MACROALBUMINURIA REV: CPT | Performed by: INTERNAL MEDICINE

## 2024-03-14 NOTE — PATIENT INSTRUCTIONS
Dear FAVIO   It was nice seeing you in the nephrology clinic today     Today we discussed the following:     #Chronic kidney disease stage III baseline kidney function 40% and worsened to 34 %-recently worsening to 25 %. Most likely due to chronic kidney disease progression due to diabetes.  He had acute kidney injury in January 2024-recovered and back to baseline 20-25%.  Our target is to keep kidney function stable.  Continue Jardiance, Kerendia, and lisinopril     #Significant protein leak in the urine this is most likely due to diabetes. Improved.  Continue same medications  #Leg swelling and shortness of breath-improved      # Dizziness-improved.  Continue reduced dose Lasix 40 mg in the morning and 40 mg in the evening  -Continue to hold potassium chloride   -Continue lisinopril 40 mg, continue Kerendia 10 mg  -Continue atorvastatin 20 mg  -Continue vitamin D     #Elevated uric acid-now normal-continue allopurinol 100 mg     #High phosphorus-limit your phosphorus intake. Limit your dairy products, nuts, cereals     #Significant anemia-improved significantly.  Currently you are on Epogen shots-on hold at the moment due to improved blood count.        Limit salt intake as possible. Daily allowance of sodium should not exceed 1500 mg      6 month follow-up with repeat blood work and urinalysis prior to next visit     Please call our office if you have any question  Thank you for coming to see me today     Imani Kaplan MD, MS, WILBER KIDD  Clinical  - [employer]  Nephrologist - [facility]

## 2024-03-14 NOTE — PROGRESS NOTES
Subjective     Mr. North is a 63-year-old male with past medical history of chronic anemia, hypertension, type 2 diabetes-uncontrolled with retinopathy and neuropathy, HFpEF, remote history of Lyme disease in 2015 status post doxycycline, CKD, CharcoAid joint who is coming to see me today for CKD/volume management follow-up    Last office visit December 2023.  We started Kerendia and Jardiance for diabetic nephropathy management.  In the interim, he had hospital admission in January 2024 with bradycardia that thought to be secondary to blood pressure medications.  Today, Mr. North came with his daughter-in-law.  He feels well.  Leg swelling improved significantly.  No dizziness.  Most of his medications that were held during last hospital admission were reintroduced again with no issues.  Blood pressure accepted today.  He is losing weight intentionally.  Repeat blood work showed stable serum creatinine 3 and GFR 22 consistent with his new baseline.  No continued complaints or concerns today    Prior notes    Last office visit August 2023.  We dropped Lasix due to dizziness-significantly improved.  We continued Farxiga 10 mg and lisinopril 40 mg.  Today, Mr. North came alone.  Improved leg swelling.  No kidney related complaints or concerns.  He missed diabetes medication for few weeks-hemoglobin A1c is worsening 8.7.  He did not do blood work before the visit as instructed-we will get it done.  He continues to get Epogen shots for anemia-improved.  We discussed starting finerenone today for diabetic nephropathy management and discussed risk for hyperkalemia      Prior notes  Last office visit April 2023. Was started Farxiga 10 mg for volume/hypertension/CKD management. Was started allopurinol 100 mg for hyperuricemia. Today, Mr. North reports feeling well. He started feeling dizzy when he stands up after starting Farxiga. I advised to go down on furosemide dose for possible volume depletion. He had blood work done  last week that showed slightly worsening serum creatinine 3 from 2.1 and GFR down to 23 from 34 from 40 prior. Possibly due to hemodynamic injury. Uric acid is now normal after starting allopurinol. Leg swelling improved. No shortness of breath. He does not see hematology anymore in [facility] shots have been on hold. Repeat CBC shows worsening hemoglobin 9.8. He requested us to take care of his anemia management as part of CKD management-we will do     Per prior notes     Last office visit February 2023. Significant hypervolemia with nephrotic syndrome. We obtained kidney biopsy-came back with insufficient tissue not able to get much information. We uptitrated diuretics. We uptitrated her ACE inhibitors. In the interim, Mr. North reports marked improvement in leg swelling and shortness of breath. He is adherent to low-salt diet. Today he came with his stepdaughter. He is aware of the insufficient sample of kidney biopsy-mutual decision not to repeat kidney biopsy at this time. He had blood work done recently and results were discussed with him in details including worsening CKD and within normal electrolytes. Improved albuminuria and proteinuria-remains to be significant but much better from prior. Blood pressure is accepted     Her prior notes         Patient has a past medical history of T2DM, Lyme disease, HTN, CHF, FILOMENA, Neuronitis, CKD, and prostate CA. 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 He denies a family history of kidney problems, but does endorse a family history of heart attack and diabetes. He was hospitalized in February 2022 for foot pain. He was placed on Vancomycin and Zosyn for a foot infection. He states that there is foam in his urine and has some eye swelling in the mornings. He denies NSAID use and only uses Tylenol for pain. He was diagnosed with Lyme disease in November 2015 and was on Doxycycline. Bloodwork was obtained and reviewed. GFR was 43 and Cr was 1.76. UA in October 2022  "showed +3 protein. UA micro revealed RBCs. Alb/Cr ratio in October was significantly elevated. Since January of 2022, he has gained roughly 20 pounds. A kidney US done in February 2022 revealed large, but unremarkable kidneys. Discussed repeating bloodwork after today's visit for most up to date labs. We will increase Lasix to 80 mg Am and 40 mg PM. We will increase potassium chloride as a result of the increased Lasix. Continue Lisinopril 40 mg daily - beneficial for protein leak. Continue Atorvastatin. We discussed need for a kidney biopsy for further testing, and Plavix will need to be stopped 5 days prior to biopsy. Follow-up in one month with repeat bloodwork before the visit. If you become anuric, swelling increases, or you become SOB go to the Emergency Room immediately. Educated on a low salt diet with fresh fruits and vegetables.            Objective   /75 (BP Location: Left arm, Patient Position: Standing, BP Cuff Size: Adult)   Pulse 75   Temp 36.3 °C (97.3 °F)   Resp 16   Ht 1.753 m (5' 9\")   Wt 115 kg (254 lb 6.4 oz)   SpO2 98%   BMI 37.57 kg/m²   Wt Readings from Last 3 Encounters:   03/14/24 115 kg (254 lb 6.4 oz)   02/07/24 119 kg (262 lb 12.8 oz)   01/31/24 118 kg (260 lb 12.9 oz)       Physical Exam    General appearance: no distress awake and alert on room air, euvolemic on exam  Eyes: non-icteric  HEENT: atrumatic head, PEERLA, moist mucosa  Skin: no apparent rash  Heart: NSR, S1, S2 normal, no murmur or gallop  Lungs: Symmetrical expansion,CTA bilat no wheezing/crackles  Abdomen: soft, nt/nd, obese  Extremities: no significant edema bilat, Charcot boot is applied to the left lower extremity  Neuro: No FND,asterixis, no focal deficits noticed        Review of Systems     Constitutional: no fever, no chills, no recent weight gain and no recent weight loss.   Eyes: no blurred vision and no diplopia.   ENT: no hearing loss, no earache, no sore throat, no swollen glands in the neck and " "no nasal discharge.   Cardiovascular: no chest pain, no palpitations and no lower extremity edema.   Respiratory: no shortness of breath, no chronic cough and no shortness of breath during exertion.   Gastrointestinal: no abdominal pain, no constipation, no heartburn, no vomiting, no bloody stools and no change in bowel movements.   Genitourinary: no dysuria and no hematuria.   Musculoskeletal: no arthralgias and no myalgias.   Skin: no rashes and no skin lesions.   Neurological: no headaches and no dizziness.   Psychiatric: no confusion, no depression and no anxiety.   Endocrine: no heat intolerance, no cold intolerance, appetite not increased, no thyroid disorder, no increased urinary frequency and no dry skin.   Hematologic/Lymphatic: does not bleed easily and does not bruise easily.   All other systems have been reviewed and are negative for complaint.         Data Review        Results from last 7 days   Lab Units 03/13/24  1035   WBC AUTO x10*3/uL 7.8   HEMOGLOBIN g/dL 11.8*   HEMATOCRIT % 36.7*   PLATELETS AUTO x10*3/uL 207          Results from last 7 days   Lab Units 03/13/24  1035   SODIUM mmol/L 141   POTASSIUM mmol/L 4.7   CHLORIDE mmol/L 105   CO2 mmol/L 25   BUN mg/dL 52*   CREATININE mg/dL 3.04*   EGFR mL/min/1.73m*2 22*   GLUCOSE mg/dL 170*   CALCIUM mg/dL 9.1   PHOSPHORUS mg/dL 3.6       Lab Results   Component Value Date    URICACID 7.9 (H) 03/13/2024       No components found for: \"[id number]\"      Lab Results   Component Value Date    HGBA1C 8.7 (A) 09/19/2023         Lab Results   Component Value Date    CALCIUM 9.1 03/13/2024    PHOS 3.6 03/13/2024         No results found for requested labs within last 365 days.        Results from last 7 days   Lab Units 03/13/24  1035   SODIUM mmol/L 141   POTASSIUM mmol/L 4.7   CHLORIDE mmol/L 105   CO2 mmol/L 25   BUN mg/dL 52*   GLUCOSE mg/dL 170*   CALCIUM mg/dL 9.1   ANION GAP mmol/L 16   EGFR mL/min/1.73m*2 22*             Albumin/Creatine Ratio " "  Date Value Ref Range Status   03/13/2024 466.8 (H) <30.0 ug/mg Creat Final   12/05/2023 700.4 (H) <30.0 ug/mg Creat Final   09/13/2023 2,284.6 (H) 0.0 - 30.0 ug/mg crt Final       Recent Labs     03/13/24  1035 02/03/24  0903 02/02/24  0916 02/01/24 2023 02/01/24  0605 01/31/24  1603 01/31/24  1341 12/20/23  1023 12/07/23  1325 12/07/23  1325 09/13/23  0959    139 144 137 139  --  134* 134*   < > 140 136   K 4.7 4.7 5.1 5.5* 5.9* 5.5* 6.8* 4.8   < > 4.8 4.1   CO2 25 21 18* 15* 13*  --  15* 23   < > 21 26   BUN 52* 72* 95* 108* 118*  --  141* 74*   < > 60* 42*   GLUCOSE 170* 156* 153* 204* 165*  --  222* 194*   < > 236* 273*   CALCIUM 9.1 8.6 8.4* 8.2* 8.2*  --  8.5* 8.7   < > 8.6 8.8   PHOS 3.6 4.7 6.3* 6.9*  --   --   --  5.0*  --  4.6 4.0   CREATININE 3.04* 2.63* 3.05* 3.48* 3.80*  --  4.46* 3.11*   < > 2.63* 2.22*   EGFR 22* 26* 22* 19* 17*  --  14* 22*   < > 26*  --    ANIONGAP 16 13 15 17 20  --  16 12   < > 16 10    < > = values in this interval not displayed.              Current Outpatient Medications:     allopurinol (Zyloprim) 100 mg tablet, Take 1 tablet (100 mg) by mouth once daily., Disp: 90 tablet, Rfl: 3    atorvastatin (Lipitor) 20 mg tablet, Take 1 tablet (20 mg) by mouth once daily at bedtime., Disp: 90 tablet, Rfl: 3    blood sugar diagnostic (ReliOn Prime Test Strips) strip, TEST 3 TIMES DAILY., Disp: , Rfl:     CareFine Pen Needle 32 gauge x 3/16\" needle, USE 1 PEN NEEDLE TO INJECT INSULIN 3 TIMES A DAY BEFORE MEALS AND AT BEDTIME., Disp: 100 each, Rfl: 3    carvedilol (Coreg) 6.25 mg tablet, Take 1 tablet (6.25 mg) by mouth 2 times a day with meals., Disp: 30 tablet, Rfl: 3    cholecalciferol (Vitamin D-3) 1,250 mcg (50,000 unit) capsule, Take 1 capsule (50,000 Units) by mouth 1 (one) time per week., Disp: , Rfl:     clopidogrel (Plavix) 75 mg tablet, Take 1 tablet (75 mg) by mouth once daily., Disp: 90 tablet, Rfl: 3    empagliflozin (Jardiance) 10 mg, Take 1 tablet (10 mg) by mouth " once daily in the morning., Disp: 30 tablet, Rfl: 1    epoetin mckenna-epbx (Retacrit) 40,000 unit/mL injection, Inject 1 mL (40,000 Units) under the skin every 30 (thirty) days., Disp: , Rfl:     ergocalciferol (Vitamin D-2) 1.25 MG (26260 UT) capsule, Take 1 capsule (1,250 mcg) by mouth once a week., Disp: , Rfl:     finerenone (Kerendia) 10 mg tablet tablet, Take 1 tablet (10 mg) by mouth once daily., Disp: 90 tablet, Rfl: 3    folic acid (Folvite) 1 mg tablet, Take 5 tablets (5 mg) by mouth once daily., Disp: 450 tablet, Rfl: 3    furosemide (Lasix) 40 mg tablet, 1 tablet (40 mg)., Disp: , Rfl:     HumaLOG KwikPen Insulin 100 unit/mL injection, Inject 8 Units under the skin 3 times a day with meals. 8 units before meals, Disp: 15 each, Rfl: 3    insulin glargine (Lantus Solostar U-100 Insulin) 100 unit/mL (3 mL) pen, Inject 20 Units under the skin once daily at bedtime., Disp: 18 mL, Rfl: 0    lisinopril 40 mg tablet, Take 1 tablet (40 mg) by mouth once daily in the morning., Disp: 90 tablet, Rfl: 3    MAGNESIUM ORAL, Take 1 tablet by mouth once daily., Disp: , Rfl:     semaglutide 0.25 mg or 0.5 mg (2 mg/3 mL) pen injector, Inject 0.5 mg under the skin 1 (one) time per week. (Note increased dose as anticipated with clinical pharmacy team), Disp: 9 mL, Rfl: 0    sodium,potassium,mag sulfates (Suprep) 17.5-3.13-1.6 gram recon soln solution, Take 1 bottle by mouth if needed (take 1 bottle at 6pm night before procedure and take one bottle at 4am the day of procedure)., Disp: 2 each, Rfl: 0    Bydureon BCise 2 mg/0.85 mL auto-injector, INJECT 0.85 ML SUBCUTANEOUSLY ONCE WEEKLY. (Patient not taking: Reported on 3/14/2024), Disp: 13.6 mL, Rfl: 0    Farxiga 10 mg, Take 1 tablet (10 mg) by mouth once daily., Disp: , Rfl:      Assessment and Plan     Mr. North is a 63-year-old male with past medical history of chronic anemia, hypertension, type 2 diabetes-uncontrolled with retinopathy and neuropathy, HFpEF, remote history  of Lyme disease in 2015 status post doxycycline, CKD, CharcoAid joint who is coming to see me today for CKD/volume management follow-up     Impression  #Chronic kidney disease stage IIIb-4/A3-most likely diabetic nephropathy  -New baseline serum creatinine 3-GFR 20-25 mill per minute 1.73 m²  -Recurrent admissions with acute kidney injury    Information from prior visits  -Patient had normal kidney function up until January 2022. He had hospital admission in February 2022 with infected foot received vancomycin and Zosyn (elevated trough levels of vancomycin noticed). During the admission he developed acute kidney injury with a serum creatinine up to 1.7-2, GFR 35-40. Serum creatinine has been stable since then with no improvement. He had another admission in April with respiratory issues.  -Urine dipstick earlier was significant albuminuria. Prior spot test albumin creatinine shows above limit of the lab concerning for nephrotic range proteinuria/nephrotic syndrome. He gained about 30 pounds in the last year  -He got nephrology evaluation in February 2022 that revealed negative basic GN work-up (C3, C4, syphilis, hep C, hep B, HIV, SPEP, UPEP). More work-up came back negative for antiplat 2R antibodies, antihistone. Attempted kidney biopsy in February 2023-insufficient tissue sampling. [place] decision to hold off repeat kidney biopsy at this time  -Chronic kidney disease and nephrotic range proteinuria-most likely related to diabetic nephropathy        # Significantly improved albuminuria from outside the lab limit then 2.7 g/g then most recent spot test albumin creatinine ratio 0.4 g/g  -Continue finerenone and Jardiance  -We will continue conservative measures. We will continue maximum tolerated dose of RAAS inhibitor.      #Lyme disease 2015-status post doxycycline. Less concerns about Lyme disease induced kidney injury     ##Hypertension-current medication carvedilol 25 mg twice daily, amlodipine 5 mg,  lisinopril 40 mg, Lasix 40 mg in the morning, 40 mg in the p.m (reduced due to dizziness-improved).  We are holding potassium chloride-currently potassium is normal.  Hydralazine 50 mg 3 times daily were held during the last hospital admission-no need to reintroduce     #Chronic anemia- currently getting Retacrit infusion.  On hold due to improved hemoglobin 11.8     #Peripheral artery disease status post stent-on Plavix.      #Type 2 diabetes-uncontrolled with neuropathy and retinopathy.  Continue SGL 2 inhibitors for GFR preservation     #Significant hyperuricemia uric acid 9.3-now improved 4-1-unghhtbs allopurinol 100 mg     #CKD-MBD-secondary hyperparathyroidism, within normal phosphorus    Follow-up in 6 months with repeat blood work and urinalysis prior to next visit        Imani Kaplan MD, MS, WILBER KIDD  Clinical  - [facility] [employer]  Nephrologist - [facility]

## 2024-03-20 ENCOUNTER — TELEMEDICINE (OUTPATIENT)
Dept: PHARMACY | Facility: HOSPITAL | Age: 64
End: 2024-03-20
Payer: MEDICARE

## 2024-03-20 ENCOUNTER — OFFICE VISIT (OUTPATIENT)
Dept: CARDIOLOGY | Facility: HOSPITAL | Age: 64
End: 2024-03-20
Payer: MEDICARE

## 2024-03-20 VITALS
OXYGEN SATURATION: 96 % | BODY MASS INDEX: 37.4 KG/M2 | SYSTOLIC BLOOD PRESSURE: 143 MMHG | HEART RATE: 91 BPM | DIASTOLIC BLOOD PRESSURE: 89 MMHG | WEIGHT: 253.26 LBS

## 2024-03-20 DIAGNOSIS — E11.9 TYPE 2 DIABETES MELLITUS WITH INSULIN THERAPY (MULTI): Primary | ICD-10-CM

## 2024-03-20 DIAGNOSIS — I73.9 PAD (PERIPHERAL ARTERY DISEASE) (CMS-HCC): ICD-10-CM

## 2024-03-20 DIAGNOSIS — R00.1 BRADYCARDIA: ICD-10-CM

## 2024-03-20 DIAGNOSIS — E78.5 DYSLIPIDEMIA: ICD-10-CM

## 2024-03-20 DIAGNOSIS — I10 ESSENTIAL HYPERTENSION: Primary | ICD-10-CM

## 2024-03-20 DIAGNOSIS — Z79.4 TYPE 2 DIABETES MELLITUS WITH INSULIN THERAPY (MULTI): Primary | ICD-10-CM

## 2024-03-20 PROCEDURE — 99214 OFFICE O/P EST MOD 30 MIN: CPT | Performed by: INTERNAL MEDICINE

## 2024-03-20 PROCEDURE — 3077F SYST BP >= 140 MM HG: CPT | Performed by: INTERNAL MEDICINE

## 2024-03-20 PROCEDURE — 4010F ACE/ARB THERAPY RXD/TAKEN: CPT | Performed by: INTERNAL MEDICINE

## 2024-03-20 PROCEDURE — 1036F TOBACCO NON-USER: CPT | Performed by: INTERNAL MEDICINE

## 2024-03-20 PROCEDURE — 3079F DIAST BP 80-89 MM HG: CPT | Performed by: INTERNAL MEDICINE

## 2024-03-20 PROCEDURE — 3062F POS MACROALBUMINURIA REV: CPT | Performed by: INTERNAL MEDICINE

## 2024-03-20 ASSESSMENT — ENCOUNTER SYMPTOMS
BLURRED VISION: 0
FATIGUE: 0
POLYDIPSIA: 1
WEAKNESS: 0
VISUAL CHANGE: 0
POLYPHAGIA: 0

## 2024-03-20 NOTE — PROGRESS NOTES
Chief Complaint:   Hospital Follow-up     History Of Present Illness:    Gregorio North is a 63 y.o. male presenting for follow-up.  He was admitted in February with STEVE and hypotension and bradycardia--he had his amlodipine and hydralazine discontinued and his Coreg decreased from 25 mg twice daily to 6.25 mg twice daily.  He is doing much better at this time since then.  Denies any chest pain, shortness of breath, dizziness, palpitations.  Compliant with medications.     Last Recorded Vitals:  Vitals:    03/20/24 1012   BP: 143/89   Pulse: 91   SpO2: 96%   Weight: 115 kg (253 lb 4.2 oz)       Past Medical History:  He has a past medical history of Acute diastolic heart failure (CMS/[facility]) (11/23/2022), STEVE (acute kidney injury) (CMS/[facility]) (09/18/2023), COVID-19 virus infection (09/18/2023), Cutaneous abscess of back (any part, except buttock) (01/25/2021), Deficiency of other specified B group vitamins (06/09/2021), Diabetic foot ulcer (CMS/[facility]) (05/06/2022), Encounter for general adult medical examination without abnormal findings (05/19/2020), Encounter for screening for malignant neoplasm of colon (02/21/2019), Encounter for screening for malignant neoplasm of prostate (08/12/2021), Focal chorioretinal inflammation, macular or paramacular, 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), Focal chorioretinal inflammation, macular or paramacular, left eye (11/05/2014), 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), History of foot operation (09/18/2023), Ischemic optic neuropathy, 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), Localized edema (01/11/2021), Other muscle spasm (04/24/2017), Other specified abnormal immunological findings in serum (09/17/2014), Other specified cataract (01/27/2016), Other specified health status, Other specified postprocedural states (06/18/2015), Other specified retinal disorders (01/27/2016), Other symptoms and signs involving the musculoskeletal system (04/24/2017), Pain in right shoulder (04/24/2017), Personal  history of diseases of the skin and subcutaneous tissue (05/25/2021), Personal history of other diseases of male genital organs (01/11/2021), Personal history of other diseases of the circulatory system (09/03/2014), Personal history of other diseases of the musculoskeletal system and connective tissue (11/21/2016), Personal history of other diseases of the musculoskeletal system and connective tissue (11/21/2016), Personal history of other diseases of the nervous system and sense organs (09/08/2014), Personal history of other diseases of the nervous system and sense organs (01/27/2016), Personal history of other diseases of the nervous system and sense organs (09/08/2014), Personal history of other diseases of the nervous system and sense organs (08/22/2016), Personal history of other endocrine, nutritional and metabolic disease (01/11/2021), Personal history of other specified conditions, Personal history of other specified conditions (09/08/2014), Personal history of other specified conditions (08/12/2021), Personal history of other specified conditions (08/09/2018), Personal history of other specified conditions (08/09/2018), Respiratory failure (CMS/HCC) (10/10/2023), Sebaceous cyst (11/20/2018), Unspecified cataract (01/27/2016), and Unspecified visual loss (02/18/2022).    Past Surgical History:  He has a past surgical history that includes Foot surgery (03/31/2016); Other surgical history (02/04/2015); MR angio head wo IV contrast (9/17/2014); and CT guided percutaneous biopsy bone deep (8/29/2022).      Social History:  He reports that he has never smoked. He has never used smokeless tobacco. He reports that he does not currently use alcohol. He reports that he does not use drugs.    Family History:  Family History   Problem Relation Name Age of Onset    No Known Problems Mother      Heart attack Father      Diabetes Father          Allergies:  Patient has no known allergies.    Outpatient  "Medications:  Current Outpatient Medications   Medication Instructions    allopurinol (ZYLOPRIM) 100 mg, oral, Daily    atorvastatin (LIPITOR) 20 mg, oral, Nightly    blood sugar diagnostic (ReliOn Prime Test Strips) strip TEST 3 TIMES DAILY.    Bydureon BCise 2 mg/0.85 mL auto-injector INJECT 0.85 ML SUBCUTANEOUSLY ONCE WEEKLY.    CareFine Pen Needle 32 gauge x 3/16\" needle USE 1 PEN NEEDLE TO INJECT INSULIN 3 TIMES A DAY BEFORE MEALS AND AT BEDTIME.    carvedilol (COREG) 6.25 mg, oral, 2 times daily with meals    cholecalciferol (VITAMIN D-3) 50,000 Units, oral, Weekly    clopidogrel (PLAVIX) 75 mg, oral, Daily    empagliflozin (JARDIANCE) 10 mg, oral, Every morning    ergocalciferol (Vitamin D-2) 1.25 MG (57889 UT) capsule 1 capsule, oral, Weekly    Farxiga 10 mg, oral, Daily    finerenone (KERENDIA) 10 mg, oral, Daily    folic acid (FOLVITE) 5 mg, oral, Daily    furosemide (LASIX) 40 mg    HumaLOG KwikPen Insulin 8 Units, subcutaneous, 3 times daily with meals, 8 units before meals    insulin glargine (LANTUS SOLOSTAR U-100 INSULIN) 20 Units, subcutaneous, Nightly    lisinopril 40 mg, oral, Every morning    MAGNESIUM ORAL 1 tablet, oral, Daily    Retacrit 40,000 Units, subcutaneous, Every 30 days    semaglutide 0.5 mg, subcutaneous, Weekly, (Note increased dose as anticipated with clinical pharmacy team)    sodium,potassium,mag sulfates (Suprep) 17.5-3.13-1.6 gram recon soln solution 1 bottle, oral, As needed       Physical Exam:  Constitutional:       Appearance: Healthy appearance. Not in distress.   Neck:      Vascular: No JVR. JVD normal.   Pulmonary:      Effort: Pulmonary effort is normal.      Breath sounds: Normal breath sounds. No wheezing. No rhonchi. No rales.   Chest:      Chest wall: Not tender to palpatation.   Cardiovascular:      Normal rate. Regular rhythm.      Murmurs: There is no murmur.   Edema:     Peripheral edema absent.   Abdominal:      General: Bowel sounds are normal.      Palpations: " Abdomen is soft.      Tenderness: There is no abdominal tenderness.   Musculoskeletal: Normal range of motion. Skin:     General: Skin is warm and dry.   Neurological:      General: No focal deficit present.      Mental Status: Alert and oriented to person, place and time.           Last Labs:  CBC -  Lab Results   Component Value Date    WBC 7.8 03/13/2024    HGB 11.8 (L) 03/13/2024    HCT 36.7 (L) 03/13/2024    MCV 93 03/13/2024     03/13/2024       CMP -  Lab Results   Component Value Date    CALCIUM 9.1 03/13/2024    PHOS 3.6 03/13/2024    PROT 6.9 01/31/2024    ALBUMIN 3.8 03/13/2024    AST 17 01/31/2024    ALT 29 01/31/2024    ALKPHOS 65 01/31/2024    BILITOT 0.5 01/31/2024       LIPID PANEL -   Lab Results   Component Value Date    CHOL 133 02/09/2023    TRIG 147 02/09/2023    HDL 43.6 02/09/2023    CHHDL 3.1 02/09/2023    LDLF 60 02/09/2023    VLDL 29 02/09/2023    NHDL 160 05/04/2018       RENAL FUNCTION PANEL -   Lab Results   Component Value Date    GLUCOSE 170 (H) 03/13/2024     03/13/2024    K 4.7 03/13/2024     03/13/2024    CO2 25 03/13/2024    ANIONGAP 16 03/13/2024    BUN 52 (H) 03/13/2024    CREATININE 3.04 (H) 03/13/2024    GFRMALE 32 (A) 09/13/2023    CALCIUM 9.1 03/13/2024    PHOS 3.6 03/13/2024    ALBUMIN 3.8 03/13/2024        Lab Results   Component Value Date    BNP 73 01/31/2024    HGBA1C 8.7 (A) 09/19/2023       Last Cardiology Tests:  ECG independently reviewed from January 31: Sinus bradycardia, rate 50    ECHO: 5/6/2022  1. The left ventricular systolic function is normal with a 55-60% estimated ejection fraction.  2. Spectral Doppler shows an abnormal pattern of left ventricular diastolic filling.  3. There is mild mitral and tricuspid regurgitation.    Assessment/Plan   Very pleasant 63 year old male with a history of diabetes, PAD, obesity, kidney disease, chronic anemia, HTN, HLD and diastolic dysfunction.    He was admitted in February with STEVE secondary to  hypotension and bradycardia.  He had his amlodipine and hydralazine stopped at that time and his carvedilol decreased from 25 mg twice daily to 6.25 mg twice daily and is feeling much better at this time.     Plan:  -Continue current atorvastatin, carvedilol, clopidogrel, Lasix, lisinopril  -Follow-up with us in 1 year or sooner if needed      Andrae Campbell MD

## 2024-03-20 NOTE — ASSESSMENT & PLAN NOTE
Patients diabetes is improved with most recent A1c of 8.7% (Goal < 7%). Assessment: Patient blood sugars elevated in the 180-200 range throughout the entire day, regardless of meals. Due to this, increasing this patient's Lantus would be the most appropriate intervention until such time that Ozempic can be titrated.   Initiate:   Lantus: Increase to 22u at bedtime starting 3/20  Continue:   Semaglutide 0.5 mg Qweek on Wednesday  Patient picked up 90 day supply within the past week, so will reassess for dose titration once completed.  Humalog 8u TID with meals  Jardiance 10 mg Qam  Compliance at present is estimated to be excellent. Efforts to improve compliance (if necessary) will be directed at regular blood sugar monitorin times daily.  Education Provided to Patient: Counseled patient on signs and symptoms of hypoglycemia and appropriate corrective measures, counseled patient on dose adjustments   Advised patient about [facility] PAP. Patient may qualify. PAP information sent via email. Awaiting proof of income.  Follow-up: Follow up on 3/27  PCP Follow-up: 3/27

## 2024-03-20 NOTE — PROGRESS NOTES
Subjective   Patient ID: Gregorio North is a 63 y.o. male who presents for Diabetes.    Referring Provider: Latisha Vines PA-C     Diabetes  He presents for his follow-up diabetic visit. He has type 2 diabetes mellitus. The initial diagnosis of diabetes was made 9 years ago. His disease course has been improving. There are no hypoglycemic associated symptoms. Associated symptoms include polydipsia and polyuria. Pertinent negatives for diabetes include no blurred vision, no chest pain, no fatigue, no foot paresthesias, no foot ulcerations, no polyphagia, no visual change and no weakness. There are no hypoglycemic complications. Symptoms are improving. Diabetic complications include nephropathy. Pertinent negatives for diabetic complications include no autonomic neuropathy, CVA, heart disease, impotence, peripheral neuropathy, PVD or retinopathy. Risk factors for coronary artery disease include diabetes mellitus, hypertension, male sex, obesity, dyslipidemia and tobacco exposure. Current diabetic treatment includes oral agent (monotherapy) and insulin injections (and Ozempic 0.25mg weekly injection). He is compliant with treatment all of the time (Only non-compliant when medications on backorder). His weight is decreasing rapidly. He is following a low salt, generally healthy and high fiber diet. Meal planning includes avoidance of concentrated sweets and carbohydrate counting. He has had a previous visit with a dietitian. He participates in exercise daily. His home blood glucose trend is fluctuating minimally. His breakfast blood glucose range is generally 180-200 mg/dl. His lunch blood glucose range is generally 180-200 mg/dl. An ACE inhibitor/angiotensin II receptor blocker is being taken. He sees a podiatrist.Eye exam is not current (Appointment scheduled for next month).       Review of Systems   Constitutional:  Negative for fatigue.   Eyes:  Negative for blurred vision.   Cardiovascular:  Negative for chest  "pain.   Endocrine: Positive for polydipsia and polyuria. Negative for polyphagia.   Genitourinary:  Negative for impotence.   Neurological:  Negative for weakness.       Medication System Management:  Affordability/Accessibility: Not addressed this visit  Adherence/Organization: Not addressed this visit  Adverse Effects: None    Objective     There were no vitals taken for this visit.     Labs  Lab Results   Component Value Date    BILITOT 0.5 01/31/2024    CALCIUM 9.1 03/13/2024    CO2 25 03/13/2024     03/13/2024    CREATININE 3.04 (H) 03/13/2024    GLUCOSE 170 (H) 03/13/2024    ALKPHOS 65 01/31/2024    K 4.7 03/13/2024    PROT 6.9 01/31/2024     03/13/2024    AST 17 01/31/2024    ALT 29 01/31/2024    BUN 52 (H) 03/13/2024    ANIONGAP 16 03/13/2024    MG 3.05 (H) 01/31/2024    PHOS 3.6 03/13/2024     09/08/2022    ALBUMIN 3.8 03/13/2024    GFRMALE 32 (A) 09/13/2023     Lab Results   Component Value Date    TRIG 147 02/09/2023    CHOL 133 02/09/2023    HDL 43.6 02/09/2023     Lab Results   Component Value Date    HGBA1C 8.7 (A) 09/19/2023       Current Outpatient Medications on File Prior to Visit   Medication Sig Dispense Refill    allopurinol (Zyloprim) 100 mg tablet Take 1 tablet (100 mg) by mouth once daily. 90 tablet 3    atorvastatin (Lipitor) 20 mg tablet Take 1 tablet (20 mg) by mouth once daily at bedtime. 90 tablet 3    blood sugar diagnostic (ReliOn Prime Test Strips) strip TEST 3 TIMES DAILY.      CareFine Pen Needle 32 gauge x 3/16\" needle USE 1 PEN NEEDLE TO INJECT INSULIN 3 TIMES A DAY BEFORE MEALS AND AT BEDTIME. 100 each 3    carvedilol (Coreg) 6.25 mg tablet Take 1 tablet (6.25 mg) by mouth 2 times a day with meals. 30 tablet 3    cholecalciferol (Vitamin D-3) 125 MCG (5000 UT) capsule Take 1 capsule (125 mcg) by mouth once daily.      clopidogrel (Plavix) 75 mg tablet Take 1 tablet (75 mg) by mouth once daily. 90 tablet 3    empagliflozin (Jardiance) 10 mg Take 1 tablet (10 mg) " by mouth once daily in the morning. 30 tablet 1    epoetin mckenna-epbx (Retacrit) 40,000 unit/mL injection Inject 1 mL (40,000 Units) under the skin every 30 (thirty) days.      finerenone (Kerendia) 10 mg tablet tablet Take 1 tablet (10 mg) by mouth once daily. 90 tablet 3    folic acid (Folvite) 1 mg tablet Take 5 tablets (5 mg) by mouth once daily. 450 tablet 3    furosemide (Lasix) 40 mg tablet Take 1 tablet (40 mg) by mouth 2 times a day.      HumaLOG KwikPen Insulin 100 unit/mL injection Inject 8 Units under the skin 3 times a day with meals. 8 units before meals 15 each 3    insulin glargine (Lantus Solostar U-100 Insulin) 100 unit/mL (3 mL) pen Inject 20 Units under the skin once daily at bedtime. 18 mL 0    lisinopril 40 mg tablet Take 1 tablet (40 mg) by mouth once daily in the morning. 90 tablet 3    MAGNESIUM GLYCINATE ORAL Take 420 mg by mouth once daily.      semaglutide 0.25 mg or 0.5 mg (2 mg/3 mL) pen injector Inject 0.5 mg under the skin 1 (one) time per week. (Note increased dose as anticipated with clinical pharmacy team) 9 mL 0    sodium,potassium,mag sulfates (Suprep) 17.5-3.13-1.6 gram recon soln solution Take 1 bottle by mouth if needed (take 1 bottle at 6pm night before procedure and take one bottle at 4am the day of procedure). 2 each 0    [DISCONTINUED] Bydureon BCise 2 mg/0.85 mL auto-injector INJECT 0.85 ML SUBCUTANEOUSLY ONCE WEEKLY. (Patient not taking: Reported on 3/20/2024) 13.6 mL 0    [DISCONTINUED] ergocalciferol (Vitamin D-2) 1.25 MG (97463 UT) capsule Take 1 capsule (1,250 mcg) by mouth once a week.      [DISCONTINUED] Farxiga 10 mg Take 1 tablet (10 mg) by mouth once daily.      [DISCONTINUED] MAGNESIUM ORAL Take 1 tablet by mouth once daily.       No current facility-administered medications on file prior to visit.     Pharmacotherapy Changes  Added  Cholecalciferol 5000u capsule every day  Magnesium Glycinate 420 mg QD  Updated  Furosemide 40 mg BID  Removed  Bydureon:  Formulary change to Ozempic  Ergocalciferol: No longer taking  Farxiga: Formulary change to Jardiance  Magnesium oral: Specified formulation  Cholecalciferol 38049h: Therapy completed, dose change    Affordability  Ozempic: $512/3 month supply  Jardiance: $42/month  Kerendia: $42/month  Insulin    Assessment/Plan   Problem List Items Addressed This Visit             ICD-10-CM    Type 2 diabetes mellitus with insulin therapy (CMS/[facility]) - Primary E11.9, Z79.4     Patients diabetes is improved with most recent A1c of 8.7% (Goal < 7%). Assessment: Patient blood sugars elevated in the 180-200 range throughout the entire day, regardless of meals. Due to this, increasing this patient's Lantus would be the most appropriate intervention until such time that Ozempic can be titrated.   Initiate:   Lantus: Increase to 22u at bedtime starting 3/20  Continue:   Semaglutide 0.5 mg Qweek on Wednesday  Patient picked up 90 day supply within the past week, so will reassess for dose titration once completed.  Humalog 8u TID with meals  Jardiance 10 mg Qam  Compliance at present is estimated to be excellent. Efforts to improve compliance (if necessary) will be directed at regular blood sugar monitorin times daily.  Education Provided to Patient: Counseled patient on signs and symptoms of hypoglycemia and appropriate corrective measures, counseled patient on dose adjustments   Advised patient about [facility] PAP. Patient may qualify. PAP information sent via email. Awaiting proof of income.  Follow-up: Follow up on 3/27  PCP Follow-up: 3/27            Amy Whalen, PharmD, [place]    Continue all meds under the continuation of care with the referring provider and clinical pharmacy team.

## 2024-03-21 ENCOUNTER — APPOINTMENT (OUTPATIENT)
Dept: PRIMARY CARE | Facility: CLINIC | Age: 64
End: 2024-03-21
Payer: MEDICARE

## 2024-03-21 NOTE — PROGRESS NOTES
I reviewed the progress note and agree with the resident’s findings and plans as written. Case discussed with resident.    Nallely Bryan, PharmD

## 2024-03-27 ENCOUNTER — APPOINTMENT (OUTPATIENT)
Dept: HEMATOLOGY/ONCOLOGY | Facility: HOSPITAL | Age: 64
End: 2024-03-27
Payer: MEDICARE

## 2024-03-27 ENCOUNTER — OFFICE VISIT (OUTPATIENT)
Dept: PRIMARY CARE | Facility: CLINIC | Age: 64
End: 2024-03-27
Payer: MEDICARE

## 2024-03-27 ENCOUNTER — TELEMEDICINE (OUTPATIENT)
Dept: PHARMACY | Facility: HOSPITAL | Age: 64
End: 2024-03-27
Payer: MEDICARE

## 2024-03-27 VITALS
TEMPERATURE: 97 F | OXYGEN SATURATION: 97 % | HEIGHT: 69 IN | DIASTOLIC BLOOD PRESSURE: 71 MMHG | BODY MASS INDEX: 37.98 KG/M2 | WEIGHT: 256.4 LBS | HEART RATE: 75 BPM | SYSTOLIC BLOOD PRESSURE: 111 MMHG

## 2024-03-27 DIAGNOSIS — E11.21 TYPE 2 DIABETES MELLITUS WITH DIABETIC NEPHROPATHY, WITHOUT LONG-TERM CURRENT USE OF INSULIN (MULTI): ICD-10-CM

## 2024-03-27 DIAGNOSIS — E87.1 HYPONATREMIA: ICD-10-CM

## 2024-03-27 DIAGNOSIS — E11.9 TYPE 2 DIABETES MELLITUS WITH INSULIN THERAPY (MULTI): Primary | ICD-10-CM

## 2024-03-27 DIAGNOSIS — M35.00 SJOGREN'S SYNDROME, WITH UNSPECIFIED ORGAN INVOLVEMENT (MULTI): ICD-10-CM

## 2024-03-27 DIAGNOSIS — L97.424 NON-PRESSURE CHRONIC ULCER OF LEFT HEEL AND MIDFOOT WITH NECROSIS OF BONE (MULTI): ICD-10-CM

## 2024-03-27 DIAGNOSIS — I70.223 ATHEROSCLEROSIS OF NATIVE ARTERIES OF EXTREMITIES WITH REST PAIN, BILATERAL LEGS (MULTI): ICD-10-CM

## 2024-03-27 DIAGNOSIS — M86.471 CHRONIC OSTEOMYELITIS WITH DRAINING SINUS, RIGHT ANKLE AND FOOT (MULTI): ICD-10-CM

## 2024-03-27 DIAGNOSIS — E87.5 HYPERKALEMIA: ICD-10-CM

## 2024-03-27 DIAGNOSIS — E66.01 CLASS 3 SEVERE OBESITY DUE TO EXCESS CALORIES WITH SERIOUS COMORBIDITY IN ADULT, UNSPECIFIED BMI (MULTI): ICD-10-CM

## 2024-03-27 DIAGNOSIS — E11.21 TYPE 2 DIABETES MELLITUS WITH DIABETIC NEPHROPATHY, WITHOUT LONG-TERM CURRENT USE OF INSULIN (MULTI): Primary | ICD-10-CM

## 2024-03-27 DIAGNOSIS — Z79.4 TYPE 2 DIABETES MELLITUS WITH INSULIN THERAPY (MULTI): Primary | ICD-10-CM

## 2024-03-27 LAB — POC HEMOGLOBIN A1C: 8.4 % (ref 4.2–6.5)

## 2024-03-27 PROCEDURE — 99214 OFFICE O/P EST MOD 30 MIN: CPT | Performed by: PHYSICIAN ASSISTANT

## 2024-03-27 PROCEDURE — 3062F POS MACROALBUMINURIA REV: CPT | Performed by: PHYSICIAN ASSISTANT

## 2024-03-27 PROCEDURE — 4010F ACE/ARB THERAPY RXD/TAKEN: CPT | Performed by: PHYSICIAN ASSISTANT

## 2024-03-27 PROCEDURE — 3074F SYST BP LT 130 MM HG: CPT | Performed by: PHYSICIAN ASSISTANT

## 2024-03-27 PROCEDURE — 83036 HEMOGLOBIN GLYCOSYLATED A1C: CPT | Performed by: PHYSICIAN ASSISTANT

## 2024-03-27 PROCEDURE — 3078F DIAST BP <80 MM HG: CPT | Performed by: PHYSICIAN ASSISTANT

## 2024-03-27 RX ORDER — SODIUM BICARBONATE 650 MG/1
650 TABLET ORAL EVERY 8 HOURS
COMMUNITY
Start: 2024-02-01 | End: 2024-03-27 | Stop reason: SDUPTHER

## 2024-03-27 RX ORDER — SODIUM BICARBONATE 650 MG/1
650 TABLET ORAL EVERY 8 HOURS
Qty: 270 TABLET | Refills: 0 | Status: SHIPPED | OUTPATIENT
Start: 2024-03-27 | End: 2024-06-25

## 2024-03-27 ASSESSMENT — ENCOUNTER SYMPTOMS
POLYDIPSIA: 1
DEPRESSION: 0
LOSS OF SENSATION IN FEET: 0
WEAKNESS: 0
FATIGUE: 0
BLURRED VISION: 0
VISUAL CHANGE: 0
POLYPHAGIA: 0
OCCASIONAL FEELINGS OF UNSTEADINESS: 0

## 2024-03-27 ASSESSMENT — PATIENT HEALTH QUESTIONNAIRE - PHQ9
1. LITTLE INTEREST OR PLEASURE IN DOING THINGS: NOT AT ALL
SUM OF ALL RESPONSES TO PHQ9 QUESTIONS 1 AND 2: 0
2. FEELING DOWN, DEPRESSED OR HOPELESS: NOT AT ALL

## 2024-03-27 NOTE — PROGRESS NOTES
Subjective     HPI   Gregorio North is a 63 y.o. year old male patient with presenting to clinic with concern for   Chief Complaint   Patient presents with    Follow-up     3 month      Diarrhea     Not as bad          A1c 8.4% today    Ongoing diarrhea- occurs the day he uses Ozempic dose, but is significantly improved otherwise.     Sees jonas Valente, Follow up 1 yr.     Was at [facility] for procrit. Found to be hypotensive and bradycardic. Referred to ER. Bradycardia and hypotension may be secondary to worsening renal failure with hyperkalemia. Hyperkalemia at 6.8. Found to have acute on chronic renal failure. Creat 4/8 vs baseline around 2.5. Improved to baseline with fluids and holding offending medications. His potassium has improved from 6.8-5.5. Stabilized at [facility] and Carvedilol dose reduced from 25 to 6.25mg. Stop Hydralazine and amlodipine. Continue lasix and lisinopril.    Bradycardia and hypotension may be secondary to worsening renal failure with hyperkalemia. His potassium has improved from 6.8-5.5     Discussed lokelma for hyperkalemia and socium bicarb 650tid- during hospitalization only. Checked with nephro. Continue procrit as outpatient.         Patient Active Problem List   Diagnosis    Benign essential hypertension    Diabetes mellitus (CMS/HCC)    Anemia    Chronic kidney disease, stage 4 (severe) (CMS/HCC)    Dyslipidemia    Benign prostatic hyperplasia    Anxiety    Fatigue    Hypokalemia    HLD (hyperlipidemia)    Macular exudate    Nystagmus    Optic atrophy    Atherosclerosis of native arteries of extremities with rest pain, bilateral legs (CMS/HCC)    Pulmonary edema    Vitamin D deficiency    Unvaccinated for covid-19    CHF (congestive heart failure) (CMS/HCC)    Tremor    Swelling of upper extremity    Papilledema    Palpitations    Obesity with body mass index 30 or greater    Muscle pain    Arthralgia    Impairment of balance    History of hypertension    History of  endocrine disorder    Headache    Class 3 severe obesity due to excess calories in adult (CMS/[facility])    Sjogren's syndrome (CMS/[facility])    Shortness of breath    Proteinuria    History of kidney cancer    Lyme disease    Iron deficiency anemia    Chronic pulmonary edema    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 (CMS/[facility])    Chronic osteomyelitis with draining sinus, right ankle and foot (CMS/[facility])    Acute kidney injury superimposed on CKD (CMS/[facility])    Type 2 diabetes mellitus with insulin therapy (CMS/[facility])       Past Medical History:   Diagnosis Date    Acute diastolic heart failure (CMS/[facility]) 11/23/2022    STEVE (acute kidney injury) (CMS/[facility]) 09/18/2023    COVID-19 virus infection 09/18/2023    Cutaneous abscess of back (any part, except buttock) 01/25/2021    Abscess of back    Deficiency of other specified B group vitamins 06/09/2021    Folate deficiency    Diabetic foot ulcer (CMS/[facility]) 05/06/2022    Encounter for general adult medical examination without abnormal findings 05/19/2020    Medicare annual wellness visit, subsequent    Encounter for screening for malignant neoplasm of colon 02/21/2019    Screening for malignant neoplasm of colon    Encounter for screening for malignant neoplasm of prostate 08/12/2021    Prostate cancer screening    Focal chorioretinal inflammation, macular or paramacular, 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    Acute macular neuroretinitis of left eye    Focal chorioretinal inflammation, macular or paramacular, left eye 11/05/2014    Acute macular neuroretinitis of left eye    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    History of foot operation 09/18/2023    Ischemic optic neuropathy, 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    Localized edema 01/11/2021    Bilateral edema of lower extremity    Other muscle spasm 04/24/2017    Trapezius muscle spasm    Other specified abnormal immunological findings in serum  09/17/2014    Positive Lyme disease serology    Other specified cataract 01/27/2016    Cataract, mature    Other specified health status     No pertinent past surgical history    Other specified postprocedural states 06/18/2015    Status post foot surgery    Other specified retinal disorders 01/27/2016    Macular exudate    Other symptoms and signs involving the musculoskeletal system 04/24/2017    Left arm weakness    Pain in right shoulder 04/24/2017    Right shoulder pain    Personal history of diseases of the skin and subcutaneous tissue 05/25/2021    History of cellulitis    Personal history of other diseases of male genital organs 01/11/2021    History of benign prostatic hyperplasia    Personal history of other diseases of the circulatory system 09/03/2014    History of hypertension    Personal history of other diseases of the musculoskeletal system and connective tissue 11/21/2016    History of joint pain    Personal history of other diseases of the musculoskeletal system and connective tissue 11/21/2016    History of muscle pain    Personal history of other diseases of the nervous system and sense organs 09/08/2014    History of nystagmus    Personal history of other diseases of the nervous system and sense organs 01/27/2016    History of optic atrophy    Personal history of other diseases of the nervous system and sense organs 09/08/2014    History of papilledema    Personal history of other diseases of the nervous system and sense organs 08/22/2016    History of tremor    Personal history of other endocrine, nutritional and metabolic disease 01/11/2021    History of elevated lipids    Personal history of other specified conditions     History of impaired glucose tolerance    Personal history of other specified conditions 09/08/2014    History of palpitations    Personal history of other specified conditions 08/12/2021    History of chronic fatigue    Personal history of other specified conditions  "08/09/2018    History of headache    Personal history of other specified conditions 08/09/2018    History of balance disorder    Respiratory failure (CMS/HCC) 10/10/2023    Sebaceous cyst 11/20/2018    Infected sebaceous cyst of skin    Unspecified cataract 01/27/2016    Total cataract    Unspecified visual loss 02/18/2022    Vision loss      Past Surgical History:   Procedure Laterality Date    CT GUIDED PERCUTANEOUS BIOPSY BONE DEEP  8/29/2022    CT GUIDED PERCUTANEOUS BIOPSY BONE DEEP 8/29/2022 [facility]    FOOT SURGERY  03/31/2016    Foot Surgery    MR HEAD ANGIO WO IV CONTRAST  9/17/2014    MR HEAD ANGIO WO IV CONTRAST 9/17/2014 [facility]    OTHER SURGICAL HISTORY  02/04/2015    Eye Surgery Results [facility]      Family History   Problem Relation Name Age of Onset    No Known Problems Mother      Heart attack Father      Diabetes Father        Social History     Tobacco Use    Smoking status: Never    Smokeless tobacco: Never   Substance Use Topics    Alcohol use: Not Currently        Current Outpatient Medications:     allopurinol (Zyloprim) 100 mg tablet, Take 1 tablet (100 mg) by mouth once daily., Disp: 90 tablet, Rfl: 3    atorvastatin (Lipitor) 20 mg tablet, Take 1 tablet (20 mg) by mouth once daily at bedtime., Disp: 90 tablet, Rfl: 3    blood sugar diagnostic (ReliOn Prime Test Strips) strip, TEST 3 TIMES DAILY., Disp: , Rfl:     CareFine Pen Needle 32 gauge x 3/16\" needle, USE 1 PEN NEEDLE TO INJECT INSULIN 3 TIMES A DAY BEFORE MEALS AND AT BEDTIME., Disp: 100 each, Rfl: 3    carvedilol (Coreg) 6.25 mg tablet, Take 1 tablet (6.25 mg) by mouth 2 times a day with meals., Disp: 30 tablet, Rfl: 3    cholecalciferol (Vitamin D-3) 125 MCG (5000 UT) capsule, Take 1 capsule (125 mcg) by mouth once daily., Disp: , Rfl:     clopidogrel (Plavix) 75 mg tablet, Take 1 tablet (75 mg) by mouth once daily., Disp: 90 tablet, Rfl: 3    empagliflozin (Jardiance) 10 mg, Take 1 tablet (10 mg) by mouth once " daily in the morning., Disp: 30 tablet, Rfl: 1    epoetin mckenna-epbx (Retacrit) 40,000 unit/mL injection, Inject 1 mL (40,000 Units) under the skin every 30 (thirty) days., Disp: , Rfl:     finerenone (Kerendia) 10 mg tablet tablet, Take 1 tablet (10 mg) by mouth once daily., Disp: 90 tablet, Rfl: 3    folic acid (Folvite) 1 mg tablet, Take 5 tablets (5 mg) by mouth once daily., Disp: 450 tablet, Rfl: 3    furosemide (Lasix) 40 mg tablet, Take 1 tablet (40 mg) by mouth 2 times a day., Disp: , Rfl:     HumaLOG KwikPen Insulin 100 unit/mL injection, Inject 8 Units under the skin 3 times a day with meals. 8 units before meals, Disp: 15 each, Rfl: 3    lisinopril 40 mg tablet, Take 1 tablet (40 mg) by mouth once daily in the morning., Disp: 90 tablet, Rfl: 3    MAGNESIUM GLYCINATE ORAL, Take 420 mg by mouth once daily., Disp: , Rfl:     semaglutide 0.25 mg or 0.5 mg (2 mg/3 mL) pen injector, Inject 0.5 mg under the skin 1 (one) time per week. (Note increased dose as anticipated with clinical pharmacy team), Disp: 9 mL, Rfl: 0    sodium,potassium,mag sulfates (Suprep) 17.5-3.13-1.6 gram recon soln solution, Take 1 bottle by mouth if needed (take 1 bottle at 6pm night before procedure and take one bottle at 4am the day of procedure)., Disp: 2 each, Rfl: 0    insulin glargine (Lantus Solostar U-100 Insulin) 100 unit/mL (3 mL) pen, Inject 20 Units under the skin once daily at bedtime., Disp: 18 mL, Rfl: 0    sodium bicarbonate 650 mg tablet, Take 1 tablet (650 mg) by mouth every 8 hours., Disp: 270 tablet, Rfl: 0    sodium zirconium cyclosilicate (Lokelma) 5 gram packet, Take 5 g by mouth once daily., Disp: 30 packet, Rfl: 0     Review of Systems  Constitutional: Denies fever  HEENT: Denies ST, earache  CVS: Denies Chest pain  Pulmonary: Denies wheezing, SOB  GI: Denies N/V  : Denies dysuria  Musculoskeletal:  Denies myalgia  Neuro: Denies focal weakness or numbness.  Skin: Denies Rashes.  *Review of Systems is negative  "unless otherwise mentioned in HPI or ROS above.    Objective   /71   Pulse 75   Temp 36.1 °C (97 °F)   Ht 1.753 m (5' 9\")   Wt 116 kg (256 lb 6.4 oz)   SpO2 97%   BMI 37.86 kg/m²  reviewed Body mass index is 37.86 kg/m².     Physical Exam  Constitutional: NAD.  Resting comfortably.  Head: Atraumatic, normocephalic.  ENT: Moist oral mucosa. Nasal mucosa wnl.   Cardiac: Regular rate & rhythm.   Pulmonary: Lungs clear bilat  GI: Soft, Nontender, nondistended.   Musculoskeletal: No peripheral edema.  Has a boot on left Foot.   Skin: No evidence of trauma. No rashes  Psych: Intact judgement and insight.    .Assessment/Plan   Problem List Items Addressed This Visit             ICD-10-CM    Diabetes mellitus (CMS/[facility]) - Primary E11.9    Atherosclerosis of native arteries of extremities with rest pain, bilateral legs (CMS/[facility]) I70.223    Class 3 severe obesity due to excess calories in adult (CMS/[facility]) E66.01    Sjogren's syndrome (CMS/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) L97.424    Chronic osteomyelitis with draining sinus, right ankle and foot (CMS/[facility]) M86.471     Other Visit Diagnoses         Codes    Hyponatremia     E87.1    Relevant Medications    sodium bicarbonate 650 mg tablet    Hyperkalemia     E87.5    Relevant Medications    sodium zirconium cyclosilicate (Lokelma) 5 gram packet            "

## 2024-03-27 NOTE — ASSESSMENT & PLAN NOTE
Patients diabetes is improved with most recent A1c of 8.4% (Goal < 7%). FBG and PPBG are still elevated  Increase: Lantus from 22u to 24u starting 3/27  Continue: Ozempic 0.5 mg weekly, Humalog 8u TID cf, Jardiance 10 mg  Compliance at present is estimated to be excellent. Continue lifestyle modifications and monitoring  Education Provided to Patient: Counseled patient that he can begin increased dose of Lantus 24u tonight, we can discuss lowering the insulin dose once we are able to titrate up the ozempic, FBG goals, and obtain a repeat lipid panel since the last one was from 2/2023.   Interested in increasing Ozempic, but he just received 3 months worth. May consider inject 0.5mg twice for 1mg dose but would need extra pen needles and insurance could be a barrier in getting the next fill early  Follow-up: 4/3 check if he may qualify for PAP. Info was sent to his email previously  PCP Follow-up: 6/27

## 2024-03-27 NOTE — PROGRESS NOTES
Subjective   Patient ID: Gregorio North is a 63 y.o. male who presents for No chief complaint on file..    Referring Provider: Latisha Vines PA-C     Diabetes  He presents for his follow-up diabetic visit. He has type 2 diabetes mellitus. The initial diagnosis of diabetes was made 9 years ago. His disease course has been improving. There are no hypoglycemic associated symptoms. Associated symptoms include polydipsia and polyuria. Pertinent negatives for diabetes include no blurred vision, no chest pain, no fatigue, no foot paresthesias, no foot ulcerations, no polyphagia, no visual change and no weakness. There are no hypoglycemic complications. Symptoms are improving. Diabetic complications include nephropathy. Pertinent negatives for diabetic complications include no autonomic neuropathy, CVA, heart disease, impotence, peripheral neuropathy, PVD or retinopathy. Risk factors for coronary artery disease include diabetes mellitus, hypertension, male sex, obesity, dyslipidemia and tobacco exposure. Current diabetic treatment includes oral agent (monotherapy) and insulin injections (and Ozempic 0.25mg weekly injection). He is compliant with treatment all of the time (Only non-compliant when medications on backorder). His weight is decreasing rapidly. He is following a low salt, generally healthy and high fiber diet. Meal planning includes avoidance of concentrated sweets and carbohydrate counting. He has had a previous visit with a dietitian. He participates in exercise daily. His home blood glucose trend is fluctuating minimally. His breakfast blood glucose range is generally 130-140 mg/dl. His dinner blood glucose range is generally 140-180 mg/dl. An ACE inhibitor/angiotensin II receptor blocker is being taken. He sees a podiatrist.Eye exam is not current (Appointment scheduled for next month).       Review of Systems   Constitutional:  Negative for fatigue.   Eyes:  Negative for blurred vision.   Cardiovascular:  " Negative for chest pain.   Endocrine: Positive for polydipsia and polyuria. Negative for polyphagia.   Genitourinary:  Negative for impotence.   Neurological:  Negative for weakness.       Medication System Management:  Affordability/Accessibility: Not addressed this visit  Adherence/Organization: Not addressed this visit  Adverse Effects: None    Objective     There were no vitals taken for this visit.     Labs  Lab Results   Component Value Date    BILITOT 0.5 01/31/2024    CALCIUM 9.1 03/13/2024    CO2 25 03/13/2024     03/13/2024    CREATININE 3.04 (H) 03/13/2024    GLUCOSE 170 (H) 03/13/2024    ALKPHOS 65 01/31/2024    K 4.7 03/13/2024    PROT 6.9 01/31/2024     03/13/2024    AST 17 01/31/2024    ALT 29 01/31/2024    BUN 52 (H) 03/13/2024    ANIONGAP 16 03/13/2024    MG 3.05 (H) 01/31/2024    PHOS 3.6 03/13/2024     09/08/2022    ALBUMIN 3.8 03/13/2024    GFRMALE 32 (A) 09/13/2023     Lab Results   Component Value Date    TRIG 147 02/09/2023    CHOL 133 02/09/2023    HDL 43.6 02/09/2023     Lab Results   Component Value Date    HGBA1C 8.4 (A) 03/27/2024       Current Outpatient Medications on File Prior to Visit   Medication Sig Dispense Refill    allopurinol (Zyloprim) 100 mg tablet Take 1 tablet (100 mg) by mouth once daily. 90 tablet 3    atorvastatin (Lipitor) 20 mg tablet Take 1 tablet (20 mg) by mouth once daily at bedtime. 90 tablet 3    blood sugar diagnostic (ReliOn Prime Test Strips) strip TEST 3 TIMES DAILY.      CareFine Pen Needle 32 gauge x 3/16\" needle USE 1 PEN NEEDLE TO INJECT INSULIN 3 TIMES A DAY BEFORE MEALS AND AT BEDTIME. 100 each 3    carvedilol (Coreg) 6.25 mg tablet Take 1 tablet (6.25 mg) by mouth 2 times a day with meals. 30 tablet 3    cholecalciferol (Vitamin D-3) 125 MCG (5000 UT) capsule Take 1 capsule (125 mcg) by mouth once daily.      clopidogrel (Plavix) 75 mg tablet Take 1 tablet (75 mg) by mouth once daily. 90 tablet 3    empagliflozin (Jardiance) 10 mg " Take 1 tablet (10 mg) by mouth once daily in the morning. 30 tablet 1    epoetin mckenna-epbx (Retacrit) 40,000 unit/mL injection Inject 1 mL (40,000 Units) under the skin every 30 (thirty) days.      finerenone (Kerendia) 10 mg tablet tablet Take 1 tablet (10 mg) by mouth once daily. 90 tablet 3    folic acid (Folvite) 1 mg tablet Take 5 tablets (5 mg) by mouth once daily. 450 tablet 3    furosemide (Lasix) 40 mg tablet Take 1 tablet (40 mg) by mouth 2 times a day.      HumaLOG KwikPen Insulin 100 unit/mL injection Inject 8 Units under the skin 3 times a day with meals. 8 units before meals 15 each 3    insulin glargine (Lantus Solostar U-100 Insulin) 100 unit/mL (3 mL) pen Inject 20 Units under the skin once daily at bedtime. 18 mL 0    lisinopril 40 mg tablet Take 1 tablet (40 mg) by mouth once daily in the morning. 90 tablet 3    MAGNESIUM GLYCINATE ORAL Take 420 mg by mouth once daily.      semaglutide 0.25 mg or 0.5 mg (2 mg/3 mL) pen injector Inject 0.5 mg under the skin 1 (one) time per week. (Note increased dose as anticipated with clinical pharmacy team) 9 mL 0    sodium bicarbonate 650 mg tablet Take 1 tablet (650 mg) by mouth every 8 hours. 270 tablet 0    sodium zirconium cyclosilicate (Lokelma) 5 gram packet Take 5 g by mouth once daily. 30 packet 0    sodium,potassium,mag sulfates (Suprep) 17.5-3.13-1.6 gram recon soln solution Take 1 bottle by mouth if needed (take 1 bottle at 6pm night before procedure and take one bottle at 4am the day of procedure). 2 each 0    [DISCONTINUED] Bydureon BCise 2 mg/0.85 mL auto-injector INJECT 0.85 ML SUBCUTANEOUSLY ONCE WEEKLY. (Patient not taking: Reported on 3/20/2024) 13.6 mL 0    [DISCONTINUED] sodium bicarbonate 650 mg tablet Take 1 tablet (650 mg) by mouth every 8 hours.       No current facility-administered medications on file prior to visit.       Affordability  Ozempic: $512/3 month supply  Jardiance: $42/month  Kerendia: $42/month  Insulin    Assessment/Plan    Problem List Items Addressed This Visit             ICD-10-CM    Type 2 diabetes mellitus with insulin therapy (CMS/[facility]) - Primary E11.9, Z79.4     Patients diabetes is improved with most recent A1c of 8.4% (Goal < 7%). FBG and PPBG are still elevated  Increase: Lantus from 22u to 24u starting 3/27  Continue: Ozempic 0.5 mg weekly, Humalog 8u TID cf, Jardiance 10 mg  Compliance at present is estimated to be excellent. Continue lifestyle modifications and monitoring  Education Provided to Patient: Counseled patient that he can begin increased dose of Lantus 24u tonight, we can discuss lowering the insulin dose once we are able to titrate up the ozempic, FBG goals, and obtain a repeat lipid panel since the last one was from 2/2023.   Interested in increasing Ozempic, but he just received 3 months worth. May consider inject 0.5mg twice for 1mg dose but would need extra pen needles and insurance could be a barrier in getting the next fill early  Follow-up: 4/3 check if he may qualify for PAP. Info was sent to his email previously  PCP Follow-up: 6/27          Amy Whalen, PharmD    Continue all meds under the continuation of care with the referring provider and clinical pharmacy team.

## 2024-04-02 DIAGNOSIS — E11.21 TYPE 2 DIABETES MELLITUS WITH DIABETIC NEPHROPATHY, WITHOUT LONG-TERM CURRENT USE OF INSULIN (MULTI): ICD-10-CM

## 2024-04-03 ENCOUNTER — TELEMEDICINE (OUTPATIENT)
Dept: PHARMACY | Facility: HOSPITAL | Age: 64
End: 2024-04-03
Payer: MEDICARE

## 2024-04-03 DIAGNOSIS — Z79.4 TYPE 2 DIABETES MELLITUS WITH INSULIN THERAPY (MULTI): Primary | ICD-10-CM

## 2024-04-03 DIAGNOSIS — E11.9 TYPE 2 DIABETES MELLITUS WITH INSULIN THERAPY (MULTI): Primary | ICD-10-CM

## 2024-04-03 DIAGNOSIS — E11.21 TYPE 2 DIABETES MELLITUS WITH DIABETIC NEPHROPATHY, WITHOUT LONG-TERM CURRENT USE OF INSULIN (MULTI): ICD-10-CM

## 2024-04-03 ASSESSMENT — ENCOUNTER SYMPTOMS: POLYDIPSIA: 1

## 2024-04-03 NOTE — PROGRESS NOTES
"Pharmacist Clinic: Diabetes Management  Gregorio North is a 63 y.o. male who presents for a follow-up evaluation of his Type 2 diabetes mellitus.   Referring Provider: Latisha Vines PA-C     Diabetes  He presents for his follow-up diabetic visit. He has type 2 diabetes mellitus. There are no hypoglycemic associated symptoms. Associated symptoms include polydipsia and polyuria. There are no hypoglycemic complications. Diabetic complications include nephropathy. Risk factors for coronary artery disease include diabetes mellitus, dyslipidemia, hypertension, male sex, obesity and tobacco exposure. He is compliant with treatment all of the time. Meal planning includes avoidance of concentrated sweets and carbohydrate counting. He has had a previous visit with a dietitian. He participates in exercise daily. He sees a podiatrist.Eye exam is not current.       No Known Allergies    LAB REVIEW   Glucose (mg/dL)   Date Value   03/13/2024 170 (H)   02/03/2024 156 (H)   02/02/2024 153 (H)     POC HEMOGLOBIN A1c (%)   Date Value   03/27/2024 8.4 (A)   09/19/2023 8.7 (A)     Hemoglobin A1C (%)   Date Value   10/17/2022 7.1 (A)   01/27/2022 11.2 (A)   08/30/2021 7.2 (A)     Urea Nitrogen (mg/dL)   Date Value   03/13/2024 52 (H)   02/03/2024 72 (H)   02/02/2024 95 (HH)     Creatinine (mg/dL)   Date Value   03/13/2024 3.04 (H)   02/03/2024 2.63 (H)   02/02/2024 3.05 (H)     No results found for: \"ALBUR\", \"[id number]\"  Lab Results   Component Value Date    CHOL 133 02/09/2023    CHOL 184 10/17/2022    CHOL 108 01/31/2022     Lab Results   Component Value Date    HDL 43.6 02/09/2023    HDL 53.0 10/17/2022    HDL 31.1 (A) 01/31/2022     No results found for: \"LDLCALC\"  Lab Results   Component Value Date    TRIG 147 02/09/2023    TRIG 127 10/17/2022    TRIG 127 01/31/2022       The 10-year ASCVD risk score (Attica DK, et al., 2019) is: 16.1%    Values used to calculate the score:      Age: 63 years      Sex: Male      Is Non- " : No      Diabetic: Yes      Tobacco smoker: No      Systolic Blood Pressure: 111 mmHg      Is BP treated: Yes      HDL Cholesterol: 43.6 mg/dL      Total Cholesterol: 133 mg/dL     DIABETES ASSESSMENT    CURRENT PHARMACOTHERAPY  Ozempic 0.5 mg weekly on Wednesday (started 3/6/24)  Patient paid for a 90 day supply within the past week, so will reassess for dose titration once closer to completion  Humalog 8 units TID with meals  Lantus 24 units QHS  Jardiance 10 mg QAM    SECONDARY PREVENTION  - Statin? Yes atorvastatin 20mg  - ACE-I/ARB? Yes lisinopril 40mg  - Aspirin? No    HISTORICAL PHARMACOTHERAPY  Bydureon: Formulary change to Ozempic   Farxiga: Formulary change to Jardiance     SMBG MEASUREMENTS  Patient is using: glucometer Paid OTC because he didn't know it can be covered as Rx. Difficult to use due to poor eyesight and is tired of pricking so often  The patient is currently checking the blood glucose 2 times per day.  FBG (avg): 140s (highest at 180)  PPBG (avg): 160s (highest at 160s)    Diet: Reports appetite is bad and nitpicks vegetables. He is following a low salt, generally healthy and high fiber diet  Breakfast: biggest meal around 11am  Lunch: often skipped. Lightest meal  Dinner: varies     ADHERENCE/AFFORDABILITY: reports they try to save enough on side for meds but reports financial strain  Ozempic: $512/3 month supply  Jardiance: 150.11 (Previous: $42/month)  Kerendia: Needs PA (Previous: $42/month)  Insulin  Testing supplies paid out of pocket $19 for test strips (didn't know they were available as Rx): covered by insurance    Assessment/Plan   Problem List Items Addressed This Visit       Diabetes mellitus (CMS/[facility])    Relevant Medications    empagliflozin (Jardiance) 10 mg    Type 2 diabetes mellitus with insulin therapy (CMS/[facility]) - Primary     Patients diabetes needs improvement with most recent A1c of 8.4% on 3/27/24 (Goal < 7%).   Initiate: Lantus 26 units every night at  bedtime  Continue:   Ozempic 0.5 mg weekly on Wednesday (started 3/6/24)  Interested in increasing Ozempic, but he just received 3 months worth. May consider inject 0.5mg twice for 1mg dose but would need extra pen needles and insurance could be a barrier in getting the next fill early  Humalog 8 units TID with meals  Jardiance 10 mg QAM  Sent refill for 14 day trial at [facility]. Patient reports last dose was today  Complications: CKD. Unable to increase Jardiance due to renal function eGFR of 22  Continue: Kerendia 10mg daily   Has 10 tabs left. Needs a PA  Adherence at present is estimated to be excellent. Efforts to improve adherence (if necessary) will be directed at regular blood sugar monitoring: 3 times daily.  Will look into coverage for testing supplies: glucometer and CGMs  Education Provided to Patient: we can discuss lowering the insulin dose once we are able to titrate up the ozempic, BG goals, importance of BG testing, Jardiance dosage,  PAP, and obtain a repeat lipid panel since the last one was from 2/2023.   PharmD Follow-up: 1 week for BG control,  PAP status, test supplies  PCP Follow-up: 6/27/24         Relevant Medications    blood-glucose meter misc    blood sugar diagnostic (Blood Glucose Test) strip    lancets (Accu-Chek Fastclix Lancet Drum) misc    alcohol swabs (Alcohol Pads) pads, medicated    FreeStyle [place] 3 [place] misc    FreeStyle [place] 3 Sensor device         Notify your healthcare provider if you have any of the following:  -Diarrhea or vomiting for more than six hours  -Blood sugar >300 mg/dL more than once  -Low blood sugar (<70 mg/dL)  -Questions about your medications    Thank you,  Amy Whalen, PharmD    Continue all meds under the continuation of care with the referring provider and clinical pharmacy team.  Verbal consent to manage patient's drug therapy was obtained. They were informed they may decline to participate or withdraw from participation in pharmacy  services at any time.

## 2024-04-04 PROCEDURE — RXMED WILLOW AMBULATORY MEDICATION CHARGE

## 2024-04-04 RX ORDER — IBUPROFEN 200 MG
CAPSULE ORAL
Qty: 100 EACH | Refills: 0 | Status: SHIPPED | OUTPATIENT
Start: 2024-04-04

## 2024-04-04 RX ORDER — LANCETS
EACH MISCELLANEOUS
Qty: 100 EACH | Refills: 0 | Status: SHIPPED | OUTPATIENT
Start: 2024-04-04

## 2024-04-04 RX ORDER — BLOOD-GLUCOSE SENSOR
EACH MISCELLANEOUS
Qty: 6 EACH | Refills: 0 | Status: SHIPPED | OUTPATIENT
Start: 2024-04-04

## 2024-04-04 RX ORDER — DEXTROSE 4 G
TABLET,CHEWABLE ORAL
Qty: 1 EACH | Refills: 0 | Status: SHIPPED | OUTPATIENT
Start: 2024-04-04

## 2024-04-04 RX ORDER — ISOPROPYL ALCOHOL 70 ML/100ML
SWAB TOPICAL
Qty: 300 EACH | Refills: 0 | Status: SHIPPED | OUTPATIENT
Start: 2024-04-04

## 2024-04-04 RX ORDER — EMPAGLIFLOZIN 10 MG/1
10 TABLET, FILM COATED ORAL
Qty: 30 TABLET | Refills: 0 | OUTPATIENT
Start: 2024-04-04

## 2024-04-04 RX ORDER — BLOOD-GLUCOSE,RECEIVER,CONT
EACH MISCELLANEOUS
Qty: 1 EACH | Refills: 0 | Status: SHIPPED | OUTPATIENT
Start: 2024-04-04

## 2024-04-04 NOTE — ASSESSMENT & PLAN NOTE
Patients diabetes needs improvement with most recent A1c of 8.4% on 3/27/24 (Goal < 7%).   Initiate: Lantus 26 units every night at bedtime  Continue:   Ozempic 0.5 mg weekly on Wednesday (started 3/6/24)  Interested in increasing Ozempic, but he just received 3 months worth. May consider inject 0.5mg twice for 1mg dose but would need extra pen needles and insurance could be a barrier in getting the next fill early  Humalog 8 units TID with meals  Jardiance 10 mg QAM  Sent refill for 14 day trial at [facility]. Patient reports last dose was today  Complications: CKD. Unable to increase Jardiance due to renal function eGFR of 22  Continue: Kerendia 10mg daily   Has 10 tabs left. Needs a PA  Adherence at present is estimated to be excellent. Efforts to improve adherence (if necessary) will be directed at regular blood sugar monitoring: 3 times daily.  Will look into coverage for testing supplies: glucometer and CGMs  Education Provided to Patient: we can discuss lowering the insulin dose once we are able to titrate up the ozempic, BG goals, importance of BG testing, Jardiance dosage,  PAP, and obtain a repeat lipid panel since the last one was from 2/2023.   PharmD Follow-up: 1 week for BG control,  PAP status, test supplies  PCP Follow-up: 6/27/24

## 2024-04-05 ENCOUNTER — PHARMACY VISIT (OUTPATIENT)
Dept: PHARMACY | Facility: CLINIC | Age: 64
End: 2024-04-05
Payer: MEDICARE

## 2024-04-10 ENCOUNTER — TELEMEDICINE (OUTPATIENT)
Dept: PHARMACY | Facility: HOSPITAL | Age: 64
End: 2024-04-10
Payer: MEDICARE

## 2024-04-10 ENCOUNTER — APPOINTMENT (OUTPATIENT)
Dept: HEMATOLOGY/ONCOLOGY | Facility: HOSPITAL | Age: 64
End: 2024-04-10
Payer: MEDICARE

## 2024-04-10 DIAGNOSIS — I73.9 PAD (PERIPHERAL ARTERY DISEASE) (CMS-HCC): ICD-10-CM

## 2024-04-10 DIAGNOSIS — E78.00 PURE HYPERCHOLESTEROLEMIA: ICD-10-CM

## 2024-04-10 DIAGNOSIS — E87.5 HYPERKALEMIA: ICD-10-CM

## 2024-04-10 DIAGNOSIS — E87.6 HYPOKALEMIA: ICD-10-CM

## 2024-04-10 DIAGNOSIS — E11.21 TYPE 2 DIABETES MELLITUS WITH DIABETIC NEPHROPATHY, WITHOUT LONG-TERM CURRENT USE OF INSULIN (MULTI): ICD-10-CM

## 2024-04-10 DIAGNOSIS — N18.32 STAGE 3B CHRONIC KIDNEY DISEASE (MULTI): ICD-10-CM

## 2024-04-10 DIAGNOSIS — I10 BENIGN ESSENTIAL HYPERTENSION: ICD-10-CM

## 2024-04-10 DIAGNOSIS — E66.01 CLASS 3 SEVERE OBESITY DUE TO EXCESS CALORIES WITH SERIOUS COMORBIDITY IN ADULT, UNSPECIFIED BMI (MULTI): ICD-10-CM

## 2024-04-10 DIAGNOSIS — E55.9 VITAMIN D DEFICIENCY: ICD-10-CM

## 2024-04-10 DIAGNOSIS — N18.4 CHRONIC KIDNEY DISEASE, STAGE 4 (SEVERE) (MULTI): Primary | ICD-10-CM

## 2024-04-10 DIAGNOSIS — I50.20 SYSTOLIC CONGESTIVE HEART FAILURE, UNSPECIFIED HF CHRONICITY (MULTI): ICD-10-CM

## 2024-04-10 DIAGNOSIS — E08.00 DIABETES MELLITUS DUE TO UNDERLYING CONDITION WITH HYPEROSMOLARITY WITHOUT COMA, WITHOUT LONG-TERM CURRENT USE OF INSULIN (MULTI): ICD-10-CM

## 2024-04-10 DIAGNOSIS — N04.9 NEPHROTIC SYNDROME: ICD-10-CM

## 2024-04-10 DIAGNOSIS — E11.9 TYPE 2 DIABETES MELLITUS WITHOUT COMPLICATION, UNSPECIFIED WHETHER LONG TERM INSULIN USE (MULTI): ICD-10-CM

## 2024-04-10 RX ORDER — INSULIN LISPRO 100 [IU]/ML
8 INJECTION, SOLUTION INTRAVENOUS; SUBCUTANEOUS
Qty: 15 ML | Refills: 3 | Status: SHIPPED | OUTPATIENT
Start: 2024-04-10

## 2024-04-10 RX ORDER — SEMAGLUTIDE 0.68 MG/ML
0.5 INJECTION, SOLUTION SUBCUTANEOUS
COMMUNITY
End: 2024-05-15 | Stop reason: DRUGHIGH

## 2024-04-10 RX ORDER — INSULIN GLARGINE 100 [IU]/ML
26 INJECTION, SOLUTION SUBCUTANEOUS NIGHTLY
Qty: 15 ML | Refills: 3 | Status: SHIPPED | OUTPATIENT
Start: 2024-04-10

## 2024-04-10 ASSESSMENT — ENCOUNTER SYMPTOMS: POLYDIPSIA: 1

## 2024-04-10 NOTE — ASSESSMENT & PLAN NOTE
Patient reported cost issues with Kerendia and Lokelma  Continue:   Kerendia 10mg daily  PA has been approved. Will send script for 30 day free trial to  [facility] for mail order and start  PAP application   Lokelma 5mg daily

## 2024-04-10 NOTE — PATIENT INSTRUCTIONS
Patient Blood Sugar Goals  - Fasting blood sugar (before eating, at least 8 hours fasting): 70 - 130 mg/dL  - Postprandial blood sugar (throughout the day after eating): less than 180 mg/dL  - A1c: less than 7%    Watch for signs and symptoms of low /high blood sugar:   Low BG (below goal) High BG (above goal)   Early symptoms Looking pale (pallor)  Shakiness  Dizziness or lightheadedness  Sweating  Hunger or nausea  An irregular or fast heartbeat  Difficulty concentrating  Fatigue: feeling weak and having no energy  Irritability or anxiety  Headache  Tingling or numbness of the lips, tongue or cheek Greatly increased urination  Thirst  Extreme hunger  Extreme fatigue  Confusion  Blurred vision/visual changes  Infections (urinary/skin)   Severe Confusion, unusual behavior or both, such as the inability to complete routine tasks  Loss of coordination  Difficulty speaking or slurred speech  Blurry or tunnel vision  Inability to eat or drink  Muscle weakness  Drowsiness  Seizures  Coma Shortness of breath  Fruity-smelling breath  Nausea and vomiting  Abdominal pain  Very dry mouth  Seizures  Loss of consciousness/Coma

## 2024-04-10 NOTE — ASSESSMENT & PLAN NOTE
"Patients diabetes needs improvement with most recent A1c of 8.4% on 3/27/24 (Goal < 7%). FBG is still elevated while PPBG appear to be better controlled. Will have better assessment with more SMBG readings when patient starts testing QID. All DM meds sent to [facility] pharmacy for  PAP coverage  Increase: Lantus 26 units every night at bedtime  Continue:   Ozempic 0.5 mg weekly on Wednesday (started 3/6/24)  Ozempic 1mg script sent for profile to start  PAP   Will hold off on increasing dose given 3 month supply of 0.5mg fill, kidney function, and recent tolerance last titation  May consider injecting 0.5mg twice for 1mg dose using current pen needles. Insurance allows \"early billing\" of different dose.  Humalog 8 units TID 15-30 minutes before meals  Jardiance 10 mg QAM (Has 7 tabs left)  Unable to increase dose due to renal function (eGFR 22)  Adherence at present is estimated to be excellent. Efforts to improve adherence (if necessary) will be directed at regular blood sugar monitorin times daily.  All testing supplies are covered at $0 copay including CGM and alcohol pads  Information counseled and provided to patient on [place] [employer]  Counseled on checking FBG and 2hr PPBG after each meal  Education Provided to Patient: we can discuss lowering the insulin dose once we are able to titrate up the ozempic, BG goals, importance of BG testing, Jardiance dosage,  PAP, and obtain a repeat lipid panel since the last one was from 2023.   PharmD Follow-up: 1 week for BG control,  PAP status, CGM set up  PCP Follow-up: 24  "

## 2024-04-10 NOTE — PROGRESS NOTES
"Pharmacist Clinic: Diabetes Management  Gregorio North is a 63 y.o. male who presents for a follow-up evaluation of his Type 2 diabetes mellitus.   Referring Provider: Latisha Vines PA-C     Diabetes  He presents for his follow-up diabetic visit. He has type 2 diabetes mellitus. There are no hypoglycemic associated symptoms. Associated symptoms include polydipsia and polyuria. There are no hypoglycemic complications. Diabetic complications include nephropathy. Risk factors for coronary artery disease include diabetes mellitus, dyslipidemia, hypertension, male sex, obesity and tobacco exposure. He is compliant with treatment all of the time. Meal planning includes avoidance of concentrated sweets and carbohydrate counting. He has had a previous visit with a dietitian. He participates in exercise daily. He sees a podiatrist.Eye exam is not current.     Received verbal consent by Dr. Kaplan for CKD referral to help with cost of Lokelma and Kerendia. Patient is eligible for  PAP/VAF for multiple meds. PA for Kerendia has been approved.    No Known Allergies    LAB REVIEW   Glucose (mg/dL)   Date Value   03/13/2024 170 (H)   02/03/2024 156 (H)   02/02/2024 153 (H)     POC HEMOGLOBIN A1c (%)   Date Value   03/27/2024 8.4 (A)   09/19/2023 8.7 (A)     Hemoglobin A1C (%)   Date Value   10/17/2022 7.1 (A)   01/27/2022 11.2 (A)   08/30/2021 7.2 (A)     Urea Nitrogen (mg/dL)   Date Value   03/13/2024 52 (H)   02/03/2024 72 (H)   02/02/2024 95 (HH)     Creatinine (mg/dL)   Date Value   03/13/2024 3.04 (H)   02/03/2024 2.63 (H)   02/02/2024 3.05 (H)     No results found for: \"ALBUR\", \"[id number]\"  Lab Results   Component Value Date    CHOL 133 02/09/2023    CHOL 184 10/17/2022    CHOL 108 01/31/2022     Lab Results   Component Value Date    HDL 43.6 02/09/2023    HDL 53.0 10/17/2022    HDL 31.1 (A) 01/31/2022     No results found for: \"LDLCALC\"  Lab Results   Component Value Date    TRIG 147 02/09/2023    TRIG 127 " 10/17/2022    TRIG 127 01/31/2022       The 10-year ASCVD risk score (Niels CLEVELAND, et al., 2019) is: 16.1%    Values used to calculate the score:      Age: 63 years      Sex: Male      Is Non- : No      Diabetic: Yes      Tobacco smoker: No      Systolic Blood Pressure: 111 mmHg      Is BP treated: Yes      HDL Cholesterol: 43.6 mg/dL      Total Cholesterol: 133 mg/dL     DIABETES ASSESSMENT    CURRENT PHARMACOTHERAPY  Ozempic 0.5 mg weekly on Wednesday (started 3/6/24)  Patient paid for a 84 day supply on 3/8/24 --> will reassess for dose titration once closer to completion  Reports improvement on diarrhea and nausea. Had 1 episode of vomiting the day after of large pasta meal  Humalog 8 units TID with meals  Lantus 24 units QHS  Jardiance 10 mg QAM    SECONDARY PREVENTION  - Statin? Yes atorvastatin 20mg  - ACE-I/ARB? Yes lisinopril 40mg  - Aspirin? No    HISTORICAL PHARMACOTHERAPY  Bydureon: Formulary change to Ozempic   Farxiga: Formulary change to Jardiance     SMBG MEASUREMENTS  Patient is using: glucometer Received Rx. Difficult to use due to poor eyesight and is tired of pricking so often. Has not started CGM yet  The patient is currently checking the blood glucose 2 times per day. Sleeps at 11pm-12am.   Date FBG (8-9am) PPBG   4/10 164 225 (15 min after FBG)   4/9 134 157   4/8 176 142 (before dinner)     Diet: Reports appetite is bad and nitpicks vegetables. He is following a low salt, generally healthy and high fiber diet  Breakfast: biggest meal around 11am  Lunch: often skipped. Lightest meal  Dinner: varies     ADHERENCE/AFFORDABILITY: reports they try to save enough on side for meds but reports financial strain. He seems to be in the coverage.  Ozempic: $512/3 month supply  Jardiance: $150.11 (Previous: $42/month)  Kerendia: PA approved (Previous: $42/month)  Insulin: expensive (still has 3 pens left each)  Lokelma: ~$125/month (just filled for 1 month on  "3/27/24)    Assessment/Plan   Problem List Items Addressed This Visit       Benign essential hypertension    Relevant Medications    finerenone (Kerendia) 10 mg tablet tablet    Diabetes mellitus (CMS/[facility])     Patients diabetes needs improvement with most recent A1c of 8.4% on 3/27/24 (Goal < 7%). FBG is still elevated while PPBG appear to be better controlled. Will have better assessment with more SMBG readings when patient starts testing QID. All DM meds sent to [facility] pharmacy for  PAP coverage  Initiate: Lantus 26 units every night at bedtime  Continue:   Ozempic 0.5 mg weekly on Wednesday (started 3/6/24)  Ozempic 1mg script sent for profile to start  PAP   Will hold off on increasing dose given 3 month supply of 0.5mg fill, kidney function, and recent tolerance last titation  May consider injecting 0.5mg twice for 1mg dose using current pen needles. Insurance allows \"early billing\" of different dose.  Humalog 8 units TID 15-30 minutes before meals  Jardiance 10 mg QAM (Has 7 tabs left)  Unable to increase dose due to renal function (eGFR 22)  Adherence at present is estimated to be excellent. Efforts to improve adherence (if necessary) will be directed at regular blood sugar monitorin times daily.  All testing supplies are covered at $0 copay including CGM and alcohol pads  Information counseled and provided to patient on [employer]  Counseled on checking FBG and 2hr PPBG after each meal  Education Provided to Patient: we can discuss lowering the insulin dose once we are able to titrate up the ozempic, BG goals, importance of BG testing, Jardiance dosage,  PAP, and obtain a repeat lipid panel since the last one was from 2023.   PharmD Follow-up: 1 week for BG control,  PAP status, CGM set up  PCP Follow-up: 24         Relevant Medications    finerenone (Kerendia) 10 mg tablet tablet    empagliflozin (Jardiance) 10 mg    HumaLOG KwikPen Insulin 100 unit/mL injection    insulin " glargine (Lantus Solostar U-100 Insulin) 100 unit/mL (3 mL) pen    semaglutide (OZEMPIC) 1 mg/dose (4 mg/3 mL) pen injector (Start on 4/14/2024)    Other Relevant Orders    Referral to Clinical Pharmacy    Lipid panel    Chronic kidney disease, stage 4 (severe) (CMS/[facility]) - Primary     Patient reported cost issues with Kerendia and Lokelma  Continue:   Kerendia 10mg daily  PA has been approved. Will send script for 30 day free trial to [facility] for mail order and start  PAP application   Lokelma 5mg daily         Hypokalemia    Relevant Medications    finerenone (Kerendia) 10 mg tablet tablet    HLD (hyperlipidemia)    Relevant Medications    finerenone (Kerendia) 10 mg tablet tablet    Other Relevant Orders    Lipid panel    Vitamin D deficiency    Relevant Medications    finerenone (Kerendia) 10 mg tablet tablet    CHF (congestive heart failure) (CMS/[facility])    Relevant Medications    finerenone (Kerendia) 10 mg tablet tablet    Class 3 severe obesity due to excess calories in adult (CMS/[facility])    Relevant Medications    finerenone (Kerendia) 10 mg tablet tablet     Other Visit Diagnoses       PAD (peripheral artery disease) (CMS/[facility])        Relevant Medications    finerenone (Kerendia) 10 mg tablet tablet    Nephrotic syndrome        Relevant Medications    finerenone (Kerendia) 10 mg tablet tablet    Stage 3b chronic kidney disease (CMS/[facility])        Relevant Medications    finerenone (Kerendia) 10 mg tablet tablet    Other Relevant Orders    Referral to Clinical Pharmacy    Hyperkalemia        Relevant Medications    sodium zirconium cyclosilicate (Lokelma) 5 gram packet            Notify your healthcare provider if you have any of the following:  -Diarrhea or vomiting for more than six hours  -Blood sugar >300 mg/dL more than once  -Low blood sugar (<70 mg/dL)  -Questions about your medications    Thank you,  Amy Whalen, PharmD    Continue all meds under the continuation of care with the referring provider  and clinical pharmacy team.  Verbal consent to manage patient's drug therapy was obtained. They were informed they may decline to participate or withdraw from participation in pharmacy services at any time.

## 2024-04-11 ENCOUNTER — SPECIALTY PHARMACY (OUTPATIENT)
Dept: PHARMACY | Facility: CLINIC | Age: 64
End: 2024-04-11

## 2024-04-11 ENCOUNTER — PHARMACY VISIT (OUTPATIENT)
Dept: PHARMACY | Facility: CLINIC | Age: 64
End: 2024-04-11
Payer: COMMERCIAL

## 2024-04-11 DIAGNOSIS — E87.6 HYPOKALEMIA: Primary | ICD-10-CM

## 2024-04-11 PROCEDURE — RXMED WILLOW AMBULATORY MEDICATION CHARGE

## 2024-04-12 PROCEDURE — RXMED WILLOW AMBULATORY MEDICATION CHARGE

## 2024-04-12 RX ORDER — POTASSIUM CHLORIDE 1500 MG/1
20 TABLET, EXTENDED RELEASE ORAL 2 TIMES DAILY
Qty: 180 TABLET | Refills: 0 | Status: SHIPPED | OUTPATIENT
Start: 2024-04-12

## 2024-04-13 ENCOUNTER — PHARMACY VISIT (OUTPATIENT)
Dept: PHARMACY | Facility: CLINIC | Age: 64
End: 2024-04-13
Payer: COMMERCIAL

## 2024-04-14 ENCOUNTER — PHARMACY VISIT (OUTPATIENT)
Dept: PHARMACY | Facility: CLINIC | Age: 64
End: 2024-04-14

## 2024-04-14 PROCEDURE — RXMED WILLOW AMBULATORY MEDICATION CHARGE

## 2024-04-15 ENCOUNTER — PHARMACY VISIT (OUTPATIENT)
Dept: PHARMACY | Facility: CLINIC | Age: 64
End: 2024-04-15
Payer: MEDICARE

## 2024-04-15 PROCEDURE — RXMED WILLOW AMBULATORY MEDICATION CHARGE

## 2024-04-17 ENCOUNTER — TELEMEDICINE (OUTPATIENT)
Dept: PHARMACY | Facility: HOSPITAL | Age: 64
End: 2024-04-17
Payer: MEDICARE

## 2024-04-17 DIAGNOSIS — E11.21 TYPE 2 DIABETES MELLITUS WITH DIABETIC NEPHROPATHY, WITHOUT LONG-TERM CURRENT USE OF INSULIN (MULTI): ICD-10-CM

## 2024-04-17 DIAGNOSIS — N18.32 STAGE 3B CHRONIC KIDNEY DISEASE (MULTI): ICD-10-CM

## 2024-04-17 ASSESSMENT — ENCOUNTER SYMPTOMS: POLYDIPSIA: 1

## 2024-04-17 NOTE — Clinical Note
Patient has successfully been enrolled in [facility]/VAF for insulin, Kerendia, Ozempic, and soon Lokelma. He had elevated blood sugars possibly due to recent stress since he has not had any other changes. He wished to increase Ozempic to 1mg, so we discussed how to administer two doses of 0.5mg safely. He seems to be competent with his insulin dosing, so we discussed his BG goals and adjusting his insulin dosing every 3-4 days until our follow-up. If there are any concerns, please let me know. Thanks! Amy Whalen, PharmD

## 2024-04-17 NOTE — PROGRESS NOTES
"Pharmacist Clinic: Diabetes Management  Gregorio North is a 63 y.o. male who presents for a follow-up evaluation of his Type 2 diabetes mellitus.   Referring Provider: Latisha Vines PA-C     The active benefit period is 4/12/2024 - 4/12/2025.     Diabetes  He presents for his follow-up diabetic visit. He has type 2 diabetes mellitus. There are no hypoglycemic associated symptoms. Associated symptoms include polydipsia and polyuria. There are no hypoglycemic complications. Diabetic complications include nephropathy. Risk factors for coronary artery disease include diabetes mellitus, dyslipidemia, hypertension, male sex, obesity and tobacco exposure. He is compliant with treatment all of the time. Meal planning includes avoidance of concentrated sweets and carbohydrate counting. He has had a previous visit with a dietitian. He participates in exercise daily. He sees a podiatrist.Eye exam is not current.       No Known Allergies    LAB REVIEW   Glucose (mg/dL)   Date Value   03/13/2024 170 (H)   02/03/2024 156 (H)   02/02/2024 153 (H)     POC HEMOGLOBIN A1c (%)   Date Value   03/27/2024 8.4 (A)   09/19/2023 8.7 (A)     Hemoglobin A1C (%)   Date Value   10/17/2022 7.1 (A)   01/27/2022 11.2 (A)   08/30/2021 7.2 (A)     Urea Nitrogen (mg/dL)   Date Value   03/13/2024 52 (H)   02/03/2024 72 (H)   02/02/2024 95 (HH)     Creatinine (mg/dL)   Date Value   03/13/2024 3.04 (H)   02/03/2024 2.63 (H)   02/02/2024 3.05 (H)     No results found for: \"ALBUR\", \"[id number]\"  Lab Results   Component Value Date    CHOL 133 02/09/2023    CHOL 184 10/17/2022    CHOL 108 01/31/2022     Lab Results   Component Value Date    HDL 43.6 02/09/2023    HDL 53.0 10/17/2022    HDL 31.1 (A) 01/31/2022     No results found for: \"LDLCALC\"  Lab Results   Component Value Date    TRIG 147 02/09/2023    TRIG 127 10/17/2022    TRIG 127 01/31/2022       The 10-year ASCVD risk score (Niels CLEVELAND, et al., 2019) is: 16.1%    Values used to calculate the " score:      Age: 63 years      Sex: Male      Is Non- : No      Diabetic: Yes      Tobacco smoker: No      Systolic Blood Pressure: 111 mmHg      Is BP treated: Yes      HDL Cholesterol: 43.6 mg/dL      Total Cholesterol: 133 mg/dL     DIABETES ASSESSMENT    CURRENT PHARMACOTHERAPY  Ozempic 0.5 mg weekly on Wednesday (started 3/6/24)  Patient paid for a 84 day supply on 3/8/24 --> will reassess for dose titration once closer to completion  Reports improvement on diarrhea and nausea. Had 1 episode of vomiting the day after of large pasta meal  Humalog 8 units TID with meals  Lantus 26 units QHS  Jardiance 10 mg QAM    SECONDARY PREVENTION  - Statin? Yes atorvastatin 20mg  - ACE-I/ARB? Yes lisinopril 40mg  - Aspirin? No    HISTORICAL PHARMACOTHERAPY  Bydureon: Formulary change to Ozempic   Farxiga: Formulary change to Jardiance     SMBG MEASUREMENTS  Patient is using: glucometer Received Rx. Difficult to use due to poor eyesight and is tired of pricking so often. Has not started CGM yet  The patient is currently checking the blood glucose 4 times per day (FBG and 2hr after meals). Sleeps at 11pm-12am.    FBG (8-9am) PPBG (2 hrs after meals)   4/10/2024 176 227 247 223   4/11/2024 199 238 267 279   4/12/2024 199 267 247 224   4/13/2024 174 212 232 243   4/14/2024 182 216 223 264   4/15/2024 191 247 238 216   4/16/2024 176 216 224 267   4/17/2024 188 354       Diet: Reports appetite is bad and nitpicks vegetables. He is following a low salt, generally healthy and high fiber diet  Breakfast: biggest meal around 11am  Lunch (biggest meal): often skipped. Lightest meal  Dinner: varies   Bedtime: midnight takes atorvastatin and Lantus    ADHERENCE/AFFORDABILITY: reports they try to save enough on side for meds but reports financial strain. He seems to be in the coverage.  Ozempic: $512/3 month supply  Jardiance: $150.11 (Previous: $42/month)  Kerendia: PA approved (Previous: $42/month)  Insulin:  "expensive (still has 3 pens left each)  Lokelma: ~$125/month (just filled for 1 month on 3/27/24)    Assessment/Plan   Problem List Items Addressed This Visit       Diabetes mellitus (Multi)     Patients diabetes needs improvement with most recent A1c of 8.4% on 3/27/24 (Goal < 7%). FBG and PPBG has been elevated with no known cause besides recent stress and frustration. He has concern for snack coverage and his elevated BG. He expressed willingness to increase Ozempic dosing. He is diligent with his medications and has understanding of dosing with some help  Increase:   Lantus 28 units every night at bedtime  Patient may adjust insulin by 2 units every 3-4 days based on BG until follow-up  Continue:   Ozempic 1 mg (two doses of 0.5mg) weekly on Wednesday (starting 24)  Insurance allows \"early billing\" of different dose.  Humalog 10 units TID 15-30 minutes before meals + sliding scale  Cover snacks with 1 unit of Humalog for every 10 grams of carbohydrates  Patient may adjust insulin by 2 units every 3-4 days based on BG until follow-up  Jardiance 10 mg QAM   Unable to increase dose due to renal function (eGFR 22)  Adherence at present is estimated to be excellent. Efforts to improve adherence (if necessary) will be directed at regular blood sugar monitorin times daily.  All testing supplies are covered at $0 copay including CGM and alcohol pads  Information counseled and provided to patient on [employer]  Counseled on checking FBG, before meals, and 2hr PPBG after each meal  Education Provided to Patient: ozempic titration/administration, insulin titration, hypoglycemic symptoms, BG goals, additional BG monitoring given his elevated sugars, Lokelma should not interact with Humalog so he does not have to be wary of their administration together, and obtain a repeat lipid panel since the last one was from 2023.   PharmD Follow-up: 1 week for BG control  PCP Follow-up: 24         Relevant Orders "    Follow Up In Clinical Pharmacy     Other Visit Diagnoses       Stage 3b chronic kidney disease (Multi)        Relevant Orders    Follow Up In Clinical Pharmacy              Notify your healthcare provider if you have any of the following:  -Diarrhea or vomiting for more than six hours  -Blood sugar >300 mg/dL more than once  -Low blood sugar (<70 mg/dL)  -Questions about your medications    Thank you,  Amy Whalen, PharmD    Continue all meds under the continuation of care with the referring provider and clinical pharmacy team.  Verbal consent to manage patient's drug therapy was obtained. They were informed they may decline to participate or withdraw from participation in pharmacy services at any time.

## 2024-04-18 NOTE — ASSESSMENT & PLAN NOTE
"Patients diabetes needs improvement with most recent A1c of 8.4% on 3/27/24 (Goal < 7%). FBG and PPBG has been elevated with no known cause besides recent stress and frustration. He has concern for snack coverage and his elevated BG. He expressed willingness to increase Ozempic dosing. He is diligent with his medications and has understanding of dosing with some help  Increase:   Lantus 28 units every night at bedtime  Patient may adjust insulin by 2 units every 3-4 days based on BG until follow-up  Continue:   Ozempic 1 mg (two doses of 0.5mg) weekly on Wednesday (starting 24)  Insurance allows \"early billing\" of different dose.  Humalog 10 units TID 15-30 minutes before meals + sliding scale  Cover snacks with 1 unit of Humalog for every 10 grams of carbohydrates  Patient may adjust insulin by 2 units every 3-4 days based on BG until follow-up  Jardiance 10 mg QAM   Unable to increase dose due to renal function (eGFR 22)  Adherence at present is estimated to be excellent. Efforts to improve adherence (if necessary) will be directed at regular blood sugar monitorin times daily.  All testing supplies are covered at $0 copay including CGM and alcohol pads  Information counseled and provided to patient on [employer] [place] [employer]  Counseled on checking FBG, before meals, and 2hr PPBG after each meal  Education Provided to Patient: ozempic titration/administration, insulin titration, hypoglycemic symptoms, BG goals, additional BG monitoring given his elevated sugars, Lokelma should not interact with Humalog so he does not have to be wary of their administration together, and obtain a repeat lipid panel since the last one was from 2023.   PharmD Follow-up: 1 week for BG control  PCP Follow-up: 24  "

## 2024-04-19 PROCEDURE — RXMED WILLOW AMBULATORY MEDICATION CHARGE

## 2024-04-22 ENCOUNTER — TELEPHONE (OUTPATIENT)
Dept: HEMATOLOGY/ONCOLOGY | Facility: HOSPITAL | Age: 64
End: 2024-04-22
Payer: MEDICARE

## 2024-04-22 ENCOUNTER — PHARMACY VISIT (OUTPATIENT)
Dept: PHARMACY | Facility: CLINIC | Age: 64
End: 2024-04-22
Payer: MEDICARE

## 2024-04-22 NOTE — TELEPHONE ENCOUNTER
Reached out to patient regarding his Procrit Injection for this week and that it is still not approved. Patient is aware and agreeable to the cancellation for this week. I will update patient each week.

## 2024-04-23 DIAGNOSIS — I10 BENIGN ESSENTIAL HYPERTENSION: Primary | ICD-10-CM

## 2024-04-24 ENCOUNTER — TELEMEDICINE (OUTPATIENT)
Dept: PHARMACY | Facility: HOSPITAL | Age: 64
End: 2024-04-24
Payer: MEDICARE

## 2024-04-24 ENCOUNTER — APPOINTMENT (OUTPATIENT)
Dept: HEMATOLOGY/ONCOLOGY | Facility: HOSPITAL | Age: 64
End: 2024-04-24
Payer: MEDICARE

## 2024-04-24 DIAGNOSIS — N18.32 STAGE 3B CHRONIC KIDNEY DISEASE (MULTI): ICD-10-CM

## 2024-04-24 DIAGNOSIS — N18.4 CHRONIC KIDNEY DISEASE, STAGE 4 (SEVERE) (MULTI): Primary | ICD-10-CM

## 2024-04-24 DIAGNOSIS — E11.21 TYPE 2 DIABETES MELLITUS WITH DIABETIC NEPHROPATHY, WITHOUT LONG-TERM CURRENT USE OF INSULIN (MULTI): ICD-10-CM

## 2024-04-24 RX ORDER — FUROSEMIDE 40 MG/1
TABLET ORAL
Qty: 270 TABLET | Refills: 0 | Status: SHIPPED | OUTPATIENT
Start: 2024-04-24

## 2024-04-24 ASSESSMENT — ENCOUNTER SYMPTOMS: POLYDIPSIA: 1

## 2024-04-24 NOTE — ASSESSMENT & PLAN NOTE
Patient reports he just received his Lokelma and Kerendia. He is currently set with his CKD medications covered through  PAP/VAF  Will need to re-enroll patient in  VAF/PAP for CKD meds in March 2025

## 2024-04-24 NOTE — ASSESSMENT & PLAN NOTE
Patient identified self-management goal:  weight loss, BG control  Patients diabetes is poorly controlled   Patient's goal A1c is < 7%.  Is pt at goal? No, 8.4% on 3/27/24  Patient's PPBGs are fluctuating drastically where there is sensitivity with food intake with 2 episodes of hypoglycemia after light dinners FBG averages 130-150s mg/dL and PPBG is greatly elevated after meals.  Complications: 2 episodes of hypoglycemia with lower food intake. Patient's carb to insulin ratio competency is not reliable at the moment  Rationale for plan: Carb counting is the most ideal for this patient but not feasible. FBG is reasonably stable at the moment. Will focus on controlling the PPBG with bolus insulin at this time due to drastic fluctuations. Unable to increase Jardiance dose due to renal function (eGFR 22)  Medication Changes:  CONTINUE:  Lantus 26 units every night at bedtime  Ozempic 1 mg (two doses of 0.5mg) weekly on Wednesday (starting 4/17/24)  Jardiance 10 mg every morning  INCREASE  Humalog 10 units TID 15-30 minutes before meals + sliding scale  Inject 5 units before lighter meals  Cover snacks with 1 unit of Humalog for every 10 grams of carbohydrates  Compliance at present is estimated to be excellent but patient may have forgotten about dose increase instructed to him from last week. Efforts to improve compliance (if necessary) will be directed at regular blood sugar monitoring: FBG, before meals, and 2hr PPBG after each meal .  Education Provided to Patient: Humalog instructions, hypoglycemic symptoms, BG goals, additional BG monitoring given his elevated sugars. Obtain a repeat lipid panel since the last one was from 2/2023  Follow-up: 1 week for BG control  PCP Follow-up: 6/27/24

## 2024-04-24 NOTE — Clinical Note
His blood sugars are elevated and fluctuating. Will increase Humalog dose and follow-up next week. Thanks! Amy Whalen, EdieD

## 2024-04-24 NOTE — PROGRESS NOTES
"Pharmacist Clinic: Diabetes Management  Gregorio North is a 63 y.o. male who presents for a follow-up evaluation of his Type 2 diabetes mellitus.   Referring Provider: Latisha Vines PA-C     Patient has been enrolled in [facility]/VAF with the active benefit period 4/12/2024 - 4/12/2025. Approved for Kerendia, Lokelma, Lantus, Humalog, Jardiance and Ozempic.    Diabetes  He presents for his follow-up diabetic visit. He has type 2 diabetes mellitus. There are no hypoglycemic associated symptoms. Associated symptoms include polydipsia and polyuria. There are no hypoglycemic complications. Diabetic complications include nephropathy. Risk factors for coronary artery disease include diabetes mellitus, dyslipidemia, hypertension, male sex, obesity and tobacco exposure. He is compliant with treatment all of the time. Meal planning includes avoidance of concentrated sweets and carbohydrate counting. He has had a previous visit with a dietitian. He participates in exercise daily. He sees a podiatrist.Eye exam is not current.       No Known Allergies    LAB REVIEW   Glucose (mg/dL)   Date Value   03/13/2024 170 (H)   02/03/2024 156 (H)   02/02/2024 153 (H)     POC HEMOGLOBIN A1c (%)   Date Value   03/27/2024 8.4 (A)   09/19/2023 8.7 (A)     Hemoglobin A1C (%)   Date Value   10/17/2022 7.1 (A)   01/27/2022 11.2 (A)   08/30/2021 7.2 (A)     Urea Nitrogen (mg/dL)   Date Value   03/13/2024 52 (H)   02/03/2024 72 (H)   02/02/2024 95 (HH)     Creatinine (mg/dL)   Date Value   03/13/2024 3.04 (H)   02/03/2024 2.63 (H)   02/02/2024 3.05 (H)     No results found for: \"ALBUR\", \"[id number]\"  Lab Results   Component Value Date    CHOL 133 02/09/2023    CHOL 184 10/17/2022    CHOL 108 01/31/2022     Lab Results   Component Value Date    HDL 43.6 02/09/2023    HDL 53.0 10/17/2022    HDL 31.1 (A) 01/31/2022     No results found for: \"LDLCALC\"  Lab Results   Component Value Date    TRIG 147 02/09/2023    TRIG 127 10/17/2022    TRIG 127 " "01/31/2022       The 10-year ASCVD risk score (Niels CLEVELAND, et al., 2019) is: 16.1%    Values used to calculate the score:      Age: 63 years      Sex: Male      Is Non- : No      Diabetic: Yes      Tobacco smoker: No      Systolic Blood Pressure: 111 mmHg      Is BP treated: Yes      HDL Cholesterol: 43.6 mg/dL      Total Cholesterol: 133 mg/dL     DIABETES ASSESSMENT    CURRENT PHARMACOTHERAPY  Ozempic 1 mg ( two doses of 0.5 mg) weekly on Wednesday (started 4/17/24)  Patient paid for a 84 day supply on 3/8/24  Reports no GI ALIE with titrated dose  Humalog 8 units TID with meals  Lantus 26 units QHS  Jardiance 10 mg QAM    SECONDARY PREVENTION  - Statin? Yes atorvastatin 20mg  - ACE-I/ARB? Yes lisinopril 40mg  - Aspirin? No    HISTORICAL PHARMACOTHERAPY  Bydureon: Formulary change to Ozempic   Farxiga: Formulary change to Jardiance     SMBG MEASUREMENTS  Patient is using: both glucometer and continuous glucose monitor Reports drastic fluctuation in BG  The patient is currently checking the blood glucose 5 times per day (FBG, BG before meals, and 2hr after meals). Sleeps at 11pm-12am. His BG seems to spike quickly but comes down quickly after 20-30 min. It is also sensitive to his food intake  Hypoglycemic awareness: yes, has a few Tootsie Rolls to raise BG  CKD could be factor with hypoglycemia   FBG (8-9am) Lunch BG 2hr PPBG Dinner Bedtime Notes   18-Apr 135 208 143 146 158    19-Apr 154 162 200 141 187    20-Apr 141 182 228 149 127    21-Apr 129 127 230 128 206 Ate light dinner   22-Apr 63  134 124 136 122 187 No hypo sx   23-Apr 138 144 240 175 195 Ate light dinner   24-Apr 53 154 243   No hypo sx   25-Apr   226        Diet: Reports \"appetite is bad\" and nitpicks vegetables. He is following a low salt, generally healthy and high fiber diet. Ozempic has been helping with food cravings. No snacks this past week  Breakfast: biggest meal around 11am at [employer] (scrambles eggs, 2 sausages, " "english muffin, bowl of fruit, coffee)  Lunch (biggest meal. After 2pm if late breakfast): light if full from breakfast  Dinner: varies, often lighter meal  Bedtime: midnight takes atorvastatin and Lantus    ADHERENCE/AFFORDABILITY: reports they try to save enough on side for meds but reports financial strain. He seems to be in the coverage.  Ozempic: $512/3 month supply (Insurance allows \"early billing\" of different dose. No steps required)  Jardiance: $150.11 (Previous: $42/month)  Kerendia: PA approved (Previous: $42/month)  Insulin: expensive (still has 3 pens left each)  Lokelma: ~$125/month (just filled for 1 month on 3/27/24)    Assessment/Plan   Problem List Items Addressed This Visit       Diabetes mellitus (Multi)     Patient identified self-management goal:  weight loss, BG control  Patients diabetes is poorly controlled   Patient's goal A1c is < 7%.  Is pt at goal? No, 8.4% on 3/27/24  Patient's PPBGs are fluctuating drastically where there is sensitivity with food intake with 2 episodes of hypoglycemia after light dinners FBG averages 130-150s mg/dL and PPBG is greatly elevated after meals.  Complications: 2 episodes of hypoglycemia with lower food intake. Patient's carb to insulin ratio competency is not reliable at the moment  Rationale for plan: Carb counting is the most ideal for this patient but not feasible. FBG is reasonably stable at the moment. Will focus on controlling the PPBG with bolus insulin at this time due to drastic fluctuations. Unable to increase Jardiance dose due to renal function (eGFR 22)  Medication Changes:  CONTINUE:  Lantus 26 units every night at bedtime  Ozempic 1 mg (two doses of 0.5mg) weekly on Wednesday (starting 4/17/24)  Jardiance 10 mg every morning  INCREASE  Humalog 10 units TID 15-30 minutes before meals + sliding scale  Inject 5 units before lighter meals  Cover snacks with 1 unit of Humalog for every 10 grams of carbohydrates  Compliance at present is estimated " to be excellent but patient may have forgotten about dose increase instructed to him from last week. Efforts to improve compliance (if necessary) will be directed at regular blood sugar monitoring: FBG, before meals, and 2hr PPBG after each meal .  Education Provided to Patient: Humalog instructions, hypoglycemic symptoms, BG goals, additional BG monitoring given his elevated sugars. Obtain a repeat lipid panel since the last one was from 2/2023  Follow-up: 1 week for BG control  PCP Follow-up: 6/27/24         Chronic kidney disease, stage 4 (severe) (Multi) - Primary     Patient reports he just received his Lokelma and is currently set with his CKD medications covered through  PAP/VAF  Will need to re-enroll patient in  VAF/PAP for CKD meds in March 2025            Other Visit Diagnoses       Stage 3b chronic kidney disease (Multi)                Notify your healthcare provider if you have any of the following:  -Diarrhea or vomiting for more than six hours  -Blood sugar >300 mg/dL more than once  -Low blood sugar (<70 mg/dL)  -Questions about your medications    Thank you,  Amy Whalen, PharmD    Continue all meds under the continuation of care with the referring provider and clinical pharmacy team.  Verbal consent to manage patient's drug therapy was obtained. They were informed they may decline to participate or withdraw from participation in pharmacy services at any time.

## 2024-05-01 ENCOUNTER — TELEMEDICINE (OUTPATIENT)
Dept: PHARMACY | Facility: HOSPITAL | Age: 64
End: 2024-05-01
Payer: MEDICARE

## 2024-05-01 DIAGNOSIS — E11.22 TYPE 2 DIABETES MELLITUS WITH STAGE 4 CHRONIC KIDNEY DISEASE, WITH LONG-TERM CURRENT USE OF INSULIN (MULTI): Primary | ICD-10-CM

## 2024-05-01 DIAGNOSIS — Z79.4 TYPE 2 DIABETES MELLITUS WITH STAGE 4 CHRONIC KIDNEY DISEASE, WITH LONG-TERM CURRENT USE OF INSULIN (MULTI): Primary | ICD-10-CM

## 2024-05-01 DIAGNOSIS — N18.4 TYPE 2 DIABETES MELLITUS WITH STAGE 4 CHRONIC KIDNEY DISEASE, WITH LONG-TERM CURRENT USE OF INSULIN (MULTI): Primary | ICD-10-CM

## 2024-05-01 ASSESSMENT — ENCOUNTER SYMPTOMS: POLYDIPSIA: 1

## 2024-05-01 NOTE — PROGRESS NOTES
"Pharmacist Clinic: Diabetes Management  Gregorio North is a 63 y.o. male who presents for a follow-up evaluation of his Type 2 diabetes mellitus.   Referring Provider: Latisha Vines PA-C     Patient has been enrolled in [facility]/VAF with the active benefit period 4/12/2024 - 4/12/2025. Approved for Kerendia, Lokelma, Lantus, Humalog, Jardiance and Ozempic.    Diabetes  He presents for his follow-up diabetic visit. He has type 2 diabetes mellitus. There are no hypoglycemic associated symptoms. Associated symptoms include polydipsia and polyuria. There are no hypoglycemic complications. Diabetic complications include nephropathy. Risk factors for coronary artery disease include diabetes mellitus, dyslipidemia, hypertension, male sex, obesity and tobacco exposure. He is compliant with treatment all of the time. Meal planning includes avoidance of concentrated sweets and carbohydrate counting. He has had a previous visit with a dietitian. He participates in exercise daily. He sees a podiatrist.Eye exam is not current.       No Known Allergies    LAB REVIEW   Glucose (mg/dL)   Date Value   03/13/2024 170 (H)   02/03/2024 156 (H)   02/02/2024 153 (H)     POC HEMOGLOBIN A1c (%)   Date Value   03/27/2024 8.4 (A)   09/19/2023 8.7 (A)     Hemoglobin A1C (%)   Date Value   10/17/2022 7.1 (A)   01/27/2022 11.2 (A)   08/30/2021 7.2 (A)     Urea Nitrogen (mg/dL)   Date Value   03/13/2024 52 (H)   02/03/2024 72 (H)   02/02/2024 95 (HH)     Creatinine (mg/dL)   Date Value   03/13/2024 3.04 (H)   02/03/2024 2.63 (H)   02/02/2024 3.05 (H)     No results found for: \"ALBUR\", \"[id number]\"  Lab Results   Component Value Date    CHOL 133 02/09/2023    CHOL 184 10/17/2022    CHOL 108 01/31/2022     Lab Results   Component Value Date    HDL 43.6 02/09/2023    HDL 53.0 10/17/2022    HDL 31.1 (A) 01/31/2022     No results found for: \"LDLCALC\"  Lab Results   Component Value Date    TRIG 147 02/09/2023    TRIG 127 10/17/2022    TRIG 127 " 01/31/2022       The 10-year ASCVD risk score (Niels CLEVELAND, et al., 2019) is: 16.1%    Values used to calculate the score:      Age: 63 years      Sex: Male      Is Non- : No      Diabetic: Yes      Tobacco smoker: No      Systolic Blood Pressure: 111 mmHg      Is BP treated: Yes      HDL Cholesterol: 43.6 mg/dL      Total Cholesterol: 133 mg/dL     DIABETES ASSESSMENT    CURRENT PHARMACOTHERAPY  Ozempic 1 mg ( two doses of 0.5 mg) weekly on Wednesday (started 4/17/24)  Patient paid for a 84 day supply on 3/8/24  Reports no GI ALIE with titrated dose  Humalog 10 units TID with meals  Patient reports that he has been injecting 8 units if he doesn't think there are carbs in meal and 10 units if there are carbs  Lantus 26 units QHS  Jardiance 10 mg QAM    SECONDARY PREVENTION  - Statin? Yes atorvastatin 20mg  - ACE-I/ARB? Yes lisinopril 40mg  - Aspirin? No    HISTORICAL PHARMACOTHERAPY  Bydureon: Formulary change to Ozempic   Farxiga: Formulary change to Jardiance     SMBG MEASUREMENTS  Patient is using: both glucometer and continuous glucose monitor Reports drastic fluctuation in BG  CGM sensor stopped working, so patient is currently checking the blood glucose 3 times per day (FBG, BG before meals, and 2hr after meals). His BG seems to spike quickly but comes down quickly after 20-30 min. It is also sensitive to his food intake  Hypoglycemic awareness: yes, has a few Tootsie Rolls to raise BG  With Freestyle [place]:   FBG (8-9am) Lunch BG 2hr PPBG Dinner Bedtime (10-11pm) Notes   24-Apr 54  167 223 209 240 189    25-Apr 160 194 200 178 191 8 units AM, 10 units noon, 8 units PM. Had increased activity walking around festival and eating festival food     The following are numbers were manually checked after sensor stopped   FBG (8-9am) Lunch BG Dinner Notes   26-Apr 52 (6:30am)  70 (6:45am)  200s  162 164 175 8 units TID. No hypoglycemic sx but ate 9 Tootsie rolls which increased BG to 70 and then  "ate additional nutty bars and candy --> BG 200s   27-Apr 63 107 184 8 units AM, 10 units noon, 8 units PM   28-Apr 167 211 219 8 units AM, 10 units noon, 10 units PM elevated BG possibly due to food   29-Apr 167 250 163 8 units AM, 10 units noon (BG possibly due to breakfast), 10 units PM    30-Apr 201 311 106 8 units. Had a lot of carbs for lunch   1-May 179        Diet: Reports \"appetite is bad\" and nitpicks vegetables. He is following a low salt, generally healthy and high fiber diet. Ozempic has been helping with food cravings. No snacks this past week  Breakfast: biggest meal around 11am at [employer] (scrambles eggs, 2 sausages, english muffin, bowl of fruit, coffee)  If not [employer]: Raisin Bran spikes BG a lot more than [employer] meal  Lunch (biggest meal. After 2pm if late breakfast): light if full from breakfast  Dinner: varies, often lighter meal  Bedtime: midnight takes atorvastatin and Lantus    ADHERENCE/AFFORDABILITY: reports they try to save enough on side for meds but reports financial strain. He seems to be in the coverage.  Ozempic: $512/3 month supply (Insurance allows \"early billing\" of different dose. No steps required)  Jardiance: $150.11 (Previous: $42/month)  Kerendia: PA approved (Previous: $42/month)  Insulin: expensive (still has 3 pens left each)  Lokelma: ~$125/month (just filled for 1 month on 3/27/24)    Assessment/Plan   Problem List Items Addressed This Visit       Diabetes mellitus (Multi) - Primary     Patient identified self-management goal:  weight loss, BG control  Patients diabetes is poorly controlled   Patient's goal A1c is < 7%.  Is pt at goal? No, 8.4% on 3/27/24  Patient's PPBGs are fluctuating drastically where there is sensitivity with food intake with 2 episodes of hypoglycemia after light dinners FBG averages 130-150s mg/dL and PPBG is greatly elevated after meals.  Complications: episodes of hypoglycemia with lower food intake. Recent hypoglycemic episode involved " increased exercise and ingestion of festival foodPatient's carb to insulin ratio competency is not reliable at the moment  Rationale for plan: Carb counting is the most ideal for this patient but not feasible. FBG is elevated. Will focus on controlling the PPBG with bolus insulin at this time due to drastic fluctuations likely due to intake of simple carbohydrates. Patient is likely to have controlled PPBG by injecting 10 units of Humalog at each meal rather than self-adjusting based on what carbs. Unable to increase Jardiance dose due to renal function (eGFR 22)  Medication Changes:  CONTINUE:  Lantus 26 units every night at bedtime  Ozempic 1 mg (two doses of 0.5mg) weekly on Wednesday (starting 4/17/24)  Jardiance 10 mg every morning  Humalog 10 units TID 15-30 minutes before meals + sliding scale  Inject 5 units before lighter meals  Cover snacks with 1 unit of Humalog for every 10 grams of carbohydrates  Compliance at present is estimated to be good but patient has adjusted Humalog dose based on insulin. Efforts to improve compliance (if necessary) will be directed compliance with Humalog injection, watching his intake of simple/complex carbs, increased exercise, and at regular blood sugar monitoring: FBG, before meals, and 2hr PPBG after each meal .  Education Provided to Patient: Humalog instructions, hypoglycemic symptoms, BG goals, additional BG monitoring given his elevated sugars, complex vs simple carbs and their effect on BG  Follow-up: 2 week for BG control and adjust meds as needed  PCP Follow-up: 6/27/24              Notify your healthcare provider if you have any of the following:  -Diarrhea or vomiting for more than six hours  -Blood sugar >300 mg/dL more than once  -Low blood sugar (<70 mg/dL)  -Questions about your medications    Thank you,  Amy Whalen, PharmD    Continue all meds under the continuation of care with the referring provider and clinical pharmacy team.  Verbal consent to  manage patient's drug therapy was obtained. They were informed they may decline to participate or withdraw from participation in pharmacy services at any time.

## 2024-05-01 NOTE — ASSESSMENT & PLAN NOTE
Patient identified self-management goal:  weight loss, BG control  Patients diabetes is poorly controlled   Patient's goal A1c is < 7%.  Is pt at goal? No, 8.4% on 3/27/24  Patient's PPBGs are fluctuating drastically where there is sensitivity with food intake with 2 episodes of hypoglycemia after light dinners FBG averages 130-150s mg/dL and PPBG is greatly elevated after meals.  Complications: episodes of hypoglycemia with lower food intake. Recent hypoglycemic episode involved increased exercise and ingestion of festival foodPatient's carb to insulin ratio competency is not reliable at the moment  Rationale for plan: Carb counting is the most ideal for this patient but not feasible. FBG is elevated. Will focus on controlling the PPBG with bolus insulin at this time due to drastic fluctuations likely due to intake of simple carbohydrates. Patient is likely to have controlled PPBG by injecting 10 units of Humalog at each meal rather than self-adjusting based on what carbs. Unable to increase Jardiance dose due to renal function (eGFR 22)  Medication Changes:  CONTINUE:  Lantus 26 units every night at bedtime  Ozempic 1 mg (two doses of 0.5mg) weekly on Wednesday (starting 4/17/24)  Jardiance 10 mg every morning  Humalog 10 units TID 15-30 minutes before meals + sliding scale  Inject 5 units before lighter meals  Cover snacks with 1 unit of Humalog for every 10 grams of carbohydrates  Compliance at present is estimated to be good but patient has adjusted Humalog dose based on insulin. Efforts to improve compliance (if necessary) will be directed compliance with Humalog injection, watching his intake of simple/complex carbs, increased exercise, and at regular blood sugar monitoring: FBG, before meals, and 2hr PPBG after each meal .  Education Provided to Patient: Humalog instructions, hypoglycemic symptoms, BG goals, additional BG monitoring given his elevated sugars, complex vs simple carbs and their effect on  BG  Follow-up: 2 week for BG control and adjust meds as needed  PCP Follow-up: 6/27/24

## 2024-05-02 ENCOUNTER — PATIENT OUTREACH (OUTPATIENT)
Dept: PRIMARY CARE | Facility: CLINIC | Age: 64
End: 2024-05-02
Payer: MEDICARE

## 2024-05-06 DIAGNOSIS — E11.21 TYPE 2 DIABETES MELLITUS WITH DIABETIC NEPHROPATHY, WITHOUT LONG-TERM CURRENT USE OF INSULIN (MULTI): ICD-10-CM

## 2024-05-06 RX ORDER — SEMAGLUTIDE 1.34 MG/ML
1 INJECTION, SOLUTION SUBCUTANEOUS
Qty: 3 ML | Refills: 0 | Status: CANCELLED | OUTPATIENT
Start: 2024-05-06

## 2024-05-07 NOTE — PROGRESS NOTES
I reviewed the progress note and agree with the resident’s findings and plans as written. Case discussed with resident.    Elieser Portillo, PharmD

## 2024-05-08 ENCOUNTER — APPOINTMENT (OUTPATIENT)
Dept: HEMATOLOGY/ONCOLOGY | Facility: HOSPITAL | Age: 64
End: 2024-05-08
Payer: MEDICARE

## 2024-05-10 DIAGNOSIS — Z79.4 OTHER SPECIFIED DIABETES MELLITUS WITH OTHER SPECIFIED COMPLICATION, WITH LONG-TERM CURRENT USE OF INSULIN (MULTI): ICD-10-CM

## 2024-05-10 DIAGNOSIS — E13.69 OTHER SPECIFIED DIABETES MELLITUS WITH OTHER SPECIFIED COMPLICATION, WITH LONG-TERM CURRENT USE OF INSULIN (MULTI): ICD-10-CM

## 2024-05-10 RX ORDER — PEN NEEDLE, DIABETIC 32 GX3/16"
NEEDLE, DISPOSABLE MISCELLANEOUS
Qty: 100 EACH | Refills: 3 | Status: SHIPPED | OUTPATIENT
Start: 2024-05-10

## 2024-05-12 ENCOUNTER — ANESTHESIA EVENT (OUTPATIENT)
Dept: ANESTHESIOLOGY | Facility: HOSPITAL | Age: 64
End: 2024-05-12

## 2024-05-12 SDOH — HEALTH STABILITY: MENTAL HEALTH: CURRENT SMOKER: 0

## 2024-05-12 NOTE — ANESTHESIA PREPROCEDURE EVALUATION
Patient: Gregorio North    Procedure Information    Date: 05/12/24  Procedure: Procedure Not Yet Scheduled         Relevant Problems   Anesthesia (within normal limits)      Cardiac   (+) Atherosclerosis of native arteries of extremities with rest pain, bilateral legs (Multi)   (+) Benign essential hypertension   (+) CHF (congestive heart failure) (Multi)   (+) HLD (hyperlipidemia)      Pulmonary (within normal limits)      Neuro   (+) Anxiety   (+) Tremor      GI (within normal limits)      /Renal   (+) Benign prostatic hyperplasia      Liver (within normal limits)      Endocrine   (+) Diabetes mellitus (Multi)   (+) Obesity with body mass index 30 or greater   (+) Type 2 diabetes mellitus with insulin therapy (Multi)      Hematology   (+) Anemia   (+) Iron deficiency anemia      Musculoskeletal (within normal limits)   (+) Arthralgia   (+) 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 (Multi)      HEENT (within normal limits)      ID   (+) Chronic osteomyelitis with draining sinus, right ankle and foot (Multi)   (+) Lyme disease      Skin (within normal limits)      GYN (within normal limits)      Respiratory   (+) Chronic pulmonary edema (HHS-HCC)      Circulatory   (+) Palpitations      Genitourinary   (+) Acute kidney injury superimposed on CKD (CMS-HCC)   (+) Chronic kidney disease, stage 4 (severe) (Multi)   (+) History of kidney cancer      Multi-system (Lupus, Sarcoid)   (+) Sjogren's syndrome (Multi)       Clinical information reviewed:                   NPO Detail:  No data recorded     PHYSICAL EXAM    Anesthesia Plan    History of general anesthesia?: yes  History of complications of general anesthesia?: unknown/emergency    ASA 3     MAC     The patient is not a current smoker.    intravenous induction   Anesthetic plan and risks discussed with patient.  Use of blood products discussed with patient who consented to blood products.    Plan discussed with CRNA.

## 2024-05-13 ENCOUNTER — PRE-ADMISSION TESTING (OUTPATIENT)
Dept: PREADMISSION TESTING | Facility: HOSPITAL | Age: 64
End: 2024-05-13
Payer: MEDICARE

## 2024-05-13 VITALS — BODY MASS INDEX: 37.07 KG/M2 | WEIGHT: 251 LBS

## 2024-05-13 ASSESSMENT — DUKE ACTIVITY SCORE INDEX (DASI)
CAN YOU DO YARD WORK LIKE RAKING LEAVES, WEEDING OR PUSHING A MOWER: YES
DASI METS SCORE: 9
CAN YOU DO HEAVY WORK AROUND THE HOUSE LIKE SCRUBBING FLOORS OR LIFTING AND MOVING HEAVY FURNITURE: YES
CAN YOU CLIMB A FLIGHT OF STAIRS OR WALK UP A HILL: YES
CAN YOU HAVE SEXUAL RELATIONS: YES
CAN YOU WALK A BLOCK OR TWO ON LEVEL GROUND: YES
CAN YOU WALK INDOORS, SUCH AS AROUND YOUR HOUSE: YES
CAN YOU DO MODERATE WORK AROUND THE HOUSE LIKE VACUUMING, SWEEPING FLOORS OR CARRYING GROCERIES: YES
CAN YOU PARTICIPATE IN STRENOUS SPORTS LIKE SWIMMING, SINGLES TENNIS, FOOTBALL, BASKETBALL, OR SKIING: NO
CAN YOU RUN A SHORT DISTANCE: YES
CAN YOU TAKE CARE OF YOURSELF (EAT, DRESS, BATHE, OR USE TOILET): YES
CAN YOU DO LIGHT WORK AROUND THE HOUSE LIKE DUSTING OR WASHING DISHES: YES
TOTAL_SCORE: 50.7
CAN YOU PARTICIPATE IN MODERATE RECREATIONAL ACTIVITIES LIKE GOLF, BOWLING, DANCING, DOUBLES TENNIS OR THROWING A BASEBALL OR FOOTBALL: YES

## 2024-05-13 NOTE — PREPROCEDURE INSTRUCTIONS
"   Medication List            Accurate as of May 13, 2024 10:58 AM. Always use your most recent med list.                Accu-Chek Guide Glucose Meter misc  Generic drug: blood-glucose meter  Use as directed to check blood sugar up to 3-4 times daily     Accu-Chek Guide test strips strip  Generic drug: blood sugar diagnostic  Use as directed to check blood sugar up to 3-4 times daily     Accu-Chek Softclix Lancets misc  Generic drug: lancets  Use as directed  to check blood pressure 3 to 4 times a day     allopurinol 100 mg tablet  Commonly known as: Zyloprim  Take 1 tablet (100 mg) by mouth once daily.  Medication Adjustments for Surgery: Continue until night before surgery     atorvastatin 20 mg tablet  Commonly known as: Lipitor  Take 1 tablet (20 mg) by mouth once daily at bedtime.  Medication Adjustments for Surgery: Continue until night before surgery     BD Alcohol Swabs pads, medicated  Generic drug: alcohol swabs  Use as directed to check blood sugar up to 3-4 times daily and inject insulin 4 times daily     CareFine Pen Needle 32 gauge x 3/16\" needle  Generic drug: pen needle, diabetic  USE 1 PEN NEEDLE TO INJECT INSULIN 3 TIMES A DAY BEFORE MEALS AND AT BEDTIME     carvedilol 6.25 mg tablet  Commonly known as: Coreg  Take 1 tablet (6.25 mg) by mouth 2 times a day with meals.  Medication Adjustments for Surgery: Take morning of surgery with sip of water, no other fluids     cholecalciferol 125 MCG (5000 UT) capsule  Commonly known as: Vitamin D-3  Medication Adjustments for Surgery: Continue until night before surgery     clopidogrel 75 mg tablet  Commonly known as: Plavix  Take 1 tablet (75 mg) by mouth once daily.  Notes to patient: STOP 5 DAYS PRIOR TO SURGERY     folic acid 1 mg tablet  Commonly known as: Folvite  Take 5 tablets (5 mg) by mouth once daily.  Medication Adjustments for Surgery: Continue until night before surgery     FreeStyle [place] 3 [place]ai misc  Generic drug: blood-glucose " meter,continuous  Use as instructed to check blood sugar throughout the day     FreeStyle [place] 3 Sensor device  Generic drug: blood-glucose sensor  Change every 14 days. Used as directed to check blood sugar throughout the day     furosemide 40 mg tablet  Commonly known as: Lasix  TAKE 2 TABLETS (80MG) BY MOUTH IN THE MORNING AND 1 TABLET (40MG) IN THE EVENING.  Medication Adjustments for Surgery: Continue until night before surgery     HumaLOG KwikPen Insulin 100 unit/mL injection  Generic drug: insulin lispro  Inject 8 Units under the skin 3 times a day before meals.  Medication Adjustments for Surgery: Continue until night before surgery     Jardiance 10 mg  Generic drug: empagliflozin  Take 1 tablet (10 mg) by mouth once daily in the morning.  Medication Adjustments for Surgery: Stop 3 days before surgery     Kerendia 10 mg tablet tablet  Generic drug: finerenone  Take 1 tablet (10 mg) by mouth once daily.  Medication Adjustments for Surgery: Continue until night before surgery     Lantus Solostar U-100 Insulin 100 unit/mL (3 mL) pen  Generic drug: insulin glargine  Inject 26 Units under the skin once daily at bedtime.     lisinopril 40 mg tablet  Take 1 tablet (40 mg) by mouth once daily in the morning.  Medication Adjustments for Surgery: Continue until night before surgery     Lokelma 5 gram packet  Generic drug: sodium zirconium cyclosilicate  Take 5 g (1 packet) by mouth once daily.  Medication Adjustments for Surgery: Continue until night before surgery     MAGNESIUM GLYCINATE ORAL  Medication Adjustments for Surgery: Continue until night before surgery     * Ozempic 0.25 mg or 0.5 mg (2 mg/3 mL) pen injector  Generic drug: semaglutide  Medication Adjustments for Surgery: Stop 7 days before surgery     * Ozempic 1 mg/dose (4 mg/3 mL) pen injector  Generic drug: semaglutide  Inject 1 mg under the skin 1 (one) time per week.  Medication Adjustments for Surgery: Stop 7 days before surgery     potassium  chloride CR 20 mEq ER tablet  Commonly known as: Klor-Con M20  TAKE ONE TABLET BY MOUTH TWO TIMES A DAY     Retacrit 40,000 unit/mL injection  Generic drug: epoetin mckenna-epbx  Medication Adjustments for Surgery: Continue until night before surgery     sodium bicarbonate 650 mg tablet  Take 1 tablet (650 mg) by mouth every 8 hours.  Medication Adjustments for Surgery: Continue until night before surgery     sodium,potassium,mag sulfates 17.5-3.13-1.6 gram recon soln solution  Commonly known as: Suprep  Take 1 bottle by mouth if needed (take 1 bottle at 6pm night before procedure and take one bottle at 4am the day of procedure).  Medication Adjustments for Surgery: Take morning of surgery with sip of water, no other fluids           * This list has 2 medication(s) that are the same as other medications prescribed for you. Read the directions carefully, and ask your doctor or other care provider to review them with you.                           NPO Instructions:     Follow instructions. Day before procedure-may have light breakfast by 9am (ex. 1 egg, 1 piece of toast).  Then CLEAR LIQUIDS ONLY-No dairy products, nothing red/purple.  Take first part of prep- Evening Before Procedure.  Drink lots of liquids.  Day of Procedure- 3-4am Take Second Part of Prep.   STOP DRINKING 3 HOURS PRIOR TO PROCEDURE.  Take medications as instructed.       Additional Instructions:      Review your medication instructions, take indicated medications  Wear  comfortable loose fitting clothing  All jewelry and valuables should be left at home     Park in back of hospital by ER. Come up to Second floor-Outpt dept to check in.  Bring Photo ID and Insurance card,   You MUST have a  with you.       If you get ill at all before your procedure- CALL YOUR DOCTOR/SURGEON.  We want you in the best shape that is possible. Any sickness might lead to your procedure being delayed.       Call Outpatient dept at [phone] the night before your  procedure (Friday for Monday procedure), between 1-3 pm.

## 2024-05-15 ENCOUNTER — TELEMEDICINE (OUTPATIENT)
Dept: PHARMACY | Facility: HOSPITAL | Age: 64
End: 2024-05-15
Payer: MEDICARE

## 2024-05-15 VITALS — WEIGHT: 252 LBS | BODY MASS INDEX: 37.33 KG/M2 | HEIGHT: 69 IN

## 2024-05-15 DIAGNOSIS — E11.21 TYPE 2 DIABETES MELLITUS WITH DIABETIC NEPHROPATHY, WITHOUT LONG-TERM CURRENT USE OF INSULIN (MULTI): ICD-10-CM

## 2024-05-15 PROCEDURE — RXMED WILLOW AMBULATORY MEDICATION CHARGE

## 2024-05-15 ASSESSMENT — ENCOUNTER SYMPTOMS: POLYDIPSIA: 1

## 2024-05-15 NOTE — PROGRESS NOTES
"Pharmacist Clinic: Diabetes Management  Gregorio North is a 63 y.o. male who presents for a follow-up evaluation of his Type 2 diabetes mellitus.   Referring Provider: Latisha Vines PA-C     Patient has been enrolled in [facility]/VAF with the active benefit period 4/12/2024 - 4/12/2025. Approved for Kerendia, Lokelma, Lantus, Humalog, Jardiance and Ozempic.    Diabetes  He presents for his follow-up diabetic visit. He has type 2 diabetes mellitus. There are no hypoglycemic associated symptoms. Associated symptoms include polydipsia and polyuria. There are no hypoglycemic complications. Diabetic complications include nephropathy. Risk factors for coronary artery disease include diabetes mellitus, dyslipidemia, hypertension, male sex, obesity and tobacco exposure. He is compliant with treatment all of the time. Meal planning includes avoidance of concentrated sweets and carbohydrate counting. He has had a previous visit with a dietitian. He participates in exercise daily. He sees a podiatrist.Eye exam is not current.       No Known Allergies    LAB REVIEW   Glucose (mg/dL)   Date Value   03/13/2024 170 (H)   02/03/2024 156 (H)   02/02/2024 153 (H)     POC HEMOGLOBIN A1c (%)   Date Value   03/27/2024 8.4 (A)   09/19/2023 8.7 (A)     Hemoglobin A1C (%)   Date Value   10/17/2022 7.1 (A)   01/27/2022 11.2 (A)   08/30/2021 7.2 (A)     Urea Nitrogen (mg/dL)   Date Value   03/13/2024 52 (H)   02/03/2024 72 (H)   02/02/2024 95 (HH)     Creatinine (mg/dL)   Date Value   03/13/2024 3.04 (H)   02/03/2024 2.63 (H)   02/02/2024 3.05 (H)     No results found for: \"ALBUR\", \"MOG38CTL\"  Lab Results   Component Value Date    CHOL 133 02/09/2023    CHOL 184 10/17/2022    CHOL 108 01/31/2022     Lab Results   Component Value Date    HDL 43.6 02/09/2023    HDL 53.0 10/17/2022    HDL 31.1 (A) 01/31/2022     No results found for: \"LDLCALC\"  Lab Results   Component Value Date    TRIG 147 02/09/2023    TRIG 127 10/17/2022    TRIG 127 " "2022       The 10-year ASCVD risk score (Niels CLEVELAND, et al., 2019) is: 16.1%    Values used to calculate the score:      Age: 63 years      Sex: Male      Is Non- : No      Diabetic: Yes      Tobacco smoker: No      Systolic Blood Pressure: 111 mmHg      Is BP treated: Yes      HDL Cholesterol: 43.6 mg/dL      Total Cholesterol: 133 mg/dL     DIABETES ASSESSMENT    CURRENT PHARMACOTHERAPY  Ozempic 1 mg (two doses of 0.5 mg) weekly on Wednesday (started 24)  Patient paid for a 84 day supply on 3/8/24  Reports no GI ALIE with titrated dose  Humalog 10 units TID with meals  Patient reports that he has been injecting 8 units if he doesn't think there are carbs in meal and 10 units if there are carbs  Lantus 26 units QHS  Jardiance 10 mg QAM    SECONDARY PREVENTION  - Statin? Yes atorvastatin 20mg  - ACE-I/ARB? Yes lisinopril 40mg  - Aspirin? No    HISTORICAL PHARMACOTHERAPY  Bydureon: Formulary change to Ozempic   Farxiga: Formulary change to Jardiance     SMBG MEASUREMENTS  Patient is using: both glucometer and continuous glucose monitor Reports drastic fluctuation in BG  Patient is currently checking the blood glucose 3 times per day (FBG, BG before meals, and 2hr after meals). His BG seems to spike quickly but comes down quickly after 20-30 min. It is also sensitive to his food intake  Hypoglycemic awareness: yes, has a few Tootsie Rolls to raise BG  Avg FBs  PPBG: fluctuates    Diet: Reports \"appetite is bad\" and nitpicks vegetables. He is following a low salt, generally healthy and high fiber diet. Ozempic has been helping with food cravings. No snacks this past week  Breakfast: biggest meal around 11am at [employer] (scrambles eggs, 2 sausages, english muffin, bowl of fruit, coffee)  If not [employer]: Raisin Bran spikes BG a lot more than [employer] meal  Lunch (biggest meal. After 2pm if late breakfast): light if full from breakfast  Dinner: varies, often lighter meal  Bedtime: " "midnight takes atorvastatin and Lantus    ADHERENCE/AFFORDABILITY: reports they try to save enough on side for meds but reports financial strain. He seems to be in the coverage.  Ozempic: $512/3 month supply (Insurance allows \"early billing\" of different dose. No steps required)  Jardiance: $150.11 (Previous: $42/month)  Kerendia: PA approved (Previous: $42/month)  Insulin: expensive (still has 3 pens left each)  Lokelma: ~$125/month (just filled for 1 month on 3/27/24)    Assessment/Plan   Problem List Items Addressed This Visit       Diabetes mellitus (Multi)     Patient identified self-management goal:  weight loss, BG control  Patients diabetes is poorly controlled   Patient's goal A1c is < 7%.  Is pt at goal? No, 8.4% on 3/27/24  Patient's PPBGs are fluctuating drastically where there is sensitivity with food intake a few episodes of hypoglycemia after light dinners. FBG averages 110s mg/dL and PPBG is greatly elevated after big meals.  Complications: episodes of hypoglycemia with lower food intake. Patient's carb to insulin ratio competency is not reliable at the moment, but he is able to determine pattern of low hypoglycemia with light meals  Rationale for plan: Carb counting is the most ideal for this patient but not feasible. FBG is elevated. Will continue to focus on controlling the PPBG with bolus insulin at this time due to drastic fluctuations likely due to intake of simple carbohydrates. Patient is likely to have controlled PPBG by injecting 10 units of Humalog at each meal and only 5 units with light meals rather than self-adjusting based on what carbs. Unable to increase Jardiance dose due to renal function (eGFR 22). About to have colonoscopy where a dose needs to be held at some point, so will continue Ozempic 1mg for now until after procedure.  Medication Changes:  CONTINUE:  Lantus 26 units every night at bedtime  Ozempic 1 mg (two doses of 0.5mg) weekly on Wednesday (starting 4/17/24)  Jardiance " 10 mg every morning  Humalog 10 units TID 15-30 minutes before meals + sliding scale  Inject 5 units before lighter meals  Cover snacks with 1 unit of Humalog for every 10 grams of carbohydrates  Compliance at present is estimated to be good but patient has adjusted Humalog dose based on insulin. Efforts to improve compliance (if necessary) will be directed compliance with Humalog injection, watching his intake of simple/complex carbs, increased exercise, and at regular blood sugar monitoring: FBG, before meals, and 2hr PPBG after each meal .  Education Provided to Patient: Humalog instructions, hypoglycemic symptoms, BG goals, additional BG monitoring given his elevated sugars, complex vs simple carbs  Follow-up: 4 weeks for BG control and adjust meds as needed. Patient is to contact me ([phone]) with concerns as needed  PCP Follow-up: 6/27/24         Relevant Medications    semaglutide (OZEMPIC) 1 mg/dose (4 mg/3 mL) pen injector (Start on 5/19/2024)       Notify your healthcare provider if you have any of the following:  -Diarrhea or vomiting for more than six hours  -Blood sugar >300 mg/dL more than once  -Low blood sugar (<70 mg/dL)  -Questions about your medications    Thank you,  Amy Whalen, PharmD    Continue all meds under the continuation of care with the referring provider and clinical pharmacy team.  Verbal consent to manage patient's drug therapy was obtained. They were informed they may decline to participate or withdraw from participation in pharmacy services at any time.

## 2024-05-15 NOTE — ASSESSMENT & PLAN NOTE
Patient identified self-management goal:  weight loss, BG control  Patients diabetes is poorly controlled   Patient's goal A1c is < 7%.  Is pt at goal? No, 8.4% on 3/27/24  Patient's PPBGs are fluctuating drastically where there is sensitivity with food intake a few episodes of hypoglycemia after light dinners. FBG averages 110s mg/dL and PPBG is greatly elevated after big meals.  Complications: episodes of hypoglycemia with lower food intake. Patient's carb to insulin ratio competency is not reliable at the moment, but he is able to determine pattern of low hypoglycemia with light meals  Rationale for plan: Carb counting is the most ideal for this patient but not feasible. FBG is elevated. Will continue to focus on controlling the PPBG with bolus insulin at this time due to drastic fluctuations likely due to intake of simple carbohydrates. Patient is likely to have controlled PPBG by injecting 10 units of Humalog at each meal and only 5 units with light meals rather than self-adjusting based on what carbs. Unable to increase Jardiance dose due to renal function (eGFR 22). About to have colonoscopy where a dose needs to be held at some point, so will continue Ozempic 1mg for now until after procedure.  Medication Changes:  CONTINUE:  Lantus 26 units every night at bedtime  Ozempic 1 mg (two doses of 0.5mg) weekly on Wednesday (starting 4/17/24)  Jardiance 10 mg every morning  Humalog 10 units TID 15-30 minutes before meals + sliding scale  Inject 5 units before lighter meals  Cover snacks with 1 unit of Humalog for every 10 grams of carbohydrates  Compliance at present is estimated to be good but patient has adjusted Humalog dose based on insulin. Efforts to improve compliance (if necessary) will be directed compliance with Humalog injection, watching his intake of simple/complex carbs, increased exercise, and at regular blood sugar monitoring: FBG, before meals, and 2hr PPBG after each meal .  Education Provided to  Patient: Humalog instructions, hypoglycemic symptoms, BG goals, additional BG monitoring given his elevated sugars, complex vs simple carbs  Follow-up: 4 weeks for BG control and adjust meds as needed. Patient is to contact me ([phone]) with concerns as needed  PCP Follow-up: 6/27/24

## 2024-05-17 ENCOUNTER — PHARMACY VISIT (OUTPATIENT)
Dept: PHARMACY | Facility: CLINIC | Age: 64
End: 2024-05-17
Payer: MEDICARE

## 2024-05-22 ENCOUNTER — APPOINTMENT (OUTPATIENT)
Dept: HEMATOLOGY/ONCOLOGY | Facility: HOSPITAL | Age: 64
End: 2024-05-22
Payer: MEDICARE

## 2024-05-22 ENCOUNTER — LAB (OUTPATIENT)
Dept: LAB | Facility: LAB | Age: 64
End: 2024-05-22
Payer: MEDICARE

## 2024-05-22 DIAGNOSIS — E78.00 PURE HYPERCHOLESTEROLEMIA: ICD-10-CM

## 2024-05-22 DIAGNOSIS — E11.9 TYPE 2 DIABETES MELLITUS WITHOUT COMPLICATION, UNSPECIFIED WHETHER LONG TERM INSULIN USE (MULTI): ICD-10-CM

## 2024-05-22 LAB
CHOLEST SERPL-MCNC: 115 MG/DL (ref 0–199)
CHOLESTEROL/HDL RATIO: 4
HDLC SERPL-MCNC: 29 MG/DL
LDLC SERPL CALC-MCNC: 51 MG/DL
NON HDL CHOLESTEROL: 86 MG/DL (ref 0–149)
TRIGL SERPL-MCNC: 174 MG/DL (ref 0–149)
VLDL: 35 MG/DL (ref 0–40)

## 2024-05-22 PROCEDURE — 80061 LIPID PANEL: CPT

## 2024-05-22 PROCEDURE — 36415 COLL VENOUS BLD VENIPUNCTURE: CPT

## 2024-06-01 DIAGNOSIS — Z86.79 HISTORY OF HYPERTENSION: ICD-10-CM

## 2024-06-03 ENCOUNTER — HOSPITAL ENCOUNTER (OUTPATIENT)
Dept: GASTROENTEROLOGY | Facility: HOSPITAL | Age: 64
Setting detail: OUTPATIENT SURGERY
Discharge: HOME | End: 2024-06-03
Payer: MEDICARE

## 2024-06-03 ENCOUNTER — ANESTHESIA (OUTPATIENT)
Dept: GASTROENTEROLOGY | Facility: HOSPITAL | Age: 64
End: 2024-06-03
Payer: MEDICARE

## 2024-06-03 ENCOUNTER — ANESTHESIA EVENT (OUTPATIENT)
Dept: GASTROENTEROLOGY | Facility: HOSPITAL | Age: 64
End: 2024-06-03
Payer: MEDICARE

## 2024-06-03 VITALS
WEIGHT: 251.32 LBS | SYSTOLIC BLOOD PRESSURE: 119 MMHG | TEMPERATURE: 96.8 F | OXYGEN SATURATION: 100 % | RESPIRATION RATE: 17 BRPM | BODY MASS INDEX: 37.22 KG/M2 | HEIGHT: 69 IN | HEART RATE: 86 BPM | DIASTOLIC BLOOD PRESSURE: 58 MMHG

## 2024-06-03 DIAGNOSIS — Z12.11 SCREENING FOR COLON CANCER: Primary | ICD-10-CM

## 2024-06-03 LAB — GLUCOSE BLD MANUAL STRIP-MCNC: 158 MG/DL (ref 74–99)

## 2024-06-03 PROCEDURE — 2500000005 HC RX 250 GENERAL PHARMACY W/O HCPCS: Performed by: NURSE ANESTHETIST, CERTIFIED REGISTERED

## 2024-06-03 PROCEDURE — 45378 DIAGNOSTIC COLONOSCOPY: CPT | Performed by: SURGERY

## 2024-06-03 PROCEDURE — G0121 COLON CA SCRN NOT HI RSK IND: HCPCS | Performed by: SURGERY

## 2024-06-03 PROCEDURE — 2500000004 HC RX 250 GENERAL PHARMACY W/ HCPCS (ALT 636 FOR OP/ED): Performed by: PHYSICIAN ASSISTANT

## 2024-06-03 PROCEDURE — 82947 ASSAY GLUCOSE BLOOD QUANT: CPT

## 2024-06-03 PROCEDURE — 7100000009 HC PHASE TWO TIME - INITIAL BASE CHARGE

## 2024-06-03 PROCEDURE — 2500000004 HC RX 250 GENERAL PHARMACY W/ HCPCS (ALT 636 FOR OP/ED): Performed by: NURSE ANESTHETIST, CERTIFIED REGISTERED

## 2024-06-03 PROCEDURE — RXMED WILLOW AMBULATORY MEDICATION CHARGE

## 2024-06-03 PROCEDURE — 3700000001 HC GENERAL ANESTHESIA TIME - INITIAL BASE CHARGE

## 2024-06-03 PROCEDURE — 7100000010 HC PHASE TWO TIME - EACH INCREMENTAL 1 MINUTE

## 2024-06-03 PROCEDURE — 3700000002 HC GENERAL ANESTHESIA TIME - EACH INCREMENTAL 1 MINUTE

## 2024-06-03 RX ORDER — ONDANSETRON HYDROCHLORIDE 2 MG/ML
4 INJECTION, SOLUTION INTRAVENOUS ONCE AS NEEDED
Status: DISCONTINUED | OUTPATIENT
Start: 2024-06-03 | End: 2024-06-04 | Stop reason: HOSPADM

## 2024-06-03 RX ORDER — PROPOFOL 10 MG/ML
INJECTION, EMULSION INTRAVENOUS AS NEEDED
Status: DISCONTINUED | OUTPATIENT
Start: 2024-06-03 | End: 2024-06-03

## 2024-06-03 RX ORDER — SODIUM CHLORIDE, SODIUM LACTATE, POTASSIUM CHLORIDE, CALCIUM CHLORIDE 600; 310; 30; 20 MG/100ML; MG/100ML; MG/100ML; MG/100ML
100 INJECTION, SOLUTION INTRAVENOUS CONTINUOUS
Status: DISCONTINUED | OUTPATIENT
Start: 2024-06-03 | End: 2024-06-04 | Stop reason: HOSPADM

## 2024-06-03 RX ORDER — LIDOCAINE HYDROCHLORIDE 20 MG/ML
INJECTION, SOLUTION INFILTRATION; PERINEURAL AS NEEDED
Status: DISCONTINUED | OUTPATIENT
Start: 2024-06-03 | End: 2024-06-03

## 2024-06-03 RX ADMIN — PROPOFOL 50 MG: 10 INJECTION, EMULSION INTRAVENOUS at 08:47

## 2024-06-03 RX ADMIN — PROPOFOL 50 MG: 10 INJECTION, EMULSION INTRAVENOUS at 08:42

## 2024-06-03 RX ADMIN — LIDOCAINE HYDROCHLORIDE 2 ML: 20 INJECTION, SOLUTION INFILTRATION; PERINEURAL at 08:38

## 2024-06-03 RX ADMIN — PROPOFOL 100 MG: 10 INJECTION, EMULSION INTRAVENOUS at 08:38

## 2024-06-03 RX ADMIN — PROPOFOL 50 MG: 10 INJECTION, EMULSION INTRAVENOUS at 08:52

## 2024-06-03 RX ADMIN — SODIUM CHLORIDE, POTASSIUM CHLORIDE, SODIUM LACTATE AND CALCIUM CHLORIDE 100 ML/HR: 600; 310; 30; 20 INJECTION, SOLUTION INTRAVENOUS at 07:57

## 2024-06-03 RX ADMIN — PROPOFOL 30 MG: 10 INJECTION, EMULSION INTRAVENOUS at 08:56

## 2024-06-03 SDOH — HEALTH STABILITY: MENTAL HEALTH: CURRENT SMOKER: 0

## 2024-06-03 ASSESSMENT — PAIN SCALES - GENERAL
PAIN_LEVEL: 0
PAINLEVEL_OUTOF10: 1
PAINLEVEL_OUTOF10: 0 - NO PAIN

## 2024-06-03 ASSESSMENT — COLUMBIA-SUICIDE SEVERITY RATING SCALE - C-SSRS
2. HAVE YOU ACTUALLY HAD ANY THOUGHTS OF KILLING YOURSELF?: NO
6. HAVE YOU EVER DONE ANYTHING, STARTED TO DO ANYTHING, OR PREPARED TO DO ANYTHING TO END YOUR LIFE?: NO
1. IN THE PAST MONTH, HAVE YOU WISHED YOU WERE DEAD OR WISHED YOU COULD GO TO SLEEP AND NOT WAKE UP?: NO

## 2024-06-03 ASSESSMENT — PAIN - FUNCTIONAL ASSESSMENT
PAIN_FUNCTIONAL_ASSESSMENT: 0-10

## 2024-06-03 NOTE — H&P
History Of Present Illness  Gregorio North is a 63 y.o. male presenting for a high risk colonoscopy      Past Medical History  Past Medical History:   Diagnosis Date    Acute diastolic heart failure (Multi) 11/23/2022    STEVE (acute kidney injury) (CMS-HCC) 09/18/2023    CHF (congestive heart failure) (Multi)     COVID-19 virus infection 09/18/2023    Cutaneous abscess of back (any part, except buttock) 01/25/2021    Abscess of back    Deficiency of other specified B group vitamins 06/09/2021    Folate deficiency    Diabetic foot ulcer (Multi) 05/06/2022    DM (diabetes mellitus) (Multi)     Encounter for general adult medical examination without abnormal findings 05/19/2020    Medicare annual wellness visit, subsequent    Encounter for screening for malignant neoplasm of colon 02/21/2019    Screening for malignant neoplasm of colon    Encounter for screening for malignant neoplasm of prostate 08/12/2021    Prostate cancer screening    Focal chorioretinal inflammation, macular or paramacular, 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    Acute macular neuroretinitis of left eye    Focal chorioretinal inflammation, macular or paramacular, 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/05/2014    Acute macular neuroretinitis of left eye    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    History of foot operation 09/18/2023    HLD (hyperlipidemia)     HTN (hypertension)     Ischemic optic neuropathy, 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    Localized edema 01/11/2021    Bilateral edema of lower extremity    Other muscle spasm 04/24/2017    Trapezius muscle spasm    Other specified abnormal immunological findings in serum 09/17/2014    Positive Lyme disease serology    Other specified cataract 01/27/2016    Cataract, mature    Other specified health status     No pertinent past surgical history    Other specified postprocedural states 06/18/2015    Status post foot surgery    Other specified retinal disorders  01/27/2016    Macular exudate    Other symptoms and signs involving the musculoskeletal system 04/24/2017    Left arm weakness    Pain in right shoulder 04/24/2017    Right shoulder pain    Personal history of diseases of the skin and subcutaneous tissue 05/25/2021    History of cellulitis    Personal history of other diseases of male genital organs 01/11/2021    History of benign prostatic hyperplasia    Personal history of other diseases of the circulatory system 09/03/2014    History of hypertension    Personal history of other diseases of the musculoskeletal system and connective tissue 11/21/2016    History of joint pain    Personal history of other diseases of the musculoskeletal system and connective tissue 11/21/2016    History of muscle pain    Personal history of other diseases of the nervous system and sense organs 09/08/2014    History of nystagmus    Personal history of other diseases of the nervous system and sense organs 01/27/2016    History of optic atrophy    Personal history of other diseases of the nervous system and sense organs 09/08/2014    History of papilledema    Personal history of other diseases of the nervous system and sense organs 08/22/2016    History of tremor    Personal history of other endocrine, nutritional and metabolic disease 01/11/2021    History of elevated lipids    Personal history of other specified conditions     History of impaired glucose tolerance    Personal history of other specified conditions 09/08/2014    History of palpitations    Personal history of other specified conditions 08/12/2021    History of chronic fatigue    Personal history of other specified conditions 08/09/2018    History of headache    Personal history of other specified conditions 08/09/2018    History of balance disorder    Respiratory failure (Multi) 10/10/2023    Sebaceous cyst 11/20/2018    Infected sebaceous cyst of skin    Unspecified cataract 01/27/2016    Total cataract    Unspecified  "visual loss 02/18/2022    Vision loss       Surgical History  Past Surgical History:   Procedure Laterality Date    CT GUIDED PERCUTANEOUS BIOPSY BONE DEEP  08/29/2022    CT GUIDED PERCUTANEOUS BIOPSY BONE DEEP 8/29/2022 [facility]    FOOT SURGERY Left 03/31/2016    Foot Surgery    MR HEAD ANGIO WO IV CONTRAST  09/17/2014    MR HEAD ANGIO WO IV CONTRAST 9/17/2014 [facility]    OTHER SURGICAL HISTORY  02/04/2015    Eye Surgery Results [facility]        Social History  He reports that he has never smoked. He has never used smokeless tobacco. He reports that he does not currently use alcohol. He reports that he does not use drugs.    Family History  Family History   Problem Relation Name Age of Onset    No Known Problems Mother      Heart attack Father      Diabetes Father          Allergies  Patient has no known allergies.    Review of Systems   All other systems reviewed and are negative.       Physical Exam  Vitals reviewed.   Constitutional:       Appearance: Normal appearance.   HENT:      Head: Normocephalic.   Cardiovascular:      Rate and Rhythm: Normal rate and regular rhythm.      Heart sounds: Normal heart sounds.   Pulmonary:      Effort: Pulmonary effort is normal.      Breath sounds: Normal breath sounds.   Abdominal:      General: Abdomen is flat. There is no distension.      Palpations: Abdomen is soft. There is no mass.      Tenderness: There is no abdominal tenderness. There is no guarding.   Musculoskeletal:         General: Normal range of motion.      Cervical back: Normal range of motion.   Skin:     General: Skin is warm.   Neurological:      General: No focal deficit present.   Psychiatric:         Mood and Affect: Mood normal.          Last Recorded Vitals  Blood pressure (!) 189/90, pulse 90, temperature 35.9 °C (96.7 °F), temperature source Temporal, resp. rate 20, height 1.753 m (5' 9\"), weight 114 kg (251 lb 5.2 oz), SpO2 100%.    COLONOSCOPY/BIOPSIES.  Risks include, but not " limited to pain, infection, bleeding, perforation, missed lesions, aspiration, risk of cardiac, pulmonary, neurologic, locomotor, anesthetic events, incomplete colonoscopy, and other unforeseen complications including death      Kilo Vargas MD

## 2024-06-03 NOTE — ANESTHESIA POSTPROCEDURE EVALUATION
Patient: Gregorio North    Procedure Summary       Date: 06/03/24 Room / Location: [facility]    Anesthesia Start: 0832 Anesthesia Stop: 0902    Procedure: COLONOSCOPY Diagnosis:       Screening for colon cancer      Screening for colon cancer    Scheduled Providers: Kilo Vargas MD Responsible Provider: HEMANT Carlos    Anesthesia Type: MAC ASA Status: 3            Anesthesia Type: MAC    Vitals Value Taken Time   /58 06/03/24 0913   Temp 36 °C (96.8 °F) 06/03/24 0901   Pulse 86 06/03/24 0913   Resp 17 06/03/24 0913   SpO2 100 % 06/03/24 0913       Anesthesia Post Evaluation    Patient location during evaluation: PACU  Patient participation: complete - patient participated  Level of consciousness: awake and alert  Pain score: 0  Pain management: adequate  Airway patency: patent  Cardiovascular status: acceptable  Respiratory status: acceptable  Hydration status: acceptable  Postoperative Nausea and Vomiting: none        There were no known notable events for this encounter.

## 2024-06-03 NOTE — DISCHARGE INSTRUCTIONS

## 2024-06-03 NOTE — ANESTHESIA PREPROCEDURE EVALUATION
Patient: Gregorio North    Procedure Information       Date/Time: 06/03/24 0830    Scheduled providers: Kilo Vargas MD    Procedure: COLONOSCOPY    Location: [facility]          Vitals:    06/03/24 0744   BP: (!) 189/90   Pulse: 90   Resp: 20   Temp: 35.9 °C (96.7 °F)   SpO2: 100%       Past Surgical History:   Procedure Laterality Date    CT GUIDED PERCUTANEOUS BIOPSY BONE DEEP  08/29/2022    CT GUIDED PERCUTANEOUS BIOPSY BONE DEEP 8/29/2022 [facility]    FOOT SURGERY Left 03/31/2016    Foot Surgery    MR HEAD ANGIO WO IV CONTRAST  09/17/2014    MR HEAD ANGIO WO IV CONTRAST 9/17/2014 [facility]    OTHER SURGICAL HISTORY  02/04/2015    Eye Surgery Results [facility]     Past Medical History:   Diagnosis Date    Acute diastolic heart failure (Multi) 11/23/2022    STEVE (acute kidney injury) (CMS-HCC) 09/18/2023    CHF (congestive heart failure) (Multi)     COVID-19 virus infection 09/18/2023    Cutaneous abscess of back (any part, except buttock) 01/25/2021    Abscess of back    Deficiency of other specified B group vitamins 06/09/2021    Folate deficiency    Diabetic foot ulcer (Multi) 05/06/2022    DM (diabetes mellitus) (Multi)     Encounter for general adult medical examination without abnormal findings 05/19/2020    Medicare annual wellness visit, subsequent    Encounter for screening for malignant neoplasm of colon 02/21/2019    Screening for malignant neoplasm of colon    Encounter for screening for malignant neoplasm of prostate 08/12/2021    Prostate cancer screening    Focal chorioretinal inflammation, macular or paramacular, 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    Acute macular neuroretinitis of left eye    Focal chorioretinal inflammation, macular or paramacular, left eye 11/05/2014    Acute macular neuroretinitis of left eye    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    History of foot operation 09/18/2023    HLD (hyperlipidemia)     HTN (hypertension)      Ischemic optic neuropathy, 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    Localized edema 01/11/2021    Bilateral edema of lower extremity    Other muscle spasm 04/24/2017    Trapezius muscle spasm    Other specified abnormal immunological findings in serum 09/17/2014    Positive Lyme disease serology    Other specified cataract 01/27/2016    Cataract, mature    Other specified health status     No pertinent past surgical history    Other specified postprocedural states 06/18/2015    Status post foot surgery    Other specified retinal disorders 01/27/2016    Macular exudate    Other symptoms and signs involving the musculoskeletal system 04/24/2017    Left arm weakness    Pain in right shoulder 04/24/2017    Right shoulder pain    Personal history of diseases of the skin and subcutaneous tissue 05/25/2021    History of cellulitis    Personal history of other diseases of male genital organs 01/11/2021    History of benign prostatic hyperplasia    Personal history of other diseases of the circulatory system 09/03/2014    History of hypertension    Personal history of other diseases of the musculoskeletal system and connective tissue 11/21/2016    History of joint pain    Personal history of other diseases of the musculoskeletal system and connective tissue 11/21/2016    History of muscle pain    Personal history of other diseases of the nervous system and sense organs 09/08/2014    History of nystagmus    Personal history of other diseases of the nervous system and sense organs 01/27/2016    History of optic atrophy    Personal history of other diseases of the nervous system and sense organs 09/08/2014    History of papilledema    Personal history of other diseases of the nervous system and sense organs 08/22/2016    History of tremor    Personal history of other endocrine, nutritional and metabolic disease 01/11/2021    History of elevated lipids    Personal history of other specified  conditions     History of impaired glucose tolerance    Personal history of other specified conditions 09/08/2014    History of palpitations    Personal history of other specified conditions 08/12/2021    History of chronic fatigue    Personal history of other specified conditions 08/09/2018    History of headache    Personal history of other specified conditions 08/09/2018    History of balance disorder    Respiratory failure (Multi) 10/10/2023    Sebaceous cyst 11/20/2018    Infected sebaceous cyst of skin    Unspecified cataract 01/27/2016    Total cataract    Unspecified visual loss 02/18/2022    Vision loss       Current Outpatient Medications:     allopurinol (Zyloprim) 100 mg tablet, Take 1 tablet (100 mg) by mouth once daily., Disp: 90 tablet, Rfl: 3    atorvastatin (Lipitor) 20 mg tablet, Take 1 tablet (20 mg) by mouth once daily at bedtime., Disp: 90 tablet, Rfl: 3    carvedilol (Coreg) 6.25 mg tablet, Take 1 tablet (6.25 mg) by mouth 2 times a day with meals., Disp: 30 tablet, Rfl: 3    cholecalciferol (Vitamin D-3) 125 MCG (5000 UT) capsule, Take 1 capsule (125 mcg) by mouth once daily., Disp: , Rfl:     clopidogrel (Plavix) 75 mg tablet, Take 1 tablet (75 mg) by mouth once daily., Disp: 90 tablet, Rfl: 3    empagliflozin (Jardiance) 10 mg, Take 1 tablet (10 mg) by mouth once daily in the morning., Disp: 90 tablet, Rfl: 3    epoetin mckenna-epbx (Retacrit) 40,000 unit/mL injection, Inject 1 mL (40,000 Units) under the skin every 30 (thirty) days., Disp: , Rfl:     finerenone (Kerendia) 10 mg tablet tablet, Take 1 tablet (10 mg) by mouth once daily., Disp: 90 tablet, Rfl: 3    folic acid (Folvite) 1 mg tablet, Take 5 tablets (5 mg) by mouth once daily., Disp: 450 tablet, Rfl: 3    furosemide (Lasix) 40 mg tablet, TAKE 2 TABLETS (80MG) BY MOUTH IN THE MORNING AND 1 TABLET (40MG) IN THE EVENING., Disp: 270 tablet, Rfl: 0    HumaLOG KwikPen Insulin 100 unit/mL injection, Inject 8 Units under the skin 3 times a  "day before meals. (Patient taking differently: Inject 10 Units under the skin 3 times a day before meals.), Disp: 15 mL, Rfl: 3    insulin glargine (Lantus Solostar U-100 Insulin) 100 unit/mL (3 mL) pen, Inject 26 Units under the skin once daily at bedtime., Disp: 15 mL, Rfl: 3    lisinopril 40 mg tablet, Take 1 tablet (40 mg) by mouth once daily in the morning., Disp: 90 tablet, Rfl: 3    semaglutide (OZEMPIC) 1 mg/dose (4 mg/3 mL) pen injector, Inject 1 mg under the skin 1 (one) time per week., Disp: 3 mL, Rfl: 0    sodium bicarbonate 650 mg tablet, Take 1 tablet (650 mg) by mouth every 8 hours., Disp: 270 tablet, Rfl: 0    sodium zirconium cyclosilicate (Lokelma) 5 gram packet, Take 5 g (1 packet) by mouth once daily., Disp: 90 packet, Rfl: 3    sodium,potassium,mag sulfates (Suprep) 17.5-3.13-1.6 gram recon soln solution, Take 1 bottle by mouth if needed (take 1 bottle at 6pm night before procedure and take one bottle at 4am the day of procedure)., Disp: 2 each, Rfl: 0    alcohol swabs (Alcohol Pads) pads, medicated, Use as directed to check blood sugar up to 3-4 times daily and inject insulin 4 times daily, Disp: 300 each, Rfl: 0    blood sugar diagnostic (Blood Glucose Test) strip, Use as directed to check blood sugar up to 3-4 times daily, Disp: 100 each, Rfl: 0    blood-glucose meter misc, Use as directed to check blood sugar up to 3-4 times daily, Disp: 1 each, Rfl: 0    CareFine Pen Needle 32 gauge x 3/16\" needle, USE 1 PEN NEEDLE TO INJECT INSULIN 3 TIMES A DAY BEFORE MEALS AND AT BEDTIME, Disp: 100 each, Rfl: 3    FreeStyle [place] 3 [place] misc, Use as instructed to check blood sugar throughout the day, Disp: 1 each, Rfl: 0    FreeStyle [place] 3 Sensor device, Change every 14 days. Used as directed to check blood sugar throughout the day, Disp: 6 each, Rfl: 0    lancets (Accu-Chek Softclix Lancets) misc, Use as directed  to check blood pressure 3 to 4 times a day, Disp: 100 each, Rfl: 0    MAGNESIUM " GLYCINATE ORAL, Take 420 mg by mouth once daily., Disp: , Rfl:     potassium chloride CR 20 mEq ER tablet, TAKE ONE TABLET BY MOUTH TWO TIMES A DAY (Patient not taking: Reported on 4/17/2024), Disp: 180 tablet, Rfl: 0    Current Facility-Administered Medications:     lactated Ringer's infusion, 100 mL/hr, intravenous, Continuous, Jorge Becker PA-C, Last Rate: 100 mL/hr at 06/03/24 0757, 100 mL/hr at 06/03/24 0757  Prior to Admission medications    Medication Sig Start Date End Date Taking? Authorizing Provider   allopurinol (Zyloprim) 100 mg tablet Take 1 tablet (100 mg) by mouth once daily. 12/21/23 12/20/24 Yes Latisha Vines PA-C   atorvastatin (Lipitor) 20 mg tablet Take 1 tablet (20 mg) by mouth once daily at bedtime. 12/21/23 12/20/24 Yes Latisha Vines PA-C   carvedilol (Coreg) 6.25 mg tablet Take 1 tablet (6.25 mg) by mouth 2 times a day with meals. 2/3/24  Yes Clarence Carreno,    cholecalciferol (Vitamin D-3) 125 MCG (5000 UT) capsule Take 1 capsule (125 mcg) by mouth once daily.   Yes Historical Provider, MD   clopidogrel (Plavix) 75 mg tablet Take 1 tablet (75 mg) by mouth once daily. 12/21/23 12/20/24 Yes Latisha Vines PA-C   empagliflozin (Jardiance) 10 mg Take 1 tablet (10 mg) by mouth once daily in the morning. 4/10/24  Yes Latisha Vines PA-C   epoetin mckenna-epbx (Retacrit) 40,000 unit/mL injection Inject 1 mL (40,000 Units) under the skin every 30 (thirty) days. 11/23/22  Yes Historical Provider, MD   finerenone (Kerendia) 10 mg tablet tablet Take 1 tablet (10 mg) by mouth once daily. 4/10/24  Yes Latisha Vines PA-C   folic acid (Folvite) 1 mg tablet Take 5 tablets (5 mg) by mouth once daily. 12/21/23 12/20/24 Yes Latisha A Vines, PA-C   furosemide (Lasix) 40 mg tablet TAKE 2 TABLETS (80MG) BY MOUTH IN THE MORNING AND 1 TABLET (40MG) IN THE EVENING. 4/24/24  Yes Imani Kaplan MD   HumaLOG KwikPen Insulin 100 unit/mL injection Inject 8 Units under the skin 3 times a day  "before meals.  Patient taking differently: Inject 10 Units under the skin 3 times a day before meals. 4/10/24  Yes Latisha Vines PA-C   insulin glargine (Lantus Solostar U-100 Insulin) 100 unit/mL (3 mL) pen Inject 26 Units under the skin once daily at bedtime. 4/10/24  Yes Latisha Vines PA-C   lisinopril 40 mg tablet Take 1 tablet (40 mg) by mouth once daily in the morning. 12/21/23 12/20/24 Yes Latisha Vines PA-C   semaglutide (OZEMPIC) 1 mg/dose (4 mg/3 mL) pen injector Inject 1 mg under the skin 1 (one) time per week. 5/19/24  Yes Latisha Vines PA-C   sodium bicarbonate 650 mg tablet Take 1 tablet (650 mg) by mouth every 8 hours. 3/27/24 6/25/24 Yes Latisha Vines PA-C   sodium zirconium cyclosilicate (Lokelma) 5 gram packet Take 5 g (1 packet) by mouth once daily. 4/10/24  Yes Latisha Vines PA-C   sodium,potassium,mag sulfates (Suprep) 17.5-3.13-1.6 gram recon soln solution Take 1 bottle by mouth if needed (take 1 bottle at 6pm night before procedure and take one bottle at 4am the day of procedure). 2/9/24  Yes Kilo Vargas MD   alcohol swabs (Alcohol Pads) pads, medicated Use as directed to check blood sugar up to 3-4 times daily and inject insulin 4 times daily 4/4/24   Latisha Vines PA-C   blood sugar diagnostic (Blood Glucose Test) strip Use as directed to check blood sugar up to 3-4 times daily 4/4/24   Latisha Vines PA-C   blood-glucose meter misc Use as directed to check blood sugar up to 3-4 times daily 4/4/24   Latisha Vines PA-C   CareFine Pen Needle 32 gauge x 3/16\" needle USE 1 PEN NEEDLE TO INJECT INSULIN 3 TIMES A DAY BEFORE MEALS AND AT BEDTIME 5/10/24   Latisha Vines PA-C   FreeStyle [place] 3 [place] misc Use as instructed to check blood sugar throughout the day 4/4/24   Latisha Vines PA-C   FreeStyle [place] 3 Sensor device Change every 14 days. Used as directed to check blood sugar throughout the day 4/4/24   Latisha Vines PA-C   lancets (Accu-Chek Softclix " Lancets) misc Use as directed  to check blood pressure 3 to 4 times a day 4/4/24   Latisha Vines PA-C   MAGNESIUM GLYCINATE ORAL Take 420 mg by mouth once daily.    Historical Provider, MD   potassium chloride CR 20 mEq ER tablet TAKE ONE TABLET BY MOUTH TWO TIMES A DAY  Patient not taking: Reported on 4/17/2024 4/12/24   Imani Kaplan MD     No Known Allergies  Social History     Tobacco Use    Smoking status: Never    Smokeless tobacco: Never   Substance Use Topics    Alcohol use: Not Currently         Chemistry    Lab Results   Component Value Date/Time     03/13/2024 1035    K 4.7 03/13/2024 1035     03/13/2024 1035    CO2 25 03/13/2024 1035    BUN 52 (H) 03/13/2024 1035    CREATININE 3.04 (H) 03/13/2024 1035    Lab Results   Component Value Date/Time    CALCIUM 9.1 03/13/2024 1035    ALKPHOS 65 01/31/2024 1341    AST 17 01/31/2024 1341    ALT 29 01/31/2024 1341    BILITOT 0.5 01/31/2024 1341          Lab Results   Component Value Date/Time    WBC 7.8 03/13/2024 1035    HGB 11.8 (L) 03/13/2024 1035    HCT 36.7 (L) 03/13/2024 1035     03/13/2024 1035     Lab Results   Component Value Date/Time    PROTIME 11.7 01/31/2024 1341    INR 1.0 01/31/2024 1341     Encounter Date: 01/31/24   ECG 12 lead   Result Value    Ventricular Rate 54    Atrial Rate 54    AR Interval 188    QRS Duration 94    QT Interval 424    QTC Calculation(Bazett) 402    P Axis 47    R Axis -12    T Axis 36    QRS Count 9    Q Onset 210    P Onset 116    P Offset 148    T Offset 422    QTC Fredericia 409    Narrative    Sinus bradycardia  Otherwise normal ECG  When compared with ECG of 31-JAN-2024 12:53,  No significant change was found  See ED provider note for full interpretation and clinical correlation  Confirmed by Sridevi Khan ([id number]) on 2/2/2024 10:43:48 AM     No results found for this or any previous visit from the past 1095 days.   Relevant Problems   Cardiac   (+) Atherosclerosis of native arteries  of extremities with rest pain, bilateral legs (Multi)   (+) Benign essential hypertension   (+) CHF (congestive heart failure) (Multi)   (+) HLD (hyperlipidemia)      Neuro   (+) Anxiety      /Renal   (+) Benign prostatic hyperplasia      Endocrine   (+) Class 3 severe obesity due to excess calories in adult (Multi)   (+) Type 2 diabetes mellitus with insulin therapy (Multi)      Hematology   (+) Anemia   (+) Iron deficiency anemia      ID   (+) Chronic osteomyelitis with draining sinus, right ankle and foot (Multi)   (+) Lyme disease       Clinical information reviewed:   Tobacco  Allergies  Meds   Med Hx  Surg Hx   Fam Hx  Soc Hx      Echo 05/2022  CONCLUSIONS:   1. The left ventricular systolic function is normal with a 55-60% estimated ejection fraction.   2. Spectral Doppler shows an abnormal pattern of left ventricular diastolic filling.   3. There is mild mitral and tricuspid regurgitation.    CXR 01/24  IMPRESSION:  Left basilar airspace consolidation, as above. Clinical correlation  and continued follow-up until clearing is recommended.    5/24  Cardiac Clearance obtained- hold Plavix for 5 days continue Asprin therapy        NPO Detail:  NPO/Void Status  Date of Last Liquid: 06/03/24  Time of Last Liquid: 0530  Date of Last Solid: 06/01/24  Time of Last Solid: 1800         Physical Exam    Airway  Mallampati: III     Cardiovascular - normal exam     Dental    Pulmonary - normal exam     Abdominal - normal exam             Anesthesia Plan    History of general anesthesia?: yes  History of complications of general anesthesia?: no    ASA 3     MAC     The patient is not a current smoker.    intravenous induction   Anesthetic plan and risks discussed with patient.    Plan discussed with CRNA.

## 2024-06-04 RX ORDER — CARVEDILOL 6.25 MG/1
6.25 TABLET ORAL
Qty: 180 TABLET | Refills: 1 | Status: SHIPPED | OUTPATIENT
Start: 2024-06-04

## 2024-06-04 ASSESSMENT — PAIN SCALES - GENERAL: PAINLEVEL_OUTOF10: 0 - NO PAIN

## 2024-06-05 ENCOUNTER — APPOINTMENT (OUTPATIENT)
Dept: HEMATOLOGY/ONCOLOGY | Facility: HOSPITAL | Age: 64
End: 2024-06-05
Payer: MEDICARE

## 2024-06-09 PROCEDURE — RXMED WILLOW AMBULATORY MEDICATION CHARGE

## 2024-06-10 ENCOUNTER — PHARMACY VISIT (OUTPATIENT)
Dept: PHARMACY | Facility: CLINIC | Age: 64
End: 2024-06-10
Payer: MEDICARE

## 2024-06-11 ENCOUNTER — PHARMACY VISIT (OUTPATIENT)
Dept: PHARMACY | Facility: CLINIC | Age: 64
End: 2024-06-11
Payer: MEDICARE

## 2024-06-19 ENCOUNTER — APPOINTMENT (OUTPATIENT)
Dept: PHARMACY | Facility: HOSPITAL | Age: 64
End: 2024-06-19
Payer: MEDICARE

## 2024-06-19 DIAGNOSIS — N18.4 TYPE 2 DIABETES MELLITUS WITH STAGE 4 CHRONIC KIDNEY DISEASE, WITH LONG-TERM CURRENT USE OF INSULIN (MULTI): Primary | ICD-10-CM

## 2024-06-19 DIAGNOSIS — E11.22 TYPE 2 DIABETES MELLITUS WITH STAGE 4 CHRONIC KIDNEY DISEASE, WITH LONG-TERM CURRENT USE OF INSULIN (MULTI): Primary | ICD-10-CM

## 2024-06-19 DIAGNOSIS — Z79.4 TYPE 2 DIABETES MELLITUS WITH STAGE 4 CHRONIC KIDNEY DISEASE, WITH LONG-TERM CURRENT USE OF INSULIN (MULTI): Primary | ICD-10-CM

## 2024-06-19 PROCEDURE — RXMED WILLOW AMBULATORY MEDICATION CHARGE

## 2024-06-19 ASSESSMENT — ENCOUNTER SYMPTOMS: POLYDIPSIA: 1

## 2024-06-19 NOTE — PROGRESS NOTES
Pharmacist Clinic: Diabetes Management  Gregorio North is a 63 y.o. male who presents for a follow-up evaluation of his Type 2 diabetes mellitus.   Referring Provider: Latisha Vines PA-C     Patient has been enrolled in [facility]AF with the active benefit period 4/12/2024 - 4/12/2025. Approved for Kerendia, Lokelma, Lantus, Humalog, Jardiance and Ozempic.  Has stopped Lokelma for past 5 days which has improved his bowel movement and gas/bloating. Whenever he restarts it, he becomes very gassy and may have diarrhea, which all lead to heartburn and N/V. He also stopped taking potassium chloride    Diabetes  He presents for his follow-up diabetic visit. He has type 2 diabetes mellitus. There are no hypoglycemic associated symptoms. Associated symptoms include polydipsia and polyuria. There are no hypoglycemic complications. Diabetic complications include nephropathy. Risk factors for coronary artery disease include diabetes mellitus, dyslipidemia, hypertension, male sex, obesity and tobacco exposure. He is compliant with treatment all of the time. Meal planning includes avoidance of concentrated sweets and carbohydrate counting. He has had a previous visit with a dietitian. He participates in exercise daily. He sees a podiatrist.Eye exam is not current.       No Known Allergies    LAB REVIEW   Glucose (mg/dL)   Date Value   03/13/2024 170 (H)   02/03/2024 156 (H)   02/02/2024 153 (H)     POC HEMOGLOBIN A1c (%)   Date Value   03/27/2024 8.4 (A)   09/19/2023 8.7 (A)     Hemoglobin A1C (%)   Date Value   10/17/2022 7.1 (A)   01/27/2022 11.2 (A)   08/30/2021 7.2 (A)     Urea Nitrogen (mg/dL)   Date Value   03/13/2024 52 (H)   02/03/2024 72 (H)   02/02/2024 95 (HH)     Creatinine (mg/dL)   Date Value   03/13/2024 3.04 (H)   02/03/2024 2.63 (H)   02/02/2024 3.05 (H)     Wt Readings from Last 5 Encounters:   06/03/24 114 kg (251 lb 5.2 oz)   05/15/24 114 kg (252 lb)   05/13/24 114 kg (251 lb)   03/27/24 116 kg (256 lb 6.4  "oz)   03/20/24 115 kg (253 lb 4.2 oz)     BMI Readings from Last 5 Encounters:   06/03/24 37.11 kg/m²   05/15/24 37.20 kg/m²   05/13/24 37.07 kg/m²   03/27/24 37.86 kg/m²   03/20/24 37.40 kg/m²     No results found for: \"ALBUR\", \"CKO69MOY\"  Lab Results   Component Value Date    CHOL 115 05/22/2024    CHOL 133 02/09/2023    CHOL 184 10/17/2022     Lab Results   Component Value Date    HDL 29.0 05/22/2024    HDL 43.6 02/09/2023    HDL 53.0 10/17/2022     Lab Results   Component Value Date    LDLCALC 51 05/22/2024     Lab Results   Component Value Date    TRIG 174 (H) 05/22/2024    TRIG 147 02/09/2023    TRIG 127 10/17/2022       The ASCVD Risk score (Niels CLEVELAND, et al., 2019) failed to calculate for the following reasons:    The valid total cholesterol range is 130 to 320 mg/dL     DIABETES ASSESSMENT    CURRENT PHARMACOTHERAPY  Ozempic 1 mg (two doses of 0.5 mg) weekly on Wednesday (started 4/17/24)  Patient paid for a 84 day supply on 3/8/24  Reports no GI ALIE with titrated dose. He has been having intermittent diarrhea but believes likely due from what he eats since doing colonoscopy on 6/3  Humalog 8 units TID with meals  Patient reports that he has been injecting 8 units if he doesn't think there are carbs in meal  Lantus 26 units QHS  Jardiance 10 mg QAM    SECONDARY PREVENTION  - Statin? Yes atorvastatin 20mg  - ACE-I/ARB? Yes lisinopril 40mg  - Aspirin? No    HISTORICAL PHARMACOTHERAPY  Bydureon: Formulary change to Ozempic   Farxiga: Formulary change to Jardiance     SMBG MEASUREMENTS  Patient is using: both glucometer and continuous glucose monitor Fluctuation has minimized but   Patient is currently checking the blood glucose 3 times per day (FBG, BG before meals, and 2hr after meals). His BG seems to spike quickly but comes down quickly after 20-30 min. It is also sensitive to his food intake  Hypoglycemic awareness: yes, has a few Tootsie Rolls to raise BG  Avg FBG (~8am): 120s with some elevated numbers " "at 153 and 160  PPBG: fluctuates. Remains elevated but patient feels most comfortable with injecting 8 units of Humalog    Diet: Decreased appetite but mostly eating vegetables and salads. Ozempic has been helping with food cravings. He is following a low salt and low potassium, generally healthy and high fiber diet. No snacks this past week.   Breakfast (10-11am): biggest meal around 11am at [employer] (scrambles eggs, 2 sausages, english muffin, hashbrown/potatoes, bowl of fruit, coffee)  If not [employer]: Raisin Bran spikes BG a lot more than [employer] meal  Lunch (biggest meal if little/no breakfast. After 2pm if late breakfast. Smaller portions if big breakfast): light if full from breakfast  Dinner: varies, often lighter meal  Bedtime: midnight takes atorvastatin and Lantus    Physical activity: moderate exercise (currently walking 800-900 steps daily). Colonoscopy has caused him low energy with muscular ache from shoulder to elbow from anesthesia, but it is improving. Used to walk [phone] steps prior to colonoscopy    ADHERENCE/AFFORDABILITY: reports they try to save enough on side for meds but reports financial strain. He seems to be in the coverage.  Ozempic: $512/3 month supply (Insurance allows \"early billing\" of different dose. No steps required)  Jardiance: $150.11 (Previous: $42/month)  Kerendia: PA approved (Previous: $42/month)  Insulin: expensive (still has 3 pens left each)  Lokelma: ~$125/month (just filled for 1 month on 3/27/24)    Notes/recommendations for the PCP:  Potassium Chloride-Lokelma: Lokelma treats hyperkalemia while potassium chloride treats hypokalemia. He may not need to be on Lokelma if he isn't taking potassium chloride and on a low potassium diet  Patient reports that he stopped taking the potassium chloride since he was taking Lokelma which would address excessive potassium (which can be supplemented by Kcl).  Patient reports side effects with Lokelma, so he has stopped it for 5 " days which led to significant improvement in gas, bloating, and bowel movement. Side effects returns whenever he restarts Lokelma    Assessment/Plan   Problem List Items Addressed This Visit       Diabetes mellitus (Multi) - Primary     Patient identified self-management goal:  weight loss, BG control  Patients diabetes has improved  Patient's goal A1c is < 7%.  Is pt at goal? No, 8.4% on 3/27/24 (POCT A1c)  Patient's PPBGs are fluctuating where there is sensitivity with food intake a few episodes of hypoglycemia after light dinners. FBG averages 120s mg/dL and PPBG is greatly elevated after big meals.  Complications: episodes of hypoglycemia with lower food intake. Patient's carb to insulin ratio competency is not reliable at the moment, but he is able to determine pattern of low hypoglycemia with light meals. Reports that colonoscopy has made him feel weaker with intermittent diarrhea and pain radiating from his shoulder. CGM sensors often seem to stop working  Rationale for plan: Carb counting is the most ideal for this patient but not feasible. FBG is elevated. Patient feels the most comfortable at injecting 8 units of Humalog at each meal rather than 10 units. Unable to increase Jardiance dose due to renal function (eGFR 22). Patient reports tolerance to Ozempic and attributes any GI issues to Lokelma or the colonoscopy. Recently restarted 1mg and claims tolerance. Reports no change in weight. Educated patient to finish 4 doses of Ozempic 1mg before starting Ozempic 2mg.  Medication Changes:  CONTINUE:  Lantus 26 units every night at bedtime  Ozempic 1 mg weekly on Wednesday (4/17/24-6/26/24)  Jardiance 10 mg every morning  Humalog 8 units TID 15-30 minutes before meals + sliding scale  Inject 4 units before lighter meals  Cover snacks with 1 unit of Humalog for every 10 grams of carbohydrates  START (7/3): Ozempic 2mg weekly upon completion of 1 mg  May adjust Lantus to 24 units if patient experiences  hypoglycemia  Compliance at present is estimated to be good but patient has adjusted Humalog dose based on insulin. Efforts to improve compliance (if necessary) will be directed compliance with Humalog injection, watching his intake of simple/complex carbs, increased exercise,   Regular blood sugar monitoring: FBG, before meals, and 2hr PPBG after each meal.  Track insulin dose in [place]  Education Provided to Patient: Humalog/Ozempic instructions, hypoglycemic symptoms, BG goals, additional BG monitoring given his elevated sugars, complex vs simple carbs, side effects and titration of Ozempic  Follow-up: 7/8 for BG control and adjust meds as needed with Ozempic 2 mg. Patient is to contact me ([phone]) with concerns as needed  PCP Follow-up: 6/27/24 Due for some lab work including regular A1c         Relevant Medications    semaglutide 2 mg/dose (8 mg/3 mL) pen injector (Start on 6/23/2024)    Other Relevant Orders    Hemoglobin A1c    Comprehensive metabolic panel    Follow Up In Clinical Pharmacy         Notify your healthcare provider if you have any of the following:  -Diarrhea or vomiting for more than six hours  -Blood sugar >300 mg/dL more than once  -Low blood sugar (<70 mg/dL)  -Questions about your medications    Thank you,  Amy Whalen, PharmD    Continue all meds under the continuation of care with the referring provider and clinical pharmacy team.  Verbal consent to manage patient's drug therapy was obtained. They were informed they may decline to participate or withdraw from participation in pharmacy services at any time.

## 2024-06-19 NOTE — ASSESSMENT & PLAN NOTE
Patient identified self-management goal:  weight loss, BG control  Patients diabetes has improved  Patient's goal A1c is < 7%.  Is pt at goal? No, 8.4% on 3/27/24 (POCT A1c)  Patient's PPBGs are fluctuating where there is sensitivity with food intake a few episodes of hypoglycemia after light dinners. FBG averages 120s mg/dL and PPBG is greatly elevated after big meals.  Complications: episodes of hypoglycemia with lower food intake. Patient's carb to insulin ratio competency is not reliable at the moment, but he is able to determine pattern of low hypoglycemia with light meals. Reports that colonoscopy has made him feel weaker with intermittent diarrhea and pain radiating from his shoulder. CGM sensors often seem to stop working  Rationale for plan: Carb counting is the most ideal for this patient but not feasible. FBG is elevated. Patient feels the most comfortable at injecting 8 units of Humalog at each meal rather than 10 units. Unable to increase Jardiance dose due to renal function (eGFR 22). Patient reports tolerance to Ozempic and attributes any GI issues to Lokelma or the colonoscopy. Recently restarted 1mg and claims tolerance. Reports no change in weight. Educated patient to finish 4 doses of Ozempic 1mg before starting Ozempic 2mg.  Medication Changes:  CONTINUE:  Lantus 26 units every night at bedtime  Ozempic 1 mg weekly on Wednesday (4/17/24-6/26/24)  Jardiance 10 mg every morning  Humalog 8 units TID 15-30 minutes before meals + sliding scale  Inject 4 units before lighter meals  Cover snacks with 1 unit of Humalog for every 10 grams of carbohydrates  START (7/3): Ozempic 2mg weekly upon completion of 1 mg  May adjust Lantus to 24 units if patient experiences hypoglycemia  Compliance at present is estimated to be good but patient has adjusted Humalog dose based on insulin. Efforts to improve compliance (if necessary) will be directed compliance with Humalog injection, watching his intake of  simple/complex carbs, increased exercise,   Regular blood sugar monitoring: FBG, before meals, and 2hr PPBG after each meal.  Track insulin dose in [place]  Education Provided to Patient: Humalog/Ozempic instructions, hypoglycemic symptoms, BG goals, additional BG monitoring given his elevated sugars, complex vs simple carbs, side effects and titration of Ozempic  Follow-up: 7/8 for BG control and adjust meds as needed with Ozempic 2 mg. Patient is to contact me ([phone]) with concerns as needed  PCP Follow-up: 6/27/24 Due for some lab work including regular A1c

## 2024-06-19 NOTE — Clinical Note
Patient's BG control has improved with his current regimen. Will titrate him to Ozempic 2mg in a few weeks and follow up to adjust his medications as needed. He also mentioned GI side effects from Lokelma and stopped taking it along with potassium chloride. If he continues his little to no potassium diet, he might not need to be on either prescriptions, but I did inform him to discuss it with you first. Thanks! Amy Whalen, EdieD

## 2024-06-20 NOTE — PATIENT INSTRUCTIONS
Medication Changes:  CONTINUE:  Lantus 26 units every night at bedtime  Ozempic 1 mg weekly on Wednesday (4/17/24-6/26/24)  Jardiance 10 mg every morning  Humalog 8 units TID 15-30 minutes before meals + sliding scale  Inject 4 units before lighter meals  Cover snacks with 1 unit of Humalog for every 10 grams of carbohydrates  START (7/3): Ozempic 2mg weekly upon completion of 1 mg   May adjust Lantus to 24 units if you  experience low blood sugar   Discuss with your provider about whether you should continue taking potassium chloride and Lokelma or not  Obtain labs ideally before appointment with PCP

## 2024-06-22 ENCOUNTER — PHARMACY VISIT (OUTPATIENT)
Dept: PHARMACY | Facility: CLINIC | Age: 64
End: 2024-06-22
Payer: MEDICARE

## 2024-06-27 ENCOUNTER — LAB (OUTPATIENT)
Dept: LAB | Facility: LAB | Age: 64
End: 2024-06-27
Payer: MEDICARE

## 2024-06-27 ENCOUNTER — APPOINTMENT (OUTPATIENT)
Dept: PRIMARY CARE | Facility: CLINIC | Age: 64
End: 2024-06-27
Payer: MEDICARE

## 2024-06-27 VITALS
BODY MASS INDEX: 36.23 KG/M2 | OXYGEN SATURATION: 98 % | HEART RATE: 77 BPM | WEIGHT: 244.6 LBS | DIASTOLIC BLOOD PRESSURE: 76 MMHG | SYSTOLIC BLOOD PRESSURE: 134 MMHG | HEIGHT: 69 IN | TEMPERATURE: 96.6 F

## 2024-06-27 DIAGNOSIS — E11.21 TYPE 2 DIABETES MELLITUS WITH DIABETIC NEPHROPATHY, WITHOUT LONG-TERM CURRENT USE OF INSULIN (MULTI): ICD-10-CM

## 2024-06-27 DIAGNOSIS — Z00.00 MEDICARE ANNUAL WELLNESS VISIT, INITIAL: Primary | ICD-10-CM

## 2024-06-27 DIAGNOSIS — E87.5 HYPERKALEMIA: ICD-10-CM

## 2024-06-27 DIAGNOSIS — E78.5 BORDERLINE HYPERLIPIDEMIA: ICD-10-CM

## 2024-06-27 DIAGNOSIS — R25.2 MUSCLE CRAMPS: ICD-10-CM

## 2024-06-27 LAB
ANION GAP SERPL CALC-SCNC: 14 MMOL/L (ref 10–20)
BUN SERPL-MCNC: 69 MG/DL (ref 6–23)
CALCIUM SERPL-MCNC: 9.6 MG/DL (ref 8.6–10.3)
CHLORIDE SERPL-SCNC: 104 MMOL/L (ref 98–107)
CO2 SERPL-SCNC: 24 MMOL/L (ref 21–32)
CREAT SERPL-MCNC: 3.32 MG/DL (ref 0.5–1.3)
EGFRCR SERPLBLD CKD-EPI 2021: 20 ML/MIN/1.73M*2
EST. AVERAGE GLUCOSE BLD GHB EST-MCNC: 146 MG/DL
GLUCOSE SERPL-MCNC: 98 MG/DL (ref 74–99)
HBA1C MFR BLD: 6.7 %
MAGNESIUM SERPL-MCNC: 2.25 MG/DL (ref 1.6–2.4)
POTASSIUM SERPL-SCNC: 4.4 MMOL/L (ref 3.5–5.3)
SODIUM SERPL-SCNC: 138 MMOL/L (ref 136–145)
TSH SERPL-ACNC: 3.84 MIU/L (ref 0.44–3.98)

## 2024-06-27 PROCEDURE — 4010F ACE/ARB THERAPY RXD/TAKEN: CPT | Performed by: PHYSICIAN ASSISTANT

## 2024-06-27 PROCEDURE — 3075F SYST BP GE 130 - 139MM HG: CPT | Performed by: PHYSICIAN ASSISTANT

## 2024-06-27 PROCEDURE — 83036 HEMOGLOBIN GLYCOSYLATED A1C: CPT

## 2024-06-27 PROCEDURE — 3048F LDL-C <100 MG/DL: CPT | Performed by: PHYSICIAN ASSISTANT

## 2024-06-27 PROCEDURE — G0438 PPPS, INITIAL VISIT: HCPCS | Performed by: PHYSICIAN ASSISTANT

## 2024-06-27 PROCEDURE — 3078F DIAST BP <80 MM HG: CPT | Performed by: PHYSICIAN ASSISTANT

## 2024-06-27 PROCEDURE — 1036F TOBACCO NON-USER: CPT | Performed by: PHYSICIAN ASSISTANT

## 2024-06-27 PROCEDURE — 3062F POS MACROALBUMINURIA REV: CPT | Performed by: PHYSICIAN ASSISTANT

## 2024-06-27 PROCEDURE — 36415 COLL VENOUS BLD VENIPUNCTURE: CPT

## 2024-06-27 ASSESSMENT — ACTIVITIES OF DAILY LIVING (ADL)
BATHING: INDEPENDENT
TAKING_MEDICATION: INDEPENDENT
DRESSING: INDEPENDENT
DOING_HOUSEWORK: INDEPENDENT
GROCERY_SHOPPING: INDEPENDENT
MANAGING_FINANCES: INDEPENDENT

## 2024-06-27 NOTE — PROGRESS NOTES
Subjective   HPI   Gregorio North is a 63 y.o. year old male patient with presenting to clinic with concern for Medicare Visit     Chief Complaint   Patient presents with    Medicare Annual Wellness Visit Initial     Had colonscopy 2 weeks ago.  Left Arm that they put IV in had bad muscular ache after-upper arm.  Gotten better-slowly going away.  [place] weak.    Lokelma causing bloating and heart burn.    Poor appetite.       Initial Medicare    Stopped taking lokelma d/t side effects (gas, bloating and dairrhea) 2 weeks ago.   NEEDS to be taking this. Cannot stop Lokelma without thorough conversation with nephrologist.     Muscle cramps in legs the past few days    Advance Care Planning    Advance Care Planning was discussed with patient and family. Advanced directives and POA and the patient's Advance Care Plan can be documented in the EMR when/if they have plans in order and provide paperwork    TSH due,     Sees jonas Valente, Follow up 1 yr.     Was at [facility] center for procrit. Found to be hypotensive and bradycardic. Referred to ER. Bradycardia and hypotension may be secondary to worsening renal failure with hyperkalemia. Hyperkalemia at 6.8. Found to have acute on chronic renal failure. Creat 4/8 vs baseline around 2.5. Improved to baseline with fluids and holding offending medications. His potassium has improved from 6.8-5.5. Stabilized at [place] and Carvedilol dose reduced from 25 to 6.25mg. Stop Hydralazine and amlodipine. Continue lasix and lisinopril.    Bradycardia and hypotension may be secondary to worsening renal failure with hyperkalemia. His potassium has improved from 6.8-5.5     Discussed lokelma for hyperkalemia and socium bicarb 650tid- during hospitalization only. Checked with nephro. Continue procrit as outpatient.         Patient Care Team:  Latisha Vines PA-C as PCP - General (Family Medicine)  Jason Jesus MD as PCP - [employer] PCP      Specialists  Cardiol-Dr Campbell  Nephrology Dr Kaplan  Surg- Dr Vargas  Pod- Dr Jacques    Past Medical, Surgical, and Family History reviewed and updated in chart.    Reviewed all medications by prescribing practitioner or clinical pharmacist (such as prescriptions, OTCs, herbal therapies and supplements) and documented in the medical record.     Preventative Health   Health Maintenance Due   Topic Date Due    MMR Vaccine (1 of 1 - Standard series) Never done    Pneumococcal Vaccination (1 of 2 - PCV) Never done    Pneumococcal Vaccination (1 of 2 - PCV) Never done    Foot Exam  Never done    DTaP/Tdap/Td Vaccines (1 - Tdap) Never done    Eye Exam  01/27/2017    Hemoglobin A1C  06/27/2024            Topic Date Due    MMR Vaccines (1 of 1 - Standard series) Never done    DTaP/Tdap/Td Vaccines (1 - Tdap) Never done        Immunizations Reviewed    There is no immunization history on file for this patient.     Problem List Reviewed  Patient Active Problem List   Diagnosis    Benign essential hypertension    Diabetes mellitus (Multi)    Anemia    Chronic kidney disease, stage 4 (severe) (Multi)    Dyslipidemia    Benign prostatic hyperplasia    Anxiety    Fatigue    Hypokalemia    HLD (hyperlipidemia)    Macular exudate    Nystagmus    Optic atrophy    Atherosclerosis of native arteries of extremities with rest pain, bilateral legs (Multi)    Pulmonary edema (HHS-HCC)    Vitamin D deficiency    Unvaccinated for covid-19    CHF (congestive heart failure) (Multi)    Tremor    Swelling of upper extremity    Papilledema    Palpitations    Obesity with body mass index 30 or greater    Muscle pain    Arthralgia    Impairment of balance    History of hypertension    History of endocrine disorder    Headache    Class 3 severe obesity due to excess calories in adult (Multi)    Sjogren's syndrome (Multi)    Shortness of breath    Proteinuria    History of kidney cancer    Lyme disease    Iron deficiency anemia    Chronic  pulmonary edema (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    Ischemic optic neuropathy, 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    Lyme disease 09/08/2014    History of palpitations    Other specified cataract 01/27/2016    Cataract, mature    Other specified retinal disorders 01/27/2016    Macular exudate    Respiratory failure (Multi) 10/10/2023    Sebaceous cyst 11/20/2018    Infected sebaceous cyst of skin    Unspecified cataract 01/27/2016    Total cataract      Past Surgical History:   Procedure Laterality Date    CT GUIDED PERCUTANEOUS BIOPSY BONE DEEP  08/29/2022    CT GUIDED PERCUTANEOUS BIOPSY BONE DEEP 8/29/2022 [facility] LEGACY    FOOT SURGERY Left 03/31/2016    Foot Surgery    MR HEAD ANGIO WO IV CONTRAST  09/17/2014    MR HEAD ANGIO WO IV CONTRAST 9/17/2014 [facility] ANCILLARY LEGAC[facility]    OTHER SURGICAL HISTORY  02/04/2015    Eye Surgery Results [facility]      Family History   Problem Relation Name Age of Onset    No Known Problems Mother      Heart attack Father      Diabetes Father        Social History     Tobacco Use    Smoking status: Never    Smokeless tobacco: Never  "  Substance Use Topics    Alcohol use: Not Currently        Medications Reviewed  Current Outpatient Medications on File Prior to Visit   Medication Sig Dispense Refill    alcohol swabs (Alcohol Pads) pads, medicated Use as directed to check blood sugar up to 3-4 times daily and inject insulin 4 times daily 300 each 0    allopurinol (Zyloprim) 100 mg tablet Take 1 tablet (100 mg) by mouth once daily. 90 tablet 3    atorvastatin (Lipitor) 20 mg tablet Take 1 tablet (20 mg) by mouth once daily at bedtime. 90 tablet 3    blood sugar diagnostic (Blood Glucose Test) strip Use as directed to check blood sugar up to 3-4 times daily 100 each 0    blood-glucose meter misc Use as directed to check blood sugar up to 3-4 times daily 1 each 0    CareFine Pen Needle 32 gauge x 3/16\" needle USE 1 PEN NEEDLE TO INJECT INSULIN 3 TIMES A DAY BEFORE MEALS AND AT BEDTIME 100 each 3    carvedilol (Coreg) 6.25 mg tablet Take 1 tablet (6.25 mg) by mouth 2 times daily (morning and late afternoon). 180 tablet 1    cholecalciferol (Vitamin D-3) 125 MCG (5000 UT) capsule Take 1 capsule (125 mcg) by mouth once daily.      clopidogrel (Plavix) 75 mg tablet Take 1 tablet (75 mg) by mouth once daily. 90 tablet 3    empagliflozin (Jardiance) 10 mg Take 1 tablet (10 mg) by mouth once daily in the morning. 90 tablet 3    epoetin mckenna-epbx (Retacrit) 40,000 unit/mL injection Inject 1 mL (40,000 Units) under the skin every 30 (thirty) days.      finerenone (Kerendia) 10 mg tablet tablet Take 1 tablet (10 mg) by mouth once daily. 90 tablet 3    folic acid (Folvite) 1 mg tablet Take 5 tablets (5 mg) by mouth once daily. 450 tablet 3    FreeStyle [place] 3 [place] misc Use as instructed to check blood sugar throughout the day 1 each 0    FreeStyle [place] 3 Sensor device Change every 14 days. Used as directed to check blood sugar throughout the day 6 each 0    furosemide (Lasix) 40 mg tablet TAKE 2 TABLETS (80MG) BY MOUTH IN THE MORNING AND 1 TABLET (40MG) IN " "THE EVENING. 270 tablet 0    HumaLOG KwikPen Insulin 100 unit/mL injection Inject 8 Units under the skin 3 times a day before meals. 15 mL 3    insulin glargine (Lantus Solostar U-100 Insulin) 100 unit/mL (3 mL) pen Inject 26 Units under the skin once daily at bedtime. 15 mL 3    lancets (Accu-Chek Softclix Lancets) misc Use as directed  to check blood pressure 3 to 4 times a day 100 each 0    lisinopril 40 mg tablet Take 1 tablet (40 mg) by mouth once daily in the morning. 90 tablet 3    MAGNESIUM GLYCINATE ORAL Take 420 mg by mouth once daily.      potassium chloride CR 20 mEq ER tablet TAKE ONE TABLET BY MOUTH TWO TIMES A  tablet 0    semaglutide 2 mg/dose (8 mg/3 mL) pen injector Inject 2 mg under the skin 1 (one) time per week. 2 mL 3    sodium zirconium cyclosilicate (Lokelma) 5 gram packet Take 5 g (1 packet) by mouth once daily. 90 packet 3    [] sodium bicarbonate 650 mg tablet Take 1 tablet (650 mg) by mouth every 8 hours. 270 tablet 0     No current facility-administered medications on file prior to visit.        Review of Systems  Constitutional: Denies fever  HEENT: Denies ST, earache  CVS: Denies Chest pain  Pulmonary: Denies wheezing, SOB  GI: Denies N/V  : Denies dysuria  Musculoskeletal:  Denies myalgia  Neuro: Denies focal weakness or numbness.  Skin: Denies Rashes.  *Review of Systems is negative unless otherwise mentioned in HPI or ROS above.      @OBJECTIVEBEGIN  /76   Pulse 77   Temp 35.9 °C (96.6 °F)   Ht 1.753 m (5' 9\")   Wt 111 kg (244 lb 9.6 oz)   SpO2 98%   BMI 36.12 kg/m²  reviewed Body mass index is 36.12 kg/m².     Physical Exam  Constitutional: NAD. Afebrile. Resting comfortably.  ENT: Nasal mucosa and oropharynx: moist oral mucosa. Posterior oropharynx nonerythematous. No posterior pharyngeal streaking.  Eyes: PERRLA. EOM intact. No vertical or circular nystagmus.  Lymph: No anterior cervical chain or submandibular lymphadenopathy. No sentinel lymph " nodes.  Cardiac: Regular rate & rhythm. No murmur, gallops, or rubs.  Pulmonary: Lungs clear to auscultation bilaterally with good aeration. No wheezes, rhonchi, or rales. Normal WOB.  GI: Soft, Nontender, nondistended. No guarding. Normal BS x4.  : No suprapubic tenderness. No CVA tenderness to percussion.   Musculoskeletal: No peripheral edema.   Skin: No evidence of trauma. No rashes  Neuro: No focal neuro deficits. Normal gait without assistive devices.  Psych: Intact judgement and insight.    MDM  NEEDS TO TAKE LOKELMA  BMP AND Mg today. A1c today      .Assessment/Plan   Problem List Items Addressed This Visit             ICD-10-CM    Diabetes mellitus (Multi) E11.9    Relevant Orders    Hemoglobin A1c     Other Visit Diagnoses         Codes    Medicare annual wellness visit, initial    -  Primary Z00.00    Borderline hyperlipidemia     E78.5    Relevant Orders    TSH with reflex to Free T4 if abnormal    Hyperkalemia     E87.5    Relevant Orders    Basic metabolic panel    Muscle cramps     R25.2    Relevant Orders    Magnesium

## 2024-07-02 ENCOUNTER — TELEPHONE (OUTPATIENT)
Dept: PRIMARY CARE | Facility: CLINIC | Age: 64
End: 2024-07-02
Payer: MEDICARE

## 2024-07-02 DIAGNOSIS — E13.69 OTHER SPECIFIED DIABETES MELLITUS WITH OTHER SPECIFIED COMPLICATION, WITH LONG-TERM CURRENT USE OF INSULIN (MULTI): ICD-10-CM

## 2024-07-02 DIAGNOSIS — E11.21 TYPE 2 DIABETES MELLITUS WITH DIABETIC NEPHROPATHY, WITHOUT LONG-TERM CURRENT USE OF INSULIN (MULTI): Primary | ICD-10-CM

## 2024-07-02 DIAGNOSIS — Z79.4 OTHER SPECIFIED DIABETES MELLITUS WITH OTHER SPECIFIED COMPLICATION, WITH LONG-TERM CURRENT USE OF INSULIN (MULTI): ICD-10-CM

## 2024-07-02 RX ORDER — BLOOD SUGAR DIAGNOSTIC
STRIP MISCELLANEOUS
Qty: 100 EACH | Refills: 3 | Status: SHIPPED | OUTPATIENT
Start: 2024-07-02 | End: 2024-07-02

## 2024-07-02 RX ORDER — PEN NEEDLE, DIABETIC 30 GX3/16"
NEEDLE, DISPOSABLE MISCELLANEOUS
Qty: 400 EACH | Refills: 3 | Status: SHIPPED | OUTPATIENT
Start: 2024-07-02

## 2024-07-02 NOTE — TELEPHONE ENCOUNTER
"CareFine Pen Needle 32 gauge x 3/16\" needle       Sig: USE 1 PEN NEEDLE TO INJECT INSULIN 3 TIMES A DAY BEFORE MEALS AND AT BEDTIME      Sent to pharmacy as: CareFine Pen Needle 32 gauge x 3/16\"  This was sent to pharmacy as PAULINO, they cannot get this brand in, will you send new e script with a different brand (BD/sure comfort) and no PAULINO so they can try to find substitute?  "
No

## 2024-07-04 PROCEDURE — RXMED WILLOW AMBULATORY MEDICATION CHARGE

## 2024-07-08 ENCOUNTER — APPOINTMENT (OUTPATIENT)
Dept: PHARMACY | Facility: HOSPITAL | Age: 64
End: 2024-07-08
Payer: MEDICARE

## 2024-07-08 DIAGNOSIS — E11.22 TYPE 2 DIABETES MELLITUS WITH STAGE 4 CHRONIC KIDNEY DISEASE, WITH LONG-TERM CURRENT USE OF INSULIN (MULTI): ICD-10-CM

## 2024-07-08 DIAGNOSIS — Z79.4 TYPE 2 DIABETES MELLITUS WITH STAGE 4 CHRONIC KIDNEY DISEASE, WITH LONG-TERM CURRENT USE OF INSULIN (MULTI): ICD-10-CM

## 2024-07-08 DIAGNOSIS — N18.4 TYPE 2 DIABETES MELLITUS WITH STAGE 4 CHRONIC KIDNEY DISEASE, WITH LONG-TERM CURRENT USE OF INSULIN (MULTI): ICD-10-CM

## 2024-07-08 DIAGNOSIS — Z79.4 TYPE 2 DIABETES MELLITUS WITH INSULIN THERAPY (MULTI): ICD-10-CM

## 2024-07-08 DIAGNOSIS — E11.9 TYPE 2 DIABETES MELLITUS WITH INSULIN THERAPY (MULTI): ICD-10-CM

## 2024-07-08 RX ORDER — BLOOD-GLUCOSE SENSOR
EACH MISCELLANEOUS
Qty: 6 EACH | Refills: 4 | Status: SHIPPED | OUTPATIENT
Start: 2024-07-08

## 2024-07-08 ASSESSMENT — ENCOUNTER SYMPTOMS: POLYDIPSIA: 1

## 2024-07-08 NOTE — PROGRESS NOTES
Pharmacist Clinic: Diabetes Management  Gregorio North is a 63 y.o. male who presents for a follow-up evaluation of his Type 2 diabetes mellitus.   Referring Provider: Latisha Vines PA-C     Patient has been enrolled in [facility] with the active benefit period 4/12/2024 - 4/12/2025. Approved for Kerendia, Lokelma, Lantus, Humalog, Jardiance and Ozempic.  When he stopped Lokelma, had improved his bowel movement and gas/bloating. Whenever he restarts it, he becomes very gassy and may have diarrhea, which all lead to heartburn and N/V. He also stopped taking potassium chloride.  7/8/24: He reports that he restarted Lokelma on 7/4 or 7/5 as directed by PCP since he hasn't spoken with nephrologist about it. He still has been off of KCL and maintains low potassium diet. He reports that Lokelma was started after he had already been taking KCL.    Diabetes  He presents for his follow-up diabetic visit. He has type 2 diabetes mellitus. There are no hypoglycemic associated symptoms. Associated symptoms include polydipsia and polyuria. There are no hypoglycemic complications. Diabetic complications include nephropathy. Risk factors for coronary artery disease include diabetes mellitus, dyslipidemia, hypertension, male sex, obesity and tobacco exposure. He is compliant with treatment all of the time. Meal planning includes avoidance of concentrated sweets and carbohydrate counting. He has had a previous visit with a dietitian. He participates in exercise daily. He sees a podiatrist.Eye exam is not current.     No Known Allergies    LAB REVIEW   Glucose (mg/dL)   Date Value   06/27/2024 98   03/13/2024 170 (H)   02/03/2024 156 (H)     POC HEMOGLOBIN A1c (%)   Date Value   03/27/2024 8.4 (A)   09/19/2023 8.7 (A)     Hemoglobin A1C (%)   Date Value   06/27/2024 6.7 (H)   10/17/2022 7.1 (A)   01/27/2022 11.2 (A)   08/30/2021 7.2 (A)     Urea Nitrogen (mg/dL)   Date Value   06/27/2024 69 (H)   03/13/2024 52 (H)   02/03/2024 72  "(H)     Creatinine (mg/dL)   Date Value   06/27/2024 3.32 (H)   03/13/2024 3.04 (H)   02/03/2024 2.63 (H)     Wt Readings from Last 5 Encounters:   06/27/24 111 kg (244 lb 9.6 oz)   06/03/24 114 kg (251 lb 5.2 oz)   05/15/24 114 kg (252 lb)   05/13/24 114 kg (251 lb)   03/27/24 116 kg (256 lb 6.4 oz)     BMI Readings from Last 5 Encounters:   06/27/24 36.12 kg/m²   06/03/24 37.11 kg/m²   05/15/24 37.20 kg/m²   05/13/24 37.07 kg/m²   03/27/24 37.86 kg/m²     No results found for: \"ALBUR\", \"[id number]\"  Lab Results   Component Value Date    CHOL 115 05/22/2024    CHOL 133 02/09/2023    CHOL 184 10/17/2022     Lab Results   Component Value Date    HDL 29.0 05/22/2024    HDL 43.6 02/09/2023    HDL 53.0 10/17/2022     Lab Results   Component Value Date    LDLCALC 51 05/22/2024     Lab Results   Component Value Date    TRIG 174 (H) 05/22/2024    TRIG 147 02/09/2023    TRIG 127 10/17/2022       The ASCVD Risk score (Niels DK, et al., 2019) failed to calculate for the following reasons:    The valid total cholesterol range is 130 to 320 mg/dL     DIABETES ASSESSMENT    CURRENT PHARMACOTHERAPY  Ozempic 2 mg weekly on Thurs (started 7/4)  Reports no GI ALIE with titrated dose. Intermittent diarrhea has improved which he believes likely due to Lokelma since diarrhea improved while he was off of it.   Humalog 8 units TID with meals  Patient reports that he has been injecting 8 units if he doesn't think there are carbs in meal  Lantus 26 units QHS  Jardiance 10 mg QAM    SECONDARY PREVENTION  - Statin? Yes atorvastatin 20mg  - ACE-I/ARB? Yes lisinopril 40mg  - Aspirin? No    HISTORICAL PHARMACOTHERAPY  Bydureon: Formulary change to Ozempic   Farxiga: Formulary change to Jardiance     SMBG MEASUREMENTS  Patient is using: both glucometer and continuous glucose monitor Fluctuation has minimized but   Patient is currently checking the blood glucose 3 times per day (FBG, BG before meals, and 2hr after meals). His BG seems to spike " "quickly but comes down quickly after 20-30 min. It is also sensitive to his food intake  Hypoglycemic awareness: yes, has a few Tootsie Rolls to raise BG  Avg FBG (~8am): 120s   PPBG: fluctuates. Remains elevated but patient feels most comfortable with injecting 8 units of Humalog    Diet: Decreased appetite but mostly eating vegetables and salads. Ozempic has been helping with food cravings. He is following a low salt and low potassium, generally healthy and high fiber diet. No snacks this past week.   Breakfast (10-11am): biggest meal around 11am at [facility] (scrambles eggs, 2 sausages, english muffin, hashbrown/potatoes, bowl of fruit, coffee)  If not Baldev's: Raisin Bran spikes BG a lot more than Baldev's meal  Lunch (biggest meal if little/no breakfast. After 2pm if late breakfast. Smaller portions if big breakfast): light if full from breakfast  Dinner: varies, often lighter meal  Bedtime: midnight takes atorvastatin and Lantus    Physical activity: moderate exercise (currently returned to walking 3000 steps daily with his new shoes. Recently walked 8000 steps for a trip). Activity improved since previous issues with colonoscopy.    Wt Readings from Last 6 Encounters:   06/27/24 111 kg (244 lb 9.6 oz)   06/03/24 114 kg (251 lb 5.2 oz)   05/15/24 114 kg (252 lb)   05/13/24 114 kg (251 lb)   03/27/24 116 kg (256 lb 6.4 oz)   03/20/24 115 kg (253 lb 4.2 oz)     BMI Readings from Last 6 Encounters:   06/27/24 36.12 kg/m²   06/03/24 37.11 kg/m²   05/15/24 37.20 kg/m²   05/13/24 37.07 kg/m²   03/27/24 37.86 kg/m²   03/20/24 37.40 kg/m²     ADHERENCE/AFFORDABILITY: reports they try to save enough on side for meds but reports financial strain. He seems to be in the coverage.  Ozempic: $512/3 month supply (Insurance allows \"early billing\" of different dose. No steps required)  Jardiance: $150.11 (Previous: $42/month)  Kerendia: PA approved (Previous: $42/month)  Insulin: expensive (still has 3 pens left each)  Lokelma: " ~$125/month (just filled for 1 month on 3/27/24)    Notes/recommendations for the PCP:  Potassium Chloride-Lokelma: Lokelma treats hyperkalemia while potassium chloride treats hypokalemia. He may not need to be on Lokelma if he isn't taking potassium chloride and on a low potassium diet  Patient reports that he stopped taking the potassium chloride since he was taking Lokelma which would address excessive potassium (which can be supplemented by Kcl).  Patient reports side effects with Lokelma, so he has stopped it for 5 days which led to significant improvement in gas, bloating, and bowel movement. Side effects returns whenever he restarts Lokelma    Assessment/Plan   Problem List Items Addressed This Visit       Diabetes mellitus (Multi)    Type 2 diabetes mellitus with insulin therapy (Multi)     Patient identified self-management goal:  weight loss, BG control  Patients diabetes has improved  Patient's goal A1c is < 7%.  Is pt at goal? No, 6.7% on 3/27/24. Great job!  Patient's FBG averages 120s mg/dL and PPBG is greatly elevated after big meals but is well stabilized before the next meal. No hypoglycemic episodes  Complications: episodes of hypoglycemia with lower food intake. Patient's carb to insulin ratio competency is not reliable at the moment, but he is able to determine pattern of low hypoglycemia with light meals. CGM sensors often seem to stop working  Rationale for plan: Carb counting is the most ideal for this patient but not feasible. FBG and PPBG are generally well controlled based on patient report. Patient still feels the most comfortable at injecting 8 units of Humalog at before meal rather than 10 units. Unable to increase Jardiance dose due to renal function (eGFR 22). Patient reports tolerance to Ozempic 2mg and attributes any GI issues to Lokelma or the colonoscopy. Will focus on increased exercise while patient is on current regimen.  Medication Changes:  CONTINUE:  Ozempic 2 mg weekly on  Wednesday (4/17/24-6/26/24)  Jardiance 10 mg every morning  Lantus 26 units every night at bedtime  Humalog 8 units TID 15-30 minutes before meals + sliding scale  Inject 4 units before lighter meals  Cover snacks with 1 unit of Humalog for every 10 grams of carbohydrates  Compliance at present is estimated to be excellent. Efforts to improve compliance (if necessary) will be directed compliance with Humalog injection, watching his intake of simple/complex carbs, increased exercise,   Regular blood sugar monitoring: FBG, before meals, and 2hr PPBG after each meal.  Track insulin dose in [place]  Check weight before follow-up  Education Provided to Patient: Humalog/Ozempic instructions, hypoglycemic symptoms, BG goals, additional BG monitoring given his elevated sugars, complex vs simple carbs, side effects and titration of Ozempic  Follow-up: 8/24 for BG control and adjust meds as needed. Patient is to contact me ([phone]) with concerns as needed  PCP Follow-up: 10/1/24  Nephrology: 9/17/24 discuss your side effects and continuation of Lokelma.          Relevant Medications    FreeStyle [place] 3 Sensor device    Other Relevant Orders    Follow Up In Clinical Pharmacy           Notify your healthcare provider if you have any of the following:  -Diarrhea or vomiting for more than six hours  -Blood sugar >300 mg/dL more than once  -Low blood sugar (<70 mg/dL)  -Questions about your medications    Thank you,  Amy Whalen, PharmD    Continue all meds under the continuation of care with the referring provider and clinical pharmacy team.  Verbal consent to manage patient's drug therapy was obtained. They were informed they may decline to participate or withdraw from participation in pharmacy services at any time.

## 2024-07-08 NOTE — PROGRESS NOTES
I reviewed the progress note and agree with the resident’s findings and plans as written. Case discussed with resident.    Nallely Bryan, PharmD          negative

## 2024-07-08 NOTE — PATIENT INSTRUCTIONS
Medication Changes:  CONTINUE:  Ozempic 2 mg weekly on Wednesday (4/17/24-6/26/24)  Jardiance 10 mg every morning  Lantus 26 units every night at bedtime  Humalog 8 units TID 15-30 minutes before meals + sliding scale  Inject 4 units before lighter meals  Cover snacks with 1 unit of Humalog for every 10 grams of carbohydrates  Compliance at present is estimated to be excellent. Efforts to improve compliance (if necessary) will be directed compliance with Humalog injection, watching his intake of simple/complex carbs, increased exercise,   Regular blood sugar monitoring: FBG, before meals, and 2hr PPBG after each meal.  Track insulin dose in [place]  Check weight before follow-up  Follow-up: 8/24 for BG control and adjust meds as needed. Patient is to contact me ([phone]) with concerns as needed  Nephrology follow-up: 9/17/24 discuss your side effects and continuation of Lokelma.

## 2024-07-08 NOTE — Clinical Note
Patient is tolerating Ozempic 2mg and his BG is well controlled at the moment. No changes today. Thanks! Amy Whalen, EdieD

## 2024-07-08 NOTE — ASSESSMENT & PLAN NOTE
Patient identified self-management goal:  weight loss, BG control  Patients diabetes has improved  Patient's goal A1c is < 7%.  Is pt at goal? No, 6.7% on 3/27/24. Great job!  Patient's FBG averages 120s mg/dL and PPBG is greatly elevated after big meals but is well stabilized before the next meal. No hypoglycemic episodes  Complications: episodes of hypoglycemia with lower food intake. Patient's carb to insulin ratio competency is not reliable at the moment, but he is able to determine pattern of low hypoglycemia with light meals. CGM sensors often seem to stop working  Rationale for plan: Carb counting is the most ideal for this patient but not feasible. FBG and PPBG are generally well controlled based on patient report. Patient still feels the most comfortable at injecting 8 units of Humalog at before meal rather than 10 units. Unable to increase Jardiance dose due to renal function (eGFR 22). Patient reports tolerance to Ozempic 2mg and attributes any GI issues to Lokelma or the colonoscopy. Will focus on increased exercise while patient is on current regimen.  Medication Changes:  CONTINUE:  Ozempic 2 mg weekly on Wednesday (4/17/24-6/26/24)  Jardiance 10 mg every morning  Lantus 26 units every night at bedtime  Humalog 8 units TID 15-30 minutes before meals + sliding scale  Inject 4 units before lighter meals  Cover snacks with 1 unit of Humalog for every 10 grams of carbohydrates  Compliance at present is estimated to be excellent. Efforts to improve compliance (if necessary) will be directed compliance with Humalog injection, watching his intake of simple/complex carbs, increased exercise,   Regular blood sugar monitoring: FBG, before meals, and 2hr PPBG after each meal.  Track insulin dose in [place]  Check weight before follow-up  Education Provided to Patient: Humalog/Ozempic instructions, hypoglycemic symptoms, BG goals, additional BG monitoring given his elevated sugars, complex vs simple carbs, side  effects and titration of Ozempic  Follow-up: 8/24 for BG control and adjust meds as needed. Patient is to contact me ([phone]) with concerns as needed  PCP Follow-up: 10/1/24  Nephrology: 9/17/24 discuss your side effects and continuation of Lokelma.

## 2024-07-09 ENCOUNTER — PHARMACY VISIT (OUTPATIENT)
Dept: PHARMACY | Facility: CLINIC | Age: 64
End: 2024-07-09
Payer: MEDICARE

## 2024-07-14 PROCEDURE — RXMED WILLOW AMBULATORY MEDICATION CHARGE

## 2024-07-17 ENCOUNTER — PHARMACY VISIT (OUTPATIENT)
Dept: PHARMACY | Facility: CLINIC | Age: 64
End: 2024-07-17
Payer: MEDICARE

## 2024-07-22 PROCEDURE — RXMED WILLOW AMBULATORY MEDICATION CHARGE

## 2024-07-24 ENCOUNTER — PHARMACY VISIT (OUTPATIENT)
Dept: PHARMACY | Facility: CLINIC | Age: 64
End: 2024-07-24
Payer: MEDICARE

## 2024-07-24 PROCEDURE — RXMED WILLOW AMBULATORY MEDICATION CHARGE

## 2024-07-26 ENCOUNTER — PHARMACY VISIT (OUTPATIENT)
Dept: PHARMACY | Facility: CLINIC | Age: 64
End: 2024-07-26
Payer: MEDICARE

## 2024-07-30 PROCEDURE — RXMED WILLOW AMBULATORY MEDICATION CHARGE

## 2024-07-31 ENCOUNTER — PHARMACY VISIT (OUTPATIENT)
Dept: PHARMACY | Facility: CLINIC | Age: 64
End: 2024-07-31
Payer: MEDICARE

## 2024-08-03 PROCEDURE — RXMED WILLOW AMBULATORY MEDICATION CHARGE

## 2024-08-05 ENCOUNTER — PHARMACY VISIT (OUTPATIENT)
Dept: PHARMACY | Facility: CLINIC | Age: 64
End: 2024-08-05
Payer: MEDICARE

## 2024-08-11 PROCEDURE — RXMED WILLOW AMBULATORY MEDICATION CHARGE

## 2024-08-13 ENCOUNTER — PHARMACY VISIT (OUTPATIENT)
Dept: PHARMACY | Facility: CLINIC | Age: 64
End: 2024-08-13
Payer: MEDICARE

## 2024-08-22 ENCOUNTER — TELEPHONE (OUTPATIENT)
Dept: NEPHROLOGY | Facility: CLINIC | Age: 64
End: 2024-08-22
Payer: MEDICARE

## 2024-08-22 NOTE — PROGRESS NOTES
Pt. Called, c/o Lokelma giving GI upset. Wants to know if he can take it every other day instead of daily.  Please advise.  -MO

## 2024-08-26 PROCEDURE — RXMED WILLOW AMBULATORY MEDICATION CHARGE

## 2024-08-28 ENCOUNTER — APPOINTMENT (OUTPATIENT)
Dept: PHARMACY | Facility: HOSPITAL | Age: 64
End: 2024-08-28
Payer: MEDICARE

## 2024-08-28 VITALS — WEIGHT: 239 LBS | BODY MASS INDEX: 35.29 KG/M2

## 2024-08-28 DIAGNOSIS — Z79.4 TYPE 2 DIABETES MELLITUS WITH INSULIN THERAPY (MULTI): ICD-10-CM

## 2024-08-28 DIAGNOSIS — E11.9 TYPE 2 DIABETES MELLITUS WITH INSULIN THERAPY (MULTI): ICD-10-CM

## 2024-08-28 ASSESSMENT — ENCOUNTER SYMPTOMS: POLYDIPSIA: 1

## 2024-08-28 NOTE — ASSESSMENT & PLAN NOTE
Patient identified self-management goal:  weight loss, BG control  Patients diabetes has improved  Patient's goal A1c is < 7%.  Is pt at goal? yes, 6.7% on 3/27/24. Great job!  Patient's BG averages are well stabilized. No hypoglycemic episodes  Complications: episodes of hypoglycemia with lower or no food intake. Patient's carb to insulin ratio competency is not reliable at the moment, but he is able to determine pattern of low hypoglycemia with light meals. CGM sensors often seem to stop working  Rationale for plan: Carb counting is the most ideal for this patient but not feasible. FBG and PPBG are generally well controlled based on patient report. Patient still feels the most comfortable at injecting 8 units of Humalog at before meal rather than 10 units. Unable to increase Jardiance dose due to renal function (eGFR 22). Patient reports tolerance to Ozempic 2mg and notices weight loss. Attributes any GI issues to Lokelma or the colonoscopy. Will focus on increased exercise while patient is on current regimen.  Medication Management:  CONTINUE:  Ozempic 2 mg weekly on Wednesday (4/17/24-6/26/24)  Jardiance 10 mg every morning  Lantus 26 units every night at bedtime  Humalog 8 units TID 15-30 minutes before meals + sliding scale  Inject 4 units before lighter meals  Cover snacks with 1 unit of Humalog for every 10 grams of carbohydrates  Compliance at present is estimated to be excellent. Efforts to improve compliance (if necessary) will be directed compliance with Humalog injection, watching his intake of simple/complex carbs, increased exercise,   Regular blood sugar monitoring: FBG, before meals, and 2hr PPBG after each meal.  Track insulin dose in [place]  Check weight before follow-up  Education Provided to Patient: Humalog/Ozempic instructions, hypoglycemic symptoms, BG goals, additional BG monitoring given his elevated sugars, complex vs simple carbs, side effects and titration of Ozempic  Follow-up: 9/18 @  3pm for BG control and adjust meds as needed. Patient is to contact me ([phone]) with concerns as needed  PCP Follow-up: 10/1/24  Nephrology: 9/17/24 discuss side effects of Lokelma and potassium levels. Obtain labs prior to appointment

## 2024-08-28 NOTE — PROGRESS NOTES
Pharmacist Clinic: Diabetes Management  Gregorio North is a 63 y.o. male who presents for a follow-up evaluation of his Type 2 diabetes mellitus.   Referring Provider: Latisha Vines PA-C     Patient has been enrolled in [facility]/VAF with the active benefit period 4/12/2024 - 4/12/2025 as long as the following criteria continue to be satisfied:   Your medication (Kerendia, Lokelma, Lantus, Humalog, Jardiance and Ozempic.) remains covered under your current insurance plan and filled through [facility].  Your prescriber does not discontinue therapy.  You do not seek reimbursement from any other private or government-funded programs for the medication.  The [employer] will then offset your copay balance, so that your out-of pocket expense for your specialty medication will be $0.00.    Diabetes  He presents for his follow-up diabetic visit. He has type 2 diabetes mellitus. There are no hypoglycemic associated symptoms. Associated symptoms include polydipsia and polyuria. There are no hypoglycemic complications. Diabetic complications include nephropathy. Risk factors for coronary artery disease include diabetes mellitus, dyslipidemia, hypertension, male sex, obesity and tobacco exposure. He is compliant with treatment all of the time. Meal planning includes avoidance of concentrated sweets and carbohydrate counting. He has had a previous visit with a dietitian. He participates in exercise daily. He sees a podiatrist.Eye exam is not current.     No Known Allergies    LAB REVIEW   Glucose (mg/dL)   Date Value   06/27/2024 98   03/13/2024 170 (H)   02/03/2024 156 (H)     POC HEMOGLOBIN A1c (%)   Date Value   03/27/2024 8.4 (A)   09/19/2023 8.7 (A)     Hemoglobin A1C (%)   Date Value   06/27/2024 6.7 (H)   10/17/2022 7.1 (A)   01/27/2022 11.2 (A)   08/30/2021 7.2 (A)     Urea Nitrogen (mg/dL)   Date Value   06/27/2024 69 (H)   03/13/2024 52 (H)   02/03/2024 72 (H)     Creatinine (mg/dL)   Date Value  "  06/27/2024 3.32 (H)   03/13/2024 3.04 (H)   02/03/2024 2.63 (H)     Wt Readings from Last 5 Encounters:   08/19/24 108 kg (239 lb)   06/27/24 111 kg (244 lb 9.6 oz)   06/03/24 114 kg (251 lb 5.2 oz)   05/15/24 114 kg (252 lb)   05/13/24 114 kg (251 lb)     BMI Readings from Last 5 Encounters:   08/19/24 35.29 kg/m²   06/27/24 36.12 kg/m²   06/03/24 37.11 kg/m²   05/15/24 37.20 kg/m²   05/13/24 37.07 kg/m²     No results found for: \"ALBUR\", \"[id number]\"  Lab Results   Component Value Date    CHOL 115 05/22/2024    CHOL 133 02/09/2023    CHOL 184 10/17/2022     Lab Results   Component Value Date    HDL 29.0 05/22/2024    HDL 43.6 02/09/2023    HDL 53.0 10/17/2022     Lab Results   Component Value Date    LDLCALC 51 05/22/2024     Lab Results   Component Value Date    TRIG 174 (H) 05/22/2024    TRIG 147 02/09/2023    TRIG 127 10/17/2022       The ASCVD Risk score (Niels CLEVELAND, et al., 2019) failed to calculate for the following reasons:    The valid total cholesterol range is 130 to 320 mg/dL     DIABETES ASSESSMENT    CURRENT PHARMACOTHERAPY  Ozempic 2 mg weekly on Thurs (started 7/4)  Reports no GI ALIE with titrated dose. Intermittent diarrhea has improved which he believes likely due to Lokelma since diarrhea improved while he was off of it.   Humalog 8 units TID with meals  Patient reports that he has been injecting 8 units if he doesn't think there are carbs in meal  Lantus 26 units QHS  Jardiance 10 mg QAM    SECONDARY PREVENTION  - Statin? Yes atorvastatin 20mg  - ACE-I/ARB? Yes lisinopril 40mg  - Aspirin? No    HISTORICAL PHARMACOTHERAPY  Bydureon: Formulary change to Ozempic   Farxiga: Formulary change to Jardiance     SMBG MEASUREMENTS  Patient is using: both glucometer and continuous glucose monitor Fluctuation has minimized but   Patient is currently checking the blood glucose 3 times per day (FBG, BG before meals, and 2hr after meals). His BG seems to spike quickly but comes down quickly after 20-30 min. It " "is also sensitive to his food intake  Hypoglycemic awareness: yes, has a few Tootsie Rolls to raise BG  Avg FBG (~8am): low 110s  PPBG: fluctuates. Remains elevated but patient feels most comfortable with injecting 8 units of Humalog. Spikes up after eating and then comes down around 140s    Diet: Decreased appetite but mostly eating vegetables and salads. Ozempic has been helping with food cravings. He is following a low salt and low potassium, generally healthy and high fiber diet. No snacks this past week.   Breakfast (10-11am): biggest meal around 11am at [employer] (scrambles eggs, 2 sausages, english muffin, hashbrown/potatoes, bowl of fruit, coffee)  If not [employer]: Raisin Bran spikes BG a lot more than Baldev's meal  Lunch (biggest meal if little/no breakfast. After 2pm if late breakfast. Smaller portions if big breakfast): light if full from breakfast  Dinner: varies, often lighter meal  Bedtime: midnight takes atorvastatin and Lantus    Physical activity: moderate exercise (currently returned to walking 3000 steps daily with his new shoes. Recently walked 8000 steps for a trip). Recently lost energy in exercising    Wt Readings from Last 6 Encounters:   08/19/24 108 kg (239 lb)   06/27/24 111 kg (244 lb 9.6 oz)   06/03/24 114 kg (251 lb 5.2 oz)   05/15/24 114 kg (252 lb)   05/13/24 114 kg (251 lb)   03/27/24 116 kg (256 lb 6.4 oz)     BMI Readings from Last 6 Encounters:   08/19/24 35.29 kg/m²   06/27/24 36.12 kg/m²   06/03/24 37.11 kg/m²   05/15/24 37.20 kg/m²   05/13/24 37.07 kg/m²   03/27/24 37.86 kg/m²     ADHERENCE/AFFORDABILITY: reports they try to save enough on side for meds but reports financial strain. He seems to be in the coverage.  Ozempic: $512/3 month supply (Insurance allows \"early billing\" of different dose. No steps required)  Jardiance: $150.11 (Previous: $42/month)  Kerendia: PA approved (Previous: $42/month)  Insulin: expensive (still has 3 pens left each)  Lokelma: ~$125/month "     Notes/recommendations for the Healthcare Provider(s):  Potassium Chloride-Lokelma: Lokelma treats hyperkalemia while potassium chloride treats hypokalemia. He may not need to be on Lokelma if he isn't taking potassium chloride and on a low potassium diet. He tolerated sodium bicarbonate well and is more interested in it over Lokelma. Educated to get active current labs done prior to nephrology visit.  Potassium Chloride: Patient reports that he stopped taking the potassium chloride 2 months ago (sometime after he had labs done on ) since he was taking Lokelma which would address excessive potassium (which can be supplemented by Kcl). Was already taking Kcl before he was placed on Lokelma  Lokelma: Patient reports side effects associated with Lokelma only. Taking for 4 months (since beginning of Feb). Causing him stomach issues (feels like gas, a ball of fire, bloating, diarrhea, heartburn, N/V). Whenever he restarts it, he becomes very gassy and may have diarrhea, which all lead to heartburn and N/V. He reports Lokelma was started after he had already been taking KCL.  Stopped for 5-7 days which led to significant improvement in gas, bloating, and bowel movement.   24: He reports that he restarted Lokelma on  or  as directed by PCP since he hasn't spoken with nephrologist about it. He still has been off of KCL and maintains low potassium diet.    24: Was instructed to take every other day instructed by Rosaura.   On days he's not taking it, has no ALIE  On days he takes Lokelma, side effects return  Sodium bicarbonate 650 mg TID: Rx  and pt ran out 3 weeks ago. Didn't know if he needed to continue it. Was tolerated well and never had side effects with it.     Assessment/Plan   Problem List Items Addressed This Visit       Type 2 diabetes mellitus with insulin therapy (Multi)     Patient identified self-management goal:  weight loss, BG control  Patients diabetes has improved  Patient's  goal A1c is < 7%.  Is pt at goal? yes, 6.7% on 3/27/24. Great job!  Patient's BG averages are well stabilized. No hypoglycemic episodes  Complications: episodes of hypoglycemia with lower or no food intake. Patient's carb to insulin ratio competency is not reliable at the moment, but he is able to determine pattern of low hypoglycemia with light meals. CGM sensors often seem to stop working  Rationale for plan: Carb counting is the most ideal for this patient but not feasible. FBG and PPBG are generally well controlled based on patient report. Patient still feels the most comfortable at injecting 8 units of Humalog at before meal rather than 10 units. Unable to increase Jardiance dose due to renal function (eGFR 22). Patient reports tolerance to Ozempic 2mg and notices weight loss. Attributes any GI issues to Lokelma or the colonoscopy. Will focus on increased exercise while patient is on current regimen.  Medication Management:  CONTINUE:  Ozempic 2 mg weekly on Wednesday (4/17/24-6/26/24)  Jardiance 10 mg every morning  Lantus 26 units every night at bedtime  Humalog 8 units TID 15-30 minutes before meals + sliding scale  Inject 4 units before lighter meals  Cover snacks with 1 unit of Humalog for every 10 grams of carbohydrates  Compliance at present is estimated to be excellent. Efforts to improve compliance (if necessary) will be directed compliance with Humalog injection, watching his intake of simple/complex carbs, increased exercise,   Regular blood sugar monitoring: FBG, before meals, and 2hr PPBG after each meal.  Track insulin dose in [place]  Check weight before follow-up  Education Provided to Patient: Humalog/Ozempic instructions, hypoglycemic symptoms, BG goals, additional BG monitoring given his elevated sugars, complex vs simple carbs, side effects and titration of Ozempic  Follow-up: 9/18 @ 3pm for BG control and adjust meds as needed. Patient is to contact me ([phone]) with concerns as  needed  PCP Follow-up: 10/1/24  Nephrology: 9/17/24 discuss side effects of Lokelma and potassium levels. Obtain labs prior to appointment            Notify your healthcare provider if you have any of the following:  -Diarrhea or vomiting for more than six hours  -Blood sugar >300 mg/dL more than once  -Low blood sugar (<70 mg/dL)  -Questions about your medications    Thank you,  Amy Whaeln, PharmD    Continue all meds under the continuation of care with the referring provider and clinical pharmacy team.  Verbal consent to manage patient's drug therapy was obtained. They were informed they may decline to participate or withdraw from participation in pharmacy services at any time.

## 2024-08-28 NOTE — Clinical Note
Patient's diabetes seems to be well controlled at this time so no changes were warranted. He mentioned side effects associated with Lokelma only, so the discussion about it and his potassium levels can be found in the encounter note for your reference. He also reports he stopped taking potassium chloride 20mEq. Encouraged him to further discuss it with you. Thanks! Amy Whalen, EdieD

## 2024-08-29 ENCOUNTER — PHARMACY VISIT (OUTPATIENT)
Dept: PHARMACY | Facility: CLINIC | Age: 64
End: 2024-08-29
Payer: MEDICARE

## 2024-09-11 ENCOUNTER — OFFICE VISIT (OUTPATIENT)
Dept: PRIMARY CARE | Facility: CLINIC | Age: 64
End: 2024-09-11
Payer: MEDICARE

## 2024-09-11 ENCOUNTER — HOSPITAL ENCOUNTER (INPATIENT)
Facility: HOSPITAL | Age: 64
LOS: 1 days | Discharge: HOME | End: 2024-09-12
Attending: EMERGENCY MEDICINE | Admitting: STUDENT IN AN ORGANIZED HEALTH CARE EDUCATION/TRAINING PROGRAM
Payer: MEDICARE

## 2024-09-11 ENCOUNTER — APPOINTMENT (OUTPATIENT)
Dept: CARDIOLOGY | Facility: HOSPITAL | Age: 64
End: 2024-09-11
Payer: MEDICARE

## 2024-09-11 ENCOUNTER — TELEPHONE (OUTPATIENT)
Dept: PRIMARY CARE | Facility: CLINIC | Age: 64
End: 2024-09-11

## 2024-09-11 ENCOUNTER — LAB (OUTPATIENT)
Dept: LAB | Facility: LAB | Age: 64
End: 2024-09-11
Payer: MEDICARE

## 2024-09-11 VITALS
HEART RATE: 89 BPM | SYSTOLIC BLOOD PRESSURE: 122 MMHG | BODY MASS INDEX: 35.83 KG/M2 | DIASTOLIC BLOOD PRESSURE: 74 MMHG | WEIGHT: 242.6 LBS | OXYGEN SATURATION: 100 % | TEMPERATURE: 98.2 F

## 2024-09-11 DIAGNOSIS — E11.22 TYPE 2 DIABETES MELLITUS WITH STAGE 4 CHRONIC KIDNEY DISEASE, WITH LONG-TERM CURRENT USE OF INSULIN (MULTI): ICD-10-CM

## 2024-09-11 DIAGNOSIS — R10.13 DYSPEPSIA: Primary | ICD-10-CM

## 2024-09-11 DIAGNOSIS — R10.13 DYSPEPSIA: ICD-10-CM

## 2024-09-11 DIAGNOSIS — D63.1 ANEMIA DUE TO STAGE 3B CHRONIC KIDNEY DISEASE (MULTI): ICD-10-CM

## 2024-09-11 DIAGNOSIS — E87.5 HYPERKALEMIA: ICD-10-CM

## 2024-09-11 DIAGNOSIS — I50.9 CONGESTIVE HEART FAILURE, UNSPECIFIED HF CHRONICITY, UNSPECIFIED HEART FAILURE TYPE: ICD-10-CM

## 2024-09-11 DIAGNOSIS — K52.9 CHRONIC DIARRHEA: ICD-10-CM

## 2024-09-11 DIAGNOSIS — Z85.528 HISTORY OF KIDNEY CANCER: ICD-10-CM

## 2024-09-11 DIAGNOSIS — D50.0 IRON DEFICIENCY ANEMIA DUE TO CHRONIC BLOOD LOSS: ICD-10-CM

## 2024-09-11 DIAGNOSIS — J81.0 ACUTE PULMONARY EDEMA (MULTI): ICD-10-CM

## 2024-09-11 DIAGNOSIS — Z79.4 TYPE 2 DIABETES MELLITUS WITH STAGE 4 CHRONIC KIDNEY DISEASE, WITH LONG-TERM CURRENT USE OF INSULIN (MULTI): ICD-10-CM

## 2024-09-11 DIAGNOSIS — N28.9 RENAL INSUFFICIENCY: Primary | ICD-10-CM

## 2024-09-11 DIAGNOSIS — E08.00 DIABETES MELLITUS DUE TO UNDERLYING CONDITION WITH HYPEROSMOLARITY WITHOUT COMA, WITHOUT LONG-TERM CURRENT USE OF INSULIN (MULTI): ICD-10-CM

## 2024-09-11 DIAGNOSIS — D64.9 ANEMIA, UNSPECIFIED TYPE: ICD-10-CM

## 2024-09-11 DIAGNOSIS — N18.32 ANEMIA DUE TO STAGE 3B CHRONIC KIDNEY DISEASE (MULTI): ICD-10-CM

## 2024-09-11 DIAGNOSIS — N18.4 TYPE 2 DIABETES MELLITUS WITH STAGE 4 CHRONIC KIDNEY DISEASE, WITH LONG-TERM CURRENT USE OF INSULIN (MULTI): ICD-10-CM

## 2024-09-11 DIAGNOSIS — R19.7 DIARRHEA, UNSPECIFIED: ICD-10-CM

## 2024-09-11 DIAGNOSIS — I10 BENIGN ESSENTIAL HYPERTENSION: ICD-10-CM

## 2024-09-11 LAB
25(OH)D3 SERPL-MCNC: 42 NG/ML (ref 30–100)
ALBUMIN SERPL BCP-MCNC: 3.9 G/DL (ref 3.4–5)
ALBUMIN SERPL BCP-MCNC: 3.9 G/DL (ref 3.4–5)
ALP SERPL-CCNC: 90 U/L (ref 33–136)
ALP SERPL-CCNC: 95 U/L (ref 33–136)
ALT SERPL W P-5'-P-CCNC: 48 U/L (ref 10–52)
ALT SERPL W P-5'-P-CCNC: 49 U/L (ref 10–52)
ANION GAP SERPL CALC-SCNC: 16 MMOL/L (ref 10–20)
ANION GAP SERPL CALC-SCNC: 17 MMOL/L (ref 10–20)
APPEARANCE UR: CLEAR
AST SERPL W P-5'-P-CCNC: 24 U/L (ref 9–39)
AST SERPL W P-5'-P-CCNC: 25 U/L (ref 9–39)
BASOPHILS # BLD AUTO: 0.06 X10*3/UL (ref 0–0.1)
BASOPHILS NFR BLD AUTO: 0.8 %
BILIRUB SERPL-MCNC: 0.3 MG/DL (ref 0–1.2)
BILIRUB SERPL-MCNC: 0.4 MG/DL (ref 0–1.2)
BILIRUB UR STRIP.AUTO-MCNC: NEGATIVE MG/DL
BUN SERPL-MCNC: 106 MG/DL (ref 6–23)
BUN SERPL-MCNC: 115 MG/DL (ref 6–23)
CALCIUM SERPL-MCNC: 8.5 MG/DL (ref 8.6–10.3)
CALCIUM SERPL-MCNC: 8.8 MG/DL (ref 8.6–10.3)
CHLORIDE SERPL-SCNC: 107 MMOL/L (ref 98–107)
CHLORIDE SERPL-SCNC: 109 MMOL/L (ref 98–107)
CO2 SERPL-SCNC: 18 MMOL/L (ref 21–32)
CO2 SERPL-SCNC: 19 MMOL/L (ref 21–32)
COLOR UR: COLORLESS
CREAT SERPL-MCNC: 4.23 MG/DL (ref 0.5–1.3)
CREAT SERPL-MCNC: 4.3 MG/DL (ref 0.5–1.3)
EGFRCR SERPLBLD CKD-EPI 2021: 15 ML/MIN/1.73M*2
EGFRCR SERPLBLD CKD-EPI 2021: 15 ML/MIN/1.73M*2
EOSINOPHIL # BLD AUTO: 0.38 X10*3/UL (ref 0–0.7)
EOSINOPHIL NFR BLD AUTO: 5.1 %
ERYTHROCYTE [DISTWIDTH] IN BLOOD BY AUTOMATED COUNT: 13.2 % (ref 11.5–14.5)
ERYTHROCYTE [DISTWIDTH] IN BLOOD BY AUTOMATED COUNT: 13.4 % (ref 11.5–14.5)
FERRITIN SERPL-MCNC: 464 NG/ML (ref 20–300)
GLUCOSE BLD MANUAL STRIP-MCNC: 115 MG/DL (ref 74–99)
GLUCOSE BLD MANUAL STRIP-MCNC: 123 MG/DL (ref 74–99)
GLUCOSE SERPL-MCNC: 138 MG/DL (ref 74–99)
GLUCOSE SERPL-MCNC: 197 MG/DL (ref 74–99)
GLUCOSE UR STRIP.AUTO-MCNC: ABNORMAL MG/DL
HCT VFR BLD AUTO: 30.4 % (ref 41–52)
HCT VFR BLD AUTO: 30.8 % (ref 41–52)
HGB BLD-MCNC: 10.2 G/DL (ref 13.5–17.5)
HGB BLD-MCNC: 9.7 G/DL (ref 13.5–17.5)
IMM GRANULOCYTES # BLD AUTO: 0.02 X10*3/UL (ref 0–0.7)
IMM GRANULOCYTES NFR BLD AUTO: 0.3 % (ref 0–0.9)
INR PPP: 1 (ref 0.9–1.1)
IRON SATN MFR SERPL: 37 % (ref 25–45)
IRON SERPL-MCNC: 111 UG/DL (ref 35–150)
KETONES UR STRIP.AUTO-MCNC: NEGATIVE MG/DL
LACTATE SERPL-SCNC: 1.6 MMOL/L (ref 0.4–2)
LEUKOCYTE ESTERASE UR QL STRIP.AUTO: NEGATIVE
LIPASE SERPL-CCNC: 38 U/L (ref 9–82)
LYMPHOCYTES # BLD AUTO: 2.55 X10*3/UL (ref 1.2–4.8)
LYMPHOCYTES NFR BLD AUTO: 34 %
MAGNESIUM SERPL-MCNC: 2.67 MG/DL (ref 1.6–2.4)
MAGNESIUM SERPL-MCNC: 2.85 MG/DL (ref 1.6–2.4)
MCH RBC QN AUTO: 31.7 PG (ref 26–34)
MCH RBC QN AUTO: 32 PG (ref 26–34)
MCHC RBC AUTO-ENTMCNC: 31.9 G/DL (ref 32–36)
MCHC RBC AUTO-ENTMCNC: 33.1 G/DL (ref 32–36)
MCV RBC AUTO: 97 FL (ref 80–100)
MCV RBC AUTO: 99 FL (ref 80–100)
MONOCYTES # BLD AUTO: 0.85 X10*3/UL (ref 0.1–1)
MONOCYTES NFR BLD AUTO: 11.3 %
NEUTROPHILS # BLD AUTO: 3.65 X10*3/UL (ref 1.2–7.7)
NEUTROPHILS NFR BLD AUTO: 48.5 %
NITRITE UR QL STRIP.AUTO: NEGATIVE
NRBC BLD-RTO: 0 /100 WBCS (ref 0–0)
NRBC BLD-RTO: 0 /100 WBCS (ref 0–0)
PH UR STRIP.AUTO: 6 [PH]
PHOSPHATE SERPL-MCNC: 6.5 MG/DL (ref 2.5–4.9)
PHOSPHATE SERPL-MCNC: 6.8 MG/DL (ref 2.5–4.9)
PLATELET # BLD AUTO: 209 X10*3/UL (ref 150–450)
PLATELET # BLD AUTO: 216 X10*3/UL (ref 150–450)
POTASSIUM SERPL-SCNC: 5.4 MMOL/L (ref 3.5–5.3)
POTASSIUM SERPL-SCNC: 5.8 MMOL/L (ref 3.5–5.3)
PROT SERPL-MCNC: 7.3 G/DL (ref 6.4–8.2)
PROT SERPL-MCNC: 7.4 G/DL (ref 6.4–8.2)
PROT UR STRIP.AUTO-MCNC: NEGATIVE MG/DL
PROTHROMBIN TIME: 10.7 SECONDS (ref 9.8–12.8)
PTH-INTACT SERPL-MCNC: 105.2 PG/ML (ref 18.5–88)
RBC # BLD AUTO: 3.06 X10*6/UL (ref 4.5–5.9)
RBC # BLD AUTO: 3.19 X10*6/UL (ref 4.5–5.9)
RBC # UR STRIP.AUTO: NEGATIVE /UL
SARS-COV-2 RNA RESP QL NAA+PROBE: NOT DETECTED
SODIUM SERPL-SCNC: 137 MMOL/L (ref 136–145)
SODIUM SERPL-SCNC: 138 MMOL/L (ref 136–145)
SP GR UR STRIP.AUTO: 1.01
TIBC SERPL-MCNC: 302 UG/DL (ref 240–445)
UIBC SERPL-MCNC: 191 UG/DL (ref 110–370)
UROBILINOGEN UR STRIP.AUTO-MCNC: NORMAL MG/DL
WBC # BLD AUTO: 7.1 X10*3/UL (ref 4.4–11.3)
WBC # BLD AUTO: 7.5 X10*3/UL (ref 4.4–11.3)

## 2024-09-11 PROCEDURE — 84300 ASSAY OF URINE SODIUM: CPT

## 2024-09-11 PROCEDURE — 93005 ELECTROCARDIOGRAM TRACING: CPT

## 2024-09-11 PROCEDURE — G0378 HOSPITAL OBSERVATION PER HR: HCPCS

## 2024-09-11 PROCEDURE — 82570 ASSAY OF URINE CREATININE: CPT

## 2024-09-11 PROCEDURE — 3062F POS MACROALBUMINURIA REV: CPT | Performed by: PHYSICIAN ASSISTANT

## 2024-09-11 PROCEDURE — 82436 ASSAY OF URINE CHLORIDE: CPT

## 2024-09-11 PROCEDURE — 2500000002 HC RX 250 W HCPCS SELF ADMINISTERED DRUGS (ALT 637 FOR MEDICARE OP, ALT 636 FOR OP/ED)

## 2024-09-11 PROCEDURE — 84100 ASSAY OF PHOSPHORUS: CPT | Performed by: HEALTH CARE PROVIDER

## 2024-09-11 PROCEDURE — 83735 ASSAY OF MAGNESIUM: CPT | Performed by: HEALTH CARE PROVIDER

## 2024-09-11 PROCEDURE — 83690 ASSAY OF LIPASE: CPT | Performed by: HEALTH CARE PROVIDER

## 2024-09-11 PROCEDURE — 2500000001 HC RX 250 WO HCPCS SELF ADMINISTERED DRUGS (ALT 637 FOR MEDICARE OP)

## 2024-09-11 PROCEDURE — 4010F ACE/ARB THERAPY RXD/TAKEN: CPT | Performed by: PHYSICIAN ASSISTANT

## 2024-09-11 PROCEDURE — 3078F DIAST BP <80 MM HG: CPT | Performed by: PHYSICIAN ASSISTANT

## 2024-09-11 PROCEDURE — 2500000004 HC RX 250 GENERAL PHARMACY W/ HCPCS (ALT 636 FOR OP/ED): Performed by: EMERGENCY MEDICINE

## 2024-09-11 PROCEDURE — 1200000002 HC GENERAL ROOM WITH TELEMETRY DAILY

## 2024-09-11 PROCEDURE — 83605 ASSAY OF LACTIC ACID: CPT | Performed by: HEALTH CARE PROVIDER

## 2024-09-11 PROCEDURE — 99285 EMERGENCY DEPT VISIT HI MDM: CPT | Mod: 25

## 2024-09-11 PROCEDURE — 96361 HYDRATE IV INFUSION ADD-ON: CPT

## 2024-09-11 PROCEDURE — 82947 ASSAY GLUCOSE BLOOD QUANT: CPT

## 2024-09-11 PROCEDURE — 1036F TOBACCO NON-USER: CPT | Performed by: PHYSICIAN ASSISTANT

## 2024-09-11 PROCEDURE — 84133 ASSAY OF URINE POTASSIUM: CPT

## 2024-09-11 PROCEDURE — 82043 UR ALBUMIN QUANTITATIVE: CPT

## 2024-09-11 PROCEDURE — 96360 HYDRATION IV INFUSION INIT: CPT

## 2024-09-11 PROCEDURE — 85025 COMPLETE CBC W/AUTO DIFF WBC: CPT | Performed by: HEALTH CARE PROVIDER

## 2024-09-11 PROCEDURE — 82306 VITAMIN D 25 HYDROXY: CPT

## 2024-09-11 PROCEDURE — 36415 COLL VENOUS BLD VENIPUNCTURE: CPT | Performed by: HEALTH CARE PROVIDER

## 2024-09-11 PROCEDURE — 85610 PROTHROMBIN TIME: CPT | Performed by: HEALTH CARE PROVIDER

## 2024-09-11 PROCEDURE — 3074F SYST BP LT 130 MM HG: CPT | Performed by: PHYSICIAN ASSISTANT

## 2024-09-11 PROCEDURE — 81003 URINALYSIS AUTO W/O SCOPE: CPT | Performed by: HEALTH CARE PROVIDER

## 2024-09-11 PROCEDURE — 99214 OFFICE O/P EST MOD 30 MIN: CPT | Performed by: PHYSICIAN ASSISTANT

## 2024-09-11 PROCEDURE — 83935 ASSAY OF URINE OSMOLALITY: CPT

## 2024-09-11 PROCEDURE — 99222 1ST HOSP IP/OBS MODERATE 55: CPT

## 2024-09-11 PROCEDURE — 83970 ASSAY OF PARATHORMONE: CPT

## 2024-09-11 PROCEDURE — 87635 SARS-COV-2 COVID-19 AMP PRB: CPT | Performed by: HEALTH CARE PROVIDER

## 2024-09-11 PROCEDURE — 3048F LDL-C <100 MG/DL: CPT | Performed by: PHYSICIAN ASSISTANT

## 2024-09-11 PROCEDURE — 80053 COMPREHEN METABOLIC PANEL: CPT | Performed by: HEALTH CARE PROVIDER

## 2024-09-11 PROCEDURE — 83930 ASSAY OF BLOOD OSMOLALITY: CPT | Mod: GEALAB

## 2024-09-11 PROCEDURE — 2500000004 HC RX 250 GENERAL PHARMACY W/ HCPCS (ALT 636 FOR OP/ED)

## 2024-09-11 PROCEDURE — 3044F HG A1C LEVEL LT 7.0%: CPT | Performed by: PHYSICIAN ASSISTANT

## 2024-09-11 RX ORDER — HEPARIN SODIUM 5000 [USP'U]/ML
5000 INJECTION, SOLUTION INTRAVENOUS; SUBCUTANEOUS EVERY 8 HOURS
Status: DISCONTINUED | OUTPATIENT
Start: 2024-09-11 | End: 2024-09-12 | Stop reason: HOSPADM

## 2024-09-11 RX ORDER — FAMOTIDINE 20 MG/1
40 TABLET, FILM COATED ORAL DAILY
Status: DISCONTINUED | OUTPATIENT
Start: 2024-09-11 | End: 2024-09-12 | Stop reason: HOSPADM

## 2024-09-11 RX ORDER — CLOPIDOGREL BISULFATE 75 MG/1
75 TABLET ORAL DAILY
Status: DISCONTINUED | OUTPATIENT
Start: 2024-09-11 | End: 2024-09-12 | Stop reason: HOSPADM

## 2024-09-11 RX ORDER — INSULIN LISPRO 100 [IU]/ML
0-15 INJECTION, SOLUTION INTRAVENOUS; SUBCUTANEOUS
Status: DISCONTINUED | OUTPATIENT
Start: 2024-09-11 | End: 2024-09-12 | Stop reason: HOSPADM

## 2024-09-11 RX ORDER — FAMOTIDINE 40 MG/1
40 TABLET, FILM COATED ORAL DAILY
Qty: 30 TABLET | Refills: 0 | Status: SHIPPED | OUTPATIENT
Start: 2024-09-11 | End: 2024-10-11

## 2024-09-11 RX ORDER — FUROSEMIDE 40 MG/1
80 TABLET ORAL EVERY MORNING
Status: DISCONTINUED | OUTPATIENT
Start: 2024-09-12 | End: 2024-09-12

## 2024-09-11 RX ORDER — ACETAMINOPHEN 325 MG/1
650 TABLET ORAL EVERY 6 HOURS PRN
Status: DISCONTINUED | OUTPATIENT
Start: 2024-09-11 | End: 2024-09-12 | Stop reason: HOSPADM

## 2024-09-11 RX ORDER — ATORVASTATIN CALCIUM 20 MG/1
20 TABLET, FILM COATED ORAL NIGHTLY
Status: DISCONTINUED | OUTPATIENT
Start: 2024-09-11 | End: 2024-09-12 | Stop reason: HOSPADM

## 2024-09-11 RX ORDER — LISINOPRIL 20 MG/1
40 TABLET ORAL EVERY MORNING
Status: DISCONTINUED | OUTPATIENT
Start: 2024-09-12 | End: 2024-09-12 | Stop reason: HOSPADM

## 2024-09-11 RX ORDER — ONDANSETRON HYDROCHLORIDE 2 MG/ML
4 INJECTION, SOLUTION INTRAVENOUS EVERY 6 HOURS PRN
Status: DISCONTINUED | OUTPATIENT
Start: 2024-09-11 | End: 2024-09-12 | Stop reason: HOSPADM

## 2024-09-11 RX ORDER — INSULIN LISPRO 100 [IU]/ML
8 INJECTION, SOLUTION INTRAVENOUS; SUBCUTANEOUS
Status: DISCONTINUED | OUTPATIENT
Start: 2024-09-11 | End: 2024-09-12 | Stop reason: HOSPADM

## 2024-09-11 RX ORDER — ALLOPURINOL 100 MG/1
100 TABLET ORAL DAILY
Status: DISCONTINUED | OUTPATIENT
Start: 2024-09-11 | End: 2024-09-12 | Stop reason: HOSPADM

## 2024-09-11 RX ORDER — SODIUM CHLORIDE 9 MG/ML
125 INJECTION, SOLUTION INTRAVENOUS CONTINUOUS
Status: DISCONTINUED | OUTPATIENT
Start: 2024-09-11 | End: 2024-09-12 | Stop reason: HOSPADM

## 2024-09-11 RX ORDER — POLYETHYLENE GLYCOL 3350 17 G/17G
17 POWDER, FOR SOLUTION ORAL DAILY PRN
Status: DISCONTINUED | OUTPATIENT
Start: 2024-09-11 | End: 2024-09-11

## 2024-09-11 RX ORDER — CARVEDILOL 6.25 MG/1
6.25 TABLET ORAL
Status: DISCONTINUED | OUTPATIENT
Start: 2024-09-11 | End: 2024-09-12 | Stop reason: HOSPADM

## 2024-09-11 RX ORDER — INSULIN GLARGINE 100 [IU]/ML
26 INJECTION, SOLUTION SUBCUTANEOUS EVERY 24 HOURS
Status: DISCONTINUED | OUTPATIENT
Start: 2024-09-11 | End: 2024-09-12 | Stop reason: HOSPADM

## 2024-09-11 SDOH — SOCIAL STABILITY: SOCIAL INSECURITY: HAS ANYONE EVER THREATENED TO HURT YOUR FAMILY OR YOUR PETS?: NO

## 2024-09-11 SDOH — SOCIAL STABILITY: SOCIAL INSECURITY: WERE YOU ABLE TO COMPLETE ALL THE BEHAVIORAL HEALTH SCREENINGS?: YES

## 2024-09-11 SDOH — SOCIAL STABILITY: SOCIAL INSECURITY: ABUSE: ADULT

## 2024-09-11 SDOH — SOCIAL STABILITY: SOCIAL INSECURITY: DO YOU FEEL UNSAFE GOING BACK TO THE PLACE WHERE YOU ARE LIVING?: NO

## 2024-09-11 SDOH — SOCIAL STABILITY: SOCIAL INSECURITY: DOES ANYONE TRY TO KEEP YOU FROM HAVING/CONTACTING OTHER FRIENDS OR DOING THINGS OUTSIDE YOUR HOME?: NO

## 2024-09-11 SDOH — SOCIAL STABILITY: SOCIAL INSECURITY: ARE YOU OR HAVE YOU BEEN THREATENED OR ABUSED PHYSICALLY, EMOTIONALLY, OR SEXUALLY BY ANYONE?: NO

## 2024-09-11 SDOH — SOCIAL STABILITY: SOCIAL INSECURITY: DO YOU FEEL ANYONE HAS EXPLOITED OR TAKEN ADVANTAGE OF YOU FINANCIALLY OR OF YOUR PERSONAL PROPERTY?: NO

## 2024-09-11 SDOH — SOCIAL STABILITY: SOCIAL INSECURITY: HAVE YOU HAD THOUGHTS OF HARMING ANYONE ELSE?: NO

## 2024-09-11 SDOH — SOCIAL STABILITY: SOCIAL INSECURITY: ARE THERE ANY APPARENT SIGNS OF INJURIES/BEHAVIORS THAT COULD BE RELATED TO ABUSE/NEGLECT?: NO

## 2024-09-11 ASSESSMENT — LIFESTYLE VARIABLES
EVER HAD A DRINK FIRST THING IN THE MORNING TO STEADY YOUR NERVES TO GET RID OF A HANGOVER: NO
AUDIT-C TOTAL SCORE: 0
AUDIT-C TOTAL SCORE: 0
HOW OFTEN DO YOU HAVE 6 OR MORE DRINKS ON ONE OCCASION: NEVER
HOW MANY STANDARD DRINKS CONTAINING ALCOHOL DO YOU HAVE ON A TYPICAL DAY: PATIENT DOES NOT DRINK
SKIP TO QUESTIONS 9-10: 1
HAVE YOU EVER FELT YOU SHOULD CUT DOWN ON YOUR DRINKING: NO
EVER FELT BAD OR GUILTY ABOUT YOUR DRINKING: NO
TOTAL SCORE: 0
HAVE PEOPLE ANNOYED YOU BY CRITICIZING YOUR DRINKING: NO
HOW OFTEN DO YOU HAVE A DRINK CONTAINING ALCOHOL: NEVER

## 2024-09-11 ASSESSMENT — COGNITIVE AND FUNCTIONAL STATUS - GENERAL
DAILY ACTIVITIY SCORE: 24
MOBILITY SCORE: 24
DAILY ACTIVITIY SCORE: 24
MOBILITY SCORE: 24
PATIENT BASELINE BEDBOUND: NO

## 2024-09-11 ASSESSMENT — COLUMBIA-SUICIDE SEVERITY RATING SCALE - C-SSRS
6. HAVE YOU EVER DONE ANYTHING, STARTED TO DO ANYTHING, OR PREPARED TO DO ANYTHING TO END YOUR LIFE?: NO
1. IN THE PAST MONTH, HAVE YOU WISHED YOU WERE DEAD OR WISHED YOU COULD GO TO SLEEP AND NOT WAKE UP?: NO
2. HAVE YOU ACTUALLY HAD ANY THOUGHTS OF KILLING YOURSELF?: NO

## 2024-09-11 ASSESSMENT — ACTIVITIES OF DAILY LIVING (ADL)
HEARING - LEFT EAR: FUNCTIONAL
ASSISTIVE_DEVICE: EYEGLASSES
DRESSING YOURSELF: INDEPENDENT
BATHING: INDEPENDENT
TOILETING: INDEPENDENT
PATIENT'S MEMORY ADEQUATE TO SAFELY COMPLETE DAILY ACTIVITIES?: YES
HEARING - RIGHT EAR: FUNCTIONAL
GROOMING: INDEPENDENT
ADEQUATE_TO_COMPLETE_ADL: YES
JUDGMENT_ADEQUATE_SAFELY_COMPLETE_DAILY_ACTIVITIES: YES
FEEDING YOURSELF: INDEPENDENT
WALKS IN HOME: INDEPENDENT
LACK_OF_TRANSPORTATION: NO

## 2024-09-11 ASSESSMENT — PAIN SCALES - GENERAL
PAINLEVEL_OUTOF10: 0 - NO PAIN
PAINLEVEL_OUTOF10: 0 - NO PAIN

## 2024-09-11 ASSESSMENT — PAIN - FUNCTIONAL ASSESSMENT
PAIN_FUNCTIONAL_ASSESSMENT: 0-10
PAIN_FUNCTIONAL_ASSESSMENT: 0-10

## 2024-09-11 NOTE — ED NOTES
Pt arrives with c/o diarrhea ongoing X3 months, n/v X2 weeks. PT additionally reports abnormal blood electrolyte levels from recent lab work. Pt currently in no obvious distress.      Andres Pal RN  09/11/24 [id number]

## 2024-09-11 NOTE — PROGRESS NOTES
09/11/24 1442   Discharge Planning   Living Arrangements Children  (home with daughter Shelli)   Support Systems Children   Assistance Needed A&OX4; independent with ADLs with no DME (brace to LLE); drives; room air baseline and currently room air; on Plavix prior to hospitalization   Type of Residence Private residence   Number of Stairs to Enter Residence 4   Number of Stairs Within Residence 13   Do you have animals or pets at home? Yes   Type of Animals or Pets 1 dog   Who is requesting discharge planning? Provider   Home or Post Acute Services None   Expected Discharge Disposition Home  (Patient and step-daughter deny home going needs at this time)   Does the patient need discharge transport arranged? No   Financial Resource Strain   How hard is it for you to pay for the very basics like food, housing, medical care, and heating? Not hard   Housing Stability   In the last 12 months, was there a time when you were not able to pay the mortgage or rent on time? N   In the past 12 months, how many times have you moved where you were living? 1   At any time in the past 12 months, were you homeless or living in a shelter (including now)? N   Transportation Needs   In the past 12 months, has lack of transportation kept you from medical appointments or from getting medications? no   In the past 12 months, has lack of transportation kept you from meetings, work, or from getting things needed for daily living? No     09/11/2024 1444pm  Spoke with patient and patient's step-daughter bedside in ED

## 2024-09-11 NOTE — ED PROVIDER NOTES
HPI   Chief Complaint   Patient presents with    abnormal labs       CC: Diarrhea  HPI:   63-year-old male with history of chronic kidney disease, insulin-dependent type 2 diabetes, patient states in June 2024 he had a colonoscopy, he also reports having his Ozempic increased in dosage, ever since then he has been having several episodes of loose, watery, brown diarrhea with associated dyspepsia abdominal tenderness, transient episodes of vomiting.  He denies having any chest pain or shortness of breath, he denies any fever, chills.  He notes slightly decreased appetite, denies any hemoptysis or hematemesis, hematochezia or melanotic stools.    Additional Limitations to History:   External Records Reviewed: I reviewed recent and relevant outside records including   History Obtained From:     Past Medical History: Per HPI  Medications: Reviewed in EMR and with patient  Allergies:  Reviewed in EMR  Past Surgical History:   Social History:     ------------------------------------------------------------------------------------------------------  Physical Exam:  --Vital signs reviewed in nursing triage note, EMR flow sheets, and at patient's bedside  GEN:  A&Ox3, no acute distress, appears comfortable.  Conversational and appropriate.  No confusion or gross mental status changes.  EYES: EOMI, non-injected sclera.  ENT: Moist mucous membranes, no apparent injuries or lesions.   CARDIO: Normal rate and regular rhythm. No murmurs, rubs, or gallops.  2+ equal pulses of the distal extremities.   PULM: Clear to auscultation bilaterally. No rales, rhonchi, or wheezes. Good symmetric chest expansion.  GI: Soft, non-tender, non-distended. No rebound tenderness or guarding.  SKIN: Warm and dry, no rashes or lesions.  MSK: ROM intact the extremities without contractures.   EXT: No peripheral edema, contusions, or wounds.   NEURO: Cranial nerves II-XII grossly intact. Sensation to light touch intact and equal bilaterally in upper  and lower extremities.  Symmetric 5/5 strength in upper and lower extremities.  PSYCH: Appropriate mood and behavior, converses and responds appropriately during exam.  -------------------------------------------------------------------------------------------------------        Differential Diagnoses Considered:   Chronic Medical Conditions Significantly Affecting Care:   Diagnostic testing considered: [PERC, D-Dimer, PECARN, etc.]    - EKG interpreted by myself sinus rhythm ventricular rate 75 normal QRS duration no prolonged QT/QTc no obvious ST elevation, depression or acute ischemic findings.  - I independently interpreted: [CXR, CT, POCUS, etc. including your interpretation]  - Labs notable for     Escalation of Care: Appropriate for  Social Determinants of Health Significantly Affecting Care: [Homelessness, lacking transportation, uninsured, unable to afford medications]  Prescription Drug Consideration: [Antibiotics, antivirals, pain medications, etc.]  Discussion of Management with Other Providers:  I discussed the patient/results with: [admitting team, consultant, radiologist, social work, EPAT, case management, PT/OT, RT, PCP, etc.]      Cristopher Myrick PA-C              Patient History   Past Medical History:   Diagnosis Date    CHF (congestive heart failure) (Multi)     COVID-19 virus infection 09/18/2023    Deficiency of other specified B group vitamins 06/09/2021    Folate deficiency    Diabetic foot ulcer (Multi) 05/06/2022    Focal chorioretinal inflammation, macular or paramacular, 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    Acute macular neuroretinitis of left eye    Focal chorioretinal inflammation, macular or paramacular, left eye 11/05/2014    Acute macular neuroretinitis of left eye    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    Ischemic optic neuropathy, 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    Lyme disease 09/08/2014    History of palpitations     Other specified cataract 01/27/2016    Cataract, mature    Other specified retinal disorders 01/27/2016    Macular exudate    Respiratory failure (Multi) 10/10/2023    Sebaceous cyst 11/20/2018    Infected sebaceous cyst of skin    Unspecified cataract 01/27/2016    Total cataract     Past Surgical History:   Procedure Laterality Date    CT GUIDED PERCUTANEOUS BIOPSY BONE DEEP  08/29/2022    CT GUIDED PERCUTANEOUS BIOPSY BONE DEEP 8/29/2022 [facility]    FOOT SURGERY Left 03/31/2016    Foot Surgery    MR HEAD ANGIO WO IV CONTRAST  09/17/2014    MR HEAD ANGIO WO IV CONTRAST 9/17/2014 [facility]    OTHER SURGICAL HISTORY  02/04/2015    Eye Surgery Results [facility]     Family History   Problem Relation Name Age of Onset    No Known Problems Mother      Heart attack Father      Diabetes Father       Social History     Tobacco Use    Smoking status: Never    Smokeless tobacco: Never   Vaping Use    Vaping status: Never Used   Substance Use Topics    Alcohol use: Not Currently    Drug use: Never       Physical Exam   ED Triage Vitals [09/11/24 1256]   Temperature Heart Rate Respirations BP   36.6 °C (97.9 °F) 80 20 110/70      Pulse Ox Temp Source Heart Rate Source Patient Position   100 % Temporal Monitor --      BP Location FiO2 (%)     -- --       Physical Exam      ED Course & MDM   ED Course as of 09/11/24 1413   Wed Sep 11, 2024   1405 Patient is slightly anemic but his anemia is actually improved versus previous.  He does have slightly elevated phosphorus magnesium and potassium. [RZ]      ED Course User Index  [RZ] Gen Keating MD         Diagnoses as of 09/11/24 1413   Renal insufficiency   Anemia, unspecified type                 No data recorded     Carroll Coma Scale Score: 15 (09/11/24 1257 : Cornelio Guaman RN)                           Medical Decision Making  63-year-old male with history of type 2 insulin-dependent diabetes, with acute on chronic kidney insufficiency/failure, patient is  hemodynamically stable, nontoxic-appearing, neurologically intact, benign abdominal exam, afebrile, normal lactate no leukocytosis, low suspicion for acute surgical abdomen, he was given IV fluid,  potassium 5.4, patient will be admitted for acute on chronic renal failure        Procedure  Procedures     Crsitopher Myrick PA-C  09/11/24 1419       Cristopher Myrick PA-C  09/11/24 1453

## 2024-09-11 NOTE — PROGRESS NOTES
Subjective     HPI   Gregorio North is a 63 y.o. year old male patient with presenting to clinic with concern for   Chief Complaint   Patient presents with    Diarrhea     Ongoing since colonoscopy in June- on average 3 times daily- urgency  Nausea, cramping, gurgling- stomach is burning  Vomiting as well-   Diarrhea has a very foul odor  Feels weak,  lightheaded         3 months Bloating, diarrhea since colonoscopy water 3 times daily with foul odor.     Morning Nausea and vomiting (the food from dinner (anything from 4pm the night before)      Stopped taking his lokelma in the past due to GI issues. I advised him he cannot stop this medication without discussing with nephrology. Since then he has been taking it every other day.     Roommate has similar symptoms        Patient Active Problem List   Diagnosis    Benign essential hypertension    Diabetes mellitus (Multi)    Anemia    Chronic kidney disease, stage 4 (severe) (Multi)    Dyslipidemia    Benign prostatic hyperplasia    Anxiety    Fatigue    Hypokalemia    HLD (hyperlipidemia)    Macular exudate    Nystagmus    Optic atrophy    Atherosclerosis of native arteries of extremities with rest pain, bilateral legs (Multi)    Pulmonary edema (HHS-HCC)    Vitamin D deficiency    Unvaccinated for covid-19    CHF (congestive heart failure) (Multi)    Tremor    Swelling of upper extremity    Papilledema    Palpitations    Obesity with body mass index 30 or greater    Muscle pain    Arthralgia    Impairment of balance    History of hypertension    History of endocrine disorder    Headache    Class 3 severe obesity due to excess calories in adult (Multi)    Sjogren's syndrome (Multi)    Shortness of breath    Proteinuria    History of kidney cancer    Lyme disease    Iron deficiency anemia    Chronic pulmonary edema (HHS-HCC)    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 (Multi)    Chronic osteomyelitis with draining sinus, right ankle  and foot (Multi)    Acute kidney injury superimposed on CKD (CMS-HCC)    Type 2 diabetes mellitus with insulin therapy (Multi)       Past Medical History:   Diagnosis Date    CHF (congestive heart failure) (Multi)     COVID-19 virus infection 09/18/2023    Deficiency of other specified B group vitamins 06/09/2021    Folate deficiency    Diabetic foot ulcer (Multi) 05/06/2022    Focal chorioretinal inflammation, macular or paramacular, 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    Acute macular neuroretinitis of left eye    Focal chorioretinal inflammation, macular or paramacular, left eye 11/05/2014    Acute macular neuroretinitis of left eye    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    Ischemic optic neuropathy, 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    Lyme disease 09/08/2014    History of palpitations    Other specified cataract 01/27/2016    Cataract, mature    Other specified retinal disorders 01/27/2016    Macular exudate    Respiratory failure (Multi) 10/10/2023    Sebaceous cyst 11/20/2018    Infected sebaceous cyst of skin    Unspecified cataract 01/27/2016    Total cataract      Past Surgical History:   Procedure Laterality Date    CT GUIDED PERCUTANEOUS BIOPSY BONE DEEP  08/29/2022    CT GUIDED PERCUTANEOUS BIOPSY BONE DEEP 8/29/2022 [facility]    FOOT SURGERY Left 03/31/2016    Foot Surgery    MR HEAD ANGIO WO IV CONTRAST  09/17/2014    MR HEAD ANGIO WO IV CONTRAST 9/17/2014 [facility]    OTHER SURGICAL HISTORY  02/04/2015    Eye Surgery Results [facility]      Family History   Problem Relation Name Age of Onset    No Known Problems Mother      Heart attack Father      Diabetes Father        Social History     Tobacco Use    Smoking status: Never    Smokeless tobacco: Never   Substance Use Topics    Alcohol use: Not Currently        Current Outpatient Medications:     alcohol swabs (Alcohol Pads) pads, medicated, Use as directed to check blood sugar  up to 3-4 times daily and inject insulin 4 times daily, Disp: 300 each, Rfl: 0    allopurinol (Zyloprim) 100 mg tablet, Take 1 tablet (100 mg) by mouth once daily., Disp: 90 tablet, Rfl: 3    atorvastatin (Lipitor) 20 mg tablet, Take 1 tablet (20 mg) by mouth once daily at bedtime., Disp: 90 tablet, Rfl: 3    blood sugar diagnostic (Blood Glucose Test) strip, Use as directed to check blood sugar up to 3-4 times daily, Disp: 100 each, Rfl: 0    blood-glucose meter misc, Use as directed to check blood sugar up to 3-4 times daily, Disp: 1 each, Rfl: 0    carvedilol (Coreg) 6.25 mg tablet, Take 1 tablet (6.25 mg) by mouth 2 times daily (morning and late afternoon)., Disp: 180 tablet, Rfl: 1    cholecalciferol (Vitamin D-3) 125 MCG (5000 UT) capsule, Take 1 capsule (125 mcg) by mouth once daily., Disp: , Rfl:     clopidogrel (Plavix) 75 mg tablet, Take 1 tablet (75 mg) by mouth once daily., Disp: 90 tablet, Rfl: 3    empagliflozin (Jardiance) 10 mg, Take 1 tablet (10 mg) by mouth once daily in the morning., Disp: 90 tablet, Rfl: 3    finerenone (Kerendia) 10 mg tablet tablet, Take 1 tablet (10 mg) by mouth once daily., Disp: 90 tablet, Rfl: 3    folic acid (Folvite) 1 mg tablet, Take 5 tablets (5 mg) by mouth once daily., Disp: 450 tablet, Rfl: 3    FreeStyle [place] 3 [place] misc, Use as instructed to check blood sugar throughout the day, Disp: 1 each, Rfl: 0    furosemide (Lasix) 40 mg tablet, TAKE 2 TABLETS (80MG) BY MOUTH IN THE MORNING AND 1 TABLET (40MG) IN THE EVENING., Disp: 270 tablet, Rfl: 0    HumaLOG KwikPen Insulin 100 unit/mL injection, Inject 8 Units under the skin 3 times a day before meals., Disp: 15 mL, Rfl: 3    insulin glargine (Lantus Solostar U-100 Insulin) 100 unit/mL (3 mL) pen, Inject 26 Units under the skin once daily at bedtime., Disp: 15 mL, Rfl: 3    lancets (Accu-Chek Softclix Lancets) misc, Use as directed  to check blood pressure 3 to 4 times a day, Disp: 100 each, Rfl: 0    lisinopril 40 mg  "tablet, Take 1 tablet (40 mg) by mouth once daily in the morning., Disp: 90 tablet, Rfl: 3    MAGNESIUM GLYCINATE ORAL, Take 420 mg by mouth once daily., Disp: , Rfl:     pen needle, diabetic 32 gauge x 5/32\" needle, Use pen needle with insulin pen 4 times daily as directed, Disp: 400 each, Rfl: 3    potassium chloride CR 20 mEq ER tablet, TAKE ONE TABLET BY MOUTH TWO TIMES A DAY, Disp: 180 tablet, Rfl: 0    sodium zirconium cyclosilicate (Lokelma) 5 gram packet, Take 5 g (1 packet) by mouth once daily. (Patient taking differently: Take 5 g by mouth every other day.), Disp: 90 packet, Rfl: 3    famotidine (Pepcid) 40 mg tablet, Take 1 tablet (40 mg) by mouth once daily., Disp: 30 tablet, Rfl: 0    semaglutide (OZEMPIC) 1 mg/dose (4 mg/3 mL) pen injector, Inject 1 mg under the skin 1 (one) time per week. Reduced dose, Disp: 9 mL, Rfl: 0     Review of Systems  Constitutional: Denies fever  HEENT: Denies ST, earache  CVS: Denies Chest pain  Pulmonary: Denies wheezing, SOB  GI: Denies N/V  : Denies dysuria  Musculoskeletal:  Denies myalgia  Neuro: Denies focal weakness or numbness.  Skin: Denies Rashes.  *Review of Systems is negative unless otherwise mentioned in HPI or ROS above.    Objective   /74   Pulse 89   Temp 36.8 °C (98.2 °F)   Wt 110 kg (242 lb 9.6 oz)   SpO2 100%   BMI 35.83 kg/m²  reviewed Body mass index is 35.83 kg/m².     Physical Exam  Constitutional: NAD.  Resting comfortably.   ENT:  Moist oral mucosa. Posterior oropharynx unremarkable.   Cardiac: Regular rate & rhythm.  Pulmonary: Lungs clear bilaterally with good aeration. No wheezes, rhonchi, or rales.  GI: Soft. Nontender, Nondistended. Hyperactive bowel sounds x4. No guarding or rebound tenderness.  No RLQ tenderness.   : No CVA tenderness.  Musculoskeletal: No peripheral edema. . Neck is supple. No meningismus.  Skin: No rash or evidence of trauma. No petechiae.  Neuro: No focal neurologic deficits.  Psych: Intact judgement and " insight.    .Assessment/Plan   Problem List Items Addressed This Visit    None  Visit Diagnoses         Codes    Dyspepsia    -  Primary R10.13    Relevant Medications    famotidine (Pepcid) 40 mg tablet    semaglutide (OZEMPIC) 1 mg/dose (4 mg/3 mL) pen injector    Chronic diarrhea     K52.9    Relevant Orders    Basic metabolic panel    Magnesium    C. difficile, PCR    Stool Pathogen Panel, PCR    Referral to Gastroenterology    Diarrhea, unspecified     R19.7    Relevant Orders    Stool Pathogen Panel, PCR

## 2024-09-11 NOTE — PROGRESS NOTES
Pharmacy Medication History Review    Gregorio North is a 63 y.o. male admitted to [facility] ED. Pharmacy reviewed the patient's [id number] medications and allergies for accuracy.    The list below reflectives the updated PTA list. Please review each medication in order reconciliation for additional clarification and justification.  Prior to Admission Medications   Prescriptions Last Dose Informant Patient Reported? Taking?   FreeStyle [place] 3 [place] misc       Sig: Use as instructed to check blood sugar throughout the day   HumaLOG KwikPen Insulin 100 unit/mL injection 9/11/2024 at AM  Yes Yes   Sig: Inject 8 Units under the skin 3 times a day before meals.   MAGNESIUM GLYCINATE ORAL 9/11/2024 at AM  Yes Yes   Sig: Take 420 mg by mouth once daily.   alcohol swabs (Alcohol Pads) pads, medicated       Sig: Use as directed to check blood sugar up to 3-4 times daily and inject insulin 4 times daily   allopurinol (Zyloprim) 100 mg tablet 9/11/2024 at AM  Yes Yes   Sig: Take 1 tablet (100 mg) by mouth once daily.   atorvastatin (Lipitor) 20 mg tablet 9/10/2024 at PM  Yes Yes   Sig: Take 1 tablet (20 mg) by mouth once daily at bedtime.   blood sugar diagnostic (Blood Glucose Test) strip       Sig: Use as directed to check blood sugar up to 3-4 times daily   blood-glucose meter misc       Sig: Use as directed to check blood sugar up to 3-4 times daily   carvedilol (Coreg) 6.25 mg tablet 9/11/2024 at AM  Yes Yes   Sig: Take 1 tablet (6.25 mg) by mouth 2 times daily (morning and late afternoon).   cholecalciferol (Vitamin D-3) 125 MCG (5000 UT) capsule 9/11/2024 at AM  Yes Yes   Sig: Take 1 capsule (125 mcg) by mouth once daily.   clopidogrel (Plavix) 75 mg tablet 9/11/2024 at AM  Yes Yes   Sig: Take 1 tablet (75 mg) by mouth once daily.   empagliflozin (Jardiance) 10 mg 9/11/2024 at AM  Yes Yes   Sig: Take 1 tablet (10 mg) by mouth once daily in the morning.   famotidine (Pepcid) 40 mg tablet 9/11/2024 at AM  Yes Yes  "  Sig: Take 1 tablet (40 mg) by mouth once daily.   finerenone (Kerendia) 10 mg tablet tablet 9/11/2024 at AM  Yes Yes   Sig: Take 1 tablet (10 mg) by mouth once daily.   folic acid (Folvite) 1 mg tablet 9/11/2024 at AM  Yes Yes   Sig: Take 5 tablets (5 mg) by mouth once daily.   furosemide (Lasix) 40 mg tablet 9/11/2024 at AM  Yes Yes   Sig: TAKE 2 TABLETS (80MG) BY MOUTH IN THE MORNING AND 1 TABLET (40MG) IN THE EVENING.   Patient taking differently: Take 2 tablets (80 mg) by mouth once daily in the morning.   insulin glargine (Lantus Solostar U-100 Insulin) 100 unit/mL (3 mL) pen 9/10/2024 at PM  Yes Yes   Sig: Inject 26 Units under the skin once daily at bedtime.   lancets (Accu-Chek Softclix Lancets) misc       Sig: Use as directed  to check blood pressure 3 to 4 times a day   lisinopril 40 mg tablet 9/11/2024 at AM  Yes Yes   Sig: Take 1 tablet (40 mg) by mouth once daily in the morning.   pen needle, diabetic 32 gauge x 5/32\" needle       Sig: Use pen needle with insulin pen 4 times daily as directed   potassium chloride CR 20 mEq ER tablet   Yes No   Sig: TAKE ONE TABLET BY MOUTH TWO TIMES A DAY   semaglutide (OZEMPIC) 1 mg/dose (4 mg/3 mL) pen injector 9/4/2024  Yes Yes   Sig: Inject 1 mg under the skin 1 (one) time per week. Reduced dose   sodium zirconium cyclosilicate (Lokelma) 5 gram packet 9/11/2024 at AM  Yes Yes   Sig: Take 5 g (1 packet) by mouth once daily.   Patient taking differently: Take 5 g by mouth every other day.      Facility-Administered Medications: None            The list below reflectives the updated allergy list. Please review each documented allergy for additional clarification and justification.  Allergies  Reviewed by Leslye Iqbal on 9/11/2024   No Known Allergies         Below are additional concerns with the patient's PTA list.  Pt is no longer taking potassium.   Patient presenting with concerns for semaglutide side effects.  Reports provider told him prior to admission to " decrease dose due to side effects.      Leslye Iqbal

## 2024-09-11 NOTE — ED TRIAGE NOTES
Patient c/o diarrhea, nausea and vomiting over the past few weeks with increased weakness. Patient has outpatient lab work done today and was told by his PCP to come to the ED for elevated potassium and kidney function.

## 2024-09-11 NOTE — PATIENT INSTRUCTIONS
Probiotic foods are better than probiotic pills. Probiotics help your gut populate a healthy balance of good bacteria to keep harmful bacteria/inflammation and diarrhea in check. Probiotic foods are best eaten an hour or more AFTER you take an antibiotic dose.  We want diverse microflora.     I'd recommend kombucha (but not for kids),  kimchi, claudio (kind of like sour butter milk taste), activia or live culture yogurts, sour dough breads, sourkraut, cheeses (juarez gouda cheddar & swiss), pickles (in moderation- lots of sodium!), Miso soup, apples, green peas (frozen vs canned preferably).    Prebiotics aren't actual bacteria- they're insoluble fiber and non-digestable food ingredients that are good for your gut to support the good bacteria like probiotics and keep your gut healthy. Things like bananas, oats, asparagus, garlic, and soybeans can be good for your overall gut health.     These are the kind of things I'd recommend trying to do. The nutrition that our bodies need is best absorbed from foods we eat vs pills and supplements. If you need extra support, look for probiotics including lactobacillus and bifidobacterium.

## 2024-09-11 NOTE — H&P
HPI    HPI: Gregorio North is a 63 y.o. male with PMHx CKD stage IV (hx kidney cancer), T2DM (hx diabetic foot wounds with osteomyelitis, neuropathy), HTN, chronic iron deficiency anemia, BPH, anxiety, hypokalemia, HLD, CHF who presented to the hospital with concern for STEVE seen on outpatient labs (Cr 4.3, , K 5.8). He also reports 3 month history of diarrhea, vomiting, dyspepsia, and decreased PO intake. He associates these symptoms with increasing his dose of Ozempic to 2g following a drug holiday due to colonoscopy. He reports seeing his PCP today for these complaints, and getting labs drawn. He then received a call from his nephrologist with instructions to come to the ED.     Chief Complaint   Patient presents with    abnormal labs       ED course:     Vitals: Temp 97.9, /70, HR 80, RR 20, Spo2 100% on RA    Labs:   -CBC: WBC 7.5, Hgb 10.2, Hct 30.8, MCV 97, Platelet 216, PT 10.7, INR 1.0  -CMP: Glucose 138, Na 137, K 5.4, Cl 107, HCO3 19, , Cr 4.23 (baseline 2-3), Ca 8.5, Alk Phos 90, ALT 48, AST 24  -Lipase: 38  -Lactate: 1.6  -UA ordered    Imaging:   - EKG: Accelerated Junctional rhythm. Low voltage QRS.     Micro:   - Urine culture: pending  - COVID: negative  - C. Diff PCR: pending     Interventions: IVF given    ROS: 12 points review of system is negative except as stated in the HPI above.   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    Ischemic optic neuropathy, 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    Lyme disease 09/08/2014    History of palpitations    Other specified cataract 01/27/2016    Cataract, mature    Other specified retinal disorders 01/27/2016    Macular exudate    Respiratory failure (Multi) 10/10/2023    Sebaceous cyst 11/20/2018    Infected sebaceous cyst of skin    Unspecified cataract 01/27/2016    Total cataract      Surgical History     Past Surgical History:   Procedure Laterality Date    CT GUIDED PERCUTANEOUS BIOPSY BONE DEEP  08/29/2022    CT GUIDED PERCUTANEOUS BIOPSY BONE DEEP 8/29/2022 [facility]    FOOT SURGERY Left 03/31/2016    Foot Surgery    MR HEAD ANGIO WO IV CONTRAST  09/17/2014    MR HEAD ANGIO WO IV CONTRAST 9/17/2014 [facility]    OTHER SURGICAL HISTORY  02/04/2015    Eye Surgery Results [facility]     Family History     Family History   Problem Relation Name Age of Onset    No Known Problems Mother      Heart attack Father      Diabetes Father       Social History     Social History     Socioeconomic History    Marital status:      Spouse name: Not on file    Number of children: Not on file    Years of education: Not on file    Highest education level: Not on file   Occupational History    Not on file   Tobacco Use    Smoking status: Never    Smokeless tobacco: Never   Vaping Use    Vaping status: Never Used   Substance and Sexual Activity    Alcohol use: Not Currently    Drug use: Never    Sexual activity: Not on file   Other Topics Concern    Not on file   Social History Narrative    Not on file     Social Determinants of Health     Financial Resource Strain: Low Risk  (9/11/2024)    Overall Financial Resource Strain (CARDIA)     Difficulty of Paying Living Expenses: Not hard at all   Food Insecurity: Not on file   Transportation Needs: No Transportation Needs (9/11/2024)    PRAPARE - Transportation     Lack of  "Transportation (Medical): No     Lack of Transportation (Non-Medical): No   Physical Activity: Not on file   Stress: No Stress Concern Present (2/28/2024)    [employer] - Occupational Stress Questionnaire     Feeling of Stress : Not at all   Social Connections: Not on file   Intimate Partner Violence: Not on file   Housing Stability: Low Risk  (9/11/2024)    Housing Stability Vital Sign     Unable to Pay for Housing in the Last Year: No     Number of Times Moved in the Last Year: 1     Homeless in the Last Year: No       Tobacco Use: Low Risk  (9/11/2024)    Patient History     Smoking Tobacco Use: Never     Smokeless Tobacco Use: Never     Passive Exposure: Not on file        Social History     Substance and Sexual Activity   Alcohol Use Not Currently      Allergies   No Known Allergies   Meds    Scheduled medications  [Held by provider] allopurinol, 100 mg, oral, Daily  atorvastatin, 20 mg, oral, Nightly  carvedilol, 6.25 mg, oral, BID  clopidogrel, 75 mg, oral, Daily  famotidine, 40 mg, oral, Daily  [Held by provider] furosemide, 80 mg, oral, q AM  heparin (porcine), 5,000 Units, subcutaneous, q8h  insulin glargine, 26 Units, subcutaneous, q24h  insulin lispro, 0-15 Units, subcutaneous, TID  insulin lispro, 8 Units, subcutaneous, TID AC  lactated Ringer's, 1,000 mL, intravenous, Once  [Held by provider] lisinopril, 40 mg, oral, q AM      Continuous medications  sodium chloride 0.9%, 125 mL/hr      PRN medications  PRN medications: acetaminophen, ondansetron, polyethylene glycol   Objective     Vitals  Visit Vitals  /62 (BP Location: Left arm, Patient Position: Sitting)   Pulse 79   Temp 36.6 °C (97.9 °F) (Temporal)   Resp 18   Ht 1.753 m (5' 9\")   Wt 110 kg (242 lb)   SpO2 100%   BMI 35.74 kg/m²   Smoking Status Never   BSA 2.31 m²        Physical Examination:  Gen: well appearing, in NAD  HEENT: AT/NC, no conjunctival pallor, anicteric sclera, EOMI   Neck: supple, no " "LAD/JVD  Chest: CTAB, no wheezing / labored respirations  CVS: RRR, no murmurs  Abd: soft, flat, NT/ND, BS+  Ext: no cyanosis/clubbing or pedal edema  Neuro: AOx3, CN 2-12 intact, motor 5/5 globally, sensation intact    I/Os  No intake or output data in the 24 hours ending 09/11/24 1551    Vent Settings         Labs:   Results from last 72 hours   Lab Units 09/11/24  1319 09/11/24  0922   SODIUM mmol/L 137 138   POTASSIUM mmol/L 5.4* 5.8*   CHLORIDE mmol/L 107 109*   CO2 mmol/L 19* 18*   BUN mg/dL 106* 115*   CREATININE mg/dL 4.23* 4.30*   GLUCOSE mg/dL 138* 197*   CALCIUM mg/dL 8.5* 8.8   ANION GAP mmol/L 16 17   EGFR mL/min/1.73m*2 15* 15*   PHOSPHORUS mg/dL 6.5* 6.8*      Results from last 72 hours   Lab Units 09/11/24  1319 09/11/24  0922   WBC AUTO x10*3/uL 7.5 7.1   HEMOGLOBIN g/dL 10.2* 9.7*   HEMATOCRIT % 30.8* 30.4*   PLATELETS AUTO x10*3/uL 216 209   NEUTROS PCT AUTO % 48.5  --    LYMPHS PCT AUTO % 34.0  --    MONOS PCT AUTO % 11.3  --    EOS PCT AUTO % 5.1  --       Lab Results   Component Value Date    CALCIUM 8.5 (L) 09/11/2024    PHOS 6.5 (H) 09/11/2024      Lab Results   Component Value Date    CRP <0.10 08/10/2022      [unfilled]     Micro/ID:   No results found for the last 90 days.                   No lab exists for component: \"AGALPCRNB\"   .ID  No results found for: \"URINECULTURE\", \"BLOODCULT\", \"CSFCULTSMEAR\"    Images    ECG 12 lead  Accelerated Junctional rhythm  Low voltage QRS  Cannot rule out Anterior infarct , age undetermined  Abnormal ECG  When compared with ECG of 31-JAN-2024 16:19,  Junctional rhythm has replaced Sinus rhythm            Assessment and Plan    Gregorio North is a 63 y.o. male with PMHx CKD stage IV (hx kidney cancer), T2DM (hx diabetic foot wounds with osteomyelitis, neuropathy), HTN, chronic iron deficiency anemia, BPH, anxiety, hypokalemia, HLD, CHF who was admitted for STEVE.     Acute Medical Issues   #STEVE  #CKD stage IV  - Cr on admission 4.23 (baseline 2-3)  - " started maintenance fluids 125 ml/hr  - urine osm and lytes ordered    #diarrhea   #vomiting  - C. Diff pending   - zofran PRN     #T2DM  - last Hg A1c 6.7 (6/27/24)   - holding Jardiance 10mg daily, ozempic 2mg  - continue home lantus 26 units qhs  - continue home humalog 8units qac  - SSI     #hyperkalemia   #hx hypokalemia   - takes lokelma 5g every other day at home, will hold  - will monitor    Chronic Medical Issues   #HTN - holding amlodipine 10mg and lisinopril 40mg  #HLD - continue atorvastatin 20 mg daily   #GERD - continue pepcid 40 mg daily  #CHF - continue carvedilol 6.25 mg BID, clopidogrel 75 mg daily; holding lasix 40 mg BID  #Gout - holding allopurinol 100mg  #Anxiety  #FILOMENA (baseline Hg 10-11)    Fluids: PO intake & IVF PRN   Electrolytes: Replete as needed   Nutrition: Consistent Carbs Diet  GI ppx: Pepcid 40 mg daily  DVT ppx: Heparin SubQ  Antibiotics: none  Tubes/Lines/Drains: PIV    Code Status: Full Code   Emergency Contact: Extended Emergency Contact Information  Primary Emergency Contact: Shelli Crowe  [place] Phone: [phone]  Relation: Stepchild     Disposition: 63 y.o. male admitted for STEVE, diarrhea. Estimated length of stay > 48 hrs.       Shelli Carlson DO   09/11/24

## 2024-09-11 NOTE — PROGRESS NOTES
The patient was seen by the midlevel/resident.  I have personally saw the patient and made/approved the management plan and take responsibility for the patient management.  I reviewed the EKG's (when done) and agree with the interpretation.  I have seen and examined the patient; agree with the workup, evaluation, MDM, and diagnosis.  The care plan has been discussed with the midlevel/resident; I have reviewed the note and agree with the documented findings.     Patient was sent in by primary provider for elevated BUN/creatinine.  He has history of some renal sufficiency sees Dr. Kaplan.  He reports he is got diarrhea since May when they upped his Ozempic.  He thinks it might be connected.  He was found to have slight elevated magnesium potassium along with quite a high BUN/creatinine.  He is stable this time was given fluids and worked up we confirmed his lab abnormalities.  He does not require medicines lowers potassium right now.  Will attempt to hospitalize.  ED Course as of 09/11/24 1406   Wed Sep 11, 2024   1405 Patient is slightly anemic but his anemia is actually improved versus previous.  He does have slightly elevated phosphorus magnesium and potassium. [RZ]      ED Course User Index  [RZ] Gen Keating MD         Diagnoses as of 09/11/24 1406   Renal insufficiency   Anemia, unspecified type     Gen Keating MD

## 2024-09-12 VITALS
TEMPERATURE: 97 F | RESPIRATION RATE: 18 BRPM | BODY MASS INDEX: 35.84 KG/M2 | OXYGEN SATURATION: 96 % | HEIGHT: 69 IN | WEIGHT: 242 LBS | SYSTOLIC BLOOD PRESSURE: 149 MMHG | DIASTOLIC BLOOD PRESSURE: 87 MMHG | HEART RATE: 85 BPM

## 2024-09-12 LAB
ALBUMIN SERPL BCP-MCNC: 3.7 G/DL (ref 3.4–5)
ANION GAP SERPL CALC-SCNC: 14 MMOL/L (ref 10–20)
BUN SERPL-MCNC: 93 MG/DL (ref 6–23)
CALCIUM SERPL-MCNC: 8.3 MG/DL (ref 8.6–10.3)
CHLORIDE SERPL-SCNC: 113 MMOL/L (ref 98–107)
CHLORIDE UR-SCNC: 81 MMOL/L
CHLORIDE/CREATININE (MMOL/G) IN URINE: 131 MMOL/G CREAT (ref 23–275)
CO2 SERPL-SCNC: 19 MMOL/L (ref 21–32)
CREAT SERPL-MCNC: 3.36 MG/DL (ref 0.5–1.3)
CREAT UR-MCNC: 61.8 MG/DL (ref 20–370)
CREAT UR-MCNC: 61.8 MG/DL (ref 20–370)
EGFRCR SERPLBLD CKD-EPI 2021: 20 ML/MIN/1.73M*2
ERYTHROCYTE [DISTWIDTH] IN BLOOD BY AUTOMATED COUNT: 13.4 % (ref 11.5–14.5)
GLUCOSE BLD MANUAL STRIP-MCNC: 117 MG/DL (ref 74–99)
GLUCOSE BLD MANUAL STRIP-MCNC: 127 MG/DL (ref 74–99)
GLUCOSE SERPL-MCNC: 123 MG/DL (ref 74–99)
HCT VFR BLD AUTO: 28.7 % (ref 41–52)
HGB BLD-MCNC: 9.4 G/DL (ref 13.5–17.5)
HOLD SPECIMEN: NORMAL
MAGNESIUM SERPL-MCNC: 2.47 MG/DL (ref 1.6–2.4)
MCH RBC QN AUTO: 31.5 PG (ref 26–34)
MCHC RBC AUTO-ENTMCNC: 32.8 G/DL (ref 32–36)
MCV RBC AUTO: 96 FL (ref 80–100)
MICROALBUMIN UR-MCNC: 64.7 MG/L
MICROALBUMIN/CREAT UR: 104.7 UG/MG CREAT
NRBC BLD-RTO: 0 /100 WBCS (ref 0–0)
OSMOLALITY SERPL: 332 MOSM/KG (ref 280–300)
OSMOLALITY UR: 369 MOSM/KG (ref 200–1200)
PHOSPHATE SERPL-MCNC: 4.7 MG/DL (ref 2.5–4.9)
PLATELET # BLD AUTO: 197 X10*3/UL (ref 150–450)
POTASSIUM SERPL-SCNC: 5.9 MMOL/L (ref 3.5–5.3)
POTASSIUM UR-SCNC: 26 MMOL/L
POTASSIUM/CREAT UR-RTO: 42 MMOL/G CREAT
RBC # BLD AUTO: 2.98 X10*6/UL (ref 4.5–5.9)
SODIUM SERPL-SCNC: 140 MMOL/L (ref 136–145)
SODIUM UR-SCNC: 76 MMOL/L
SODIUM/CREAT UR-RTO: 123 MMOL/G CREAT
WBC # BLD AUTO: 6.9 X10*3/UL (ref 4.4–11.3)

## 2024-09-12 PROCEDURE — 36415 COLL VENOUS BLD VENIPUNCTURE: CPT

## 2024-09-12 PROCEDURE — 2500000004 HC RX 250 GENERAL PHARMACY W/ HCPCS (ALT 636 FOR OP/ED)

## 2024-09-12 PROCEDURE — G0378 HOSPITAL OBSERVATION PER HR: HCPCS

## 2024-09-12 PROCEDURE — 82947 ASSAY GLUCOSE BLOOD QUANT: CPT

## 2024-09-12 PROCEDURE — 85027 COMPLETE CBC AUTOMATED: CPT

## 2024-09-12 PROCEDURE — 99223 1ST HOSP IP/OBS HIGH 75: CPT | Performed by: INTERNAL MEDICINE

## 2024-09-12 PROCEDURE — 83735 ASSAY OF MAGNESIUM: CPT

## 2024-09-12 PROCEDURE — 80069 RENAL FUNCTION PANEL: CPT

## 2024-09-12 PROCEDURE — 99238 HOSP IP/OBS DSCHRG MGMT 30/<: CPT

## 2024-09-12 PROCEDURE — 2500000001 HC RX 250 WO HCPCS SELF ADMINISTERED DRUGS (ALT 637 FOR MEDICARE OP)

## 2024-09-12 PROCEDURE — 96361 HYDRATE IV INFUSION ADD-ON: CPT

## 2024-09-12 PROCEDURE — 2500000002 HC RX 250 W HCPCS SELF ADMINISTERED DRUGS (ALT 637 FOR MEDICARE OP, ALT 636 FOR OP/ED)

## 2024-09-12 RX ORDER — FUROSEMIDE 40 MG/1
80 TABLET ORAL EVERY OTHER DAY
Status: DISCONTINUED | OUTPATIENT
Start: 2024-09-13 | End: 2024-09-12 | Stop reason: HOSPADM

## 2024-09-12 RX ORDER — FUROSEMIDE 40 MG/1
TABLET ORAL
Qty: 45 TABLET | Refills: 0 | Status: SHIPPED | OUTPATIENT
Start: 2024-09-12 | End: 2024-10-12

## 2024-09-12 RX ORDER — FUROSEMIDE 40 MG/1
40 TABLET ORAL EVERY OTHER DAY
Status: DISCONTINUED | OUTPATIENT
Start: 2024-09-12 | End: 2024-09-12 | Stop reason: HOSPADM

## 2024-09-12 RX ORDER — SEMAGLUTIDE 1.34 MG/ML
0.5 INJECTION, SOLUTION SUBCUTANEOUS WEEKLY
Qty: 0.01 G | Refills: 0 | Status: SHIPPED | OUTPATIENT
Start: 2024-09-12 | End: 2024-09-18 | Stop reason: SDUPTHER

## 2024-09-12 ASSESSMENT — COGNITIVE AND FUNCTIONAL STATUS - GENERAL
DAILY ACTIVITIY SCORE: 24
MOBILITY SCORE: 24

## 2024-09-12 ASSESSMENT — PAIN SCALES - GENERAL: PAINLEVEL_OUTOF10: 0 - NO PAIN

## 2024-09-12 NOTE — CARE PLAN
The patient's goals for the shift include      The clinical goals for the shift include pt will remain safe with no falls this shift      Problem: Pain - Adult  Goal: Verbalizes/displays adequate comfort level or baseline comfort level  Outcome: Progressing     Problem: Safety - Adult  Goal: Free from fall injury  Outcome: Progressing     Problem: Discharge Planning  Goal: Discharge to home or other facility with appropriate resources  Outcome: Progressing     Problem: Chronic Conditions and Co-morbidities  Goal: Patient's chronic conditions and co-morbidity symptoms are monitored and maintained or improved  Outcome: Progressing     Problem: Pain  Goal: Takes deep breaths with improved pain control throughout the shift  Outcome: Progressing  Goal: Turns in bed with improved pain control throughout the shift  Outcome: Progressing  Goal: Walks with improved pain control throughout the shift  Outcome: Progressing  Goal: Performs ADL's with improved pain control throughout shift  Outcome: Progressing  Goal: Participates in PT with improved pain control throughout the shift  Outcome: Progressing  Goal: Free from opioid side effects throughout the shift  Outcome: Progressing  Goal: Free from acute confusion related to pain meds throughout the shift  Outcome: Progressing

## 2024-09-12 NOTE — DISCHARGE INSTRUCTIONS
Please, take your home medications as instructed after being discharged from the hospital.     NEW MEDICATIONS:  Lasix 40 mg alternating with 80 mg daily   Lokelma 5g daily   Ozempic 0.5mg     OTHER INSTRUCTIONS:  Follow up with PCP   Follow up with Nephrologist  Repeat blood work in 1 week    UPCOMING APPOINTMENTS:     Please, follow-up with your PCP within 7 to 14 days after leaving the hospital. /appointment services has been requested to make an appointment for you, however if you do not hear back from them within 1 to 2 days, please call your primary physician's office to schedule an appointment. Bring your photo ID and insurance card to your appointment.   [facility]  services can be reached at [phone].     If you experience any worsening symptoms or have any concerns, please contact your primary care provider to schedule an appointment. If you cannot get in touch with your primary care physician, please return to the nearest emergency room or urgent care clinic for an evaluation and treatment.     Thank you for choosing [facility] and allowing us to partake in your medical care!     - Your [facility] inpatient primary care team.

## 2024-09-12 NOTE — DISCHARGE SUMMARY
Discharge Diagnosis  Renal insufficiency    Issues Requiring Follow-Up  Change in dosing: Lasix 40 mg alternating with 80 mg daily   Change in frequency: Lokelma 5g daily   Change in dosing: Ozempic 0.5mg   Follow up with PCP   Follow up with Nephrologist  Repeat blood work in 1 week    Discharge Meds     Medication List      CHANGE how you take these medications     furosemide 40 mg tablet; Commonly known as: Lasix; Take 1 tablet (40 mg)   by mouth every other day AND 2 tablets (80 mg) every other day.; What   changed: See the new instructions.   Ozempic 0.25 mg or 0.5 mg(2 mg/1.5 mL) pen injector; Generic drug:   semaglutide; Inject 0.5 mg under the skin 1 (one) time per week for 28   days.; What changed: medication strength, how much to take, additional   instructions   sodium zirconium cyclosilicate 5 gram packet; Commonly known as:   Lokelma; Take 5 g by mouth once daily.; What changed: when to take this     CONTINUE taking these medications     Accu-Chek Guide Glucose Meter misc; Generic drug: blood-glucose meter;   Use as directed to check blood sugar up to 3-4 times daily   Accu-Chek Guide test strips strip; Generic drug: blood sugar diagnostic;   Use as directed to check blood sugar up to 3-4 times daily   Accu-Chek Softclix Lancets misc; Generic drug: lancets; Use as directed    to check blood pressure 3 to 4 times a day   allopurinol 100 mg tablet; Commonly known as: Zyloprim; Take 1 tablet   (100 mg) by mouth once daily.   atorvastatin 20 mg tablet; Commonly known as: Lipitor; Take 1 tablet (20   mg) by mouth once daily at bedtime.   BD Alcohol Swabs pads, medicated; Generic drug: alcohol swabs; Use as   directed to check blood sugar up to 3-4 times daily and inject insulin 4   times daily   carvedilol 6.25 mg tablet; Commonly known as: Coreg; Take 1 tablet (6.25   mg) by mouth 2 times daily (morning and late afternoon).   cholecalciferol 125 MCG (5000 UT) capsule; Commonly known as: Vitamin   D-3    "clopidogrel 75 mg tablet; Commonly known as: Plavix; Take 1 tablet (75   mg) by mouth once daily.   famotidine 40 mg tablet; Commonly known as: Pepcid; Take 1 tablet (40   mg) by mouth once daily.   folic acid 1 mg tablet; Commonly known as: Folvite; Take 5 tablets (5   mg) by mouth once daily.   FreeStyle [place] 3 [place] misc; Generic drug: blood-glucose   meter,continuous; Use as instructed to check blood sugar throughout the   day   HumaLOG KwikPen Insulin 100 unit/mL injection; Generic drug: insulin   lispro; Inject 8 Units under the skin 3 times a day before meals.   Jardiance 10 mg; Generic drug: empagliflozin; Take 1 tablet (10 mg) by   mouth once daily in the morning.   Kerendia 10 mg tablet tablet; Generic drug: finerenone; Take 1 tablet   (10 mg) by mouth once daily.   Lantus Solostar U-100 Insulin 100 unit/mL (3 mL) pen; Generic drug:   insulin glargine; Inject 26 Units under the skin once daily at bedtime.   lisinopril 40 mg tablet; Take 1 tablet (40 mg) by mouth once daily in   the morning.   MAGNESIUM GLYCINATE ORAL   pen needle, diabetic 32 gauge x 5/32\" needle; Use pen needle with   insulin pen 4 times daily as directed       Test Results Pending At Discharge  Pending Labs       Order Current Status    C. difficile, PCR In process            Hospital Course  Gregorio North is a 63 y.o. male with PMHx CKD stage IV (hx kidney cancer), T2DM (hx diabetic foot wounds with osteomyelitis, neuropathy), HTN, chronic iron deficiency anemia, BPH, anxiety, hypokalemia, HLD, CHF who presented to the hospital with concern for STEVE seen on outpatient labs (Cr 4.3, , K 5.8). He also reports 3 month history of diarrhea, vomiting, dyspepsia, and decreased PO intake. He associates these symptoms with increasing his dose of Ozempic to 2g following a drug holiday due to colonoscopy. He reports seeing his PCP today for these complaints, and getting labs drawn. He then received a call from his nephrologist with " instructions to come to the ED.     In the hospital, he was given IVF and symptoms improved. Nephrology was consulted, who recommended restarting all home medication with the following adjustments: increase lokelma to 5g daily, alternate lasix 40mg and 80mg daily. Ozempic was restarted at 0.5mg at discharge. Repeat RFP ordered for 1 week post-discharge. Encouraged to follow up with PCP and Nephrology.     Pertinent Physical Exam At Time of Discharge  Physical Exam  Constitutional:       General: He is not in acute distress.  Cardiovascular:      Rate and Rhythm: Normal rate and regular rhythm.      Heart sounds: No murmur heard.  Pulmonary:      Effort: Pulmonary effort is normal. No respiratory distress.      Breath sounds: No wheezing.   Abdominal:      General: Bowel sounds are normal. There is no distension.      Palpations: Abdomen is soft.   Musculoskeletal:         General: No swelling.   Neurological:      General: No focal deficit present.      Mental Status: He is alert and oriented to person, place, and time. Mental status is at baseline.   Psychiatric:         Mood and Affect: Mood normal.         Outpatient Follow-Up  Future Appointments   Date Time Provider Department Center   9/17/2024  1:40 PM Imani Kaplan MD [id number] [facility]   10/1/2024 11:30 AM Latisha Vines PA-C [id number] [facility]   3/20/2025 10:40 AM Andrae Campbell MD [id number] [facility]         Shelli Carlson   09/12/24

## 2024-09-12 NOTE — HOSPITAL COURSE
Gregorio North is a 63 y.o. male with PMHx CKD stage IV (hx kidney cancer), T2DM (hx diabetic foot wounds with osteomyelitis, neuropathy), HTN, chronic iron deficiency anemia, BPH, anxiety, hypokalemia, HLD, CHF who presented to the hospital with concern for STEVE seen on outpatient labs (Cr 4.3, , K 5.8). He also reports 3 month history of diarrhea, vomiting, dyspepsia, and decreased PO intake. He associates these symptoms with increasing his dose of Ozempic to 2g following a drug holiday due to colonoscopy. He reports seeing his PCP today for these complaints, and getting labs drawn. He then received a call from his nephrologist with instructions to come to the ED.     In the hospital, he was given IVF and symptoms improved. Nephrology was consulted, who recommended restarting all home medication with the following adjustments: increase lokelma to 5g daily, alternate lasix 40mg and 80mg daily. Ozempic was restarted at 0.5mg at discharge. Repeat RFP ordered for 1 week post-discharge. Encouraged to follow up with PCP and Nephrology.

## 2024-09-13 ENCOUNTER — PATIENT OUTREACH (OUTPATIENT)
Dept: PRIMARY CARE | Facility: CLINIC | Age: 64
End: 2024-09-13
Payer: MEDICARE

## 2024-09-13 NOTE — SIGNIFICANT EVENT
Follow Up Phone Call    Outgoing phone call    Spoke to: Gregorio North Relationship:self   Phone number: [phone]      Outcome: contacted patient/ family   Chief Complaint   Patient presents with    abnormal labs          Diagnosis:Not applicable    States he is feeling better. No further questions or concerns.

## 2024-09-13 NOTE — PROGRESS NOTES
Discharge Facility:[facility]  Discharge Diagnosis:Renal Insufficiency   Admission Date:9/11/24  Discharge Date: 9/12/24    PCP Appointment Date:10/1/24  Specialist Appointment Date: Nephrology   Hospital Encounter and Summary Linked: Yes  See discharge assessment below for further details  Engagement  Call Start Time: 1218 (9/13/2024 12:18 PM)    Medications  Medications reviewed with patient/caregiver?: Yes (9/13/2024 12:18 PM)  Is the patient having any side effects they believe may be caused by any medication additions or changes?: No (9/13/2024 12:18 PM)  Does the patient have all medications ordered at discharge?: Yes (9/13/2024 12:18 PM)  Prescription Comments: CHANGE how you take: furosemide (Lasix) Ozempic (semaglutide) (9/13/2024 12:18 PM)  Is the patient taking all medications as directed (includes completed medication regime)?: Yes (9/13/2024 12:18 PM)  Medication Comments: see med list (9/13/2024 12:18 PM)    Appointments  Does the patient have a primary care provider?: Yes (9/13/2024 12:18 PM)  Care Management Interventions: Verified appointment date/time/provider (fuv 10/1/24) (9/13/2024 12:18 PM)  Has the patient kept scheduled appointments due by today?: Yes (9/13/2024 12:18 PM)  Care Management Interventions: Advised patient to keep appointment; Educated on importance of keeping appointment (9/13/2024 12:18 PM)    Patient Teaching  Does the patient have access to their discharge instructions?: Yes (9/13/2024 12:18 PM)  Care Management Interventions: Reviewed instructions with patient (9/13/2024 12:18 PM)  What is the patient's perception of their health status since discharge?: Improving (9/13/2024 12:18 PM)  Is the patient/caregiver able to teach back the hierarchy of who to call/visit for symptoms/problems? PCP, Specialist, Home Health nurse, Urgent Care, ED, 911: Yes (9/13/2024 12:18 PM)    Wrap Up  Wrap Up Additional Comments: This CM spoke with pt via phone. Pt reports doing well at home since  discharge. New meds reviewed. Pt denies CP and SOB. Patient denies any further discharge questions/needs at this time. Emphasized that Follow up is needed after discharge to review the hospital recommendations, assess your response to your treatment.  Pt aware of my availability for non-emergent concerns. Contact info provided to patient (9/13/2024 12:18 PM)      Cindy Schneider LPN

## 2024-09-15 LAB
P OFFSET: 143 MS
P ONSET: 123 MS
Q ONSET: 207 MS
QRS COUNT: 12 BEATS
QRS DURATION: 94 MS
QT INTERVAL: 372 MS
QTC CALCULATION(BAZETT): 415 MS
QTC FREDERICIA: 400 MS
R AXIS: -27 DEGREES
T AXIS: 74 DEGREES
T OFFSET: 393 MS
VENTRICULAR RATE: 75 BPM

## 2024-09-17 ENCOUNTER — LAB (OUTPATIENT)
Dept: LAB | Facility: LAB | Age: 64
End: 2024-09-17
Payer: MEDICARE

## 2024-09-17 ENCOUNTER — APPOINTMENT (OUTPATIENT)
Dept: NEPHROLOGY | Facility: CLINIC | Age: 64
End: 2024-09-17
Payer: MEDICARE

## 2024-09-17 VITALS
RESPIRATION RATE: 16 BRPM | HEIGHT: 69 IN | WEIGHT: 243.4 LBS | SYSTOLIC BLOOD PRESSURE: 91 MMHG | TEMPERATURE: 97.7 F | HEART RATE: 81 BPM | DIASTOLIC BLOOD PRESSURE: 62 MMHG | BODY MASS INDEX: 36.05 KG/M2 | OXYGEN SATURATION: 96 %

## 2024-09-17 DIAGNOSIS — N18.32 STAGE 3B CHRONIC KIDNEY DISEASE (MULTI): ICD-10-CM

## 2024-09-17 DIAGNOSIS — N18.32 ANEMIA DUE TO STAGE 3B CHRONIC KIDNEY DISEASE (MULTI): ICD-10-CM

## 2024-09-17 DIAGNOSIS — D63.1 ANEMIA DUE TO STAGE 3B CHRONIC KIDNEY DISEASE (MULTI): ICD-10-CM

## 2024-09-17 DIAGNOSIS — E55.9 VITAMIN D DEFICIENCY: ICD-10-CM

## 2024-09-17 DIAGNOSIS — E08.00 DIABETES MELLITUS DUE TO UNDERLYING CONDITION WITH HYPEROSMOLARITY WITHOUT COMA, WITHOUT LONG-TERM CURRENT USE OF INSULIN: ICD-10-CM

## 2024-09-17 DIAGNOSIS — I10 BENIGN ESSENTIAL HYPERTENSION: ICD-10-CM

## 2024-09-17 DIAGNOSIS — N28.9 RENAL INSUFFICIENCY: ICD-10-CM

## 2024-09-17 DIAGNOSIS — N04.9 NEPHROTIC SYNDROME: ICD-10-CM

## 2024-09-17 DIAGNOSIS — D50.0 IRON DEFICIENCY ANEMIA DUE TO CHRONIC BLOOD LOSS: ICD-10-CM

## 2024-09-17 DIAGNOSIS — E87.6 HYPOKALEMIA: ICD-10-CM

## 2024-09-17 DIAGNOSIS — N18.4 CKD (CHRONIC KIDNEY DISEASE) STAGE 4, GFR 15-29 ML/MIN (MULTI): ICD-10-CM

## 2024-09-17 DIAGNOSIS — E87.20 METABOLIC ACIDOSIS: Primary | ICD-10-CM

## 2024-09-17 LAB
ALBUMIN SERPL BCP-MCNC: 3.7 G/DL (ref 3.4–5)
ANION GAP SERPL CALC-SCNC: 13 MMOL/L (ref 10–20)
BUN SERPL-MCNC: 53 MG/DL (ref 6–23)
CALCIUM SERPL-MCNC: 8.5 MG/DL (ref 8.6–10.3)
CHLORIDE SERPL-SCNC: 109 MMOL/L (ref 98–107)
CO2 SERPL-SCNC: 23 MMOL/L (ref 21–32)
CREAT SERPL-MCNC: 2.75 MG/DL (ref 0.5–1.3)
EGFRCR SERPLBLD CKD-EPI 2021: 25 ML/MIN/1.73M*2
GLUCOSE SERPL-MCNC: 105 MG/DL (ref 74–99)
MAGNESIUM SERPL-MCNC: 1.87 MG/DL (ref 1.6–2.4)
PHOSPHATE SERPL-MCNC: 4.2 MG/DL (ref 2.5–4.9)
POTASSIUM SERPL-SCNC: 4.9 MMOL/L (ref 3.5–5.3)
SODIUM SERPL-SCNC: 140 MMOL/L (ref 136–145)

## 2024-09-17 PROCEDURE — 83735 ASSAY OF MAGNESIUM: CPT

## 2024-09-17 PROCEDURE — 3008F BODY MASS INDEX DOCD: CPT | Performed by: INTERNAL MEDICINE

## 2024-09-17 PROCEDURE — 3048F LDL-C <100 MG/DL: CPT | Performed by: INTERNAL MEDICINE

## 2024-09-17 PROCEDURE — 3078F DIAST BP <80 MM HG: CPT | Performed by: INTERNAL MEDICINE

## 2024-09-17 PROCEDURE — 3074F SYST BP LT 130 MM HG: CPT | Performed by: INTERNAL MEDICINE

## 2024-09-17 PROCEDURE — 36415 COLL VENOUS BLD VENIPUNCTURE: CPT

## 2024-09-17 PROCEDURE — 80069 RENAL FUNCTION PANEL: CPT

## 2024-09-17 PROCEDURE — 99215 OFFICE O/P EST HI 40 MIN: CPT | Performed by: INTERNAL MEDICINE

## 2024-09-17 PROCEDURE — 3044F HG A1C LEVEL LT 7.0%: CPT | Performed by: INTERNAL MEDICINE

## 2024-09-17 PROCEDURE — 3060F POS MICROALBUMINURIA REV: CPT | Performed by: INTERNAL MEDICINE

## 2024-09-17 PROCEDURE — 4010F ACE/ARB THERAPY RXD/TAKEN: CPT | Performed by: INTERNAL MEDICINE

## 2024-09-17 RX ORDER — SODIUM BICARBONATE 650 MG/1
650 TABLET ORAL 2 TIMES DAILY
Qty: 180 TABLET | Refills: 3 | Status: SHIPPED | OUTPATIENT
Start: 2024-09-17 | End: 2025-09-17

## 2024-09-17 NOTE — PROGRESS NOTES
Subjective     Mr. 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  In the interim, patient had blood work done that showed BUN above 100 and worsening serum creatinine up to 4.5 from his baseline 3-3.5.  Patient was admitted overnight to the hospital with concerns about possible prerenal kidney injury in the setting of diuretics.  He received 500 cc normal saline.  Lasix was adjusted to be taken 80 mg alternating with 40 mg.  Serum creatinine improved and BUN improved and patient was discharged with plan to follow-up with me    Today, Gregorio came with his daughter-in-law.  He feels excellent.  No shortness of breath or chest pain.  No dizziness, nausea or vomiting.  No leg swelling.  His weight is consistent with his dry weight 240-245 pounds.  He is adherent to medication instructions.  He takes Lokelma daily for hyperkalemia with no issues.  Blood pressure excepted today.  Today we had long discussion regarding recurrent acute kidney injury, challenging volume control and possible need for dialysis in the near future    Prior notes    Last office visit December 2023.  We started Kerendia and Jardiance for diabetic nephropathy management.  In the interim, he had hospital admission in January 2024 with bradycardia that thought to be secondary to blood pressure medications.  Today, Mr. North came with his daughter-in-law.  He feels well.  Leg swelling improved significantly.  No dizziness.  Most of his medications that were held during last hospital admission were reintroduced again with no issues.  Blood pressure accepted today.  He is losing weight intentionally.  Repeat blood work showed stable serum creatinine 3 and GFR 22  consistent with his new baseline.  No continued complaints or concerns today    Prior notes    Last office visit August 2023.  We dropped Lasix due to dizziness-significantly improved.  We continued Farxiga 10 mg and lisinopril 40 mg.  Today, Mr. North came alone.  Improved leg swelling.  No kidney related complaints or concerns.  He missed diabetes medication for few weeks-hemoglobin A1c is worsening 8.7.  He did not do blood work before the visit as instructed-we will get it done.  He continues to get Epogen shots for anemia-improved.  We discussed starting finerenone today for diabetic nephropathy management and discussed risk for hyperkalemia      Prior notes  Last office visit April 2023. Was started Farxiga 10 mg for volume/hypertension/CKD management. Was started allopurinol 100 mg for hyperuricemia. Today, Mr. North reports feeling well. He started feeling dizzy when he stands up after starting Farxiga. I advised to go down on furosemide dose for possible volume depletion. He had blood work done last week that showed slightly worsening serum creatinine 3 from 2.1 and GFR down to 23 from 34 from 40 prior. Possibly due to hemodynamic injury. Uric acid is now normal after starting allopurinol. Leg swelling improved. No shortness of breath. He does not see hematology anymore in [place] shots have been on hold. Repeat CBC shows worsening hemoglobin 9.8. He requested us to take care of his anemia management as part of CKD management-we will do     Per prior notes     Last office visit February 2023. Significant hypervolemia with nephrotic syndrome. We obtained kidney biopsy-came back with insufficient tissue not able to get much information. We uptitrated diuretics. We uptitrated her ACE inhibitors. In the interim, Mr. North reports marked improvement in leg swelling and shortness of breath. He is adherent to low-salt diet. Today he came with his stepdaughter. He is aware of the insufficient sample of kidney  biopsy-mutual decision not to repeat kidney biopsy at this time. He had blood work done recently and results were discussed with him in details including worsening CKD and within normal electrolytes. Improved albuminuria and proteinuria-remains to be significant but much better from prior. Blood pressure is accepted     Her prior notes         Patient has a past medical history of T2DM, Lyme disease, HTN, CHF, FILOMENA, Neuronitis, CKD, and prostate CA. 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 He denies a family history of kidney problems, but does endorse a family history of heart attack and diabetes. He was hospitalized in February 2022 for foot pain. He was placed on Vancomycin and Zosyn for a foot infection. He states that there is foam in his urine and has some eye swelling in the mornings. He denies NSAID use and only uses Tylenol for pain. He was diagnosed with Lyme disease in November 2015 and was on Doxycycline. Bloodwork was obtained and reviewed. GFR was 43 and Cr was 1.76. UA in October 2022 showed +3 protein. UA micro revealed RBCs. Alb/Cr ratio in October was significantly elevated. Since January of 2022, he has gained roughly 20 pounds. A kidney US done in February 2022 revealed large, but unremarkable kidneys. Discussed repeating bloodwork after today's visit for most up to date labs. We will increase Lasix to 80 mg Am and 40 mg PM. We will increase potassium chloride as a result of the increased Lasix. Continue Lisinopril 40 mg daily - beneficial for protein leak. Continue Atorvastatin. We discussed need for a kidney biopsy for further testing, and Plavix will need to be stopped 5 days prior to biopsy. Follow-up in one month with repeat bloodwork before the visit. If you become anuric, swelling increases, or you become SOB go to the Emergency Room immediately. Educated on a low salt diet with fresh fruits and vegetables.            Objective   BP 91/62 (BP Location: Left arm, Patient Position:  "Standing, BP Cuff Size: Large adult)   Pulse 81   Temp 36.5 °C (97.7 °F)   Resp 16   Ht 1.753 m (5' 9\")   Wt 110 kg (243 lb 6.4 oz)   SpO2 96%   BMI 35.94 kg/m²   Wt Readings from Last 3 Encounters:   09/17/24 110 kg (243 lb 6.4 oz)   09/11/24 110 kg (242 lb)   09/11/24 110 kg (242 lb 9.6 oz)       Physical Exam    General appearance: no distress awake and alert on room air, no significant hypervolemia in exam  Eyes: non-icteric  HEENT: atrumatic head, PEERLA, moist mucosa  Skin: no apparent rash  Heart: NSR, S1, S2 normal, no murmur or gallop  Lungs: Symmetrical expansion,CTA bilat no wheezing/crackles  Abdomen: soft, nt/nd, obese  Extremities: no significant edema bilat, Charcot boot is applied to the left lower extremity  Neuro: No FND,asterixis, no focal deficits noticed        Review of Systems     Constitutional: no fever, no chills, no recent weight gain and no recent weight loss.   Eyes: no blurred vision and no diplopia.   ENT: no hearing loss, no earache, no sore throat, no swollen glands in the neck and no nasal discharge.   Cardiovascular: no chest pain, no palpitations and no lower extremity edema.   Respiratory: no shortness of breath, no chronic cough and no shortness of breath during exertion.   Gastrointestinal: no abdominal pain, no constipation, no heartburn, no vomiting, no bloody stools and no change in bowel movements.   Genitourinary: no dysuria and no hematuria.   Musculoskeletal: no arthralgias and no myalgias.   Skin: no rashes and no skin lesions.   Neurological: no headaches and no dizziness.   Psychiatric: no confusion, no depression and no anxiety.   Endocrine: no heat intolerance, no cold intolerance, appetite not increased, no thyroid disorder, no increased urinary frequency and no dry skin.   Hematologic/Lymphatic: does not bleed easily and does not bruise easily.   All other systems have been reviewed and are negative for complaint.         Data Review        Results from " "last 7 days   Lab Units 09/12/24  0654 09/11/24  1319 09/11/24  0922   WBC AUTO x10*3/uL 6.9 7.5 7.1   HEMOGLOBIN g/dL 9.4* 10.2* 9.7*   HEMATOCRIT % 28.7* 30.8* 30.4*   PLATELETS AUTO x10*3/uL 197 216 209        Results from last 7 days   Lab Units 09/12/24  0654 09/11/24  1319 09/11/24  0922   SODIUM mmol/L 140 137 138   POTASSIUM mmol/L 5.9* 5.4* 5.8*   CHLORIDE mmol/L 113* 107 109*   CO2 mmol/L 19* 19* 18*   BUN mg/dL 93* 106* 115*   CREATININE mg/dL 3.36* 4.23* 4.30*   EGFR mL/min/1.73m*2 20* 15* 15*   GLUCOSE mg/dL 123* 138* 197*   CALCIUM mg/dL 8.3* 8.5* 8.8   PHOSPHORUS mg/dL 4.7 6.5* 6.8*       Lab Results   Component Value Date    URICACID 7.9 (H) 03/13/2024       No components found for: \"[id number]\"      Lab Results   Component Value Date    HGBA1C 6.7 (H) 06/27/2024         Lab Results   Component Value Date    CALCIUM 8.3 (L) 09/12/2024    PHOS 4.7 09/12/2024         No results found for requested labs within last 365 days.        Results from last 7 days   Lab Units 09/12/24  0654 09/11/24  1319 09/11/24  0922   SODIUM mmol/L 140 137 138   POTASSIUM mmol/L 5.9* 5.4* 5.8*   CHLORIDE mmol/L 113* 107 109*   CO2 mmol/L 19* 19* 18*   BUN mg/dL 93* 106* 115*   GLUCOSE mg/dL 123* 138* 197*   CALCIUM mg/dL 8.3* 8.5* 8.8   ANION GAP mmol/L 14 16 17   EGFR mL/min/1.73m*2 20* 15* 15*             Albumin/Creatinine Ratio   Date Value Ref Range Status   09/11/2024 104.7 (H) <30.0 ug/mg Creat Final   03/13/2024 466.8 (H) <30.0 ug/mg Creat Final   12/05/2023 700.4 (H) <30.0 ug/mg Creat Final       Recent Labs     09/12/24  0654 09/11/24  1319 09/11/24  0922 06/27/24  1136 03/13/24  1035 02/03/24  0903 02/02/24  0916 02/01/24 2023    137 138 138 141 139 144 137   K 5.9* 5.4* 5.8* 4.4 4.7 4.7 5.1 5.5*   CO2 19* 19* 18* 24 25 21 18* 15*   BUN 93* 106* 115* 69* 52* 72* 95* 108*   GLUCOSE 123* 138* 197* 98 170* 156* 153* 204*   CALCIUM 8.3* 8.5* 8.8 9.6 9.1 8.6 8.4* 8.2*   PHOS 4.7 6.5* 6.8*  --  3.6 4.7 6.3* " 6.9*   CREATININE 3.36* 4.23* 4.30* 3.32* 3.04* 2.63* 3.05* 3.48*   EGFR 20* 15* 15* 20* 22* 26* 22* 19*   ANIONGAP 14 16 17 14 16 13 15 17            Current Outpatient Medications:     alcohol swabs (Alcohol Pads) pads, medicated, Use as directed to check blood sugar up to 3-4 times daily and inject insulin 4 times daily, Disp: 300 each, Rfl: 0    allopurinol (Zyloprim) 100 mg tablet, Take 1 tablet (100 mg) by mouth once daily., Disp: 90 tablet, Rfl: 3    atorvastatin (Lipitor) 20 mg tablet, Take 1 tablet (20 mg) by mouth once daily at bedtime., Disp: 90 tablet, Rfl: 3    blood sugar diagnostic (Blood Glucose Test) strip, Use as directed to check blood sugar up to 3-4 times daily, Disp: 100 each, Rfl: 0    blood-glucose meter misc, Use as directed to check blood sugar up to 3-4 times daily, Disp: 1 each, Rfl: 0    carvedilol (Coreg) 6.25 mg tablet, Take 1 tablet (6.25 mg) by mouth 2 times daily (morning and late afternoon)., Disp: 180 tablet, Rfl: 1    cholecalciferol (Vitamin D-3) 125 MCG (5000 UT) capsule, Take 1 capsule (125 mcg) by mouth once daily., Disp: , Rfl:     clopidogrel (Plavix) 75 mg tablet, Take 1 tablet (75 mg) by mouth once daily., Disp: 90 tablet, Rfl: 3    empagliflozin (Jardiance) 10 mg, Take 1 tablet (10 mg) by mouth once daily in the morning., Disp: 90 tablet, Rfl: 3    famotidine (Pepcid) 40 mg tablet, Take 1 tablet (40 mg) by mouth once daily., Disp: 30 tablet, Rfl: 0    finerenone (Kerendia) 10 mg tablet tablet, Take 1 tablet (10 mg) by mouth once daily., Disp: 90 tablet, Rfl: 3    folic acid (Folvite) 1 mg tablet, Take 5 tablets (5 mg) by mouth once daily., Disp: 450 tablet, Rfl: 3    FreeStyle [place] 3 Cammal misc, Use as instructed to check blood sugar throughout the day, Disp: 1 each, Rfl: 0    furosemide (Lasix) 40 mg tablet, Take 1 tablet (40 mg) by mouth every other day AND 2 tablets (80 mg) every other day., Disp: 45 tablet, Rfl: 0    HumaLOG KwikPen Insulin 100 unit/mL injection,  "Inject 8 Units under the skin 3 times a day before meals., Disp: 15 mL, Rfl: 3    insulin glargine (Lantus Solostar U-100 Insulin) 100 unit/mL (3 mL) pen, Inject 26 Units under the skin once daily at bedtime., Disp: 15 mL, Rfl: 3    lancets (Accu-Chek Softclix Lancets) misc, Use as directed  to check blood pressure 3 to 4 times a day, Disp: 100 each, Rfl: 0    lisinopril 40 mg tablet, Take 1 tablet (40 mg) by mouth once daily in the morning., Disp: 90 tablet, Rfl: 3    pen needle, diabetic 32 gauge x 5/32\" needle, Use pen needle with insulin pen 4 times daily as directed, Disp: 400 each, Rfl: 3    semaglutide (Ozempic) 0.25 mg or 0.5 mg(2 mg/1.5 mL) pen injector, Inject 0.5 mg under the skin 1 (one) time per week for 28 days., Disp: 0.01 g, Rfl: 0    sodium zirconium cyclosilicate (Lokelma) 5 gram packet, Take 5 g by mouth once daily., Disp: 30 packet, Rfl: 0    MAGNESIUM GLYCINATE ORAL, Take 420 mg by mouth once daily., Disp: , Rfl:      Assessment and Plan     Mr. 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 He had hospital admission in February 2022 with infected foot received vancomycin and Zosyn (elevated trough levels of vancomycin noticed). During the admission he developed acute kidney injury with a serum creatinine up to 1.7-2, GFR 35-40. Serum creatinine has been stable since then with no improvement. He had another admission in April with " respiratory issues.  -Urine dipstick earlier was significant albuminuria. Prior spot test albumin creatinine shows above limit of the lab concerning for nephrotic range proteinuria/nephrotic syndrome. He gained about 30 pounds in the last year  -He got nephrology evaluation in February 2022 that revealed negative basic GN work-up (C3, C4, syphilis, hep C, hep B, HIV, SPEP, UPEP). More work-up came back negative for antiplat 2R antibodies, antihistone. Attempted kidney biopsy in February 2023-insufficient tissue sampling. [place] decision to hold off repeat kidney biopsy at this time  -Chronic kidney disease and nephrotic range proteinuria-most likely related to diabetic nephropathy        # Significantly improved albuminuria from outside the lab limit then 2.7 g/g then most recent spot test albumin creatinine ratio 0.4 g/g  -Continue finerenone and Jardiance, lisinopril  -We will continue conservative measures. We will continue maximum tolerated dose of RAAS inhibitor.      #Lyme disease 2015-status post doxycycline. Less concerns about Lyme disease induced kidney injury     ##Hypertension-current medication carvedilol 25 mg twice daily, amlodipine 5 mg, lisinopril 40 mg, Lasix 80 mg alternating with 40 mg daily (reduced due to dizziness/acute kidney injury-improved).  We are holding potassium chloride-currently potassium is normal.  Will continue Lokelma daily.  Hydralazine 50 mg 3 times daily were held during the last hospital admission due to low blood pressure-no need to reintroduce     #Chronic anemia- currently getting Retacrit infusion.  On hold due to improved hemoglobin 11.8-monitor     #Peripheral artery disease status post stent-on Plavix.      #Type 2 diabetes-uncontrolled with neuropathy and retinopathy.  Continue SGL 2 inhibitors for GFR preservation     #Significant hyperuricemia uric acid 9.3-now improved 2-6-pevmbsvj allopurinol 100 mg     #CKD-MBD-secondary hyperparathyroidism, within normal  phosphorus    # Hypomagnesemia-currently magnesium is on hold.  Will monitor magnesium level    # Metabolic acidosis-refilled sodium bicarbonate today    Repeat blood work today-recommendation to follow  Follow-up in 3 months with repeat blood work and urinalysis prior to next visit        Imani Kaplan MD, MS, WILBER KIDD  Clinical  - [facility] [employer]  Nephrologist - [facility] - [facility]

## 2024-09-17 NOTE — PATIENT INSTRUCTIONS
Dear FAVIO   It was nice seeing you in the nephrology clinic today     Today we discussed the following:     #Chronic kidney disease stage 4-baseline kidney function 20-25%.  You had acute kidney injury a week ago and kidney function dropped to 11%.  We had to drop Lasix from 80 mg daily down to 80 mg alternating with 40 mg.  Currently you are doing good with accepted body weight 240-245 pounds.  Will get blood work today to monitor kidney function    -Continue Jardiance, Kerendia, and lisinopril  -Today we discussed high risk to progress to dialysis     #Significant protein leak in the urine this is most likely due to diabetes. Improved.  Continue same medications with Jardiance, Kerendia, lisinopril    #Leg swelling and shortness of breath-improved.  Continue Lasix 80 mg alternating with 40 mg      # Dizziness-improved.  Continue reduced dose Lasix    -Continue to hold potassium chloride-continue Lokelma daily  -Continue lisinopril 40 mg, continue Kerendia 10 mg  -Continue atorvastatin 20 mg  -Continue vitamin D     #Elevated uric acid-now normal-continue allopurinol 100 mg     #High phosphorus-limit your phosphorus intake. Limit your dairy products, nuts, cereals     #Significant anemia-improved significantly.  Currently you are on Epogen shots-on hold at the moment due to improved blood count.    # Will check magnesium-currently you are off supplements        Limit salt intake as possible. Daily allowance of fluid should not exceed 45 ounces, sodium should not exceed 1500 mg    Blood work today to monitor kidney function and magnesium level  3 month follow-up with repeat blood work and urinalysis prior to next visit     Please call our office if you have any question  Thank you for coming to see me today     Imani Kaplan MD, MS, WILBER KIDD  Clinical  - [facility] [employer]  Nephrologist - [facility] - [facility]

## 2024-09-18 ENCOUNTER — TELEMEDICINE (OUTPATIENT)
Dept: PHARMACY | Facility: HOSPITAL | Age: 64
End: 2024-09-18
Payer: MEDICARE

## 2024-09-18 DIAGNOSIS — N28.9 RENAL INSUFFICIENCY: ICD-10-CM

## 2024-09-18 DIAGNOSIS — E11.22 TYPE 2 DIABETES MELLITUS WITH STAGE 4 CHRONIC KIDNEY DISEASE, WITH LONG-TERM CURRENT USE OF INSULIN (MULTI): ICD-10-CM

## 2024-09-18 DIAGNOSIS — N18.4 TYPE 2 DIABETES MELLITUS WITH STAGE 4 CHRONIC KIDNEY DISEASE, WITH LONG-TERM CURRENT USE OF INSULIN (MULTI): ICD-10-CM

## 2024-09-18 DIAGNOSIS — Z79.4 TYPE 2 DIABETES MELLITUS WITH INSULIN THERAPY (MULTI): ICD-10-CM

## 2024-09-18 DIAGNOSIS — Z79.4 TYPE 2 DIABETES MELLITUS WITH STAGE 4 CHRONIC KIDNEY DISEASE, WITH LONG-TERM CURRENT USE OF INSULIN (MULTI): ICD-10-CM

## 2024-09-18 DIAGNOSIS — E11.9 TYPE 2 DIABETES MELLITUS WITH INSULIN THERAPY (MULTI): ICD-10-CM

## 2024-09-18 RX ORDER — BLOOD-GLUCOSE SENSOR
EACH MISCELLANEOUS
Qty: 6 EACH | Refills: 3 | Status: SHIPPED | OUTPATIENT
Start: 2024-09-18

## 2024-09-18 ASSESSMENT — ENCOUNTER SYMPTOMS: POLYDIPSIA: 1

## 2024-09-18 NOTE — PROGRESS NOTES
Pharmacist Clinic: Diabetes Management  Gregorio North is a 63 y.o. male who presents for a follow-up evaluation of his Type 2 diabetes mellitus.   Referring Provider: Latisha Vines PA-C     Patient has been enrolled in [facility]/VAF with the active benefit period 4/12/2024 - 4/12/2025 as long as the following criteria continue to be satisfied:   Your medication (Kerendia, Lokelma, Lantus, Humalog, Jardiance and Ozempic.) remains covered under your current insurance plan and filled through [facility].  Your prescriber does not discontinue therapy.  You do not seek reimbursement from any other private or government-funded programs for the medication.  The [employer] will then offset your copay balance, so that your out-of pocket expense for your specialty medication will be $0.00.    Diabetes  He presents for his follow-up diabetic visit. He has type 2 diabetes mellitus. There are no hypoglycemic associated symptoms. Associated symptoms include polydipsia and polyuria. There are no hypoglycemic complications. Diabetic complications include nephropathy. Risk factors for coronary artery disease include diabetes mellitus, dyslipidemia, hypertension, male sex, obesity and tobacco exposure. He is compliant with treatment all of the time. Meal planning includes avoidance of concentrated sweets and carbohydrate counting. He has had a previous visit with a dietitian. He participates in exercise daily. He sees a podiatrist.Eye exam is not current.     No Known Allergies    LAB REVIEW   Glucose (mg/dL)   Date Value   09/17/2024 105 (H)   09/12/2024 123 (H)   09/11/2024 138 (H)     POC HEMOGLOBIN A1c (%)   Date Value   03/27/2024 8.4 (A)   09/19/2023 8.7 (A)     Hemoglobin A1C (%)   Date Value   06/27/2024 6.7 (H)   10/17/2022 7.1 (A)   01/27/2022 11.2 (A)   08/30/2021 7.2 (A)     Urea Nitrogen (mg/dL)   Date Value   09/17/2024 53 (H)   09/12/2024 93 (HH)   09/11/2024 106 (HH)     Creatinine (mg/dL)   Date  "Value   09/17/2024 2.75 (H)   09/12/2024 3.36 (H)   09/11/2024 4.23 (H)     Wt Readings from Last 5 Encounters:   09/17/24 110 kg (243 lb 6.4 oz)   09/11/24 110 kg (242 lb)   09/11/24 110 kg (242 lb 9.6 oz)   08/19/24 108 kg (239 lb)   06/27/24 111 kg (244 lb 9.6 oz)     BMI Readings from Last 5 Encounters:   09/17/24 35.94 kg/m²   09/11/24 35.74 kg/m²   09/11/24 35.83 kg/m²   08/19/24 35.29 kg/m²   06/27/24 36.12 kg/m²     No results found for: \"ALBUR\", \"SRH57CUS\"  Lab Results   Component Value Date    CHOL 115 05/22/2024    CHOL 133 02/09/2023    CHOL 184 10/17/2022     Lab Results   Component Value Date    HDL 29.0 05/22/2024    HDL 43.6 02/09/2023    HDL 53.0 10/17/2022     Lab Results   Component Value Date    LDLCALC 51 05/22/2024     Lab Results   Component Value Date    TRIG 174 (H) 05/22/2024    TRIG 147 02/09/2023    TRIG 127 10/17/2022       The ASCVD Risk score (Niels CLEVELAND, et al., 2019) failed to calculate for the following reasons:    The valid total cholesterol range is 130 to 320 mg/dL     DIABETES ASSESSMENT    CURRENT PHARMACOTHERAPY  Ozempic 0.5 mg weekly on Fri (restarted 9/13)  Previously reported no GI ALIE with 1 mg and 2mg but had intermittent diarrhea that improved  Recent hospitalization was due to severe dehydration from not being able to keep liquids down with Ozempic 2mg despite previous reports of tolerance. Restarted on 0.5mg and tolerating well  Humalog 8 units TID with meals  Patient reports that he has been injecting 8 units if he doesn't think there are carbs in meal  Lantus 26 units QHS  Jardiance 10 mg QAM    SECONDARY PREVENTION  - Statin? Yes atorvastatin 20mg  - ACE-I/ARB? Yes lisinopril 40mg  - Aspirin? No    HISTORICAL PHARMACOTHERAPY  Bydureon: Formulary change to Ozempic   Farxiga: Formulary change to Jardiance     SMBG MEASUREMENTS  Patient is using: both glucometer and continuous glucose monitor Fluctuation has minimized but   Patient is currently checking the blood " "glucose 3 times per day (FBG, BG before meals, and 2hr after meals). His BG seems to spike quickly but comes down quickly after 20-30 min. It is also sensitive to his food intake  Hypoglycemic awareness: yes, has a few Tootsie Rolls to raise BG  Avg FBG (~8am): 120-140s  PPBG: fluctuates. Remains elevated but patient feels most comfortable with injecting 8 units of Humalog. Spikes up after eating and then comes down around 140s      Diet: Decreased appetite but mostly eating vegetables and salads. Ozempic has been helping with food cravings. He is following a low salt and low potassium, generally healthy and high fiber diet. No snacks this past week.   Breakfast (10-11am): biggest meal around 11am at [employer] (scrambles eggs, 2 sausages, english muffin, hashbrown/potatoes, bowl of fruit, coffee)  If not [employer]: Raisin Bran spikes BG a lot more than [employer] meal  Lunch (biggest meal if little/no breakfast. After 2pm if late breakfast. Smaller portions if big breakfast): light if full from breakfast  Dinner: varies, often lighter meal  Bedtime: midnight takes atorvastatin and Lantus  Fluid: max 6 bottles of water daily by Dr. Kaplan    Physical activity: moderate exercise (currently returned to walking 3000 steps daily with his new shoes. Recently walked 8000 steps for a trip). Recently lost energy in exercising    Wt Readings from Last 6 Encounters:   09/17/24 110 kg (243 lb 6.4 oz)   09/11/24 110 kg (242 lb)   09/11/24 110 kg (242 lb 9.6 oz)   08/19/24 108 kg (239 lb)   06/27/24 111 kg (244 lb 9.6 oz)   06/03/24 114 kg (251 lb 5.2 oz)     BMI Readings from Last 6 Encounters:   09/17/24 35.94 kg/m²   09/11/24 35.74 kg/m²   09/11/24 35.83 kg/m²   08/19/24 35.29 kg/m²   06/27/24 36.12 kg/m²   06/03/24 37.11 kg/m²     ADHERENCE/AFFORDABILITY: reports they try to save enough on side for meds but reports financial strain. He seems to be in the coverage.  Ozempic: $512/3 month supply (Insurance allows \"early billing\" of " different dose. No steps required)  Jardiance: $150.11 (Previous: $42/month)  Kerendia: PA approved (Previous: $42/month)  Insulin: expensive (still has 3 pens left each)  Lokelma: ~$125/month     Assessment/Plan   Problem List Items Addressed This Visit       Diabetes mellitus (Multi)    Relevant Medications    semaglutide 0.25 mg or 0.5 mg (2 mg/3 mL) pen injector    blood-glucose sensor (FreeStyle [place] 3 Plus Sensor) device    Other Relevant Orders    Follow Up In Clinical Pharmacy    Type 2 diabetes mellitus with insulin therapy (Multi)     Patient identified self-management goal:  weight loss, BG control  Patients diabetes has improved  Patient's goal A1c is  <6.5 to <8%.  Is pt at goal? yes, 6.7% on 6/27/24. Congratulations!  Patient's BG averages are stabilized with some elevated FBGs. No hypoglycemic episodes  Complications: episodes of hypoglycemia with lower or no food intake. Patient's carb to insulin ratio competency is not reliable at the moment, but he is able to determine pattern of low hypoglycemia with light meals. CGM sensors often seem to stop working  Rationale for plan: Carb counting is the most ideal for this patient but not feasible. FBG and PPBG are generally well controlled based on patient report. Patient feels the most comfortable at injecting 8 units of Humalog at before meal. Unable to increase Jardiance dose due to renal function (eGFR returned to 20). Ozempic 2mg was intolerable but 1mg was tolerated well. Feels fine with all current meds while on Ozempic 0.5mg. Given recent GI intolerance, will continue regimen, monitor side effects, and focus on increased exercise.  Medication Management:  CONTINUE:  Ozempic 0.5 mg weekly on Friday (Plan for 2-3 months depending on tolerance)  Jardiance 10 mg every morning  Lantus 26 units every night at bedtime  Humalog 8 units TID 15-30 minutes before meals + sliding scale  Inject 4 units before lighter meals  Cover snacks with 1 unit of Humalog  for every 10 grams of carbohydrates  Compliance at present is estimated to be excellent. Efforts to improve compliance (if necessary) will be directed compliance with Humalog injection, watching his intake of simple/complex carbs, increased exercise,   Regular blood sugar monitoring: FBG, before meals, and 2hr PPBG after each meal.  Track insulin dose in [place]  Check weight before follow-up  Education Provided to Patient: Ozempic/Jardiance side effects and importance of hydration, slow titration of Ozempic (likely will max at 1mg with focus on BG control than weight loss)  Pharm Follow-up: 10/3 @ 3pm for BG control and adjust meds as needed. Patient is to contact me ([phone]) with concerns as needed  PCP Follow-up: 10/1/24  Nephrology: 12/26         Relevant Medications    semaglutide 0.25 mg or 0.5 mg (2 mg/3 mL) pen injector    blood-glucose sensor (FreeStyle [place] 3 Plus Sensor) device    Renal insufficiency       Notify your healthcare provider if you have any of the following:  -Diarrhea or vomiting for more than six hours  -Blood sugar >300 mg/dL more than once  -Low blood sugar (<70 mg/dL)  -Questions about your medications    Thank you,  Amy Whalen, PharmD  [phone]    Continue all meds under the continuation of care with the referring provider and clinical pharmacy team.  Verbal consent to manage patient's drug therapy was obtained. They were informed they may decline to participate or withdraw from participation in pharmacy services at any time.

## 2024-09-18 NOTE — Clinical Note
FBGs are elevated but will make no changes to current medication regimen as patient returns to normalcy. Will follow-up to monitor side effects and his BG control. Thank you. Amy Whalen, PharmD

## 2024-09-18 NOTE — ASSESSMENT & PLAN NOTE
Patient identified self-management goal:  weight loss, BG control  Patients diabetes has improved  Patient's goal A1c is  <6.5 to <8%.  Is pt at goal? yes, 6.7% on 6/27/24. Congratulations!  Patient's BG averages are stabilized with some elevated FBGs. No hypoglycemic episodes  Complications: episodes of hypoglycemia with lower or no food intake. Patient's carb to insulin ratio competency is not reliable at the moment, but he is able to determine pattern of low hypoglycemia with light meals. CGM sensors often seem to stop working  Rationale for plan: Carb counting is the most ideal for this patient but not feasible. FBG and PPBG are generally well controlled based on patient report. Patient feels the most comfortable at injecting 8 units of Humalog at before meal. Unable to increase Jardiance dose due to renal function (eGFR returned to 20). Ozempic 2mg was intolerable but 1mg was tolerated well. Feels fine with all current meds while on Ozempic 0.5mg. Given recent GI intolerance, will continue regimen, monitor side effects, and focus on increased exercise.  Medication Management:  CONTINUE:  Ozempic 0.5 mg weekly on Friday (Plan for 2-3 months depending on tolerance)  Jardiance 10 mg every morning  Lantus 26 units every night at bedtime  Humalog 8 units TID 15-30 minutes before meals + sliding scale  Inject 4 units before lighter meals  Cover snacks with 1 unit of Humalog for every 10 grams of carbohydrates  Compliance at present is estimated to be excellent. Efforts to improve compliance (if necessary) will be directed compliance with Humalog injection, watching his intake of simple/complex carbs, increased exercise,   Regular blood sugar monitoring: FBG, before meals, and 2hr PPBG after each meal.  Track insulin dose in [place]  Check weight before follow-up  Education Provided to Patient: Ozempic/Jardiance side effects and importance of hydration, slow titration of Ozempic (likely will max at 1mg with focus on BG  control than weight loss)  Pharm Follow-up: 10/3 @ 3pm for BG control and adjust meds as needed. Patient is to contact me ([phone]) with concerns as needed  PCP Follow-up: 10/1/24  Nephrology: 12/26

## 2024-09-23 DIAGNOSIS — Z79.4 TYPE 2 DIABETES MELLITUS WITH INSULIN THERAPY (MULTI): ICD-10-CM

## 2024-09-23 DIAGNOSIS — I50.9 CONGESTIVE HEART FAILURE, UNSPECIFIED HF CHRONICITY, UNSPECIFIED HEART FAILURE TYPE: Primary | ICD-10-CM

## 2024-09-23 DIAGNOSIS — E11.9 TYPE 2 DIABETES MELLITUS WITH INSULIN THERAPY (MULTI): ICD-10-CM

## 2024-09-25 ENCOUNTER — PATIENT OUTREACH (OUTPATIENT)
Dept: PRIMARY CARE | Facility: CLINIC | Age: 64
End: 2024-09-25
Payer: MEDICARE

## 2024-09-25 PROCEDURE — RXMED WILLOW AMBULATORY MEDICATION CHARGE

## 2024-09-25 NOTE — PROGRESS NOTES
Call regarding appt. with PCP on or  after hospitalization.  At this time no follow appt made.  At time of outreach call the patient feels as if their condition has improved since last visit.  No questions or concerns at this time.

## 2024-09-26 ENCOUNTER — PHARMACY VISIT (OUTPATIENT)
Dept: PHARMACY | Facility: CLINIC | Age: 64
End: 2024-09-26
Payer: MEDICARE

## 2024-10-01 ENCOUNTER — LAB (OUTPATIENT)
Dept: LAB | Facility: LAB | Age: 64
End: 2024-10-01
Payer: MEDICARE

## 2024-10-01 ENCOUNTER — APPOINTMENT (OUTPATIENT)
Dept: PRIMARY CARE | Facility: CLINIC | Age: 64
End: 2024-10-01
Payer: MEDICARE

## 2024-10-01 VITALS
SYSTOLIC BLOOD PRESSURE: 130 MMHG | HEART RATE: 66 BPM | WEIGHT: 242.2 LBS | TEMPERATURE: 98 F | OXYGEN SATURATION: 100 % | DIASTOLIC BLOOD PRESSURE: 72 MMHG | BODY MASS INDEX: 35.77 KG/M2

## 2024-10-01 DIAGNOSIS — L97.509 TYPE 2 DIABETES MELLITUS WITH FOOT ULCER, WITH LONG-TERM CURRENT USE OF INSULIN: Primary | ICD-10-CM

## 2024-10-01 DIAGNOSIS — Z12.5 SCREENING FOR MALIGNANT NEOPLASM OF PROSTATE: ICD-10-CM

## 2024-10-01 DIAGNOSIS — Z79.4 TYPE 2 DIABETES MELLITUS WITH FOOT ULCER, WITH LONG-TERM CURRENT USE OF INSULIN: Primary | ICD-10-CM

## 2024-10-01 DIAGNOSIS — E11.621 TYPE 2 DIABETES MELLITUS WITH FOOT ULCER, WITH LONG-TERM CURRENT USE OF INSULIN: Primary | ICD-10-CM

## 2024-10-01 LAB — POC HEMOGLOBIN A1C: 6.7 % (ref 4.2–6.5)

## 2024-10-01 PROCEDURE — 3075F SYST BP GE 130 - 139MM HG: CPT | Performed by: PHYSICIAN ASSISTANT

## 2024-10-01 PROCEDURE — 4010F ACE/ARB THERAPY RXD/TAKEN: CPT | Performed by: PHYSICIAN ASSISTANT

## 2024-10-01 PROCEDURE — 83036 HEMOGLOBIN GLYCOSYLATED A1C: CPT | Performed by: PHYSICIAN ASSISTANT

## 2024-10-01 PROCEDURE — G0103 PSA SCREENING: HCPCS

## 2024-10-01 PROCEDURE — 3078F DIAST BP <80 MM HG: CPT | Performed by: PHYSICIAN ASSISTANT

## 2024-10-01 PROCEDURE — 1036F TOBACCO NON-USER: CPT | Performed by: PHYSICIAN ASSISTANT

## 2024-10-01 PROCEDURE — 3048F LDL-C <100 MG/DL: CPT | Performed by: PHYSICIAN ASSISTANT

## 2024-10-01 PROCEDURE — 99213 OFFICE O/P EST LOW 20 MIN: CPT | Performed by: PHYSICIAN ASSISTANT

## 2024-10-01 PROCEDURE — 3060F POS MICROALBUMINURIA REV: CPT | Performed by: PHYSICIAN ASSISTANT

## 2024-10-01 PROCEDURE — 3044F HG A1C LEVEL LT 7.0%: CPT | Performed by: PHYSICIAN ASSISTANT

## 2024-10-01 NOTE — PROGRESS NOTES
Subjective     HPI   Gregorio North is a 63 y.o. year old male patient with presenting to clinic with concern for   Chief Complaint   Patient presents with    3 month Diabetes management    Hospital Follow-up     Not TCM-  DEehydration       Morning Nausea and vomiting (the food from dinner (anything from 4pm the night before)  - stool studies were discontinued. Pt was hospitalized after our last visit 3 weeks ago for renal insuficiency and dehydration  Feeing much better    Reduced ozempic dose further to 0.5mg at discharge.(Had been on 2mg weekly, but drug holiday d/t colonscopy, N/V/D upon restarting for 3 months before seeing me regarding this prior to hospitalization that day)  Lokelma increased to 5mg daily  Lasix dose alternating 40mg and 80mg daily.     Doing much better on 0.5mg ozempic. Had mild gas the day after injection    CKD IV- history of kidney cancer. Sees Dr Kaplan  - chronic iron deficiency anemia  -Hyperkalemia- on lokelma  -Lasix dose alternating 40mg and 80mg daily.   -allopurinol 100    DM 2- insulin dependent, jardiance,     ASA -- on plavix     Statin- arorvastatin     ACEI lisinopril 40  - neuropathy, hx diabetic foot wounds w osteomyelitis      Podiatry Dr Jacques, Charcot foot  - using CGM freestyle juan 3    HTN- lisinopril 40  BP Readings from Last 7 Encounters:   10/01/24 130/72   09/17/24 91/62   09/12/24 149/87   09/11/24 122/74   06/27/24 134/76   06/03/24 119/58   03/27/24 111/71         HLD- atorvastatin    CHF- Diastolic dysfunction Dr Campbell  Lasix dose alternating 40mg and 80mg daily per cardiology  Coreg 6.25 bid  - ECHO: 5/6/2022  1. The left ventricular systolic function is normal with a 55-60% estimated ejection fraction.  2. Spectral Doppler shows an abnormal pattern of left ventricular diastolic filling.  3. There is mild mitral and tricuspid regurgitation.  PAD- Plavix    BPH- PSA screening due    Anxiety    Endoscopy 6/3/24      Patient Active Problem List    Diagnosis    Benign essential hypertension    Diabetes mellitus (Multi)    Anemia    Chronic kidney disease, stage 4 (severe) (Multi)    Dyslipidemia    Benign prostatic hyperplasia    Anxiety    Fatigue    Hypokalemia    HLD (hyperlipidemia)    Macular exudate    Nystagmus    Optic atrophy    Atherosclerosis of native arteries of extremities with rest pain, bilateral legs (Multi)    Pulmonary edema    Vitamin D deficiency    Unvaccinated for covid-19    CHF (congestive heart failure)    Tremor    Swelling of upper extremity    Papilledema    Palpitations    Obesity with body mass index 30 or greater    Muscle pain    Arthralgia    Impairment of balance    History of hypertension    History of endocrine disorder    Headache    Class 3 severe obesity due to excess calories in adult    Sjogren's syndrome    Shortness of breath    Proteinuria    History of kidney cancer    Lyme disease    Iron deficiency anemia    Chronic pulmonary edema    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 (Multi)    Chronic osteomyelitis with draining sinus, right ankle and foot (Multi)    Acute kidney injury superimposed on CKD ([facility])    Type 2 diabetes mellitus with insulin therapy (Multi)    Renal insufficiency       Past Medical History:   Diagnosis Date    CHF (congestive heart failure)     CKD (chronic kidney disease)     COVID-19 virus infection 09/18/2023    Deficiency of other specified B group vitamins 06/09/2021    Folate deficiency    Diabetic foot ulcer (Multi) 05/06/2022    Focal chorioretinal inflammation, macular or paramacular, 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    Acute macular neuroretinitis of left eye    Focal chorioretinal inflammation, macular or paramacular, left eye 11/05/2014    Acute macular neuroretinitis of left eye    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    Ischemic optic neuropathy, 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     Lyme disease 09/08/2014    History of palpitations    Other specified cataract 01/27/2016    Cataract, mature    Other specified retinal disorders 01/27/2016    Macular exudate    Respiratory failure (Multi) 10/10/2023    Sebaceous cyst 11/20/2018    Infected sebaceous cyst of skin    Unspecified cataract 01/27/2016    Total cataract      Past Surgical History:   Procedure Laterality Date    CT GUIDED PERCUTANEOUS BIOPSY BONE DEEP  08/29/2022    CT GUIDED PERCUTANEOUS BIOPSY BONE DEEP 8/29/2022 [facility]    FOOT SURGERY Left 03/31/2016    Foot Surgery    MR HEAD ANGIO WO IV CONTRAST  09/17/2014    MR HEAD ANGIO WO IV CONTRAST 9/17/2014 [facility]    OTHER SURGICAL HISTORY  02/04/2015    Eye Surgery Results Vision      Family History   Problem Relation Name Age of Onset    No Known Problems Mother      Heart attack Father      Diabetes Father        Social History     Tobacco Use    Smoking status: Never    Smokeless tobacco: Never   Substance Use Topics    Alcohol use: Not Currently        Current Outpatient Medications:     alcohol swabs (Alcohol Pads) pads, medicated, Use as directed to check blood sugar up to 3-4 times daily and inject insulin 4 times daily, Disp: 300 each, Rfl: 0    allopurinol (Zyloprim) 100 mg tablet, Take 1 tablet (100 mg) by mouth once daily., Disp: 90 tablet, Rfl: 3    atorvastatin (Lipitor) 20 mg tablet, Take 1 tablet (20 mg) by mouth once daily at bedtime., Disp: 90 tablet, Rfl: 3    blood sugar diagnostic (Blood Glucose Test) strip, Use as directed to check blood sugar up to 3-4 times daily, Disp: 100 each, Rfl: 0    blood-glucose meter misc, Use as directed to check blood sugar up to 3-4 times daily, Disp: 1 each, Rfl: 0    blood-glucose sensor (FreeStyle [place] 3 Plus Sensor) device, Use as instructed to check blood sugar and change every 15 days, Disp: 6 each, Rfl: 3    carvedilol (Coreg) 6.25 mg tablet, Take 1 tablet (6.25 mg) by mouth 2 times daily (morning and late  "afternoon)., Disp: 180 tablet, Rfl: 1    cholecalciferol (Vitamin D-3) 125 MCG (5000 UT) capsule, Take 1 capsule (125 mcg) by mouth once daily., Disp: , Rfl:     clopidogrel (Plavix) 75 mg tablet, Take 1 tablet (75 mg) by mouth once daily., Disp: 90 tablet, Rfl: 3    empagliflozin (Jardiance) 10 mg, Take 1 tablet (10 mg) by mouth once daily in the morning., Disp: 90 tablet, Rfl: 3    famotidine (Pepcid) 40 mg tablet, Take 1 tablet (40 mg) by mouth once daily., Disp: 30 tablet, Rfl: 0    finerenone (Kerendia) 10 mg tablet tablet, Take 1 tablet (10 mg) by mouth once daily., Disp: 90 tablet, Rfl: 3    folic acid (Folvite) 1 mg tablet, Take 5 tablets (5 mg) by mouth once daily., Disp: 450 tablet, Rfl: 3    FreeStyle [place] 3 [place] misc, Use as instructed to check blood sugar throughout the day, Disp: 1 each, Rfl: 0    furosemide (Lasix) 40 mg tablet, Take 1 tablet (40 mg) by mouth every other day AND 2 tablets (80 mg) every other day., Disp: 45 tablet, Rfl: 0    HumaLOG KwikPen Insulin 100 unit/mL injection, Inject 8 Units under the skin 3 times a day before meals., Disp: 15 mL, Rfl: 3    insulin glargine (Lantus Solostar U-100 Insulin) 100 unit/mL (3 mL) pen, Inject 26 Units under the skin once daily at bedtime., Disp: 15 mL, Rfl: 3    lancets (Accu-Chek Softclix Lancets) misc, Use as directed  to check blood pressure 3 to 4 times a day, Disp: 100 each, Rfl: 0    lisinopril 40 mg tablet, Take 1 tablet (40 mg) by mouth once daily in the morning., Disp: 90 tablet, Rfl: 3    MAGNESIUM GLYCINATE ORAL, Take 420 mg by mouth once daily., Disp: , Rfl:     pen needle, diabetic 32 gauge x 5/32\" needle, Use pen needle with insulin pen 4 times daily as directed, Disp: 400 each, Rfl: 3    semaglutide 0.25 mg or 0.5 mg (2 mg/3 mL) pen injector, Inject 0.5 mg under the skin 1 (one) time per week., Disp: 3 mL, Rfl: 1    sodium bicarbonate 650 mg tablet, Take 1 tablet (650 mg) by mouth 2 times a day., Disp: 180 tablet, Rfl: 3    sodium " zirconium cyclosilicate (Lokelma) 5 gram packet, Take 5 g by mouth once daily., Disp: 30 packet, Rfl: 0     Review of Systems  Constitutional: Denies fever  HEENT: Denies ST, earache  CVS: Denies Chest pain  Pulmonary: Denies wheezing, SOB  GI: Denies N/V  : Denies dysuria  Musculoskeletal:  Denies myalgia  Neuro: Denies focal weakness or numbness.  Skin: Denies Rashes.  *Review of Systems is negative unless otherwise mentioned in HPI or ROS above.    Objective   /72   Pulse 66   Temp 36.7 °C (98 °F)   Wt 110 kg (242 lb 3.2 oz)   SpO2 100%   BMI 35.77 kg/m²  reviewed Body mass index is 35.77 kg/m².     Physical Exam  Constitutional: NAD.  Resting comfortably.  Head: Atraumatic, normocephalic.  ENT: Moist oral mucosa. Nasal mucosa wnl.   Cardiac: Regular rate & rhythm.   Pulmonary: Lungs clear bilat  GI: Soft, Nontender, nondistended.   Musculoskeletal: 2+ peripheral edema.   Skin: No evidence of trauma. No rashes  Psych: Intact judgement and insight.    .Assessment/Plan   Problem List Items Addressed This Visit             ICD-10-CM    Diabetes mellitus (Multi) - Primary E11.9    Relevant Orders    POCT glycosylated hemoglobin (Hb A1C) manually resulted (Completed)     Other Visit Diagnoses         Codes    Screening for malignant neoplasm of prostate     Z12.5    Relevant Orders    Prostate Specific Antigen, Screen

## 2024-10-02 ENCOUNTER — APPOINTMENT (OUTPATIENT)
Dept: PHARMACY | Facility: HOSPITAL | Age: 64
End: 2024-10-02
Payer: MEDICARE

## 2024-10-02 DIAGNOSIS — E11.21 TYPE 2 DIABETES MELLITUS WITH DIABETIC NEPHROPATHY, WITHOUT LONG-TERM CURRENT USE OF INSULIN: ICD-10-CM

## 2024-10-02 DIAGNOSIS — E08.00 DIABETES MELLITUS DUE TO UNDERLYING CONDITION WITH HYPEROSMOLARITY WITHOUT COMA, WITHOUT LONG-TERM CURRENT USE OF INSULIN: ICD-10-CM

## 2024-10-02 DIAGNOSIS — Z79.4 TYPE 2 DIABETES MELLITUS WITH INSULIN THERAPY (MULTI): Primary | ICD-10-CM

## 2024-10-02 DIAGNOSIS — I50.20 SYSTOLIC CONGESTIVE HEART FAILURE, UNSPECIFIED HF CHRONICITY: ICD-10-CM

## 2024-10-02 DIAGNOSIS — E11.9 TYPE 2 DIABETES MELLITUS WITH INSULIN THERAPY (MULTI): Primary | ICD-10-CM

## 2024-10-02 DIAGNOSIS — E66.01 CLASS 3 SEVERE OBESITY DUE TO EXCESS CALORIES WITH SERIOUS COMORBIDITY IN ADULT, UNSPECIFIED BMI: ICD-10-CM

## 2024-10-02 DIAGNOSIS — E66.813 CLASS 3 SEVERE OBESITY DUE TO EXCESS CALORIES WITH SERIOUS COMORBIDITY IN ADULT, UNSPECIFIED BMI: ICD-10-CM

## 2024-10-02 DIAGNOSIS — E78.00 PURE HYPERCHOLESTEROLEMIA: ICD-10-CM

## 2024-10-02 DIAGNOSIS — E55.9 VITAMIN D DEFICIENCY: ICD-10-CM

## 2024-10-02 DIAGNOSIS — I73.9 PAD (PERIPHERAL ARTERY DISEASE) (CMS-HCC): ICD-10-CM

## 2024-10-02 DIAGNOSIS — Z79.4 TYPE 2 DIABETES MELLITUS WITH STAGE 4 CHRONIC KIDNEY DISEASE, WITH LONG-TERM CURRENT USE OF INSULIN (MULTI): ICD-10-CM

## 2024-10-02 DIAGNOSIS — N04.9 NEPHROTIC SYNDROME: ICD-10-CM

## 2024-10-02 DIAGNOSIS — E11.22 TYPE 2 DIABETES MELLITUS WITH STAGE 4 CHRONIC KIDNEY DISEASE, WITH LONG-TERM CURRENT USE OF INSULIN (MULTI): ICD-10-CM

## 2024-10-02 DIAGNOSIS — N18.32 STAGE 3B CHRONIC KIDNEY DISEASE (MULTI): ICD-10-CM

## 2024-10-02 DIAGNOSIS — E87.6 HYPOKALEMIA: ICD-10-CM

## 2024-10-02 DIAGNOSIS — I10 BENIGN ESSENTIAL HYPERTENSION: ICD-10-CM

## 2024-10-02 DIAGNOSIS — E11.9 TYPE 2 DIABETES MELLITUS WITHOUT COMPLICATION, UNSPECIFIED WHETHER LONG TERM INSULIN USE (MULTI): ICD-10-CM

## 2024-10-02 DIAGNOSIS — N18.4 TYPE 2 DIABETES MELLITUS WITH STAGE 4 CHRONIC KIDNEY DISEASE, WITH LONG-TERM CURRENT USE OF INSULIN (MULTI): ICD-10-CM

## 2024-10-02 LAB — PSA SERPL-MCNC: 1.62 NG/ML

## 2024-10-02 PROCEDURE — RXMED WILLOW AMBULATORY MEDICATION CHARGE

## 2024-10-02 RX ORDER — INSULIN LISPRO 100 [IU]/ML
8 INJECTION, SOLUTION INTRAVENOUS; SUBCUTANEOUS
Qty: 15 ML | Refills: 3 | Status: SHIPPED | OUTPATIENT
Start: 2024-10-02

## 2024-10-02 RX ORDER — INSULIN GLARGINE 100 [IU]/ML
26 INJECTION, SOLUTION SUBCUTANEOUS NIGHTLY
Qty: 15 ML | Refills: 3 | Status: SHIPPED | OUTPATIENT
Start: 2024-10-02

## 2024-10-02 RX ORDER — TIRZEPATIDE 2.5 MG/.5ML
2.5 INJECTION, SOLUTION SUBCUTANEOUS WEEKLY
Qty: 2 ML | Refills: 1 | Status: SHIPPED | OUTPATIENT
Start: 2024-10-02

## 2024-10-02 NOTE — PROGRESS NOTES
Clinical Pharmacy Appointment    Patient ID: Gregorio North is a 63 y.o. male who presents for Follow Up appointment.     Referring Provider: Latisha Vines PA-C  PCP: Latisha Vines PA-C   Last visit with PCP: 10/1/24   Next visit with PCP: 1/16/25    Subjective     DIABETES MELLITUS TYPE II:      Current diabetic medications include:  Ozempic 0.5 mg once weekly   Humalog 8 units TID  Lantus 26 units at bedtime   Jardiance 10 mg daily     Clarifications to above regimen: eGFR 25 mL/min/1.73m^2  Adverse Effects: None; GI effects resolved since lowering Ozempic to 0.5 mg weekly; unable to tolerate Ozempic 1 mg weekly    Past diabetic medications include:  Bydureon: Formulary change to Ozempic   Farxiga: Formulary change to Jardiance     Glucose Readings:  Glucometer/CGM Type: Freestyle [place] 3 plus    Current home BG readings:  mg/dL; after meals  mg/dL    Any episodes of hypoglycemia? No, none reported .  Did patient treat episode of hypoglycemia appropriately? N/A  Does pt have proteinuria? Yes    Lifestyle:  Diet: Not discussed  Physical Activity: Walking more    Secondary Prevention:  Statin? Yes - Atorvastatin 20 mg daily   ACE-I/ARB? Yes - Lisinopril 40 mg daily   Aspirin? No    Pertinent PMH Review:  PMH of Pancreatitis: No  PMH of Retinopathy: Yes  PMH of Urinary Tract Infections: No  PMH of MTC: No  PMH of Obesity: Yes  PMH of ASCVD: Yes  PMH of CKD: Yes  PMH of CHF: Yes    Medication Reconciliation:  No changes     Drug Interactions  No relevant drug interactions were noted.    Medication System Management  Patient's preferred pharmacy: No issues  Adherence/Organization: No issues  Affordability/Accessibility: Ozempic/Insulin  [facility] PAP: Yes  Expiration: 4/12/2025      Objective   No Known Allergies  Social History     Social History Narrative    Not on file      Medication Review  Current Outpatient Medications   Medication Instructions    alcohol swabs (Alcohol Pads) pads, medicated Use  "as directed to check blood sugar up to 3-4 times daily and inject insulin 4 times daily    allopurinol (ZYLOPRIM) 100 mg, oral, Daily    atorvastatin (LIPITOR) 20 mg, oral, Nightly    blood sugar diagnostic (Blood Glucose Test) strip Use as directed to check blood sugar up to 3-4 times daily    blood-glucose meter misc Use as directed to check blood sugar up to 3-4 times daily    blood-glucose sensor (FreeStyle [place] 3 Plus Sensor) device Use as instructed to check blood sugar and change every 15 days    carvedilol (COREG) 6.25 mg, oral, 2 times daily (morning and late afternoon)    cholecalciferol (VITAMIN D-3) 5,000 Units, oral, Daily    clopidogrel (PLAVIX) 75 mg, oral, Daily    famotidine (PEPCID) 40 mg, oral, Daily    folic acid (FOLVITE) 5 mg, oral, Daily    FreeStyle [place] 3 [place] misc Use as instructed to check blood sugar throughout the day    furosemide (Lasix) 40 mg tablet Take 1 tablet (40 mg) by mouth every other day AND 2 tablets (80 mg) every other day.    HumaLOG KwikPen Insulin 8 Units, subcutaneous, 3 times daily before meals    Jardiance 10 mg, oral, Every morning    Kerendia 10 mg, oral, Daily    lancets (Accu-Chek Softclix Lancets) misc Use as directed  to check blood pressure 3 to 4 times a day    Lantus Solostar U-100 Insulin 26 Units, subcutaneous, Nightly    lisinopril 40 mg, oral, Every morning    MAGNESIUM GLYCINATE ORAL 420 mg, oral, Daily    pen needle, diabetic 32 gauge x 5/32\" needle Use pen needle with insulin pen 4 times daily as directed    semaglutide 0.5 mg, subcutaneous, Weekly    sodium bicarbonate 650 mg, oral, 2 times daily    sodium zirconium cyclosilicate (LOKELMA) 5 g, oral, Daily      Vitals  BP Readings from Last 2 Encounters:   10/01/24 130/72   09/17/24 91/62     BMI Readings from Last 1 Encounters:   10/01/24 35.77 kg/m²      Labs  A1C  Lab Results   Component Value Date    HGBA1C 6.7 (A) 10/01/2024    HGBA1C 6.7 (H) 06/27/2024    HGBA1C 8.4 (A) 03/27/2024 "     BMP  Lab Results   Component Value Date    CALCIUM 8.5 (L) 09/17/2024     09/17/2024    K 4.9 09/17/2024    CO2 23 09/17/2024     (H) 09/17/2024    BUN 53 (H) 09/17/2024    CREATININE 2.75 (H) 09/17/2024    EGFR 25 (L) 09/17/2024     LFTs  Lab Results   Component Value Date    ALT 48 09/11/2024    AST 24 09/11/2024    ALKPHOS 90 09/11/2024    BILITOT 0.4 09/11/2024     FLP  Lab Results   Component Value Date    TRIG 174 (H) 05/22/2024    CHOL 115 05/22/2024    LDLF 60 02/09/2023    LDLCALC 51 05/22/2024    HDL 29.0 05/22/2024     Urine Microalbumin  Lab Results   Component Value Date    MICROALBCREA 104.7 (H) 09/11/2024     Weight Management  Wt Readings from Last 3 Encounters:   10/01/24 110 kg (242 lb 3.2 oz)   09/17/24 110 kg (243 lb 6.4 oz)   09/11/24 110 kg (242 lb)      There is no height or weight on file to calculate BMI.     Assessment/Plan   Problem List Items Addressed This Visit       Diabetes mellitus (Multi)     Patient's goal A1c is < 7.0%.  Is pt at goal? Yes, most recent A1c on 10/1/24 of 6.7%. Prior A1c was also 6.7% on 6/27/24.     Rationale for plan: Patient's SMBGs are at goal. Most recent A1c was stable when  compared to previous result. Patient recently hospitalized due to dehydration and nausea/vomiting associated with Ozempic. Dose was lowered to 0.5 mg weekly and patient is doing better now. Discussed switching to Mounjaro in the hopes that patient would tolerate it more, lose more weight, and help taper his insulin. Patient is in agreement. Will start titration at 2.5 mg weekly and assess for tolerability in 2 weeks. Otherwise, no changes in medication therapy needed at this time.      Medication Changes:   CONTINUE:  Humalog 8 units TID  Lantus 26 units at bedtime   Jardiance 10 mg daily   STOP  Ozempic 0.5 mg once weekly   START  Mounjaro 2.5 mg once weekly on Fridays    Monitoring and Education:  Counseled patient on Mounjaro MOA, expectations, side effects, duration of  therapy, administration, and monitoring parameters.  Provided detailed dosing and administration counseling to ensure proper technique.   Reviewed Mounjaro titration schedule, starting with 2.5 mg once weekly to a goal of 15 mg once weekly if tolerated  Counseled patient on the benefits of GLP-1ra glycemic control and weight loss  Reviewed storage requirements of Mounjaro when not in use, and when to administer the medication if a dose is missed.  Advised patient that they may experience improved satiety after meals and portion sizes of meals may be reduced as doses of Mounjaro increase.    Clinical Pharmacist follow-up: 10/15/24 @ 3:30 pm, Telehealth visit    Continue all meds under the continuation of care with the referring provider and clinical pharmacy team.    Thank you,  Janiya Freeman, PharmD  Clinical Pharmacist  [phone]    Verbal consent to manage patient's drug therapy was obtained from the patient. They were informed they may decline to participate or withdraw from participation in pharmacy services at any time.

## 2024-10-05 ENCOUNTER — PHARMACY VISIT (OUTPATIENT)
Dept: PHARMACY | Facility: CLINIC | Age: 64
End: 2024-10-05
Payer: MEDICARE

## 2024-10-11 ENCOUNTER — PATIENT OUTREACH (OUTPATIENT)
Dept: PRIMARY CARE | Facility: CLINIC | Age: 64
End: 2024-10-11
Payer: MEDICARE

## 2024-10-14 ENCOUNTER — PHARMACY VISIT (OUTPATIENT)
Dept: PHARMACY | Facility: CLINIC | Age: 64
End: 2024-10-14

## 2024-10-15 ENCOUNTER — APPOINTMENT (OUTPATIENT)
Dept: PHARMACY | Facility: HOSPITAL | Age: 64
End: 2024-10-15
Payer: MEDICARE

## 2024-10-15 DIAGNOSIS — E11.21 TYPE 2 DIABETES MELLITUS WITH DIABETIC NEPHROPATHY, WITHOUT LONG-TERM CURRENT USE OF INSULIN: Primary | ICD-10-CM

## 2024-10-15 RX ORDER — BLOOD-GLUCOSE SENSOR
EACH MISCELLANEOUS
Qty: 2 EACH | Refills: 11 | Status: SHIPPED | OUTPATIENT
Start: 2024-10-15

## 2024-10-17 PROCEDURE — RXMED WILLOW AMBULATORY MEDICATION CHARGE

## 2024-10-21 ENCOUNTER — PHARMACY VISIT (OUTPATIENT)
Dept: PHARMACY | Facility: CLINIC | Age: 64
End: 2024-10-21
Payer: MEDICARE

## 2024-10-22 ENCOUNTER — APPOINTMENT (OUTPATIENT)
Dept: PHARMACY | Facility: HOSPITAL | Age: 64
End: 2024-10-22
Payer: MEDICARE

## 2024-10-22 DIAGNOSIS — Z79.4 TYPE 2 DIABETES MELLITUS WITH INSULIN THERAPY (MULTI): Primary | ICD-10-CM

## 2024-10-22 DIAGNOSIS — E11.9 TYPE 2 DIABETES MELLITUS WITH INSULIN THERAPY (MULTI): Primary | ICD-10-CM

## 2024-10-22 DIAGNOSIS — I50.20 SYSTOLIC CONGESTIVE HEART FAILURE, UNSPECIFIED HF CHRONICITY: ICD-10-CM

## 2024-10-22 NOTE — PROGRESS NOTES
Clinical Pharmacy Appointment    Patient ID: Gregorio North is a 63 y.o. male who presents for Follow Up appointment.     Referring Provider: Latisha Vines PA-C  PCP: Latisha Vines PA-C   Last visit with PCP: 10/1/24   Next visit with PCP: 1/16/25    Subjective     DIABETES MELLITUS TYPE II:    Diagnosed with diabetes: 2015. Known diabetic complications: CKD.  Does patient follow with Endocrinology: No  Last optometry exam: Last week - may have eye surgery in January for macular degeneration.       Current diabetic medications include:  Humalog 8 units TID  Lantus 26 units at bedtime   Jardiance 10 mg daily   Mounjaro 2.5 mg once weekly on Saturdays (two doses completed)    Adverse Effects: None - no diarrhea or nausea    Past diabetic medications include:  Bydureon: Formulary change to Ozempic   Farxiga: Formulary change to Jardiance   Ozempic: nausea; switched to Mounjaro     Glucose Readings:  Glucometer/CGM Type: Freestyle [place] 3  Uses phone application.  Patient reports majority of readings are in range.     Any episodes of hypoglycemia? Yes, one week ago, reading of 50 mg/dL .  Did patient treat episode of hypoglycemia appropriately? Yes, jason palacios  Does the patient have a prescription for ready-to-use Glucagon? No patient declined.    Lifestyle:  Diet: 2 meals/day. Appetite improved - eating a little bit more.   BK: (11 am) eggs, 2 sausage links, and hash brown potatoes   LN: skips  DN: (5 pm) fish or cheeseburger   Snacks: yogurt, gram crackers  Drinks: water, milk, lemonade (low sugar), coffee with cream   Physical Activity: Doing more walking since getting out of the hospital. Walked 2000 steps yesterday and 3000 steps the day before.    Secondary Prevention:  Statin? Yes - Atorvastatin 20 mg daily   ACE-I/ARB? Yes - Lisinopril 40 mg daily   Aspirin? No     Pertinent PMH Review:  PMH of Pancreatitis: No  PMH of Retinopathy: Yes  PMH of Urinary Tract Infections: No  PMH of MTC: No  PMH of  Obesity: Yes  PMH of ASCVD: Yes  PMH of CKD: Yes  PMH of CHF: Yes    CONGESTIVE HEART FAILURE ASSESSMENT  Does patient follow with Cardiology: Yes    Date: 3/20/24    Staging  Ejection Fraction: 55-60% (5/6/2022)    Symptom Assessment  Weight changes/edema?: Yes - 247 lbs this morning. Patient reports that about 10 days ago he weighed 239 lbs. Pateint believed to be related to improved appetite, no longer vomiting or dealing with dehydration. Will continue to monitor. Advised patient to seek medical attention if other symptoms arise.   Dyspnea?: None  Dizziness/syncope/palpitations?: No    Medication Therapy  Current Regimen (GDMT):  ARNI/ACEi/ARB: Yes - Lisinopril 40 mg daily   Beta Blocker: Yes - Carvedilol 6.25 mg BID (previously taking 25 mg BID - lowered during hospitalization due to hypotension and bradycardia)  MRA: No  SGLT2i: Yes - Jardiance 10 mg daily     Other therapy:  Furosemide 40 mg alternating with 80 mg every other day    Adverse Effects: None      Secondary Prevention  The ASCVD Risk score (Niels CLEVELAND, et al., 2019) failed to calculate for the following reasons:    The valid total cholesterol range is 130 to 320 mg/dL  Aspirin 81mg? no  Statin?: Yes - Atorvastatin 20 mg daily   HTN?: Yes - controlled      Medication Reconciliation:  No changes    Drug Interactions  No relevant drug interactions were noted.    Medication System Management  Patient's preferred pharmacy: [facility]  Adherence/Organization: No issues  Affordability/Accessibility: Mounjaro, Lantus, Jardiance, and Humalog   PAP: Yes  Expiration: 4/12/2025    Objective   No Known Allergies  Social History     Social History Narrative    Not on file      Medication Review  Current Outpatient Medications   Medication Instructions    alcohol swabs (Alcohol Pads) pads, medicated Use as directed to check blood sugar up to 3-4 times daily and inject insulin 4 times daily    allopurinol (ZYLOPRIM) 100 mg, oral, Daily    atorvastatin (LIPITOR)  "20 mg, oral, Nightly    blood sugar diagnostic (Blood Glucose Test) strip Use as directed to check blood sugar up to 3-4 times daily    blood-glucose meter misc Use as directed to check blood sugar up to 3-4 times daily    blood-glucose sensor (FreeStyle [place] 3 Plus Sensor) device Use as instructed to check blood sugar and change every 15 days    carvedilol (COREG) 6.25 mg, oral, 2 times daily (morning and late afternoon)    cholecalciferol (VITAMIN D-3) 5,000 Units, oral, Daily    clopidogrel (PLAVIX) 75 mg, oral, Daily    famotidine (PEPCID) 40 mg, oral, Daily    folic acid (FOLVITE) 5 mg, oral, Daily    FreeStyle [place] 3 [place] misc Use as instructed to check blood sugar throughout the day    FreeStyle [place] 3 Sensor device Use as directed to check blood glucose. Change sensor every 14 days.    furosemide (Lasix) 40 mg tablet Take 1 tablet (40 mg) by mouth every other day AND 2 tablets (80 mg) every other day.    HumaLOG KwikPen Insulin 8 Units, subcutaneous, 3 times daily before meals    Jardiance 10 mg, oral, Every morning    Kerendia 10 mg, oral, Daily    lancets (Accu-Chek Softclix Lancets) misc Use as directed  to check blood pressure 3 to 4 times a day    Lantus Solostar U-100 Insulin 26 Units, subcutaneous, Nightly    lisinopril 40 mg, oral, Every morning    MAGNESIUM GLYCINATE ORAL 420 mg, oral, Daily    Mounjaro 2.5 mg, subcutaneous, Weekly    pen needle, diabetic 32 gauge x 5/32\" needle Use pen needle with insulin pen 4 times daily as directed    semaglutide 0.5 mg, subcutaneous, Weekly    sodium bicarbonate 650 mg, oral, 2 times daily      Vitals  BP Readings from Last 2 Encounters:   10/01/24 130/72   09/17/24 91/62     BMI Readings from Last 1 Encounters:   10/01/24 35.77 kg/m²      Labs  A1C  Lab Results   Component Value Date    HGBA1C 6.7 (A) 10/01/2024    HGBA1C 6.7 (H) 06/27/2024    HGBA1C 8.4 (A) 03/27/2024     BMP  Lab Results   Component Value Date    CALCIUM 8.5 (L) 09/17/2024     " 09/17/2024    K 4.9 09/17/2024    CO2 23 09/17/2024     (H) 09/17/2024    BUN 53 (H) 09/17/2024    CREATININE 2.75 (H) 09/17/2024    EGFR 25 (L) 09/17/2024     LFTs  Lab Results   Component Value Date    ALT 48 09/11/2024    AST 24 09/11/2024    ALKPHOS 90 09/11/2024    BILITOT 0.4 09/11/2024     FLP  Lab Results   Component Value Date    TRIG 174 (H) 05/22/2024    CHOL 115 05/22/2024    LDLF 60 02/09/2023    LDLCALC 51 05/22/2024    HDL 29.0 05/22/2024     Urine Microalbumin  Lab Results   Component Value Date    MICROALBCREA 104.7 (H) 09/11/2024     Weight Management  Wt Readings from Last 3 Encounters:   10/01/24 110 kg (242 lb 3.2 oz)   09/17/24 110 kg (243 lb 6.4 oz)   09/11/24 110 kg (242 lb)      There is no height or weight on file to calculate BMI.     Assessment/Plan   Problem List Items Addressed This Visit       Type 2 diabetes mellitus with insulin therapy (Multi)     Patient's goal A1c is < 7.0%.  Is pt at goal? Yes, most recent A1c on 10/1/24 of 6.7%. Prior A1c was also 6.7% on 6/27/24.     Rationale for plan: Patient's SMBGs are at goal. Most recent A1c was stable when  compared to previous result. At previous visit, patient was transitioned from Ozempic to Mounjaro. Patient reports full resolution of nausea/vomiting since switching. Reports that he is tolerating current Mounjaro dose well. Plan to continue close monitoring and reassess for dose increase in 2 weeks.    Medication Changes:  CONTINUE:  Humalog 8 units TID  Lantus 26 units at bedtime   Jardiance 10 mg daily   Mounjaro 2.5 mg once weekly on Fridays    Monitoring and Education:  Counseled patient on Mounjaro MOA, expectations, side effects, duration of therapy, administration, and monitoring parameters.  Provided detailed dosing and administration counseling to ensure proper technique.   Reviewed Mounjaro titration schedule, starting with 2.5 mg once weekly to a goal of 15 mg once weekly if tolerated  Counseled patient on the  benefits of GLP-1ra glycemic control and weight loss  Reviewed storage requirements of Mounjaro when not in use, and when to administer the medication if a dose is missed.  Advised patient that they may experience improved satiety after meals and portion sizes of meals may be reduced as doses of Mounjaro increase.    Patient has HFpEF with most recent EF 55-60%. Patient is on complete GDMT.     Rationale for plan: Patient denies any current signs or symptoms of worsening heart failure. Reports that he has noted some weight gain but he believed this is more related to improved appetite and resolution of dehydration. Will continue to monitor. Patient is on appropriate medications at this time. Advised patient to continue current regimen and follow up with cardiology in one year as recommended.    Medication Changes:  CONTINUE:  Jardiance 10 mg daily   Carvedilol 6.25 mg BID   Lisinopril 40 mg daily  Furosemide 80 mg and 40 mg alternating    Monitoring and Education:  Weigh yourself without clothing daily after using the bathroom first thing in the morning before breakfast   Contact your physician/seek help immediately if you notice the following with symptoms of shortness of breath or swelling in your extremities:   Weight gain of 3+ lbs overnight   Weight gain of 5+ lbs in a week   Physical limitations to your normal physical activity level   Limit fluid intake as instructed by your doctor and follow a heart friendly diet low in salt, fat, and focused in lean meats   Aim to exercise for 30 minutes anywhere between 3 to 5 times a week or more, depending on your physical limitations   Keep a log of your daily BP, HR and weight to share with providers   If you are a smoker or drink alcohol, consider cessation to improve your heart health     Clinical Pharmacist follow-up: 11/4/24 @ 3 pm, Telehealth visit    Continue all meds under the continuation of care with the referring provider and clinical pharmacy team.    Thank  Janiya degroot, PharmD  Clinical Pharmacist  [phone]    Verbal consent to manage patient's drug therapy was obtained from the patient. They were informed they may decline to participate or withdraw from participation in pharmacy services at any time.

## 2024-11-04 ENCOUNTER — APPOINTMENT (OUTPATIENT)
Dept: PHARMACY | Facility: HOSPITAL | Age: 64
End: 2024-11-04
Payer: MEDICARE

## 2024-11-04 DIAGNOSIS — E11.22 TYPE 2 DIABETES MELLITUS WITH STAGE 4 CHRONIC KIDNEY DISEASE, WITH LONG-TERM CURRENT USE OF INSULIN (MULTI): ICD-10-CM

## 2024-11-04 DIAGNOSIS — Z79.4 TYPE 2 DIABETES MELLITUS WITH STAGE 4 CHRONIC KIDNEY DISEASE, WITH LONG-TERM CURRENT USE OF INSULIN (MULTI): ICD-10-CM

## 2024-11-04 DIAGNOSIS — Z79.4 TYPE 2 DIABETES MELLITUS WITH INSULIN THERAPY (MULTI): ICD-10-CM

## 2024-11-04 DIAGNOSIS — N18.4 TYPE 2 DIABETES MELLITUS WITH STAGE 4 CHRONIC KIDNEY DISEASE, WITH LONG-TERM CURRENT USE OF INSULIN (MULTI): ICD-10-CM

## 2024-11-04 DIAGNOSIS — E11.9 TYPE 2 DIABETES MELLITUS WITH INSULIN THERAPY (MULTI): ICD-10-CM

## 2024-11-04 DIAGNOSIS — I50.20 SYSTOLIC CONGESTIVE HEART FAILURE, UNSPECIFIED HF CHRONICITY: ICD-10-CM

## 2024-11-04 RX ORDER — TIRZEPATIDE 5 MG/.5ML
5 INJECTION, SOLUTION SUBCUTANEOUS WEEKLY
Qty: 2 ML | Refills: 2 | Status: SHIPPED | OUTPATIENT
Start: 2024-11-04

## 2024-11-04 NOTE — PROGRESS NOTES
Clinical Pharmacy Appointment    Patient ID: Gregorio North is a 63 y.o. male who presents for Follow Up appointment.     Referring Provider: Latisha Vines PA-C  PCP: Latisha Vines PA-C   Last visit with PCP: 10/1/24   Next visit with PCP: 1/16/25    Subjective     DIABETES MELLITUS TYPE II:    Diagnosed with diabetes: 2015. Known diabetic complications: CKD.  Does patient follow with Endocrinology: No  Last optometry exam: Last week - may have eye surgery in January for macular degeneration.     Current diabetic medications include:  Humalog 8 units TID  Lantus 26 units at bedtime   Jardiance 10 mg daily   Mounjaro 2.5 mg once weekly on Saturdays    Adverse Effects: None     Past diabetic medications include:  Bydureon: Formulary change to Ozempic   Farxiga: Formulary change to Jardiance   Ozempic: nausea; switched to Mounjaro    Glucose Readings:  Glucometer/CGM Type: Freestyle [place] 3   Current home BG readings: at goal per pateint     Any episodes of hypoglycemia? No, none reported .  Did patient treat episode of hypoglycemia appropriately? N/A  Does the patient have a prescription for ready-to-use Glucagon? No patient declined.     Lifestyle:  Diet: 2 meals/day. Appetite improved - eating a little bit more.   BK: (11 am) eggs, 2 sausage links, and hash brown potatoes   LN: skips  DN: (5 pm) fish or cheeseburger   Snacks: yogurt, gram crackers  Drinks: water, milk, lemonade (low sugar), coffee with cream   Physical Activity: Doing more walking since getting out of the hospital. Walked 2000 steps yesterday and 3000 steps the day before. Weight fluctuations between 239-244 pounds.      Secondary Prevention:  Statin? Yes - Atorvastatin 20 mg daily   ACE-I/ARB? Yes - Lisinopril 40 mg daily   Aspirin? No     Pertinent PMH Review:  PMH of Pancreatitis: No  PMH of Retinopathy: Yes  PMH of Urinary Tract Infections: No  PMH of MTC: No  PMH of Obesity: Yes  PMH of ASCVD: Yes  PMH of CKD: Yes    Medication  Reconciliation:  No changes     Drug Interactions  No relevant drug interactions were noted.     Medication System Management  Patient's preferred pharmacy: [facility]  Adherence/Organization: No issues  Affordability/Accessibility: Mounjaro, Lantus, Jardiance, and Humalog   PAP: Yes  Expiration: 4/12/2025    Objective   No Known Allergies  Social History     Social History Narrative    Not on file      Medication Review  Current Outpatient Medications   Medication Instructions    alcohol swabs (Alcohol Pads) pads, medicated Use as directed to check blood sugar up to 3-4 times daily and inject insulin 4 times daily    allopurinol (ZYLOPRIM) 100 mg, oral, Daily    atorvastatin (LIPITOR) 20 mg, oral, Nightly    blood sugar diagnostic (Blood Glucose Test) strip Use as directed to check blood sugar up to 3-4 times daily    blood-glucose meter misc Use as directed to check blood sugar up to 3-4 times daily    blood-glucose sensor (FreeStyle [place] 3 Plus Sensor) device Use as instructed to check blood sugar and change every 15 days    carvedilol (COREG) 6.25 mg, oral, 2 times daily (morning and late afternoon)    cholecalciferol (VITAMIN D-3) 5,000 Units, oral, Daily    clopidogrel (PLAVIX) 75 mg, oral, Daily    famotidine (PEPCID) 40 mg, oral, Daily    folic acid (FOLVITE) 5 mg, oral, Daily    FreeStyle [place] 3 [place] misc Use as instructed to check blood sugar throughout the day    FreeStyle [place] 3 Sensor device Use as directed to check blood glucose. Change sensor every 14 days.    furosemide (Lasix) 40 mg tablet Take 1 tablet (40 mg) by mouth every other day AND 2 tablets (80 mg) every other day.    HumaLOG KwikPen Insulin 8 Units, subcutaneous, 3 times daily before meals    Jardiance 10 mg, oral, Every morning    Kerendia 10 mg, oral, Daily    lancets (Accu-Chek Softclix Lancets) misc Use as directed  to check blood pressure 3 to 4 times a day    Lantus Solostar U-100 Insulin 26 Units, subcutaneous, Nightly     "lisinopril 40 mg, oral, Every morning    MAGNESIUM GLYCINATE ORAL 420 mg, oral, Daily    Mounjaro 2.5 mg, subcutaneous, Weekly    pen needle, diabetic 32 gauge x 5/32\" needle Use pen needle with insulin pen 4 times daily as directed    sodium bicarbonate 650 mg, oral, 2 times daily      Vitals  BP Readings from Last 2 Encounters:   10/01/24 130/72   09/17/24 91/62     BMI Readings from Last 1 Encounters:   10/01/24 35.77 kg/m²      Labs  A1C  Lab Results   Component Value Date    HGBA1C 6.7 (A) 10/01/2024    HGBA1C 6.7 (H) 06/27/2024    HGBA1C 8.4 (A) 03/27/2024     BMP  Lab Results   Component Value Date    CALCIUM 8.5 (L) 09/17/2024     09/17/2024    K 4.9 09/17/2024    CO2 23 09/17/2024     (H) 09/17/2024    BUN 53 (H) 09/17/2024    CREATININE 2.75 (H) 09/17/2024    EGFR 25 (L) 09/17/2024     LFTs  Lab Results   Component Value Date    ALT 48 09/11/2024    AST 24 09/11/2024    ALKPHOS 90 09/11/2024    BILITOT 0.4 09/11/2024     FLP  Lab Results   Component Value Date    TRIG 174 (H) 05/22/2024    CHOL 115 05/22/2024    LDLF 60 02/09/2023    LDLCALC 51 05/22/2024    HDL 29.0 05/22/2024     Urine Microalbumin  Lab Results   Component Value Date    MICROALBCREA 104.7 (H) 09/11/2024     Weight Management  Wt Readings from Last 3 Encounters:   10/01/24 110 kg (242 lb 3.2 oz)   09/17/24 110 kg (243 lb 6.4 oz)   09/11/24 110 kg (242 lb)      There is no height or weight on file to calculate BMI.     Assessment/Plan   Problem List Items Addressed This Visit       Diabetes mellitus (Multi)    CHF (congestive heart failure)    Type 2 diabetes mellitus with insulin therapy (Multi)     Patient's goal A1c is < 7.0%.  Is pt at goal? Yes, most recent A1c on 10/1/24 of 6.7%. Prior A1c was also 6.7% on 6/27/24.     Rationale for plan: Patient's SMBGs are at goal. Patient denies any low BG readings since previous visit. At this time, patient reports full tolerability with current medication regimen. Discussed titration " of Mounjaro dose to 5 mg once weekly. Patient has desire to lose weight and is also hoping to reduce insulin doses as BG levels improve. Plan to continue with titration as tolerated to reach goals. At this time, will  continue other medications at current doses and will follow up in two weeks to assess for further dose adjustments.     Medication Changes:  CONTINUE:  Humalog 8 units TID  Lantus 26 units at bedtime   Jardiance 10 mg daily   INCREASE  Mounjaro 5 mg once weekly     Monitoring and Education:  Counseled patient on Mounjaro MOA, expectations, side effects, duration of therapy, administration, and monitoring parameters.  Provided detailed dosing and administration counseling to ensure proper technique.   Reviewed Mounjaro titration schedule, starting with 2.5 mg once weekly to a goal of 15 mg once weekly if tolerated  Counseled patient on the benefits of GLP-1ra glycemic control and weight loss  Reviewed storage requirements of Mounjaro when not in use, and when to administer the medication if a dose is missed.  Advised patient that they may experience improved satiety after meals and portion sizes of meals may be reduced as doses of Mounjaro increase.    Clinical Pharmacist follow-up: 11/20/24 @ 3 pm, Telehealth visit    Continue all meds under the continuation of care with the referring provider and clinical pharmacy team.    Thank you,  Janiya Freeman, PharmD  Clinical Pharmacist  [phone]    Verbal consent to manage patient's drug therapy was obtained from the patient. They were informed they may decline to participate or withdraw from participation in pharmacy services at any time.

## 2024-11-08 RX ORDER — HEPARIN SODIUM,PORCINE/PF 10 UNIT/ML
50 SYRINGE (ML) INTRAVENOUS AS NEEDED
OUTPATIENT
Start: 2024-11-08

## 2024-11-08 RX ORDER — HEPARIN 100 UNIT/ML
500 SYRINGE INTRAVENOUS AS NEEDED
OUTPATIENT
Start: 2024-11-08

## 2024-11-11 ENCOUNTER — TELEPHONE (OUTPATIENT)
Dept: PRIMARY CARE | Facility: CLINIC | Age: 64
End: 2024-11-11
Payer: MEDICARE

## 2024-11-11 DIAGNOSIS — E11.21 TYPE 2 DIABETES MELLITUS WITH DIABETIC NEPHROPATHY, WITHOUT LONG-TERM CURRENT USE OF INSULIN: ICD-10-CM

## 2024-11-11 DIAGNOSIS — Z86.79 HISTORY OF HYPERTENSION: ICD-10-CM

## 2024-11-11 DIAGNOSIS — I50.9 CONGESTIVE HEART FAILURE, UNSPECIFIED HF CHRONICITY, UNSPECIFIED HEART FAILURE TYPE: ICD-10-CM

## 2024-11-11 RX ORDER — CARVEDILOL 6.25 MG/1
6.25 TABLET ORAL
Qty: 180 TABLET | Refills: 1 | Status: SHIPPED | OUTPATIENT
Start: 2024-11-11

## 2024-11-11 RX ORDER — FUROSEMIDE 40 MG/1
TABLET ORAL
Qty: 45 TABLET | Refills: 0 | OUTPATIENT
Start: 2024-11-11 | End: 2024-12-11

## 2024-11-11 RX ORDER — PEN NEEDLE, DIABETIC 30 GX3/16"
NEEDLE, DISPOSABLE MISCELLANEOUS
Qty: 400 EACH | Refills: 3 | Status: SHIPPED | OUTPATIENT
Start: 2024-11-11

## 2024-11-20 ENCOUNTER — APPOINTMENT (OUTPATIENT)
Dept: PHARMACY | Facility: HOSPITAL | Age: 64
End: 2024-11-20
Payer: MEDICARE

## 2024-11-20 DIAGNOSIS — N18.4 TYPE 2 DIABETES MELLITUS WITH STAGE 4 CHRONIC KIDNEY DISEASE, WITH LONG-TERM CURRENT USE OF INSULIN (MULTI): ICD-10-CM

## 2024-11-20 DIAGNOSIS — Z79.4 TYPE 2 DIABETES MELLITUS WITH INSULIN THERAPY (MULTI): ICD-10-CM

## 2024-11-20 DIAGNOSIS — Z79.4 TYPE 2 DIABETES MELLITUS WITH STAGE 4 CHRONIC KIDNEY DISEASE, WITH LONG-TERM CURRENT USE OF INSULIN (MULTI): ICD-10-CM

## 2024-11-20 DIAGNOSIS — E11.9 TYPE 2 DIABETES MELLITUS WITH INSULIN THERAPY (MULTI): ICD-10-CM

## 2024-11-20 DIAGNOSIS — I50.20 SYSTOLIC CONGESTIVE HEART FAILURE, UNSPECIFIED HF CHRONICITY: ICD-10-CM

## 2024-11-20 DIAGNOSIS — E11.22 TYPE 2 DIABETES MELLITUS WITH STAGE 4 CHRONIC KIDNEY DISEASE, WITH LONG-TERM CURRENT USE OF INSULIN (MULTI): ICD-10-CM

## 2024-11-20 NOTE — PROGRESS NOTES
Clinical Pharmacy Appointment    Patient ID: Gregorio North is a 64 y.o. male who presents for Follow Up appointment.     Referring Provider: Latisha Vines PA-C  PCP: Latisha Vines PA-C   Last visit with PCP: 10/1/24   Next visit with PCP: 1/16/25    Subjective     DIABETES MELLITUS TYPE II:    Diagnosed with diabetes: 2015. Known diabetic complications: CKD and retinopathy.  Does patient follow with Endocrinology: No  Last optometry exam: 10/2024 - may have eye surgery in January for macular degeneration.      Current diabetic medications include:  Humalog 8 units TID  Lantus 26 units at bedtime   Jardiance 10 mg daily   Mounjaro 5 mg once weekly on Saturdays    Adverse Effects: None - some increase in gas     Past diabetic medications include:  Bydureon: Formulary change to Ozempic   Farxiga: Formulary change to Jardiance   Ozempic: nausea; switched to Mounjaro    Glucose Readings:  Glucometer/CGM Type: Freestyle [place] 3   To start new sensor today.    Current home BG readings: 130-140 most of the time, spikes to 250 mg/dL with meals.      Any episodes of hypoglycemia? No, none reported .  Did patient treat episode of hypoglycemia appropriately? N/A    Lifestyle:  Diet: 2 meals/day. Appetite improved - eating a little bit more. Lost about 5 pounds over the past 10 days.   BK: (11 am) eggs, 2 sausage links, and hash brown potatoes   LN: skips   DN: (5 pm) home made tacos, chinese  Snacks: yogurt, gram crackers  Drinks: water, milk, lemonade (low sugar), coffee with cream   Physical Activity: Feeling really weak about a week ago, lasted 2-3 days.     Secondary Prevention:  Statin? Yes - Atorvastatin 20 mg daily   ACE-I/ARB? Yes - Lisinopril 40 mg daily   Aspirin? No     Pertinent PMH Review:  PMH of Pancreatitis: No  PMH of Retinopathy: Yes  PMH of Urinary Tract Infections: No  PMH of MTC: No  PMH of Obesity: Yes  PMH of ASCVD: Yes  PMH of CKD: Yes  PMH of CHF: Yes    Medication Reconciliation:  No  changes    Drug Interactions  No relevant drug interactions were noted.    Medication System Management  Patient's preferred pharmacy: [facility]  Adherence/Organization: No issues  Affordability/Accessibility: Mounjaro, Lantus, Jardiance, and Humalog   PAP: Yes  Expiration: 4/12/2025    Objective   No Known Allergies  Social History     Social History Narrative    Not on file      Medication Review  Current Outpatient Medications   Medication Instructions    alcohol swabs (Alcohol Pads) pads, medicated Use as directed to check blood sugar up to 3-4 times daily and inject insulin 4 times daily    allopurinol (ZYLOPRIM) 100 mg, oral, Daily    atorvastatin (LIPITOR) 20 mg, oral, Nightly    blood sugar diagnostic (Blood Glucose Test) strip Use as directed to check blood sugar up to 3-4 times daily    blood-glucose meter misc Use as directed to check blood sugar up to 3-4 times daily    blood-glucose sensor (FreeStyle [place] 3 Plus Sensor) device Use as instructed to check blood sugar and change every 15 days    carvedilol (COREG) 6.25 mg, oral, 2 times daily (morning and late afternoon)    cholecalciferol (VITAMIN D-3) 5,000 Units, oral, Daily    clopidogrel (PLAVIX) 75 mg, oral, Daily    famotidine (PEPCID) 40 mg, oral, Daily    folic acid (FOLVITE) 5 mg, oral, Daily    FreeStyle [place] 3 [place] misc Use as instructed to check blood sugar throughout the day    FreeStyle [place] 3 Sensor device Use as directed to check blood glucose. Change sensor every 14 days.    furosemide (Lasix) 40 mg tablet Take 1 tablet (40 mg) by mouth every other day AND 2 tablets (80 mg) every other day.    HumaLOG KwikPen Insulin 8 Units, subcutaneous, 3 times daily before meals    Jardiance 10 mg, oral, Every morning    Kerendia 10 mg, oral, Daily    lancets (Accu-Chek Softclix Lancets) misc Use as directed  to check blood pressure 3 to 4 times a day    Lantus Solostar U-100 Insulin 26 Units, subcutaneous, Nightly    lisinopril 40 mg, oral,  "Every morning    MAGNESIUM GLYCINATE ORAL 420 mg, oral, Daily    Mounjaro 5 mg, subcutaneous, Weekly    pen needle, diabetic 32 gauge x 5/32\" needle Use pen needle with insulin pen 4 times daily as directed    sodium bicarbonate 650 mg, oral, 2 times daily      Vitals  BP Readings from Last 2 Encounters:   10/01/24 130/72   09/17/24 91/62     BMI Readings from Last 1 Encounters:   10/01/24 35.77 kg/m²      Labs  A1C  Lab Results   Component Value Date    HGBA1C 6.7 (A) 10/01/2024    HGBA1C 6.7 (H) 06/27/2024    HGBA1C 8.4 (A) 03/27/2024     BMP  Lab Results   Component Value Date    CALCIUM 8.5 (L) 09/17/2024     09/17/2024    K 4.9 09/17/2024    CO2 23 09/17/2024     (H) 09/17/2024    BUN 53 (H) 09/17/2024    CREATININE 2.75 (H) 09/17/2024    EGFR 25 (L) 09/17/2024     LFTs  Lab Results   Component Value Date    ALT 48 09/11/2024    AST 24 09/11/2024    ALKPHOS 90 09/11/2024    BILITOT 0.4 09/11/2024     FLP  Lab Results   Component Value Date    TRIG 174 (H) 05/22/2024    CHOL 115 05/22/2024    LDLF 60 02/09/2023    LDLCALC 51 05/22/2024    HDL 29.0 05/22/2024     Urine Microalbumin  Lab Results   Component Value Date    MICROALBCREA 104.7 (H) 09/11/2024     Weight Management  Wt Readings from Last 3 Encounters:   10/01/24 110 kg (242 lb 3.2 oz)   09/17/24 110 kg (243 lb 6.4 oz)   09/11/24 110 kg (242 lb)      There is no height or weight on file to calculate BMI.     Assessment/Plan   Problem List Items Addressed This Visit       Diabetes mellitus (Multi)    CHF (congestive heart failure)    Type 2 diabetes mellitus with insulin therapy (Multi)     Patient's goal A1c is < 7.0%.  Is pt at goal? Yes, most recent A1c on 10/1/24 of 6.7%. Prior A1c was also 6.7% on 6/27/24.     Rationale for plan: Patient's SMBGs are stable and at goal the majority of the time. Patient denies any low BG readings since previous visit. At this time, patient reports full tolerability with current medication regimen. At " previous visit, 2 weeks ago, Mounjaro dose was increased to 5 mg once weekly. Patient endorses some increase in gas since dose increase but denies any other side effects. At this time, patient appears to be tolerating well. Due to previous intolerance to Ozempic. Plan for slower titration. Will continue current dose for an additional 6 weeks (8 weeks total) prior to considering another dose increase. Patient reports that all other medications are tolerated well. At this time, will continue other medications at current doses and will follow up in four weeks to assess for further dose adjustments.     Medication Changes:  CONTINUE:  Humalog 8 units TID  Lantus 26 units at bedtime   Jardiance 10 mg daily   Mounjaro 5 mg once weekly     Future Considerations:  For BG lowering, consider increase in Jardiance v Mounjaro dose   PMH significant for obesity, CKD, CHF, and high risk for ASCVD     Monitoring and Education:  Counseled patient on Mounjaro MOA, expectations, side effects, duration of therapy, administration, and monitoring parameters.  Provided detailed dosing and administration counseling to ensure proper technique.   Reviewed Mounjaro titration schedule, starting with 2.5 mg once weekly to a goal of 15 mg once weekly if tolerated  Counseled patient on the benefits of GLP-1ra glycemic control and weight loss  Reviewed storage requirements of Mounjaro when not in use, and when to administer the medication if a dose is missed.  Advised patient that they may experience improved satiety after meals and portion sizes of meals may be reduced as doses of Mounjaro increase    Clinical Pharmacist follow-up: 12/18/24 @ 1500, Telehealth visit    Continue all meds under the continuation of care with the referring provider and clinical pharmacy team.    Thank you,  Janiya Freeman, PharmD  Clinical Pharmacist  [phone]    Verbal consent to manage patient's drug therapy was obtained from the patient. They were  informed they may decline to participate or withdraw from participation in pharmacy services at any time.

## 2024-11-21 PROCEDURE — RXMED WILLOW AMBULATORY MEDICATION CHARGE

## 2024-11-26 ENCOUNTER — PHARMACY VISIT (OUTPATIENT)
Dept: PHARMACY | Facility: CLINIC | Age: 64
End: 2024-11-26
Payer: COMMERCIAL

## 2024-12-09 PROCEDURE — RXMED WILLOW AMBULATORY MEDICATION CHARGE

## 2024-12-12 ENCOUNTER — PHARMACY VISIT (OUTPATIENT)
Dept: PHARMACY | Facility: CLINIC | Age: 64
End: 2024-12-12
Payer: COMMERCIAL

## 2024-12-14 DIAGNOSIS — E78.5 DYSLIPIDEMIA: ICD-10-CM

## 2024-12-14 DIAGNOSIS — R53.83 OTHER FATIGUE: ICD-10-CM

## 2024-12-14 DIAGNOSIS — M1A.30X1 CHRONIC GOUT DUE TO RENAL IMPAIRMENT WITH TOPHUS, UNSPECIFIED SITE: ICD-10-CM

## 2024-12-14 DIAGNOSIS — I10 PRIMARY HYPERTENSION: ICD-10-CM

## 2024-12-16 ENCOUNTER — LAB (OUTPATIENT)
Dept: LAB | Facility: LAB | Age: 64
End: 2024-12-16
Payer: MEDICARE

## 2024-12-16 DIAGNOSIS — D63.1 ANEMIA DUE TO STAGE 3B CHRONIC KIDNEY DISEASE (MULTI): ICD-10-CM

## 2024-12-16 DIAGNOSIS — E55.9 VITAMIN D DEFICIENCY: ICD-10-CM

## 2024-12-16 DIAGNOSIS — N18.32 ANEMIA DUE TO STAGE 3B CHRONIC KIDNEY DISEASE (MULTI): ICD-10-CM

## 2024-12-16 DIAGNOSIS — N18.32 STAGE 3B CHRONIC KIDNEY DISEASE (MULTI): ICD-10-CM

## 2024-12-16 DIAGNOSIS — D50.0 IRON DEFICIENCY ANEMIA DUE TO CHRONIC BLOOD LOSS: ICD-10-CM

## 2024-12-16 DIAGNOSIS — N04.9 NEPHROTIC SYNDROME: ICD-10-CM

## 2024-12-16 DIAGNOSIS — E08.00 DIABETES MELLITUS DUE TO UNDERLYING CONDITION WITH HYPEROSMOLARITY WITHOUT COMA, WITHOUT LONG-TERM CURRENT USE OF INSULIN: ICD-10-CM

## 2024-12-16 DIAGNOSIS — I10 BENIGN ESSENTIAL HYPERTENSION: ICD-10-CM

## 2024-12-16 DIAGNOSIS — N18.4 CKD (CHRONIC KIDNEY DISEASE) STAGE 4, GFR 15-29 ML/MIN (MULTI): ICD-10-CM

## 2024-12-16 DIAGNOSIS — E87.6 HYPOKALEMIA: ICD-10-CM

## 2024-12-16 LAB
ALBUMIN SERPL BCP-MCNC: 3.9 G/DL (ref 3.4–5)
ANION GAP SERPL CALC-SCNC: 15 MMOL/L (ref 10–20)
BUN SERPL-MCNC: 49 MG/DL (ref 6–23)
CALCIUM SERPL-MCNC: 9.1 MG/DL (ref 8.6–10.3)
CHLORIDE SERPL-SCNC: 106 MMOL/L (ref 98–107)
CO2 SERPL-SCNC: 24 MMOL/L (ref 21–32)
CREAT SERPL-MCNC: 2.56 MG/DL (ref 0.5–1.3)
EGFRCR SERPLBLD CKD-EPI 2021: 27 ML/MIN/1.73M*2
ERYTHROCYTE [DISTWIDTH] IN BLOOD BY AUTOMATED COUNT: 13.6 % (ref 11.5–14.5)
FERRITIN SERPL-MCNC: 260 NG/ML (ref 20–300)
GLUCOSE SERPL-MCNC: 117 MG/DL (ref 74–99)
HCT VFR BLD AUTO: 32.3 % (ref 41–52)
HGB BLD-MCNC: 10.6 G/DL (ref 13.5–17.5)
IRON SATN MFR SERPL: 28 % (ref 25–45)
IRON SERPL-MCNC: 79 UG/DL (ref 35–150)
MCH RBC QN AUTO: 30.8 PG (ref 26–34)
MCHC RBC AUTO-ENTMCNC: 32.8 G/DL (ref 32–36)
MCV RBC AUTO: 94 FL (ref 80–100)
NRBC BLD-RTO: 0 /100 WBCS (ref 0–0)
PHOSPHATE SERPL-MCNC: 4.2 MG/DL (ref 2.5–4.9)
PLATELET # BLD AUTO: 209 X10*3/UL (ref 150–450)
POTASSIUM SERPL-SCNC: 3.5 MMOL/L (ref 3.5–5.3)
RBC # BLD AUTO: 3.44 X10*6/UL (ref 4.5–5.9)
SODIUM SERPL-SCNC: 141 MMOL/L (ref 136–145)
TIBC SERPL-MCNC: 285 UG/DL (ref 240–445)
UIBC SERPL-MCNC: 206 UG/DL (ref 110–370)
WBC # BLD AUTO: 6.6 X10*3/UL (ref 4.4–11.3)

## 2024-12-16 PROCEDURE — 83970 ASSAY OF PARATHORMONE: CPT

## 2024-12-16 PROCEDURE — 82306 VITAMIN D 25 HYDROXY: CPT

## 2024-12-16 PROCEDURE — 36415 COLL VENOUS BLD VENIPUNCTURE: CPT

## 2024-12-17 ENCOUNTER — LAB (OUTPATIENT)
Dept: LAB | Facility: LAB | Age: 64
End: 2024-12-17
Payer: MEDICARE

## 2024-12-17 ENCOUNTER — OFFICE VISIT (OUTPATIENT)
Dept: NEPHROLOGY | Facility: CLINIC | Age: 64
End: 2024-12-17
Payer: MEDICARE

## 2024-12-17 VITALS
HEART RATE: 72 BPM | BODY MASS INDEX: 35.93 KG/M2 | RESPIRATION RATE: 18 BRPM | HEIGHT: 69 IN | SYSTOLIC BLOOD PRESSURE: 126 MMHG | DIASTOLIC BLOOD PRESSURE: 72 MMHG | WEIGHT: 242.6 LBS | OXYGEN SATURATION: 95 % | TEMPERATURE: 97.3 F

## 2024-12-17 DIAGNOSIS — E87.6 HYPOKALEMIA: ICD-10-CM

## 2024-12-17 DIAGNOSIS — N18.32 ANEMIA DUE TO STAGE 3B CHRONIC KIDNEY DISEASE (MULTI): ICD-10-CM

## 2024-12-17 DIAGNOSIS — E55.9 VITAMIN D DEFICIENCY: ICD-10-CM

## 2024-12-17 DIAGNOSIS — E87.20 METABOLIC ACIDOSIS: ICD-10-CM

## 2024-12-17 DIAGNOSIS — N04.9 NEPHROTIC SYNDROME: ICD-10-CM

## 2024-12-17 DIAGNOSIS — D63.1 ANEMIA DUE TO STAGE 3B CHRONIC KIDNEY DISEASE (MULTI): ICD-10-CM

## 2024-12-17 DIAGNOSIS — I10 BENIGN ESSENTIAL HYPERTENSION: ICD-10-CM

## 2024-12-17 DIAGNOSIS — N18.4 CKD (CHRONIC KIDNEY DISEASE) STAGE 4, GFR 15-29 ML/MIN (MULTI): ICD-10-CM

## 2024-12-17 DIAGNOSIS — I50.20 SYSTOLIC CONGESTIVE HEART FAILURE, UNSPECIFIED HF CHRONICITY: ICD-10-CM

## 2024-12-17 DIAGNOSIS — E08.00 DIABETES MELLITUS DUE TO UNDERLYING CONDITION WITH HYPEROSMOLARITY WITHOUT COMA, WITHOUT LONG-TERM CURRENT USE OF INSULIN: ICD-10-CM

## 2024-12-17 DIAGNOSIS — D50.0 IRON DEFICIENCY ANEMIA DUE TO CHRONIC BLOOD LOSS: ICD-10-CM

## 2024-12-17 DIAGNOSIS — N18.32 STAGE 3B CHRONIC KIDNEY DISEASE (MULTI): ICD-10-CM

## 2024-12-17 DIAGNOSIS — N18.32 STAGE 3B CHRONIC KIDNEY DISEASE (MULTI): Primary | ICD-10-CM

## 2024-12-17 LAB
25(OH)D3 SERPL-MCNC: 52 NG/ML (ref 30–100)
PTH-INTACT SERPL-MCNC: 85.8 PG/ML (ref 18.5–88)

## 2024-12-17 PROCEDURE — 3048F LDL-C <100 MG/DL: CPT | Performed by: INTERNAL MEDICINE

## 2024-12-17 PROCEDURE — 4010F ACE/ARB THERAPY RXD/TAKEN: CPT | Performed by: INTERNAL MEDICINE

## 2024-12-17 PROCEDURE — 3060F POS MICROALBUMINURIA REV: CPT | Performed by: INTERNAL MEDICINE

## 2024-12-17 PROCEDURE — 99215 OFFICE O/P EST HI 40 MIN: CPT | Performed by: INTERNAL MEDICINE

## 2024-12-17 PROCEDURE — 3074F SYST BP LT 130 MM HG: CPT | Performed by: INTERNAL MEDICINE

## 2024-12-17 PROCEDURE — 1159F MED LIST DOCD IN RCRD: CPT | Performed by: INTERNAL MEDICINE

## 2024-12-17 PROCEDURE — 82043 UR ALBUMIN QUANTITATIVE: CPT

## 2024-12-17 PROCEDURE — 3044F HG A1C LEVEL LT 7.0%: CPT | Performed by: INTERNAL MEDICINE

## 2024-12-17 PROCEDURE — G2211 COMPLEX E/M VISIT ADD ON: HCPCS | Performed by: INTERNAL MEDICINE

## 2024-12-17 PROCEDURE — 82570 ASSAY OF URINE CREATININE: CPT

## 2024-12-17 PROCEDURE — 3078F DIAST BP <80 MM HG: CPT | Performed by: INTERNAL MEDICINE

## 2024-12-17 PROCEDURE — 3008F BODY MASS INDEX DOCD: CPT | Performed by: INTERNAL MEDICINE

## 2024-12-17 RX ORDER — ALLOPURINOL 100 MG/1
100 TABLET ORAL DAILY
Qty: 90 TABLET | Refills: 3 | Status: SHIPPED | OUTPATIENT
Start: 2024-12-17

## 2024-12-17 RX ORDER — LISINOPRIL 40 MG/1
40 TABLET ORAL
Qty: 90 TABLET | Refills: 3 | Status: SHIPPED | OUTPATIENT
Start: 2024-12-17

## 2024-12-17 RX ORDER — CLOPIDOGREL BISULFATE 75 MG/1
75 TABLET ORAL DAILY
Qty: 90 TABLET | Refills: 3 | Status: SHIPPED | OUTPATIENT
Start: 2024-12-17

## 2024-12-17 RX ORDER — ATORVASTATIN CALCIUM 20 MG/1
20 TABLET, FILM COATED ORAL NIGHTLY
Qty: 90 TABLET | Refills: 3 | Status: SHIPPED | OUTPATIENT
Start: 2024-12-17

## 2024-12-17 RX ORDER — FOLIC ACID 1 MG/1
TABLET ORAL
Qty: 450 TABLET | Refills: 3 | Status: SHIPPED | OUTPATIENT
Start: 2024-12-17

## 2024-12-17 NOTE — PROGRESS NOTES
Subjective     Mr. 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  Mr. North came today with his daughter-in-law.  No kidney related complaints or concerns.  He feels great.  No recent hospital admission.  No weight gain since last office visit.  Leg swelling significantly improved.  He had blood work done that showed stable serum creatinine 2.5 significant improved from prior, BUN 49 significant proved from 100 mg after we adjusted his diuretics.  He continues to be anemic 10.6-currently Retacrit/Epogen shots are on hold after hemoglobin improved 11.5-will plan to resume again.  Diabetes in good control with A1c 6.7-recently started Mounjaro.  Proteinuria improved significantly with last spot test  mg/g.  Within normal electrolytes including potassium.  Blood pressure is accepted today.  Overall he is doing great from kidney standpoint and course is stable    Prior notes    Last office visit March 2024.  In the interim, patient had blood work done that showed BUN above 100 and worsening serum creatinine up to 4.5 from his baseline 3-3.5.  Patient was admitted overnight to the hospital with concerns about possible prerenal kidney injury in the setting of diuretics.  He received 500 cc normal saline.  Lasix was adjusted to be taken 80 mg alternating with 40 mg.  Serum creatinine improved and BUN improved and patient was discharged with plan to follow-up with me    Today, Gregorio came with his daughter-in-law.  He feels excellent.  No shortness of breath or chest pain.  No dizziness, nausea or vomiting.  No leg swelling.  His weight is consistent with his dry weight 240-245 pounds.  He is adherent to medication instructions.   He takes Lokelma daily for hyperkalemia with no issues.  Blood pressure excepted today.  Today we had long discussion regarding recurrent acute kidney injury, challenging volume control and possible need for dialysis in the near future    Prior notes    Last office visit December 2023.  We started Kerendia and Jardiance for diabetic nephropathy management.  In the interim, he had hospital admission in January 2024 with bradycardia that thought to be secondary to blood pressure medications.  Today, Mr. North came with his daughter-in-law.  He feels well.  Leg swelling improved significantly.  No dizziness.  Most of his medications that were held during last hospital admission were reintroduced again with no issues.  Blood pressure accepted today.  He is losing weight intentionally.  Repeat blood work showed stable serum creatinine 3 and GFR 22 consistent with his new baseline.  No continued complaints or concerns today    Prior notes    Last office visit August 2023.  We dropped Lasix due to dizziness-significantly improved.  We continued Farxiga 10 mg and lisinopril 40 mg.  Today, Mr. North came alone.  Improved leg swelling.  No kidney related complaints or concerns.  He missed diabetes medication for few weeks-hemoglobin A1c is worsening 8.7.  He did not do blood work before the visit as instructed-we will get it done.  He continues to get Epogen shots for anemia-improved.  We discussed starting finerenone today for diabetic nephropathy management and discussed risk for hyperkalemia      Prior notes  Last office visit April 2023. Was started Farxiga 10 mg for volume/hypertension/CKD management. Was started allopurinol 100 mg for hyperuricemia. Today, Mr. North reports feeling well. He started feeling dizzy when he stands up after starting Farxiga. I advised to go down on furosemide dose for possible volume depletion. He had blood work done last week that showed slightly worsening serum creatinine 3 from 2.1 and  GFR down to 23 from 34 from 40 prior. Possibly due to hemodynamic injury. Uric acid is now normal after starting allopurinol. Leg swelling improved. No shortness of breath. He does not see hematology anymore in [facility] shots have been on hold. Repeat CBC shows worsening hemoglobin 9.8. He requested us to take care of his anemia management as part of CKD management-we will do     Per prior notes     Last office visit February 2023. Significant hypervolemia with nephrotic syndrome. We obtained kidney biopsy-came back with insufficient tissue not able to get much information. We uptitrated diuretics. We uptitrated her ACE inhibitors. In the interim, Mr. North reports marked improvement in leg swelling and shortness of breath. He is adherent to low-salt diet. Today he came with his stepdaughter. He is aware of the insufficient sample of kidney biopsy-mutual decision not to repeat kidney biopsy at this time. He had blood work done recently and results were discussed with him in details including worsening CKD and within normal electrolytes. Improved albuminuria and proteinuria-remains to be significant but much better from prior. Blood pressure is accepted     Her prior notes         Patient has a past medical history of T2DM, Lyme disease, HTN, CHF, FILOMENA, Neuronitis, CKD, and prostate CA. 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 He denies a family history of kidney problems, but does endorse a family history of heart attack and diabetes. He was hospitalized in February 2022 for foot pain. He was placed on Vancomycin and Zosyn for a foot infection. He states that there is foam in his urine and has some eye swelling in the mornings. He denies NSAID use and only uses Tylenol for pain. He was diagnosed with Lyme disease in November 2015 and was on Doxycycline. Bloodwork was obtained and reviewed. GFR was 43 and Cr was 1.76. UA in October 2022 showed +3 protein. UA micro revealed RBCs. Alb/Cr ratio in October was  "significantly elevated. Since January of 2022, he has gained roughly 20 pounds. A kidney US done in February 2022 revealed large, but unremarkable kidneys. Discussed repeating bloodwork after today's visit for most up to date labs. We will increase Lasix to 80 mg Am and 40 mg PM. We will increase potassium chloride as a result of the increased Lasix. Continue Lisinopril 40 mg daily - beneficial for protein leak. Continue Atorvastatin. We discussed need for a kidney biopsy for further testing, and Plavix will need to be stopped 5 days prior to biopsy. Follow-up in one month with repeat bloodwork before the visit. If you become anuric, swelling increases, or you become SOB go to the Emergency Room immediately. Educated on a low salt diet with fresh fruits and vegetables.            Objective   /72 (BP Location: Left arm, Patient Position: Standing)   Pulse 72   Temp 36.3 °C (97.3 °F) (Temporal)   Resp 18   Ht 1.753 m (5' 9\")   Wt 110 kg (242 lb 9.6 oz)   SpO2 95%   BMI 35.83 kg/m²   Wt Readings from Last 3 Encounters:   12/17/24 110 kg (242 lb 9.6 oz)   10/01/24 110 kg (242 lb 3.2 oz)   09/17/24 110 kg (243 lb 6.4 oz)       Physical Exam    General appearance: no distress awake and alert on room air, no significant hypervolemia in exam  Eyes: non-icteric  HEENT: atrumatic head, PEERLA, moist mucosa  Skin: no apparent rash  Heart: NSR, S1, S2 normal, no murmur or gallop  Lungs: Symmetrical expansion,CTA bilat no wheezing/crackles  Abdomen: soft, nt/nd, obese  Extremities: no significant edema bilat, Charcot boot is applied to the left lower extremity  Neuro: No FND,asterixis, no focal deficits noticed        Review of Systems     Constitutional: no fever, no chills, no recent weight gain and no recent weight loss.   Eyes: no blurred vision and no diplopia.   ENT: no hearing loss, no earache, no sore throat, no swollen glands in the neck and no nasal discharge.   Cardiovascular: no chest pain, no " "palpitations and no lower extremity edema.   Respiratory: no shortness of breath, no chronic cough and no shortness of breath during exertion.   Gastrointestinal: no abdominal pain, no constipation, no heartburn, no vomiting, no bloody stools and no change in bowel movements.   Genitourinary: no dysuria and no hematuria.   Musculoskeletal: no arthralgias and no myalgias.   Skin: no rashes and no skin lesions.   Neurological: no headaches and no dizziness.   Psychiatric: no confusion, no depression and no anxiety.   Endocrine: no heat intolerance, no cold intolerance, appetite not increased, no thyroid disorder, no increased urinary frequency and no dry skin.   Hematologic/Lymphatic: does not bleed easily and does not bruise easily.   All other systems have been reviewed and are negative for complaint.         Data Review        Results from last 7 days   Lab Units 12/16/24  1331   WBC AUTO x10*3/uL 6.6   HEMOGLOBIN g/dL 10.6*   HEMATOCRIT % 32.3*   PLATELETS AUTO x10*3/uL 209        Results from last 7 days   Lab Units 12/16/24  1331   SODIUM mmol/L 141   POTASSIUM mmol/L 3.5   CHLORIDE mmol/L 106   CO2 mmol/L 24   BUN mg/dL 49*   CREATININE mg/dL 2.56*   EGFR mL/min/1.73m*2 27*   GLUCOSE mg/dL 117*   CALCIUM mg/dL 9.1   PHOSPHORUS mg/dL 4.2       Lab Results   Component Value Date    URICACID 7.9 (H) 03/13/2024       No components found for: \"[id number]\"      Lab Results   Component Value Date    HGBA1C 6.7 (A) 10/01/2024         Lab Results   Component Value Date    CALCIUM 9.1 12/16/2024    PHOS 4.2 12/16/2024         No results found for requested labs within last 365 days.        Results from last 7 days   Lab Units 12/16/24  1331   SODIUM mmol/L 141   POTASSIUM mmol/L 3.5   CHLORIDE mmol/L 106   CO2 mmol/L 24   BUN mg/dL 49*   GLUCOSE mg/dL 117*   CALCIUM mg/dL 9.1   ANION GAP mmol/L 15   EGFR mL/min/1.73m*2 27*             Albumin/Creatinine Ratio   Date Value Ref Range Status   09/11/2024 104.7 (H) <30.0 " ug/mg Creat Final   03/13/2024 466.8 (H) <30.0 ug/mg Creat Final   12/05/2023 700.4 (H) <30.0 ug/mg Creat Final       Recent Labs     12/16/24  1331 09/17/24  1434 09/12/24  0654 09/11/24  1319 09/11/24  0922 06/27/24  1136 03/13/24  1035 02/03/24  0903    140 140 137 138 138 141 139   K 3.5 4.9 5.9* 5.4* 5.8* 4.4 4.7 4.7   CO2 24 23 19* 19* 18* 24 25 21   BUN 49* 53* 93* 106* 115* 69* 52* 72*   GLUCOSE 117* 105* 123* 138* 197* 98 170* 156*   CALCIUM 9.1 8.5* 8.3* 8.5* 8.8 9.6 9.1 8.6   PHOS 4.2 4.2 4.7 6.5* 6.8*  --  3.6 4.7   CREATININE 2.56* 2.75* 3.36* 4.23* 4.30* 3.32* 3.04* 2.63*   EGFR 27* 25* 20* 15* 15* 20* 22* 26*   ANIONGAP 15 13 14 16 17 14 16 13            Current Outpatient Medications:     alcohol swabs (Alcohol Pads) pads, medicated, Use as directed to check blood sugar up to 3-4 times daily and inject insulin 4 times daily, Disp: 300 each, Rfl: 0    allopurinol (Zyloprim) 100 mg tablet, TAKE ONE TABLET BY MOUTH ONCE DAILY, Disp: 90 tablet, Rfl: 3    atorvastatin (Lipitor) 20 mg tablet, TAKE ONE TABLET BY MOUTH ONCE DAILY AT BEDTIME, Disp: 90 tablet, Rfl: 3    blood sugar diagnostic (Blood Glucose Test) strip, Use as directed to check blood sugar up to 3-4 times daily, Disp: 100 each, Rfl: 0    blood-glucose meter misc, Use as directed to check blood sugar up to 3-4 times daily, Disp: 1 each, Rfl: 0    blood-glucose sensor (FreeStyle [place] 3 Plus Sensor) device, Use as instructed to check blood sugar and change every 15 days, Disp: 6 each, Rfl: 3    carvedilol (Coreg) 6.25 mg tablet, Take 1 tablet (6.25 mg) by mouth 2 times daily (morning and late afternoon)., Disp: 180 tablet, Rfl: 1    cholecalciferol (Vitamin D-3) 125 MCG (5000 UT) capsule, Take 1 capsule (125 mcg) by mouth once daily., Disp: , Rfl:     clopidogrel (Plavix) 75 mg tablet, TAKE ONE TABLET BY MOUTH ONCE DAILY, Disp: 90 tablet, Rfl: 3    empagliflozin (Jardiance) 10 mg, Take 1 tablet (10 mg) by mouth once daily in the morning.,  "Disp: 90 tablet, Rfl: 3    famotidine (Pepcid) 40 mg tablet, Take 1 tablet (40 mg) by mouth once daily., Disp: 30 tablet, Rfl: 0    finerenone (Kerendia) 10 mg tablet tablet, Take 1 tablet (10 mg) by mouth once daily., Disp: 90 tablet, Rfl: 3    folic acid (Folvite) 1 mg tablet, Take 5 tablets by mouth once daily., Disp: 450 tablet, Rfl: 3    FreeStyle [place] 3 [place] misc, Use as instructed to check blood sugar throughout the day, Disp: 1 each, Rfl: 0    FreeStyle [place] 3 Sensor device, Use as directed to check blood glucose. Change sensor every 14 days., Disp: 2 each, Rfl: 11    furosemide (Lasix) 40 mg tablet, Take 1 tablet (40 mg) by mouth every other day AND 2 tablets (80 mg) every other day., Disp: 45 tablet, Rfl: 0    HumaLOG KwikPen Insulin 100 unit/mL injection, Inject 8 Units under the skin 3 times a day before meals., Disp: 15 mL, Rfl: 3    insulin glargine (Lantus Solostar U-100 Insulin) 100 unit/mL (3 mL) pen, Inject 26 Units under the skin once daily at bedtime., Disp: 15 mL, Rfl: 3    lancets (Accu-Chek Softclix Lancets) misc, Use as directed  to check blood pressure 3 to 4 times a day, Disp: 100 each, Rfl: 0    lisinopril 40 mg tablet, TAKE ONE TABLET BY MOUTH ONCE DAILY IN THE MORNING, Disp: 90 tablet, Rfl: 3    MAGNESIUM GLYCINATE ORAL, Take 420 mg by mouth once daily., Disp: , Rfl:     pen needle, diabetic 32 gauge x 5/32\" needle, Use pen needle with insulin pen 4 times daily as directed, Disp: 400 each, Rfl: 3    sodium bicarbonate 650 mg tablet, Take 1 tablet (650 mg) by mouth 2 times a day., Disp: 180 tablet, Rfl: 3    tirzepatide (Mounjaro) 5 mg/0.5 mL pen injector, Inject 5 mg under the skin 1 (one) time per week., Disp: 2 mL, Rfl: 2     Assessment and Plan     Mr. 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  Most recent creatinine improved 2.5 and GFR 27-no change in medications today  -Recurrent admissions with acute kidney injury and hypervolemia-improved      -Patient had normal kidney function up until January 2022. He had hospital admission in February 2022 with infected foot received vancomycin and Zosyn (elevated trough levels of vancomycin noticed). During the admission he developed acute kidney injury with a serum creatinine up to 1.7-2, GFR 35-40. Serum creatinine has been stable since then with no improvement. He had another admission in April with respiratory issues.  -Urine dipstick earlier was significant albuminuria. Prior spot test albumin creatinine shows above limit of the lab concerning for nephrotic range proteinuria/nephrotic syndrome. He gained about 30 pounds in the last year  -He got nephrology evaluation in February 2022 that revealed negative basic GN work-up (C3, C4, syphilis, hep C, hep B, HIV, SPEP, UPEP). More work-up came back negative for antiplat 2R antibodies, antihistone. Attempted kidney biopsy in February 2023-insufficient tissue sampling. Enders decision to hold off repeat kidney biopsy at this time  -Chronic kidney disease and nephrotic range proteinuria-most likely related to diabetic nephropathy        # Significantly improved albuminuria from outside the lab limit then 2.7 g/g then most recent spot test albumin creatinine ratio 0.4 g/g  -Continue finerenone and Jardiance, lisinopril  -We will continue conservative measures. We will continue maximum tolerated dose of RAAS inhibitor.      #Lyme disease 2015-status post doxycycline. Less concerns about Lyme disease induced kidney injury     ##Hypertension-current medication carvedilol 25 mg twice  daily, amlodipine 5 mg, lisinopril 40 mg, Lasix 80 mg alternating with 40 mg daily (reduced due to dizziness/acute kidney injury-improved).  We are holding potassium chloride-currently potassium is normal.  Will continue Lokelma daily.  Hydralazine 50 mg 3 times daily were held during the last hospital admission due to low blood pressure-no need to reintroduce     #Chronic anemia- currently holding Retacrit infusion due to improved hemoglobin 11.8.  Most recent hemoglobin 10.6-resume     #Peripheral artery disease status post stent-on Plavix.      #Type 2 diabetes-uncontrolled with neuropathy and retinopathy.  Continue SGL 2 inhibitors for GFR preservation.  Continue GLP RA-improved A1c     #Significant hyperuricemia uric acid 9.3-now improved 3-0-rngohtqg allopurinol 100 mg     #CKD-MBD-secondary hyperparathyroidism, within normal phosphorus    # Hypomagnesemia-currently magnesium is on hold.  Will monitor magnesium level    # Metabolic acidosis-refilled sodium bicarbonate today      Follow-up in 6 months with repeat blood work and urinalysis prior to next visit        Imani Kaplan MD, MS, WIL[facility] KIDD  Clinical  - [facility] [employer]  Nephrologist - [facility] - [facility]

## 2024-12-18 ENCOUNTER — APPOINTMENT (OUTPATIENT)
Dept: PHARMACY | Facility: HOSPITAL | Age: 64
End: 2024-12-18
Payer: MEDICARE

## 2024-12-18 LAB
CREAT UR-MCNC: 47.1 MG/DL (ref 20–370)
MICROALBUMIN UR-MCNC: 127.1 MG/L
MICROALBUMIN/CREAT UR: 269.9 UG/MG CREAT

## 2024-12-19 ENCOUNTER — PATIENT OUTREACH (OUTPATIENT)
Dept: PRIMARY CARE | Facility: CLINIC | Age: 64
End: 2024-12-19
Payer: MEDICARE

## 2024-12-20 ENCOUNTER — APPOINTMENT (OUTPATIENT)
Dept: NEPHROLOGY | Facility: CLINIC | Age: 64
End: 2024-12-20
Payer: MEDICARE

## 2024-12-26 ENCOUNTER — APPOINTMENT (OUTPATIENT)
Dept: NEPHROLOGY | Facility: CLINIC | Age: 64
End: 2024-12-26
Payer: MEDICARE

## 2024-12-31 ENCOUNTER — APPOINTMENT (OUTPATIENT)
Dept: PHARMACY | Facility: HOSPITAL | Age: 64
End: 2024-12-31
Payer: MEDICARE

## 2024-12-31 DIAGNOSIS — E11.22 TYPE 2 DIABETES MELLITUS WITH STAGE 4 CHRONIC KIDNEY DISEASE, WITH LONG-TERM CURRENT USE OF INSULIN (MULTI): ICD-10-CM

## 2024-12-31 DIAGNOSIS — N18.4 TYPE 2 DIABETES MELLITUS WITH STAGE 4 CHRONIC KIDNEY DISEASE, WITH LONG-TERM CURRENT USE OF INSULIN (MULTI): ICD-10-CM

## 2024-12-31 DIAGNOSIS — Z79.4 TYPE 2 DIABETES MELLITUS WITH STAGE 4 CHRONIC KIDNEY DISEASE, WITH LONG-TERM CURRENT USE OF INSULIN (MULTI): ICD-10-CM

## 2024-12-31 DIAGNOSIS — Z79.4 TYPE 2 DIABETES MELLITUS WITH INSULIN THERAPY (MULTI): ICD-10-CM

## 2024-12-31 DIAGNOSIS — E11.9 TYPE 2 DIABETES MELLITUS WITH INSULIN THERAPY (MULTI): ICD-10-CM

## 2024-12-31 DIAGNOSIS — I50.20 SYSTOLIC CONGESTIVE HEART FAILURE, UNSPECIFIED HF CHRONICITY: ICD-10-CM

## 2024-12-31 PROCEDURE — RXMED WILLOW AMBULATORY MEDICATION CHARGE

## 2024-12-31 RX ORDER — TIRZEPATIDE 5 MG/.5ML
5 INJECTION, SOLUTION SUBCUTANEOUS WEEKLY
Qty: 2 ML | Refills: 2 | Status: SHIPPED | OUTPATIENT
Start: 2024-12-31

## 2024-12-31 NOTE — PROGRESS NOTES
Clinical Pharmacy Appointment    Patient ID: Gregorio North is a 64 y.o. male who presents for Follow Up appointment.     Referring Provider: Latisha Vines PA-C  PCP: Latisha Vines PA-C   Last visit with PCP: 10/1/24   Next visit with PCP: 1/16/25    Subjective     DIABETES MELLITUS TYPE II:    Diagnosed with diabetes: 2015. Known diabetic complications: CKD and retinopathy.  Does patient follow with Endocrinology: No  Last optometry exam: 10/2024 - may have eye surgery in January for macular degeneration.      Current diabetic medications include:  Humalog 8 units TID  Lantus 26 units at bedtime   Jardiance 10 mg daily   Mounjaro 5 mg once weekly on Saturdays    Adverse Effects: Some loose stools    Past diabetic medications include:  Bydureon: Formulary change to Ozempic   Farxiga: Formulary change to Jardiance   Ozempic: nausea; switched to Mounjaro    Glucose Readings:  Glucometer/CGM Type: Freestyle [place] 3   Sensor fell off/sensor error - to call  for replacement     Current home BG readings: 100-120 most of the time, throughout the day stable around 130-140     Any episodes of hypoglycemia? Yes, one reading of 65 mg/dL .  Did patient treat episode of hypoglycemia appropriately? Yes, ate a piece of candy and a snack    Lifestyle:  Diet: 2 meals/day. Appetite improved - eating a little bit more. Lost about 5 pounds over the past 10 days. 234 pounds this morning.   BK: (11 am) eggs, 2 sausage links, and hash brown potatoes   LN: skips   DN: (5 pm) home made tacos, chinese  Snacks: yogurt, gram crackers, apple sauce, cottage cheese  Drinks: water, milk, lemonade (low sugar), coffee with cream   Physical Activity: Feeling really weak about a week ago, lasted 2-3 days.     Secondary Prevention:  Statin? Yes - Atorvastatin 20 mg daily   ACE-I/ARB? Yes - Lisinopril 40 mg daily   Aspirin? No     Pertinent PMH Review:  PMH of Pancreatitis: No  PMH of Retinopathy: Yes  PMH of Urinary Tract  Infections: No  PMH of MTC: No  PMH of Obesity: Yes  PMH of ASCVD: Yes  PMH of CKD: Yes  PMH of CHF: Yes    Medication Reconciliation:  No changes    Drug Interactions  No relevant drug interactions were noted.    Medication System Management  Patient's preferred pharmacy: [facility]  Adherence/Organization: No issues  Affordability/Accessibility: Mounjaro, Lantus, Jardiance, and Humalog   PAP: Yes  Expiration: 4/12/2025    Objective   No Known Allergies  Social History     Social History Narrative    Not on file      Medication Review  Current Outpatient Medications   Medication Instructions    alcohol swabs (Alcohol Pads) pads, medicated Use as directed to check blood sugar up to 3-4 times daily and inject insulin 4 times daily    allopurinol (ZYLOPRIM) 100 mg, oral, Daily    atorvastatin (LIPITOR) 20 mg, oral, Nightly    blood sugar diagnostic (Blood Glucose Test) strip Use as directed to check blood sugar up to 3-4 times daily    blood-glucose meter misc Use as directed to check blood sugar up to 3-4 times daily    blood-glucose sensor (FreeStyle [place] 3 Plus Sensor) device Use as instructed to check blood sugar and change every 15 days    carvedilol (COREG) 6.25 mg, oral, 2 times daily (morning and late afternoon)    cholecalciferol (VITAMIN D-3) 5,000 Units, oral, Daily    clopidogrel (PLAVIX) 75 mg, oral, Daily    famotidine (PEPCID) 40 mg, oral, Daily    folic acid (Folvite) 1 mg tablet Take 5 tablets by mouth once daily.    FreeStyle [place] 3 [place] misc Use as instructed to check blood sugar throughout the day    FreeStyle [place] 3 Sensor device Use as directed to check blood glucose. Change sensor every 14 days.    furosemide (Lasix) 40 mg tablet Take 1 tablet (40 mg) by mouth every other day AND 2 tablets (80 mg) every other day.    HumaLOG KwikPen Insulin 8 Units, subcutaneous, 3 times daily before meals    Jardiance 10 mg, oral, Every morning    Kerendia 10 mg, oral, Daily    lancets (Accu-Chek Softclix  "Lancets) misc Use as directed  to check blood pressure 3 to 4 times a day    Lantus Solostar U-100 Insulin 26 Units, subcutaneous, Nightly    lisinopril 40 mg, oral, Daily before breakfast    MAGNESIUM GLYCINATE ORAL 420 mg, oral, Daily    Mounjaro 5 mg, subcutaneous, Weekly    pen needle, diabetic 32 gauge x 5/32\" needle Use pen needle with insulin pen 4 times daily as directed    sodium bicarbonate 650 mg, oral, 2 times daily      Vitals  BP Readings from Last 2 Encounters:   12/17/24 126/72   10/01/24 130/72     BMI Readings from Last 1 Encounters:   12/17/24 35.83 kg/m²      Labs  A1C  Lab Results   Component Value Date    HGBA1C 6.7 (A) 10/01/2024    HGBA1C 6.7 (H) 06/27/2024    HGBA1C 8.4 (A) 03/27/2024     BMP  Lab Results   Component Value Date    CALCIUM 9.1 12/16/2024     12/16/2024    K 3.5 12/16/2024    CO2 24 12/16/2024     12/16/2024    BUN 49 (H) 12/16/2024    CREATININE 2.56 (H) 12/16/2024    EGFR 27 (L) 12/16/2024     LFTs  Lab Results   Component Value Date    ALT 48 09/11/2024    AST 24 09/11/2024    ALKPHOS 90 09/11/2024    BILITOT 0.4 09/11/2024     FLP  Lab Results   Component Value Date    TRIG 174 (H) 05/22/2024    CHOL 115 05/22/2024    LDLF 60 02/09/2023    LDLCALC 51 05/22/2024    HDL 29.0 05/22/2024     Urine Microalbumin  Lab Results   Component Value Date    MICROALBCREA 269.9 (H) 12/17/2024     Weight Management  Wt Readings from Last 3 Encounters:   12/17/24 110 kg (242 lb 9.6 oz)   10/01/24 110 kg (242 lb 3.2 oz)   09/17/24 110 kg (243 lb 6.4 oz)      There is no height or weight on file to calculate BMI.     Assessment/Plan   Problem List Items Addressed This Visit       Diabetes mellitus (Multi)    CHF (congestive heart failure)    Type 2 diabetes mellitus with insulin therapy (Multi)       Patient's goal A1c is < 7.0%.  Is pt at goal? Yes, most recent A1c on 10/1/24 of 6.7%. Prior A1c was also 6.7% on 6/27/24.     Rationale for plan: Patient's SMBGs are stable and at " goal the majority of the time. Patient endorses one low BG readings since previous visit - seems to be related to insulin injection when not eating. Of note, patient reports trouble with adhesive on sensors. States multiple sensors have fallen off prior to 14 days. Advised patient to contact  for replacement. Also recommended utilizing adhesive covers. At this time, patient reports full tolerability with current medication regimen. Due to prior intolerance to Ozempic, plan for slower titration of Mounjaro. Will continue current dose for an additional month prior to considering another dose increase. Will follow up in four weeks to assess for further dose adjustments.     Medication Changes:  CONTINUE:  Humalog 8 units TID  Lantus 26 units at bedtime   Jardiance 10 mg daily   Mounjaro 5 mg once weekly     Future Considerations:  For BG lowering, consider increase in Jardiance v Mounjaro dose   PMH significant for obesity, CKD, CHF, and high risk for ASCVD     Monitoring and Education:  Counseled patient on Mounjaro MOA, expectations, side effects, duration of therapy, administration, and monitoring parameters.  Provided detailed dosing and administration counseling to ensure proper technique.   Reviewed Mounjaro titration schedule, starting with 2.5 mg once weekly to a goal of 15 mg once weekly if tolerated  Counseled patient on the benefits of GLP-1ra glycemic control and weight loss  Reviewed storage requirements of Mounjaro when not in use, and when to administer the medication if a dose is missed.  Advised patient that they may experience improved satiety after meals and portion sizes of meals may be reduced as doses of Mounjaro increase    Clinical Pharmacist follow-up: 1/28/25 @ 1600, Telehealth visit    Continue all meds under the continuation of care with the referring provider and clinical pharmacy team.    Thank you,  Janiya Freeman, PharmD  Clinical Pharmacist  [phone]    Verbal  consent to manage patient's drug therapy was obtained from the patient. They were informed they may decline to participate or withdraw from participation in pharmacy services at any time.

## 2025-01-06 ENCOUNTER — PHARMACY VISIT (OUTPATIENT)
Dept: PHARMACY | Facility: CLINIC | Age: 65
End: 2025-01-06
Payer: COMMERCIAL

## 2025-01-08 DIAGNOSIS — D64.9 ANEMIA, UNSPECIFIED TYPE: Primary | ICD-10-CM

## 2025-01-08 RX ORDER — FAMOTIDINE 10 MG/ML
20 INJECTION INTRAVENOUS ONCE AS NEEDED
OUTPATIENT
Start: 2025-01-15

## 2025-01-08 RX ORDER — ALBUTEROL SULFATE 0.83 MG/ML
3 SOLUTION RESPIRATORY (INHALATION) AS NEEDED
OUTPATIENT
Start: 2025-01-15

## 2025-01-08 RX ORDER — HEPARIN 100 UNIT/ML
500 SYRINGE INTRAVENOUS AS NEEDED
OUTPATIENT
Start: 2025-01-15

## 2025-01-08 RX ORDER — DIPHENHYDRAMINE HYDROCHLORIDE 50 MG/ML
50 INJECTION INTRAMUSCULAR; INTRAVENOUS AS NEEDED
OUTPATIENT
Start: 2025-01-15

## 2025-01-08 RX ORDER — HEPARIN SODIUM,PORCINE/PF 10 UNIT/ML
50 SYRINGE (ML) INTRAVENOUS AS NEEDED
OUTPATIENT
Start: 2025-01-15

## 2025-01-08 RX ORDER — EPINEPHRINE 0.3 MG/.3ML
0.3 INJECTION SUBCUTANEOUS EVERY 5 MIN PRN
OUTPATIENT
Start: 2025-01-15

## 2025-01-10 ENCOUNTER — APPOINTMENT (OUTPATIENT)
Dept: HEMATOLOGY/ONCOLOGY | Facility: CLINIC | Age: 65
End: 2025-01-10
Payer: MEDICARE

## 2025-01-13 PROCEDURE — RXMED WILLOW AMBULATORY MEDICATION CHARGE

## 2025-01-15 DIAGNOSIS — Z79.4 TYPE 2 DIABETES MELLITUS WITH INSULIN THERAPY (MULTI): Primary | ICD-10-CM

## 2025-01-15 DIAGNOSIS — E11.9 TYPE 2 DIABETES MELLITUS WITH INSULIN THERAPY (MULTI): Primary | ICD-10-CM

## 2025-01-15 PROCEDURE — RXMED WILLOW AMBULATORY MEDICATION CHARGE

## 2025-01-15 RX ORDER — INSULIN GLARGINE-YFGN 100 [IU]/ML
26 INJECTION, SOLUTION SUBCUTANEOUS NIGHTLY
Qty: 15 ML | Refills: 3 | Status: SHIPPED | OUTPATIENT
Start: 2025-01-15

## 2025-01-16 ENCOUNTER — APPOINTMENT (OUTPATIENT)
Dept: PRIMARY CARE | Facility: CLINIC | Age: 65
End: 2025-01-16
Payer: MEDICARE

## 2025-01-16 ENCOUNTER — PHARMACY VISIT (OUTPATIENT)
Dept: PHARMACY | Facility: CLINIC | Age: 65
End: 2025-01-16
Payer: COMMERCIAL

## 2025-01-16 VITALS
OXYGEN SATURATION: 100 % | SYSTOLIC BLOOD PRESSURE: 147 MMHG | HEIGHT: 69 IN | DIASTOLIC BLOOD PRESSURE: 72 MMHG | BODY MASS INDEX: 34.36 KG/M2 | WEIGHT: 232 LBS | TEMPERATURE: 97.7 F | HEART RATE: 67 BPM

## 2025-01-16 DIAGNOSIS — L97.509 NON-PRESSURE CHRONIC ULCER OF OTHER PART OF UNSPECIFIED FOOT WITH UNSPECIFIED SEVERITY (MULTI): ICD-10-CM

## 2025-01-16 DIAGNOSIS — E66.01 CLASS 3 SEVERE OBESITY DUE TO EXCESS CALORIES WITH SERIOUS COMORBIDITY IN ADULT, UNSPECIFIED BMI: ICD-10-CM

## 2025-01-16 DIAGNOSIS — I50.9 HEART FAILURE, UNSPECIFIED HF CHRONICITY, UNSPECIFIED HEART FAILURE TYPE: ICD-10-CM

## 2025-01-16 DIAGNOSIS — Z79.4 TYPE 2 DIABETES MELLITUS WITH INSULIN THERAPY (MULTI): ICD-10-CM

## 2025-01-16 DIAGNOSIS — E11.9 TYPE 2 DIABETES MELLITUS WITH INSULIN THERAPY (MULTI): ICD-10-CM

## 2025-01-16 DIAGNOSIS — Z00.00 MEDICARE ANNUAL WELLNESS VISIT, SUBSEQUENT: Primary | ICD-10-CM

## 2025-01-16 DIAGNOSIS — M86.471 CHRONIC OSTEOMYELITIS WITH DRAINING SINUS, RIGHT ANKLE AND FOOT (MULTI): ICD-10-CM

## 2025-01-16 DIAGNOSIS — M35.00 SJOGREN'S SYNDROME, WITH UNSPECIFIED ORGAN INVOLVEMENT (MULTI): ICD-10-CM

## 2025-01-16 DIAGNOSIS — N17.9 ACUTE KIDNEY INJURY SUPERIMPOSED ON CKD (CMS-HCC): ICD-10-CM

## 2025-01-16 DIAGNOSIS — I70.223 ATHEROSCLEROSIS OF NATIVE ARTERIES OF EXTREMITIES WITH REST PAIN, BILATERAL LEGS (MULTI): ICD-10-CM

## 2025-01-16 DIAGNOSIS — L97.424 NON-PRESSURE CHRONIC ULCER OF LEFT HEEL AND MIDFOOT WITH NECROSIS OF BONE (MULTI): ICD-10-CM

## 2025-01-16 DIAGNOSIS — N18.9 ACUTE KIDNEY INJURY SUPERIMPOSED ON CKD (CMS-HCC): ICD-10-CM

## 2025-01-16 DIAGNOSIS — E11.21 TYPE 2 DIABETES MELLITUS WITH DIABETIC NEPHROPATHY, WITHOUT LONG-TERM CURRENT USE OF INSULIN: ICD-10-CM

## 2025-01-16 DIAGNOSIS — E66.813 CLASS 3 SEVERE OBESITY DUE TO EXCESS CALORIES WITH SERIOUS COMORBIDITY IN ADULT, UNSPECIFIED BMI: ICD-10-CM

## 2025-01-16 DIAGNOSIS — N18.4 CHRONIC KIDNEY DISEASE, STAGE 4 (SEVERE) (MULTI): ICD-10-CM

## 2025-01-17 ENCOUNTER — APPOINTMENT (OUTPATIENT)
Dept: HEMATOLOGY/ONCOLOGY | Facility: CLINIC | Age: 65
End: 2025-01-17
Payer: MEDICARE

## 2025-01-22 DIAGNOSIS — E55.9 VITAMIN D DEFICIENCY: ICD-10-CM

## 2025-01-22 DIAGNOSIS — E08.00 DIABETES MELLITUS DUE TO UNDERLYING CONDITION WITH HYPEROSMOLARITY WITHOUT COMA, WITHOUT LONG-TERM CURRENT USE OF INSULIN: ICD-10-CM

## 2025-01-22 DIAGNOSIS — E87.6 HYPOKALEMIA: ICD-10-CM

## 2025-01-22 DIAGNOSIS — E66.813 CLASS 3 SEVERE OBESITY DUE TO EXCESS CALORIES WITH SERIOUS COMORBIDITY IN ADULT, UNSPECIFIED BMI: ICD-10-CM

## 2025-01-22 DIAGNOSIS — E66.01 CLASS 3 SEVERE OBESITY DUE TO EXCESS CALORIES WITH SERIOUS COMORBIDITY IN ADULT, UNSPECIFIED BMI: ICD-10-CM

## 2025-01-22 DIAGNOSIS — I73.9 PAD (PERIPHERAL ARTERY DISEASE) (CMS-HCC): ICD-10-CM

## 2025-01-22 DIAGNOSIS — E78.00 PURE HYPERCHOLESTEROLEMIA: ICD-10-CM

## 2025-01-22 DIAGNOSIS — N04.9 NEPHROTIC SYNDROME: ICD-10-CM

## 2025-01-22 DIAGNOSIS — I10 BENIGN ESSENTIAL HYPERTENSION: ICD-10-CM

## 2025-01-22 DIAGNOSIS — I50.20 SYSTOLIC CONGESTIVE HEART FAILURE, UNSPECIFIED HF CHRONICITY: ICD-10-CM

## 2025-01-22 DIAGNOSIS — N18.32 STAGE 3B CHRONIC KIDNEY DISEASE (MULTI): ICD-10-CM

## 2025-01-22 PROCEDURE — RXMED WILLOW AMBULATORY MEDICATION CHARGE

## 2025-01-24 ENCOUNTER — PHARMACY VISIT (OUTPATIENT)
Dept: PHARMACY | Facility: CLINIC | Age: 65
End: 2025-01-24
Payer: COMMERCIAL

## 2025-01-24 ENCOUNTER — INFUSION (OUTPATIENT)
Dept: HEMATOLOGY/ONCOLOGY | Facility: CLINIC | Age: 65
End: 2025-01-24
Payer: MEDICARE

## 2025-01-24 VITALS
TEMPERATURE: 97.9 F | RESPIRATION RATE: 18 BRPM | OXYGEN SATURATION: 98 % | WEIGHT: 241.4 LBS | HEIGHT: 69 IN | BODY MASS INDEX: 35.76 KG/M2 | SYSTOLIC BLOOD PRESSURE: 154 MMHG | DIASTOLIC BLOOD PRESSURE: 92 MMHG | HEART RATE: 67 BPM

## 2025-01-24 DIAGNOSIS — D64.9 ANEMIA, UNSPECIFIED TYPE: ICD-10-CM

## 2025-01-24 LAB
ERYTHROCYTE [DISTWIDTH] IN BLOOD BY AUTOMATED COUNT: 14.4 % (ref 11.5–14.5)
HCT VFR BLD AUTO: 32.5 % (ref 41–52)
HGB BLD-MCNC: 10.5 G/DL (ref 13.5–17.5)
MCH RBC QN AUTO: 30.2 PG (ref 26–34)
MCHC RBC AUTO-ENTMCNC: 32.3 G/DL (ref 32–36)
MCV RBC AUTO: 93 FL (ref 80–100)
PLATELET # BLD AUTO: 204 X10*3/UL (ref 150–450)
RBC # BLD AUTO: 3.48 X10*6/UL (ref 4.5–5.9)
WBC # BLD AUTO: 7.2 X10*3/UL (ref 4.4–11.3)

## 2025-01-24 PROCEDURE — 36415 COLL VENOUS BLD VENIPUNCTURE: CPT

## 2025-01-24 PROCEDURE — 85027 COMPLETE CBC AUTOMATED: CPT

## 2025-01-24 PROCEDURE — 6350000001 HC RX 635 EPOETIN >10,000 UNITS: Mod: JZ,TB,EC | Performed by: INTERNAL MEDICINE

## 2025-01-24 PROCEDURE — 96372 THER/PROPH/DIAG INJ SC/IM: CPT

## 2025-01-24 RX ORDER — FAMOTIDINE 10 MG/ML
20 INJECTION INTRAVENOUS ONCE AS NEEDED
OUTPATIENT
Start: 2025-01-26

## 2025-01-24 RX ORDER — DIPHENHYDRAMINE HYDROCHLORIDE 50 MG/ML
50 INJECTION INTRAMUSCULAR; INTRAVENOUS AS NEEDED
Status: DISCONTINUED | OUTPATIENT
Start: 2025-01-24 | End: 2025-01-24 | Stop reason: HOSPADM

## 2025-01-24 RX ORDER — ALBUTEROL SULFATE 0.83 MG/ML
3 SOLUTION RESPIRATORY (INHALATION) AS NEEDED
OUTPATIENT
Start: 2025-01-26

## 2025-01-24 RX ORDER — EPINEPHRINE 0.3 MG/.3ML
0.3 INJECTION SUBCUTANEOUS EVERY 5 MIN PRN
Status: DISCONTINUED | OUTPATIENT
Start: 2025-01-24 | End: 2025-01-24 | Stop reason: HOSPADM

## 2025-01-24 RX ORDER — DIPHENHYDRAMINE HYDROCHLORIDE 50 MG/ML
50 INJECTION INTRAMUSCULAR; INTRAVENOUS AS NEEDED
OUTPATIENT
Start: 2025-01-26

## 2025-01-24 RX ORDER — ALBUTEROL SULFATE 0.83 MG/ML
3 SOLUTION RESPIRATORY (INHALATION) AS NEEDED
Status: DISCONTINUED | OUTPATIENT
Start: 2025-01-24 | End: 2025-01-24 | Stop reason: HOSPADM

## 2025-01-24 RX ORDER — FAMOTIDINE 10 MG/ML
20 INJECTION INTRAVENOUS ONCE AS NEEDED
Status: DISCONTINUED | OUTPATIENT
Start: 2025-01-24 | End: 2025-01-24 | Stop reason: HOSPADM

## 2025-01-24 RX ORDER — EPINEPHRINE 0.3 MG/.3ML
0.3 INJECTION SUBCUTANEOUS EVERY 5 MIN PRN
OUTPATIENT
Start: 2025-01-26

## 2025-01-24 RX ADMIN — EPOETIN ALFA-EPBX 10000 UNITS: 10000 INJECTION, SOLUTION INTRAVENOUS; SUBCUTANEOUS at 12:15

## 2025-01-24 ASSESSMENT — PAIN SCALES - GENERAL: PAINLEVEL_OUTOF10: 0-NO PAIN

## 2025-01-24 NOTE — PROGRESS NOTES
Patient received his Retacrit injection today. Patient's schedule was reviewed with him and he verbalized when to return for next infusion.  
Lo Thompson), Internal Medicine  [address] [place] [address]  Phone: [phone]  Fax: [phone]

## 2025-01-28 ENCOUNTER — APPOINTMENT (OUTPATIENT)
Dept: PHARMACY | Facility: HOSPITAL | Age: 65
End: 2025-01-28
Payer: MEDICARE

## 2025-01-28 DIAGNOSIS — E11.9 TYPE 2 DIABETES MELLITUS WITH INSULIN THERAPY (MULTI): ICD-10-CM

## 2025-01-28 DIAGNOSIS — E11.22 TYPE 2 DIABETES MELLITUS WITH STAGE 4 CHRONIC KIDNEY DISEASE, WITH LONG-TERM CURRENT USE OF INSULIN (MULTI): ICD-10-CM

## 2025-01-28 DIAGNOSIS — I50.20 SYSTOLIC CONGESTIVE HEART FAILURE, UNSPECIFIED HF CHRONICITY: ICD-10-CM

## 2025-01-28 DIAGNOSIS — N18.4 TYPE 2 DIABETES MELLITUS WITH STAGE 4 CHRONIC KIDNEY DISEASE, WITH LONG-TERM CURRENT USE OF INSULIN (MULTI): ICD-10-CM

## 2025-01-28 DIAGNOSIS — Z79.4 TYPE 2 DIABETES MELLITUS WITH STAGE 4 CHRONIC KIDNEY DISEASE, WITH LONG-TERM CURRENT USE OF INSULIN (MULTI): ICD-10-CM

## 2025-01-28 DIAGNOSIS — Z79.4 TYPE 2 DIABETES MELLITUS WITH INSULIN THERAPY (MULTI): ICD-10-CM

## 2025-01-28 NOTE — PROGRESS NOTES
Clinical Pharmacy Appointment    Patient ID: Gregorio North Jr. is a 64 y.o. male who presents for Follow Up appointment.     Referring Provider: Latisha Vines PA-C  PCP: Latisha Vines PA-C   Last visit with PCP: 10/1/24   Next visit with PCP: 1/16/25    Subjective     DIABETES MELLITUS TYPE II:    Diagnosed with diabetes: 2015. Known diabetic complications: CKD and retinopathy.  Does patient follow with Endocrinology: No  Last optometry exam: 10/2024 - may have eye surgery in January for macular degeneration.      Current diabetic medications include:  Humalog 8 units TID  Lantus 26 units at bedtime   Jardiance 10 mg daily   Mounjaro 5 mg once weekly on Saturdays    Adverse Effects: Some loose stools    Past diabetic medications include:  Bydureon: Formulary change to Ozempic   Farxiga: Formulary change to Jardiance   Ozempic: nausea; switched to Mounjaro    Glucose Readings:  Glucometer/CGM Type: Freestyle [place] 3   Sensor fell off/sensor error - to call  for replacement   Has not tried adhesive.     Current home BG readings: 100-120 most of the time, throughout the day stable around 130-140     Any episodes of hypoglycemia? No, none reported and no symptoms . Did patient treat episode of hypoglycemia appropriately? Yes, ate a piece of candy and a snack    Lifestyle:  Diet: 1 big meal/day with several smaller meals. Appetite improved - eating a little bit more. Lost about 5 pounds over the past 10 days. 234 pounds this morning. Drinking glucerna once per day.   BK: (11 am) eggs, 2 sausage links, and hash brown potatoes   LN: skips   DN: (5 pm) home made tacos, chinese  Snacks: yogurt, gram crackers, apple sauce, cottage cheese  Drinks: water, milk, lemonade (low sugar), coffee with cream   Physical Activity: Feeling really weak about a week ago, lasted 2-3 days.     Secondary Prevention:  Statin? Yes - Atorvastatin 20 mg daily   ACE-I/ARB? Yes - Lisinopril 40 mg daily   Aspirin? No      Pertinent PMH Review:  PMH of Pancreatitis: No  PMH of Retinopathy: Yes  PMH of Urinary Tract Infections: No  PMH of MTC: No  PMH of Obesity: Yes  PMH of ASCVD: Yes  PMH of CKD: Yes  PMH of CHF: Yes    Medication Reconciliation:  No changes    Drug Interactions  No relevant drug interactions were noted.    Medication System Management  Patient's preferred pharmacy: [facility]  Adherence/Organization: No issues  Affordability/Accessibility: Mounjaro, Lantus, Jardiance, and Humalog   PAP: Yes  Expiration: 4/12/2025    Objective   No Known Allergies  Social History     Social History Narrative    Not on file      Medication Review  Current Outpatient Medications   Medication Instructions    alcohol swabs (Alcohol Pads) pads, medicated Use as directed to check blood sugar up to 3-4 times daily and inject insulin 4 times daily    allopurinol (ZYLOPRIM) 100 mg, oral, Daily    atorvastatin (LIPITOR) 20 mg, oral, Nightly    blood sugar diagnostic (Blood Glucose Test) strip Use as directed to check blood sugar up to 3-4 times daily    blood-glucose meter misc Use as directed to check blood sugar up to 3-4 times daily    blood-glucose sensor (FreeStyle [place] 3 Plus Sensor) device Use as instructed to check blood sugar and change every 15 days    carvedilol (COREG) 6.25 mg, oral, 2 times daily (morning and late afternoon)    cholecalciferol (VITAMIN D-3) 5,000 Units, Daily    clopidogrel (PLAVIX) 75 mg, oral, Daily    famotidine (PEPCID) 40 mg, oral, Daily    folic acid (Folvite) 1 mg tablet Take 5 tablets by mouth once daily.    FreeStyle [place] 3 [place] misc Use as instructed to check blood sugar throughout the day    FreeStyle [place] 3 Sensor device Use as directed to check blood glucose. Change sensor every 14 days.    furosemide (Lasix) 40 mg tablet Take 1 tablet (40 mg) by mouth every other day AND 2 tablets (80 mg) every other day.    HumaLOG KwikPen Insulin 8 Units, subcutaneous, 3 times daily before meals    insulin  "glargine-yfgn (SEMGLEE(INSULIN GLARG-YFGN)PEN) 26 Units, subcutaneous, Nightly    Jardiance 10 mg, oral, Every morning    Kerendia 10 mg, oral, Daily    lancets (Accu-Chek Softclix Lancets) misc Use as directed  to check blood pressure 3 to 4 times a day    lisinopril 40 mg, oral, Daily before breakfast    MAGNESIUM GLYCINATE ORAL 420 mg, Daily    Mounjaro 5 mg, subcutaneous, Weekly    pen needle, diabetic 32 gauge x 5/32\" needle Use pen needle with insulin pen 4 times daily as directed    sodium bicarbonate 650 mg, oral, 2 times daily      Vitals  BP Readings from Last 2 Encounters:   01/24/25 (!) 154/92   01/16/25 147/72     BMI Readings from Last 1 Encounters:   01/24/25 35.80 kg/m²      Labs  A1C  Lab Results   Component Value Date    HGBA1C 6.7 (A) 10/01/2024    HGBA1C 6.7 (H) 06/27/2024    HGBA1C 8.4 (A) 03/27/2024     BMP  Lab Results   Component Value Date    CALCIUM 9.1 12/16/2024     12/16/2024    K 3.5 12/16/2024    CO2 24 12/16/2024     12/16/2024    BUN 49 (H) 12/16/2024    CREATININE 2.56 (H) 12/16/2024    EGFR 27 (L) 12/16/2024     LFTs  Lab Results   Component Value Date    ALT 48 09/11/2024    AST 24 09/11/2024    ALKPHOS 90 09/11/2024    BILITOT 0.4 09/11/2024     FLP  Lab Results   Component Value Date    TRIG 174 (H) 05/22/2024    CHOL 115 05/22/2024    LDLF 60 02/09/2023    LDLCALC 51 05/22/2024    HDL 29.0 05/22/2024     Urine Microalbumin  Lab Results   Component Value Date    MICROALBCREA 269.9 (H) 12/17/2024     Weight Management  Wt Readings from Last 3 Encounters:   01/24/25 110 kg (241 lb 6.5 oz)   01/16/25 105 kg (232 lb)   12/17/24 110 kg (242 lb 9.6 oz)      There is no height or weight on file to calculate BMI.     Assessment/Plan   Problem List Items Addressed This Visit       Diabetes mellitus (Multi)    CHF (congestive heart failure)    Type 2 diabetes mellitus with insulin therapy (Multi)       Patient's goal A1c is < 7.0%.  Is pt at goal? Yes, most recent A1c on " 10/1/24 of 6.7%. Prior A1c was also 6.7% on 6/27/24.     Rationale for plan: Patient's SMBGs are stable and at goal the majority of the time. Patient denies any low BG readings since previous visit. Of note, patient reports trouble with adhesive on sensors. States multiple sensors have fallen off prior to 14 days. Advised patient to contact  for replacement. Also recommended utilizing adhesive covers or adhesive barrier with sensors. At this time, patient reports full tolerability with current medication regimen. Patient reports that he is happy with BG readings and has no side effects. Previously discussed increasing Mounjaro to lower insulin needs. Patient reports that he would like to stay on current regimen for a while before considering Mounjaro dose increase again. Will continue current dose for an additional month and follow up in four weeks to assess for further dose adjustments.     Medication Changes:  CONTINUE:  Humalog 8 units TID  Lantus 26 units at bedtime   Jardiance 10 mg daily   Mounjaro 5 mg once weekly     Future Considerations:  For BG lowering, consider increase in Jardiance v Mounjaro dose   PMH significant for obesity, CKD, CHF, and high risk for ASCVD     Monitoring and Education:  Counseled patient on Mounjaro MOA, expectations, side effects, duration of therapy, administration, and monitoring parameters.  Provided detailed dosing and administration counseling to ensure proper technique.   Reviewed Mounjaro titration schedule, starting with 2.5 mg once weekly to a goal of 15 mg once weekly if tolerated  Counseled patient on the benefits of GLP-1ra glycemic control and weight loss  Reviewed storage requirements of Mounjaro when not in use, and when to administer the medication if a dose is missed.  Advised patient that they may experience improved satiety after meals and portion sizes of meals may be reduced as doses of Mounjaro increase    Clinical Pharmacist follow-up: 2/25/25 @  1600, Telehealth visit    Continue all meds under the continuation of care with the referring provider and clinical pharmacy team.    Thank you,  Janiya Freeman, PharmD  Clinical Pharmacist  [phone]    Verbal consent to manage patient's drug therapy was obtained from the patient. They were informed they may decline to participate or withdraw from participation in pharmacy services at any time.

## 2025-01-31 ENCOUNTER — APPOINTMENT (OUTPATIENT)
Dept: HEMATOLOGY/ONCOLOGY | Facility: CLINIC | Age: 65
End: 2025-01-31
Payer: MEDICARE

## 2025-02-06 PROCEDURE — RXMED WILLOW AMBULATORY MEDICATION CHARGE

## 2025-02-07 ENCOUNTER — PHARMACY VISIT (OUTPATIENT)
Dept: PHARMACY | Facility: CLINIC | Age: 65
End: 2025-02-07
Payer: COMMERCIAL

## 2025-02-07 ENCOUNTER — INFUSION (OUTPATIENT)
Dept: HEMATOLOGY/ONCOLOGY | Facility: CLINIC | Age: 65
End: 2025-02-07
Payer: MEDICARE

## 2025-02-07 VITALS
OXYGEN SATURATION: 96 % | BODY MASS INDEX: 35.91 KG/M2 | SYSTOLIC BLOOD PRESSURE: 154 MMHG | TEMPERATURE: 97.2 F | HEART RATE: 64 BPM | WEIGHT: 242.18 LBS | DIASTOLIC BLOOD PRESSURE: 82 MMHG | RESPIRATION RATE: 17 BRPM

## 2025-02-07 DIAGNOSIS — D64.9 ANEMIA, UNSPECIFIED TYPE: ICD-10-CM

## 2025-02-07 LAB
HCT VFR BLD AUTO: 34.7 % (ref 41–52)
HGB BLD-MCNC: 10.8 G/DL (ref 13.5–17.5)

## 2025-02-07 PROCEDURE — 96372 THER/PROPH/DIAG INJ SC/IM: CPT

## 2025-02-07 PROCEDURE — 6350000001 HC RX 635 EPOETIN >10,000 UNITS: Mod: JZ,TB,EC | Performed by: INTERNAL MEDICINE

## 2025-02-07 PROCEDURE — 36415 COLL VENOUS BLD VENIPUNCTURE: CPT | Performed by: INTERNAL MEDICINE

## 2025-02-07 PROCEDURE — 85018 HEMOGLOBIN: CPT | Performed by: INTERNAL MEDICINE

## 2025-02-07 RX ORDER — FAMOTIDINE 10 MG/ML
20 INJECTION INTRAVENOUS ONCE AS NEEDED
OUTPATIENT
Start: 2025-02-21

## 2025-02-07 RX ORDER — DIPHENHYDRAMINE HYDROCHLORIDE 50 MG/ML
50 INJECTION INTRAMUSCULAR; INTRAVENOUS AS NEEDED
OUTPATIENT
Start: 2025-02-21

## 2025-02-07 RX ORDER — ALBUTEROL SULFATE 0.83 MG/ML
3 SOLUTION RESPIRATORY (INHALATION) AS NEEDED
OUTPATIENT
Start: 2025-02-21

## 2025-02-07 RX ORDER — EPINEPHRINE 0.3 MG/.3ML
0.3 INJECTION SUBCUTANEOUS EVERY 5 MIN PRN
OUTPATIENT
Start: 2025-02-21

## 2025-02-07 RX ADMIN — EPOETIN ALFA-EPBX 10000 UNITS: 10000 INJECTION, SOLUTION INTRAVENOUS; SUBCUTANEOUS at 13:41

## 2025-02-07 ASSESSMENT — PAIN SCALES - GENERAL: PAINLEVEL_OUTOF10: 3

## 2025-02-07 NOTE — PROGRESS NOTES
Patient presented with high BP today and Hgb 10.8. Described severe right rib pain following this last injection and states he has had several incidences with bone pain following Retacrit admininstration.  BP dropped to 154/82 with relaxation, confirmed with Dr. Kaplan to proceed with Retacrit, and instructed patient per her directive that he follow up with his PCP. Verbalized understanding and has all appointments.

## 2025-02-14 ENCOUNTER — APPOINTMENT (OUTPATIENT)
Dept: HEMATOLOGY/ONCOLOGY | Facility: CLINIC | Age: 65
End: 2025-02-14
Payer: MEDICARE

## 2025-02-17 DIAGNOSIS — E87.6 HYPOKALEMIA: Primary | ICD-10-CM

## 2025-02-21 ENCOUNTER — INFUSION (OUTPATIENT)
Dept: HEMATOLOGY/ONCOLOGY | Facility: CLINIC | Age: 65
End: 2025-02-21
Payer: MEDICARE

## 2025-02-21 DIAGNOSIS — D64.9 ANEMIA, UNSPECIFIED TYPE: ICD-10-CM

## 2025-02-21 LAB
HCT VFR BLD AUTO: 34.9 % (ref 41–52)
HGB BLD-MCNC: 11.4 G/DL (ref 13.5–17.5)
POTASSIUM SERPL-SCNC: 4 MMOL/L (ref 3.5–5.3)

## 2025-02-21 PROCEDURE — 85014 HEMATOCRIT: CPT | Performed by: INTERNAL MEDICINE

## 2025-02-21 PROCEDURE — 36415 COLL VENOUS BLD VENIPUNCTURE: CPT

## 2025-02-21 PROCEDURE — 36415 COLL VENOUS BLD VENIPUNCTURE: CPT | Performed by: INTERNAL MEDICINE

## 2025-02-21 RX ORDER — DIPHENHYDRAMINE HYDROCHLORIDE 50 MG/ML
50 INJECTION INTRAMUSCULAR; INTRAVENOUS AS NEEDED
OUTPATIENT
Start: 2025-03-07

## 2025-02-21 RX ORDER — EPINEPHRINE 0.3 MG/.3ML
0.3 INJECTION SUBCUTANEOUS EVERY 5 MIN PRN
OUTPATIENT
Start: 2025-03-07

## 2025-02-21 RX ORDER — ALBUTEROL SULFATE 0.83 MG/ML
3 SOLUTION RESPIRATORY (INHALATION) AS NEEDED
OUTPATIENT
Start: 2025-03-07

## 2025-02-21 RX ORDER — FAMOTIDINE 10 MG/ML
20 INJECTION, SOLUTION INTRAVENOUS ONCE AS NEEDED
OUTPATIENT
Start: 2025-03-07

## 2025-02-25 ENCOUNTER — APPOINTMENT (OUTPATIENT)
Dept: PHARMACY | Facility: HOSPITAL | Age: 65
End: 2025-02-25
Payer: MEDICARE

## 2025-02-25 DIAGNOSIS — N18.4 TYPE 2 DIABETES MELLITUS WITH STAGE 4 CHRONIC KIDNEY DISEASE, WITH LONG-TERM CURRENT USE OF INSULIN (MULTI): ICD-10-CM

## 2025-02-25 DIAGNOSIS — Z79.4 TYPE 2 DIABETES MELLITUS WITH INSULIN THERAPY (MULTI): ICD-10-CM

## 2025-02-25 DIAGNOSIS — Z79.4 TYPE 2 DIABETES MELLITUS WITH STAGE 4 CHRONIC KIDNEY DISEASE, WITH LONG-TERM CURRENT USE OF INSULIN (MULTI): ICD-10-CM

## 2025-02-25 DIAGNOSIS — I50.20 SYSTOLIC CONGESTIVE HEART FAILURE, UNSPECIFIED HF CHRONICITY: ICD-10-CM

## 2025-02-25 DIAGNOSIS — E11.9 TYPE 2 DIABETES MELLITUS WITH INSULIN THERAPY (MULTI): ICD-10-CM

## 2025-02-25 DIAGNOSIS — E11.22 TYPE 2 DIABETES MELLITUS WITH STAGE 4 CHRONIC KIDNEY DISEASE, WITH LONG-TERM CURRENT USE OF INSULIN (MULTI): ICD-10-CM

## 2025-02-25 PROCEDURE — RXMED WILLOW AMBULATORY MEDICATION CHARGE

## 2025-02-25 RX ORDER — TIRZEPATIDE 7.5 MG/.5ML
7.5 INJECTION, SOLUTION SUBCUTANEOUS WEEKLY
Qty: 2 ML | Refills: 11 | Status: SHIPPED | OUTPATIENT
Start: 2025-02-25

## 2025-02-25 RX ORDER — BLOOD-GLUCOSE SENSOR
EACH MISCELLANEOUS
Qty: 6 EACH | Refills: 3 | Status: SHIPPED | OUTPATIENT
Start: 2025-02-25 | End: 2025-02-27 | Stop reason: SDUPTHER

## 2025-02-25 NOTE — PROGRESS NOTES
Clinical Pharmacy Appointment    Patient ID: Gregorio North Jr. is a 64 y.o. male who presents for Follow Up appointment.     Referring Provider: Latisha Vines PA-C  PCP: Latisha Vines PA-C   Last visit with PCP: 10/1/24   Next visit with PCP: 1/16/25    Subjective     DIABETES MELLITUS TYPE II:    Diagnosed with diabetes: 2015.   Known diabetic complications: CKD and retinopathy.  Does patient follow with Endocrinology: No  Last optometry exam: 10/2024 - may have eye surgery in January for macular degeneration.      Current diabetic medications include:  Humalog 8 units TID  Semglee 26 units at bedtime   Jardiance 10 mg daily   Mounjaro 5 mg once weekly on Saturdays    Adverse Effects: None     Past diabetic medications include:  Bydureon: Formulary change to Ozempic   Farxiga: Formulary change to Jardiance   Ozempic: nausea; switched to Mounjaro    Glucose Readings:  Glucometer/CGM Type: Freestyle [place] 3   Sensor fell off/sensor error - to call  for replacement   Adhesive helped     Current home BG readings: 100-120 most of the time, throughout the day stable around 130-140.    Any episodes of hypoglycemia? No, none reported and no symptoms . Did patient treat episode of hypoglycemia appropriately? Yes, ate a piece of candy and a snack    Lifestyle:  Diet: 1 big meal/day with several smaller meals. Appetite improved - eating a little bit more. 232 pounds this morning - down about 10 pounds overall. Drinking glucerna once per day.   BK: (11 am) eggs, 2 sausage links, and hash brown potatoes   LN: skips   DN: (5 pm) home made tacos, chinese  Snacks: yogurt, gram crackers, apple sauce, cottage cheese  Drinks: water, milk, lemonade (low sugar), coffee with cream   Physical Activity: Feeling really weak about a week ago, lasted 2-3 days.     Secondary Prevention:  Statin? Yes - Atorvastatin 20 mg daily   ACE-I/ARB? Yes - Lisinopril 40 mg daily   Aspirin? No     Pertinent PMH Review:  PMH of  Pancreatitis: No  PMH of Retinopathy: Yes  PMH of Urinary Tract Infections: No  PMH of MTC: No  PMH of Obesity: Yes  PMH of ASCVD: Yes  PMH of CKD: Yes  PMH of CHF: Yes    Medication Reconciliation:  No changes    Drug Interactions  No relevant drug interactions were noted.    Medication System Management  Patient's preferred pharmacy: [facility]  Adherence/Organization: No issues  Affordability/Accessibility: Mounjaro, Lantus, Jardiance, and Humalog   PAP: Yes  Expiration: 4/12/2025    Objective   No Known Allergies  Social History     Social History Narrative    Not on file      Medication Review  Current Outpatient Medications   Medication Instructions    alcohol swabs (Alcohol Pads) pads, medicated Use as directed to check blood sugar up to 3-4 times daily and inject insulin 4 times daily    allopurinol (ZYLOPRIM) 100 mg, oral, Daily    atorvastatin (LIPITOR) 20 mg, oral, Nightly    blood sugar diagnostic (Blood Glucose Test) strip Use as directed to check blood sugar up to 3-4 times daily    blood-glucose meter misc Use as directed to check blood sugar up to 3-4 times daily    blood-glucose sensor (FreeStyle [place] 3 Plus Sensor) device Use as instructed to check blood sugar and change every 15 days    carvedilol (COREG) 6.25 mg, oral, 2 times daily (morning and late afternoon)    cholecalciferol (VITAMIN D-3) 5,000 Units, Daily    clopidogrel (PLAVIX) 75 mg, oral, Daily    famotidine (PEPCID) 40 mg, oral, Daily    folic acid (Folvite) 1 mg tablet Take 5 tablets by mouth once daily.    FreeStyle [place] 3 [place] misc Use as instructed to check blood sugar throughout the day    FreeStyle [place] 3 Sensor device Use as directed to check blood glucose. Change sensor every 14 days.    furosemide (Lasix) 40 mg tablet Take 1 tablet (40 mg) by mouth every other day AND 2 tablets (80 mg) every other day.    HumaLOG KwikPen Insulin 8 Units, subcutaneous, 3 times daily before meals    insulin glargine-yfgn (SEMGLEE(INSULIN  "GLARG-YFGN)PEN) 26 Units, subcutaneous, Nightly    Jardiance 10 mg, oral, Every morning    Kerendia 10 mg, oral, Daily    lancets (Accu-Chek Softclix Lancets) misc Use as directed  to check blood pressure 3 to 4 times a day    lisinopril 40 mg, oral, Daily before breakfast    MAGNESIUM GLYCINATE ORAL 420 mg, Daily    Mounjaro 5 mg, subcutaneous, Weekly    pen needle, diabetic 32 gauge x 5/32\" needle Use pen needle with insulin pen 4 times daily as directed    sodium bicarbonate 650 mg, oral, 2 times daily      Vitals  BP Readings from Last 2 Encounters:   02/07/25 154/82   01/24/25 (!) 154/92     BMI Readings from Last 1 Encounters:   02/07/25 35.91 kg/m²      Labs  A1C  Lab Results   Component Value Date    HGBA1C 6.7 (A) 10/01/2024    HGBA1C 6.7 (H) 06/27/2024    HGBA1C 8.4 (A) 03/27/2024     BMP  Lab Results   Component Value Date    CALCIUM 9.1 12/16/2024     12/16/2024    K 4.0 02/21/2025    CO2 24 12/16/2024     12/16/2024    BUN 49 (H) 12/16/2024    CREATININE 2.56 (H) 12/16/2024    EGFR 27 (L) 12/16/2024     LFTs  Lab Results   Component Value Date    ALT 48 09/11/2024    AST 24 09/11/2024    ALKPHOS 90 09/11/2024    BILITOT 0.4 09/11/2024     FLP  Lab Results   Component Value Date    TRIG 174 (H) 05/22/2024    CHOL 115 05/22/2024    LDLF 60 02/09/2023    LDLCALC 51 05/22/2024    HDL 29.0 05/22/2024     Urine Microalbumin  Lab Results   Component Value Date    MICROALBCREA 269.9 (H) 12/17/2024     Weight Management  Wt Readings from Last 3 Encounters:   02/07/25 110 kg (242 lb 2.8 oz)   01/24/25 110 kg (241 lb 6.5 oz)   01/16/25 105 kg (232 lb)      There is no height or weight on file to calculate BMI.     Assessment/Plan     Patient's goal A1c is < 7.0%.  Is pt at goal? Yes, most recent A1c on 10/1/24 of 6.7%. Prior A1c was also 6.7% on 6/27/24.     Rationale for plan: Patient's SMBGs are stable and at goal the majority of the time. Patient denies any low BG readings since previous visit. " Patient reports that adhesive barrier with sensors has helped keep them on longer. Reports that he is out of refills, insurance is requiring sensors through Medicare part B - new prescription sent to DME supplier. At this time, patient reports full tolerability with current medication regimen. Patient reports that he is happy with BG readings and has no side effects. Previously discussed increasing Mounjaro to lower insulin needs. Patient reports that he would like to try higher Mounjaro dose at this time. Prescription sent for Mounjaro 7.5 mg once weekly. Plan for follow up in 2 weeks to assess tolerability and process renewal for  PAP.     Medication Changes:  CONTINUE:  Humalog 8 units TID  Lantus 26 units at bedtime   Jardiance 10 mg daily   INCREASE:   Mounjaro 7.5 mg once weekly on Tuesdays    Future Considerations:  For BG lowering, consider increase in Jardiance v Mounjaro dose   PMH significant for obesity, CKD, CHF, and high risk for ASCVD     Monitoring and Education:  Counseled patient on Mounjaro MOA, expectations, side effects, duration of therapy, administration, and monitoring parameters.  Provided detailed dosing and administration counseling to ensure proper technique.   Reviewed Mounjaro titration schedule, starting with 2.5 mg once weekly to a goal of 15 mg once weekly if tolerated  Counseled patient on the benefits of GLP-1ra glycemic control and weight loss  Reviewed storage requirements of Mounjaro when not in use, and when to administer the medication if a dose is missed.  Advised patient that they may experience improved satiety after meals and portion sizes of meals may be reduced as doses of Mounjaro increase    Clinical Pharmacist follow-up: 3/12/25 @ 1520, Telehealth visit    Continue all meds under the continuation of care with the referring provider and clinical pharmacy team.    Thank you,  Janiya Freeman, PharmD  Clinical Pharmacist  [phone]    Verbal consent to manage  patient's drug therapy was obtained from the patient. They were informed they may decline to participate or withdraw from participation in pharmacy services at any time.

## 2025-02-26 ENCOUNTER — TELEPHONE (OUTPATIENT)
Dept: NEPHROLOGY | Facility: CLINIC | Age: 65
End: 2025-02-26
Payer: MEDICARE

## 2025-02-26 NOTE — PROGRESS NOTES
Pt. Called. Says he had retacrit yesterday and is having extreme back pain from it and would like advice.  -MO

## 2025-02-27 ENCOUNTER — PHARMACY VISIT (OUTPATIENT)
Dept: PHARMACY | Facility: CLINIC | Age: 65
End: 2025-02-27
Payer: COMMERCIAL

## 2025-02-27 RX ORDER — BLOOD-GLUCOSE SENSOR
EACH MISCELLANEOUS
Qty: 6 EACH | Refills: 3 | Status: SHIPPED | OUTPATIENT
Start: 2025-02-27

## 2025-02-28 ENCOUNTER — APPOINTMENT (OUTPATIENT)
Dept: HEMATOLOGY/ONCOLOGY | Facility: CLINIC | Age: 65
End: 2025-02-28
Payer: MEDICARE

## 2025-03-07 ENCOUNTER — INFUSION (OUTPATIENT)
Dept: HEMATOLOGY/ONCOLOGY | Facility: CLINIC | Age: 65
End: 2025-03-07
Payer: MEDICARE

## 2025-03-07 VITALS
WEIGHT: 237.44 LBS | HEART RATE: 78 BPM | SYSTOLIC BLOOD PRESSURE: 123 MMHG | TEMPERATURE: 96.8 F | BODY MASS INDEX: 35.21 KG/M2 | DIASTOLIC BLOOD PRESSURE: 71 MMHG | RESPIRATION RATE: 16 BRPM | OXYGEN SATURATION: 96 %

## 2025-03-07 DIAGNOSIS — D64.9 ANEMIA, UNSPECIFIED TYPE: ICD-10-CM

## 2025-03-07 LAB
HCT VFR BLD AUTO: 33.1 % (ref 41–52)
HGB BLD-MCNC: 10.8 G/DL (ref 13.5–17.5)

## 2025-03-07 PROCEDURE — 6350000001 HC RX 635 EPOETIN >10,000 UNITS: Mod: JZ,TB,EC | Performed by: INTERNAL MEDICINE

## 2025-03-07 PROCEDURE — 85014 HEMATOCRIT: CPT | Performed by: INTERNAL MEDICINE

## 2025-03-07 PROCEDURE — 36415 COLL VENOUS BLD VENIPUNCTURE: CPT | Performed by: INTERNAL MEDICINE

## 2025-03-07 PROCEDURE — 96372 THER/PROPH/DIAG INJ SC/IM: CPT

## 2025-03-07 RX ORDER — ALBUTEROL SULFATE 0.83 MG/ML
3 SOLUTION RESPIRATORY (INHALATION) AS NEEDED
OUTPATIENT
Start: 2025-03-21

## 2025-03-07 RX ORDER — EPINEPHRINE 0.3 MG/.3ML
0.3 INJECTION SUBCUTANEOUS EVERY 5 MIN PRN
OUTPATIENT
Start: 2025-03-21

## 2025-03-07 RX ORDER — DIPHENHYDRAMINE HYDROCHLORIDE 50 MG/ML
50 INJECTION INTRAMUSCULAR; INTRAVENOUS AS NEEDED
OUTPATIENT
Start: 2025-03-21

## 2025-03-07 RX ORDER — FAMOTIDINE 10 MG/ML
20 INJECTION, SOLUTION INTRAVENOUS ONCE AS NEEDED
OUTPATIENT
Start: 2025-03-21

## 2025-03-07 RX ADMIN — EPOETIN ALFA-EPBX 10000 UNITS: 10000 INJECTION, SOLUTION INTRAVENOUS; SUBCUTANEOUS at 13:02

## 2025-03-07 ASSESSMENT — PAIN SCALES - GENERAL: PAINLEVEL_OUTOF10: 2

## 2025-03-12 ENCOUNTER — APPOINTMENT (OUTPATIENT)
Dept: PHARMACY | Facility: HOSPITAL | Age: 65
End: 2025-03-12
Payer: MEDICARE

## 2025-03-12 DIAGNOSIS — E11.22 TYPE 2 DIABETES MELLITUS WITH STAGE 4 CHRONIC KIDNEY DISEASE, WITH LONG-TERM CURRENT USE OF INSULIN (MULTI): ICD-10-CM

## 2025-03-12 DIAGNOSIS — Z79.4 TYPE 2 DIABETES MELLITUS WITH INSULIN THERAPY (MULTI): ICD-10-CM

## 2025-03-12 DIAGNOSIS — E11.9 TYPE 2 DIABETES MELLITUS WITH INSULIN THERAPY (MULTI): ICD-10-CM

## 2025-03-12 DIAGNOSIS — N18.4 TYPE 2 DIABETES MELLITUS WITH STAGE 4 CHRONIC KIDNEY DISEASE, WITH LONG-TERM CURRENT USE OF INSULIN (MULTI): ICD-10-CM

## 2025-03-12 DIAGNOSIS — Z79.4 TYPE 2 DIABETES MELLITUS WITH STAGE 4 CHRONIC KIDNEY DISEASE, WITH LONG-TERM CURRENT USE OF INSULIN (MULTI): ICD-10-CM

## 2025-03-12 DIAGNOSIS — I50.20 SYSTOLIC CONGESTIVE HEART FAILURE, UNSPECIFIED HF CHRONICITY: ICD-10-CM

## 2025-03-12 NOTE — PROGRESS NOTES
Clinical Pharmacy Appointment    Patient ID: Gregorio North Jr. is a 64 y.o. male who presents for diabetes.     This is a Follow Up appointment.     Referring Provider: Latisha Vines PA-C  PCP: Latisha Vines PA-C   Last visit with PCP: 10/1/24   Next visit with PCP: 1/16/25    Subjective     DIABETES MELLITUS TYPE II:    Diagnosed with diabetes: 2015.   Known diabetic complications: CKD and retinopathy.  Does patient follow with Endocrinology: No  Last optometry exam: 10/2024 - may have eye surgery in January for macular degeneration.      Current diabetic medications include:  Humalog 8 units TID  Semglee 26 units at bedtime   Jardiance 10 mg daily   Mounjaro 7.5 mg once weekly on Tuesdays     Adverse Effects: None     Past diabetic medications include:  Bydureon: Formulary change to Ozempic   Farxiga: Formulary change to Jardiance   Ozempic: nausea; switched to Mounjaro    Glucose Readings:  Glucometer/CGM Type: Freestyle [place] 3   Gets through DME supplier - [employer]    Current home BG readings: 100-120 most of the time, throughout the day stable around 130-140.    Any episodes of hypoglycemia? No, none reported and no symptoms . Did patient treat episode of hypoglycemia appropriately? Yes, ate a piece of candy and a snack    Lifestyle:  Diet: 1 big meal/day with several smaller meals. Appetite improved - eating a little bit more. 232 pounds this morning - down about 10 pounds overall. Drinking glucerna once per day.   BK: (11 am) eggs, 2 sausage links, and hash brown potatoes   LN: skips   DN: (5 pm) home made tacos, chinese  Snacks: yogurt, gram crackers, apple sauce, cottage cheese  Drinks: water, milk, lemonade (low sugar), coffee with cream   Physical Activity: Feeling really weak about a week ago, lasted 2-3 days.     Secondary Prevention:  Statin? Yes - Atorvastatin 20 mg daily   ACE-I/ARB? Yes - Lisinopril 40 mg daily   Aspirin? No     Pertinent PMH Review:  PMH of Pancreatitis: No  PMH of  Retinopathy: Yes  PMH of Urinary Tract Infections: No  PMH of MTC: No  PMH of Obesity: Yes  PMH of ASCVD: Yes  PMH of CKD: Yes  PMH of CHF: Yes    Medication Reconciliation:  No changes    Drug Interactions  No relevant drug interactions were noted.    Medication System Management  Patient's preferred pharmacy: [facility]  Adherence/Organization: No issues  Affordability/Accessibility: Mounjaro, Lantus, Jardiance, and Humalog   PAP: Yes  Expiration: 4/12/2025    Objective   No Known Allergies  Social History     Social History Narrative    Not on file      Medication Review  Current Outpatient Medications   Medication Instructions    alcohol swabs (Alcohol Pads) pads, medicated Use as directed to check blood sugar up to 3-4 times daily and inject insulin 4 times daily    allopurinol (ZYLOPRIM) 100 mg, oral, Daily    atorvastatin (LIPITOR) 20 mg, oral, Nightly    blood sugar diagnostic (Blood Glucose Test) strip Use as directed to check blood sugar up to 3-4 times daily    blood-glucose meter misc Use as directed to check blood sugar up to 3-4 times daily    blood-glucose sensor (FreeStyle [place] 3 Plus Sensor) device Use as instructed to check blood sugar. Change sensor every 15 days.    carvedilol (COREG) 6.25 mg, oral, 2 times daily (morning and late afternoon)    cholecalciferol (VITAMIN D-3) 5,000 Units, Daily    clopidogrel (PLAVIX) 75 mg, oral, Daily    famotidine (PEPCID) 40 mg, oral, Daily    folic acid (Folvite) 1 mg tablet Take 5 tablets by mouth once daily.    FreeStyle [place] 3 [place] misc Use as instructed to check blood sugar throughout the day    furosemide (Lasix) 40 mg tablet Take 1 tablet (40 mg) by mouth every other day AND 2 tablets (80 mg) every other day.    HumaLOG KwikPen Insulin 8 Units, subcutaneous, 3 times daily before meals    insulin glargine-yfgn (SEMGLEE(INSULIN GLARG-YFGN)PEN) 26 Units, subcutaneous, Nightly    Jardiance 10 mg, oral, Every morning    Kerendia 10 mg, oral, Daily     "lancets (Accu-Chek Softclix Lancets) misc Use as directed  to check blood pressure 3 to 4 times a day    lisinopril 40 mg, oral, Daily before breakfast    MAGNESIUM GLYCINATE ORAL 420 mg, Daily    Mounjaro 7.5 mg, subcutaneous, Weekly    pen needle, diabetic 32 gauge x 5/32\" needle Use pen needle with insulin pen 4 times daily as directed    sodium bicarbonate 650 mg, oral, 2 times daily      Vitals  BP Readings from Last 2 Encounters:   03/07/25 123/71   02/07/25 154/82     BMI Readings from Last 1 Encounters:   03/07/25 35.21 kg/m²      Labs  A1C  Lab Results   Component Value Date    HGBA1C 6.7 (A) 10/01/2024    HGBA1C 6.7 (H) 06/27/2024    HGBA1C 8.4 (A) 03/27/2024     BMP  Lab Results   Component Value Date    CALCIUM 9.1 12/16/2024     12/16/2024    K 4.0 02/21/2025    CO2 24 12/16/2024     12/16/2024    BUN 49 (H) 12/16/2024    CREATININE 2.56 (H) 12/16/2024    EGFR 27 (L) 12/16/2024     LFTs  Lab Results   Component Value Date    ALT 48 09/11/2024    AST 24 09/11/2024    ALKPHOS 90 09/11/2024    BILITOT 0.4 09/11/2024     FLP  Lab Results   Component Value Date    TRIG 174 (H) 05/22/2024    CHOL 115 05/22/2024    LDLF 60 02/09/2023    LDLCALC 51 05/22/2024    HDL 29.0 05/22/2024     Urine Microalbumin  Lab Results   Component Value Date    MICROALBCREA 269.9 (H) 12/17/2024     Weight Management  Wt Readings from Last 3 Encounters:   03/07/25 108 kg (237 lb 7 oz)   02/07/25 110 kg (242 lb 2.8 oz)   01/24/25 110 kg (241 lb 6.5 oz)      There is no height or weight on file to calculate BMI.     Assessment/Plan     Patient's goal A1c is < 7.0%.  Is pt at goal? Yes, most recent A1c on 10/1/24 of 6.7%. Prior A1c was also 6.7% on 6/27/24.     Rationale for plan: Patient checks BG with Amiigo [place] 3 Plus CGM. Patient's SMBGs are stable and at goal the majority of the time. Patient denies any low BG readings since previous visit. Patient reports that adhesive barrier with sensors has helped keep them " on longer. At this time, patient reports full tolerability with current medication regimen. Patient reports that he is happy with BG readings and has no side effects. Plan to continue current medication regimen. Will follow up in 4 weeks to review BG readings and process renewal for  PAP.     Medication Changes:  CONTINUE:  Humalog 8 units TID  Semglee 26 units at bedtime   Jardiance 10 mg daily   Mounjaro 7.5 mg once weekly on Tuesdays    Future Considerations:  For BG lowering, consider increase in Jardiance v Mounjaro dose   PMH significant for obesity, CKD, CHF, and high risk for ASCVD     Monitoring and Education:  Mounjaro Education:   Counseled patient on Mounjaro MOA, expectations, side effects, duration of therapy, administration, and monitoring parameters.  Provided detailed dosing and administration counseling to ensure proper technique.   Reviewed Mounjaro titration schedule, starting with 2.5 mg once weekly to a goal of 15 mg once weekly if tolerated  Counseled patient on the benefits of GLP-1ra glycemic control and weight loss  Reviewed storage requirements of Mounjaro when not in use, and when to administer the medication if a dose is missed.  Advised patient that they may experience improved satiety after meals and portion sizes of meals may be reduced as doses of Mounjaro increase  Jardiance Education:   Counseled patient on Jardiance MOA, expectations, side effects, duration of therapy, administration, and monitoring parameters.  Reviewed the benefits of SGLT-2i therapy, such as glycemic control and kidney and CV protection.  Advised patient to practice proper  hygiene to reduce risk of UTIs or yeast infections.  Advised patient to maintain adequate fluid intake to remain hydrated while on SGLT2i therapy.  Answered all patient questions and concerns.    Clinical Pharmacist follow-up: 4/9/25 @ 7560, Telehealth visit    Continue all meds under the continuation of care with the referring provider  and clinical pharmacy team.    Thank you,  Janiya Freeman, PharmD  Clinical Pharmacist  [phone]    Verbal consent to manage patient's drug therapy was obtained from the patient. They were informed they may decline to participate or withdraw from participation in pharmacy services at any time.

## 2025-03-20 ENCOUNTER — OFFICE VISIT (OUTPATIENT)
Dept: CARDIOLOGY | Facility: HOSPITAL | Age: 65
End: 2025-03-20
Payer: MEDICARE

## 2025-03-20 VITALS
BODY MASS INDEX: 35.73 KG/M2 | OXYGEN SATURATION: 99 % | DIASTOLIC BLOOD PRESSURE: 82 MMHG | WEIGHT: 240.96 LBS | HEART RATE: 66 BPM | SYSTOLIC BLOOD PRESSURE: 150 MMHG

## 2025-03-20 DIAGNOSIS — I10 BENIGN ESSENTIAL HYPERTENSION: ICD-10-CM

## 2025-03-20 DIAGNOSIS — I51.89 DIASTOLIC DYSFUNCTION: Primary | ICD-10-CM

## 2025-03-20 DIAGNOSIS — E78.5 DYSLIPIDEMIA: ICD-10-CM

## 2025-03-20 LAB
ATRIAL RATE: 65 BPM
P AXIS: 49 DEGREES
P OFFSET: 199 MS
P ONSET: 134 MS
PR INTERVAL: 176 MS
Q ONSET: 222 MS
QRS COUNT: 11 BEATS
QRS DURATION: 94 MS
QT INTERVAL: 398 MS
QTC CALCULATION(BAZETT): 413 MS
QTC FREDERICIA: 408 MS
R AXIS: 7 DEGREES
T AXIS: -22 DEGREES
T OFFSET: 421 MS
VENTRICULAR RATE: 65 BPM

## 2025-03-20 PROCEDURE — 4010F ACE/ARB THERAPY RXD/TAKEN: CPT | Performed by: INTERNAL MEDICINE

## 2025-03-20 PROCEDURE — 3077F SYST BP >= 140 MM HG: CPT | Performed by: INTERNAL MEDICINE

## 2025-03-20 PROCEDURE — 93005 ELECTROCARDIOGRAM TRACING: CPT | Performed by: INTERNAL MEDICINE

## 2025-03-20 PROCEDURE — 99214 OFFICE O/P EST MOD 30 MIN: CPT | Performed by: INTERNAL MEDICINE

## 2025-03-20 PROCEDURE — G2211 COMPLEX E/M VISIT ADD ON: HCPCS | Performed by: INTERNAL MEDICINE

## 2025-03-20 PROCEDURE — 3079F DIAST BP 80-89 MM HG: CPT | Performed by: INTERNAL MEDICINE

## 2025-03-20 PROCEDURE — 1159F MED LIST DOCD IN RCRD: CPT | Performed by: INTERNAL MEDICINE

## 2025-03-20 RX ORDER — AMLODIPINE BESYLATE 10 MG/1
10 TABLET ORAL DAILY
Qty: 90 TABLET | Refills: 3 | Status: SHIPPED | OUTPATIENT
Start: 2025-03-20 | End: 2026-03-20

## 2025-03-20 NOTE — PROGRESS NOTES
Chief Complaint:   Follow-up     History Of Present Illness:    Gregorio North Jr. is a 64 y.o. male presenting for follow-up.  Doing well from a cardiac standpoint.  Denies any chest pain, shortness of breath, dizziness, palpitations.  Compliant with medications.      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    Past Surgical History:  He has a past surgical history that includes Foot surgery (Left, 03/31/2016); Other surgical history (02/04/2015); MR angio head wo IV contrast (09/17/2014); and CT guided percutaneous biopsy bone deep (08/29/2022).      Social History:  He reports that he has never smoked. He has never used smokeless tobacco. He reports that he does not currently use alcohol. He reports that he does not use drugs.    Family History:  Family History   Problem Relation Name Age of Onset   • No Known Problems Mother     • Heart attack  "Father     • Diabetes Father          Allergies:  Patient has no known allergies.    Outpatient Medications:  Current Outpatient Medications   Medication Instructions   • alcohol swabs (Alcohol Pads) pads, medicated Use as directed to check blood sugar up to 3-4 times daily and inject insulin 4 times daily   • allopurinol (ZYLOPRIM) 100 mg, oral, Daily   • atorvastatin (LIPITOR) 20 mg, oral, Nightly   • blood sugar diagnostic (Blood Glucose Test) strip Use as directed to check blood sugar up to 3-4 times daily   • blood-glucose meter misc Use as directed to check blood sugar up to 3-4 times daily   • blood-glucose sensor (FreeStyle [place] 3 Plus Sensor) device Use as instructed to check blood sugar. Change sensor every 15 days.   • carvedilol (COREG) 6.25 mg, oral, 2 times daily (morning and late afternoon)   • cholecalciferol (VITAMIN D-3) 5,000 Units, Daily   • clopidogrel (PLAVIX) 75 mg, oral, Daily   • famotidine (PEPCID) 40 mg, oral, Daily   • folic acid (Folvite) 1 mg tablet Take 5 tablets by mouth once daily.   • FreeStyle [place] 3 [place] misc Use as instructed to check blood sugar throughout the day   • furosemide (Lasix) 40 mg tablet Take 1 tablet (40 mg) by mouth every other day AND 2 tablets (80 mg) every other day.   • HumaLOG KwikPen Insulin 8 Units, subcutaneous, 3 times daily before meals   • insulin glargine-yfgn (SEMGLEE(INSULIN GLARG-YFGN)PEN) 26 Units, subcutaneous, Nightly   • Jardiance 10 mg, oral, Every morning   • Kerendia 10 mg, oral, Daily   • lancets (Accu-Chek Softclix Lancets) misc Use as directed  to check blood pressure 3 to 4 times a day   • lisinopril 40 mg, oral, Daily before breakfast   • MAGNESIUM GLYCINATE ORAL 420 mg, Daily   • Mounjaro 7.5 mg, subcutaneous, Weekly   • pen needle, diabetic 32 gauge x 5/32\" needle Use pen needle with insulin pen 4 times daily as directed   • sodium bicarbonate 650 mg, oral, 2 times daily       Physical Exam:  Constitutional:       Appearance: " Healthy appearance. Not in distress.   Neck:      Vascular: No JVR. JVD normal.   Pulmonary:      Effort: Pulmonary effort is normal.      Breath sounds: Normal breath sounds. No wheezing. No rhonchi. No rales.   Chest:      Chest wall: Not tender to palpatation.   Cardiovascular:      Normal rate. Regular rhythm.      Murmurs: There is no murmur.   Edema:     Peripheral edema absent.   Abdominal:      General: Bowel sounds are normal.      Palpations: Abdomen is soft.      Tenderness: There is no abdominal tenderness.   Musculoskeletal: Normal range of motion. Skin:     General: Skin is warm and dry.   Neurological:      General: No focal deficit present.      Mental Status: Alert and oriented to person, place and time.         Last Labs:  CBC -  Lab Results   Component Value Date    WBC 7.2 01/24/2025    HGB 10.8 (L) 03/07/2025    HCT 33.1 (L) 03/07/2025    MCV 93 01/24/2025     01/24/2025       CMP -  Lab Results   Component Value Date    CALCIUM 9.1 12/16/2024    PHOS 4.2 12/16/2024    PROT 7.4 09/11/2024    ALBUMIN 3.9 12/16/2024    AST 24 09/11/2024    ALT 48 09/11/2024    ALKPHOS 90 09/11/2024    BILITOT 0.4 09/11/2024       LIPID PANEL -   Lab Results   Component Value Date    CHOL 115 05/22/2024    TRIG 174 (H) 05/22/2024    HDL 29.0 05/22/2024    CHHDL 4.0 05/22/2024    LDLF 60 02/09/2023    VLDL 35 05/22/2024    NHDL 86 05/22/2024       RENAL FUNCTION PANEL -   Lab Results   Component Value Date    GLUCOSE 117 (H) 12/16/2024     12/16/2024    K 4.0 02/21/2025     12/16/2024    CO2 24 12/16/2024    ANIONGAP 15 12/16/2024    BUN 49 (H) 12/16/2024    CREATININE 2.56 (H) 12/16/2024    GFRMALE 32 (A) 09/13/2023    CALCIUM 9.1 12/16/2024    PHOS 4.2 12/16/2024    ALBUMIN 3.9 12/16/2024        Lab Results   Component Value Date    BNP 73 01/31/2024    HGBA1C 6.7 (A) 10/01/2024       Last Cardiology Tests:  ECG independently reviewed from today: Sinus rhythm, rate 65, nonspecific T wave  abnormality     ECHO: 5/6/2022  1. The left ventricular systolic function is normal with a 55-60% estimated ejection fraction.  2. Spectral Doppler shows an abnormal pattern of left ventricular diastolic filling.  3. There is mild mitral and tricuspid regurgitation.    Assessment/Plan   Very pleasant 63 year old male with a history of diabetes, PAD, obesity, kidney disease, chronic anemia, HTN, HLD and diastolic dysfunction.     Doing well from a cardiac standpoint, blood pressure can be better controlled.     Plan:  -change carevdilol 6.25mg twice daily to amlodipine 10mg daily  -Continue current atorvastatin, clopidogrel, Lasix, lisinopril  -Follow-up with us in 1 year or sooner if needed      Andrae Campbell MD

## 2025-03-21 ENCOUNTER — INFUSION (OUTPATIENT)
Dept: HEMATOLOGY/ONCOLOGY | Facility: CLINIC | Age: 65
End: 2025-03-21
Payer: MEDICARE

## 2025-03-21 DIAGNOSIS — D64.9 ANEMIA, UNSPECIFIED TYPE: ICD-10-CM

## 2025-03-21 LAB
HCT VFR BLD AUTO: 35.3 % (ref 41–52)
HGB BLD-MCNC: 11.5 G/DL (ref 13.5–17.5)

## 2025-03-21 PROCEDURE — 36415 COLL VENOUS BLD VENIPUNCTURE: CPT | Performed by: INTERNAL MEDICINE

## 2025-03-21 PROCEDURE — 85018 HEMOGLOBIN: CPT | Performed by: INTERNAL MEDICINE

## 2025-03-21 RX ORDER — EPINEPHRINE 0.3 MG/.3ML
0.3 INJECTION SUBCUTANEOUS EVERY 5 MIN PRN
OUTPATIENT
Start: 2025-04-04

## 2025-03-21 RX ORDER — FAMOTIDINE 10 MG/ML
20 INJECTION, SOLUTION INTRAVENOUS ONCE AS NEEDED
OUTPATIENT
Start: 2025-04-04

## 2025-03-21 RX ORDER — DIPHENHYDRAMINE HYDROCHLORIDE 50 MG/ML
50 INJECTION, SOLUTION INTRAMUSCULAR; INTRAVENOUS AS NEEDED
OUTPATIENT
Start: 2025-04-04

## 2025-03-21 RX ORDER — ALBUTEROL SULFATE 0.83 MG/ML
3 SOLUTION RESPIRATORY (INHALATION) AS NEEDED
OUTPATIENT
Start: 2025-04-04

## 2025-03-21 NOTE — PROGRESS NOTES
Patient arrived for Procrit injection. Hgb 11.5. Patient did not meet parameters for treatment. Labs reviewed with patient and written copy handed to patient. Patient discharged home with follow up appointment.

## 2025-04-04 ENCOUNTER — INFUSION (OUTPATIENT)
Dept: HEMATOLOGY/ONCOLOGY | Facility: CLINIC | Age: 65
End: 2025-04-04
Payer: MEDICARE

## 2025-04-04 VITALS
HEART RATE: 79 BPM | OXYGEN SATURATION: 94 % | TEMPERATURE: 97.5 F | SYSTOLIC BLOOD PRESSURE: 119 MMHG | RESPIRATION RATE: 16 BRPM | DIASTOLIC BLOOD PRESSURE: 71 MMHG | BODY MASS INDEX: 35.14 KG/M2 | WEIGHT: 236.99 LBS

## 2025-04-04 DIAGNOSIS — E55.9 VITAMIN D DEFICIENCY: ICD-10-CM

## 2025-04-04 DIAGNOSIS — I10 BENIGN ESSENTIAL HYPERTENSION: ICD-10-CM

## 2025-04-04 DIAGNOSIS — E78.00 PURE HYPERCHOLESTEROLEMIA: ICD-10-CM

## 2025-04-04 DIAGNOSIS — I50.20 SYSTOLIC CONGESTIVE HEART FAILURE, UNSPECIFIED HF CHRONICITY: ICD-10-CM

## 2025-04-04 DIAGNOSIS — N28.9 RENAL INSUFFICIENCY: ICD-10-CM

## 2025-04-04 DIAGNOSIS — D64.9 ANEMIA, UNSPECIFIED TYPE: ICD-10-CM

## 2025-04-04 DIAGNOSIS — E08.00 DIABETES MELLITUS DUE TO UNDERLYING CONDITION WITH HYPEROSMOLARITY WITHOUT COMA, WITHOUT LONG-TERM CURRENT USE OF INSULIN: ICD-10-CM

## 2025-04-04 DIAGNOSIS — E66.01 CLASS 3 SEVERE OBESITY DUE TO EXCESS CALORIES WITH SERIOUS COMORBIDITY IN ADULT, UNSPECIFIED BMI: ICD-10-CM

## 2025-04-04 DIAGNOSIS — E66.813 CLASS 3 SEVERE OBESITY DUE TO EXCESS CALORIES WITH SERIOUS COMORBIDITY IN ADULT, UNSPECIFIED BMI: ICD-10-CM

## 2025-04-04 DIAGNOSIS — N18.32 STAGE 3B CHRONIC KIDNEY DISEASE (MULTI): ICD-10-CM

## 2025-04-04 DIAGNOSIS — E87.6 HYPOKALEMIA: ICD-10-CM

## 2025-04-04 DIAGNOSIS — I73.9 PAD (PERIPHERAL ARTERY DISEASE) (CMS-HCC): ICD-10-CM

## 2025-04-04 DIAGNOSIS — N04.9 NEPHROTIC SYNDROME: ICD-10-CM

## 2025-04-04 LAB
HCT VFR BLD AUTO: 33.8 % (ref 41–52)
HGB BLD-MCNC: 10.8 G/DL (ref 13.5–17.5)

## 2025-04-04 PROCEDURE — 36415 COLL VENOUS BLD VENIPUNCTURE: CPT | Performed by: INTERNAL MEDICINE

## 2025-04-04 PROCEDURE — 85018 HEMOGLOBIN: CPT | Performed by: INTERNAL MEDICINE

## 2025-04-04 PROCEDURE — RXMED WILLOW AMBULATORY MEDICATION CHARGE

## 2025-04-04 PROCEDURE — 96372 THER/PROPH/DIAG INJ SC/IM: CPT

## 2025-04-04 PROCEDURE — 6350000001 HC RX 635 EPOETIN >10,000 UNITS: Mod: JZ,TB,EC | Performed by: INTERNAL MEDICINE

## 2025-04-04 RX ORDER — FAMOTIDINE 10 MG/ML
20 INJECTION, SOLUTION INTRAVENOUS ONCE AS NEEDED
OUTPATIENT
Start: 2025-04-18

## 2025-04-04 RX ORDER — ALBUTEROL SULFATE 0.83 MG/ML
3 SOLUTION RESPIRATORY (INHALATION) AS NEEDED
OUTPATIENT
Start: 2025-04-18

## 2025-04-04 RX ORDER — EPINEPHRINE 0.3 MG/.3ML
0.3 INJECTION SUBCUTANEOUS EVERY 5 MIN PRN
OUTPATIENT
Start: 2025-04-18

## 2025-04-04 RX ORDER — DIPHENHYDRAMINE HYDROCHLORIDE 50 MG/ML
50 INJECTION, SOLUTION INTRAMUSCULAR; INTRAVENOUS AS NEEDED
OUTPATIENT
Start: 2025-04-18

## 2025-04-04 RX ADMIN — ERYTHROPOIETIN 10000 UNITS: 10000 INJECTION, SOLUTION INTRAVENOUS; SUBCUTANEOUS at 12:30

## 2025-04-04 ASSESSMENT — PAIN SCALES - GENERAL: PAINLEVEL_OUTOF10: 0-NO PAIN

## 2025-04-09 ENCOUNTER — APPOINTMENT (OUTPATIENT)
Dept: PHARMACY | Facility: HOSPITAL | Age: 65
End: 2025-04-09
Payer: MEDICARE

## 2025-04-09 DIAGNOSIS — Z79.4 TYPE 2 DIABETES MELLITUS WITH INSULIN THERAPY (MULTI): ICD-10-CM

## 2025-04-09 DIAGNOSIS — E11.22 TYPE 2 DIABETES MELLITUS WITH STAGE 4 CHRONIC KIDNEY DISEASE, WITH LONG-TERM CURRENT USE OF INSULIN (MULTI): ICD-10-CM

## 2025-04-09 DIAGNOSIS — N18.4 TYPE 2 DIABETES MELLITUS WITH STAGE 4 CHRONIC KIDNEY DISEASE, WITH LONG-TERM CURRENT USE OF INSULIN (MULTI): ICD-10-CM

## 2025-04-09 DIAGNOSIS — Z79.4 TYPE 2 DIABETES MELLITUS WITH STAGE 4 CHRONIC KIDNEY DISEASE, WITH LONG-TERM CURRENT USE OF INSULIN (MULTI): ICD-10-CM

## 2025-04-09 DIAGNOSIS — I50.20 SYSTOLIC CONGESTIVE HEART FAILURE, UNSPECIFIED HF CHRONICITY: ICD-10-CM

## 2025-04-09 DIAGNOSIS — E11.9 TYPE 2 DIABETES MELLITUS WITH INSULIN THERAPY (MULTI): ICD-10-CM

## 2025-04-09 NOTE — PROGRESS NOTES
Clinical Pharmacy Appointment    Patient ID: Gregorio North Jr. is a 64 y.o. male who presents for diabetes.     This is a Follow Up appointment.     Referring Provider: Latisha Vines PA-C  PCP: Latisha Vines PA-C   Last visit with PCP: 1/16/25  Next visit with PCP: 4/16/25    Subjective     DIABETES MELLITUS TYPE II:    Diagnosed with diabetes: 2015.   Known diabetic complications: CKD and retinopathy.  Does patient follow with Endocrinology: No  Last optometry exam: 10/2024 - may have eye surgery in January for macular degeneration.      Current diabetic medications include:  Humalog 8 units TID  Semglee 26 units at bedtime   Jardiance 10 mg daily   Mounjaro 7.5 mg once weekly on Tuesdays     Adverse Effects: None     Past diabetic medications include:  Bydureon: Formulary change to Ozempic   Farxiga: Formulary change to Jardiance   Ozempic: nausea; switched to Mounjaro  Lantus: Formulary change to Semglee    Glucose Readings:  Glucometer/CGM Type: Freestyle [place] 3   Gets through DME supplier - [employer]    Current home BG readings: 100-120 most of the time, throughout the day stable around 130-140.    Any episodes of hypoglycemia? No, none reported and no symptoms . Did patient treat episode of hypoglycemia appropriately? Yes, ate a piece of candy and a snack    Lifestyle:  Diet: 1 big meal/day with several smaller meals. Appetite improved - eating a little bit more. 232 pounds this morning - down about 10 pounds overall. Drinking glucerna once per day.   BK: (11 am) eggs, 2 sausage links, and hash brown potatoes   LN: skips   DN: (5 pm) home made tacos, chinese  Snacks: yogurt, gram crackers, apple sauce, cottage cheese  Drinks: water, milk, lemonade (low sugar), coffee with cream   Physical Activity: Feeling really weak about a week ago, lasted 2-3 days.     Secondary Prevention:  Statin? Yes - Atorvastatin 20 mg daily   ACE-I/ARB? Yes - Lisinopril 40 mg daily   Aspirin? No     Pertinent PMH Review:  PMH of  Pancreatitis: No  PMH of Retinopathy: Yes  PMH of Urinary Tract Infections: No  PMH of MTC: No  PMH of Obesity: Yes  PMH of ASCVD: Yes  PMH of CKD: Yes  PMH of CHF: Yes    Medication Reconciliation:  No changes    Drug Interactions  No relevant drug interactions were noted.    Medication System Management  Patient's preferred pharmacy: [facility]  Adherence/Organization: No issues  Affordability/Accessibility:    PAP: Yes  Medications: Mounjaro, Semglee, Jardiance, Humalog, and Kerendia  Expiration: 4/12/2025    Objective   No Known Allergies  Social History     Social History Narrative    Not on file      Medication Review  Current Outpatient Medications   Medication Instructions    alcohol swabs (Alcohol Pads) pads, medicated Use as directed to check blood sugar up to 3-4 times daily and inject insulin 4 times daily    allopurinol (ZYLOPRIM) 100 mg, oral, Daily    amLODIPine (NORVASC) 10 mg, oral, Daily    atorvastatin (LIPITOR) 20 mg, oral, Nightly    blood sugar diagnostic (Blood Glucose Test) strip Use as directed to check blood sugar up to 3-4 times daily    blood-glucose meter misc Use as directed to check blood sugar up to 3-4 times daily    blood-glucose sensor (FreeStyle [place] 3 Plus Sensor) device Use as instructed to check blood sugar. Change sensor every 15 days.    cholecalciferol (VITAMIN D-3) 5,000 Units, Daily    clopidogrel (PLAVIX) 75 mg, oral, Daily    famotidine (PEPCID) 40 mg, oral, Daily    finerenone (KERENDIA) 10 mg, oral, Daily    folic acid (Folvite) 1 mg tablet Take 5 tablets by mouth once daily.    FreeStyle [place] 3 [place] misc Use as instructed to check blood sugar throughout the day    furosemide (Lasix) 40 mg tablet Take 1 tablet (40 mg) by mouth every other day AND 2 tablets (80 mg) every other day.    HumaLOG KwikPen Insulin 8 Units, subcutaneous, 3 times daily before meals    insulin glargine-yfgn (SEMGLEE(INSULIN GLARG-YFGN)PEN) 26 Units, subcutaneous, Nightly    Jardiance 10 mg,  "oral, Every morning    lancets (Accu-Chek Softclix Lancets) misc Use as directed  to check blood pressure 3 to 4 times a day    lisinopril 40 mg, oral, Daily before breakfast    MAGNESIUM GLYCINATE ORAL 420 mg, Daily    Mounjaro 7.5 mg, subcutaneous, Weekly    pen needle, diabetic 32 gauge x 5/32\" needle Use pen needle with insulin pen 4 times daily as directed    sodium bicarbonate 650 mg, oral, 2 times daily      Vitals  BP Readings from Last 2 Encounters:   04/04/25 119/71   03/20/25 150/82     BMI Readings from Last 1 Encounters:   04/04/25 35.14 kg/m²      Labs  A1C  Lab Results   Component Value Date    HGBA1C 6.7 (A) 10/01/2024    HGBA1C 6.7 (H) 06/27/2024    HGBA1C 8.4 (A) 03/27/2024     BMP  Lab Results   Component Value Date    CALCIUM 9.1 12/16/2024     12/16/2024    K 4.0 02/21/2025    CO2 24 12/16/2024     12/16/2024    BUN 49 (H) 12/16/2024    CREATININE 2.56 (H) 12/16/2024    EGFR 27 (L) 12/16/2024     LFTs  Lab Results   Component Value Date    ALT 48 09/11/2024    AST 24 09/11/2024    ALKPHOS 90 09/11/2024    BILITOT 0.4 09/11/2024     FLP  Lab Results   Component Value Date    TRIG 174 (H) 05/22/2024    CHOL 115 05/22/2024    LDLF 60 02/09/2023    LDLCALC 51 05/22/2024    HDL 29.0 05/22/2024     Urine Microalbumin  Lab Results   Component Value Date    MICROALBCREA 269.9 (H) 12/17/2024     Weight Management  Wt Readings from Last 3 Encounters:   04/04/25 107 kg (236 lb 15.9 oz)   03/20/25 109 kg (240 lb 15.4 oz)   03/07/25 108 kg (237 lb 7 oz)      There is no height or weight on file to calculate BMI.     Assessment/Plan     Patient's goal A1c is < 7.0%. Is pt at goal? Yes, most recent A1c on 10/1/24 of 6.7%. Prior A1c was also 6.7% on 6/27/24.     Rationale for plan: Patient checks BG with [employer] [place] 3 Plus CGM. Patient's SMBGs are stable and at goal the majority of the time. Patient denies any low BG readings since previous visit. Patient reports that adhesive barrier with " sensors has helped keep them on longer. At this time, patient reports full tolerability with current medication regimen. Patient reports that he is happy with BG readings and has no side effects. Plan to continue current medication regimen. Patient is due for renewal in [facility]. Patient to send updated financials to pharmacist. Will submit renewal application once received. Plan for follow up in 4 weeks to review BG readings.    Medication Changes:  CONTINUE:  Humalog 8 units TID  Semglee 26 units at bedtime   Jardiance 10 mg daily   Mounjaro 7.5 mg once weekly on Tuesdays  Kerendia 10 mg daily    Future Considerations:  For BG lowering, consider increase in Jardiance v Mounjaro dose   PMH significant for obesity, CKD, CHF, and high risk for ASCVD     Monitoring and Education:  Mounjaro Education:   Counseled patient on Mounjaro MOA, expectations, side effects, duration of therapy, administration, and monitoring parameters.  Provided detailed dosing and administration counseling to ensure proper technique.   Reviewed Mounjaro titration schedule, starting with 2.5 mg once weekly to a goal of 15 mg once weekly if tolerated  Counseled patient on the benefits of GLP-1ra glycemic control and weight loss  Reviewed storage requirements of Mounjaro when not in use, and when to administer the medication if a dose is missed.  Advised patient that they may experience improved satiety after meals and portion sizes of meals may be reduced as doses of Mounjaro increase  Jardiance Education:   Counseled patient on Jardiance MOA, expectations, side effects, duration of therapy, administration, and monitoring parameters.  Reviewed the benefits of SGLT-2i therapy, such as glycemic control and kidney and CV protection.  Advised patient to practice proper  hygiene to reduce risk of UTIs or yeast infections.  Advised patient to maintain adequate fluid intake to remain hydrated while on SGLT2i therapy.  Answered all patient questions  and concerns.    Clinical Pharmacist follow-up: 1 month, Telehealth visit    Continue all meds under the continuation of care with the referring provider and clinical pharmacy team.    Thank you,  Janiya Freeman, PharmD  Clinical Pharmacist  [phone]    Verbal consent to manage patient's drug therapy was obtained from the patient. They were informed they may decline to participate or withdraw from participation in pharmacy services at any time.

## 2025-04-10 ENCOUNTER — PHARMACY VISIT (OUTPATIENT)
Dept: PHARMACY | Facility: CLINIC | Age: 65
End: 2025-04-10
Payer: COMMERCIAL

## 2025-04-10 PROCEDURE — RXMED WILLOW AMBULATORY MEDICATION CHARGE

## 2025-04-14 ENCOUNTER — PHARMACY VISIT (OUTPATIENT)
Dept: PHARMACY | Facility: CLINIC | Age: 65
End: 2025-04-14
Payer: COMMERCIAL

## 2025-04-16 ENCOUNTER — APPOINTMENT (OUTPATIENT)
Dept: PRIMARY CARE | Facility: CLINIC | Age: 65
End: 2025-04-16
Payer: MEDICARE

## 2025-04-18 ENCOUNTER — INFUSION (OUTPATIENT)
Dept: HEMATOLOGY/ONCOLOGY | Facility: CLINIC | Age: 65
End: 2025-04-18
Payer: MEDICARE

## 2025-04-18 VITALS
RESPIRATION RATE: 16 BRPM | DIASTOLIC BLOOD PRESSURE: 74 MMHG | TEMPERATURE: 97.3 F | OXYGEN SATURATION: 97 % | WEIGHT: 229.28 LBS | HEART RATE: 77 BPM | BODY MASS INDEX: 34 KG/M2 | SYSTOLIC BLOOD PRESSURE: 131 MMHG

## 2025-04-18 DIAGNOSIS — D64.9 ANEMIA, UNSPECIFIED TYPE: Primary | ICD-10-CM

## 2025-04-18 DIAGNOSIS — N28.9 RENAL INSUFFICIENCY: ICD-10-CM

## 2025-04-18 LAB
HCT VFR BLD AUTO: 34.8 % (ref 41–52)
HGB BLD-MCNC: 11 G/DL (ref 13.5–17.5)

## 2025-04-18 PROCEDURE — 85014 HEMATOCRIT: CPT | Performed by: INTERNAL MEDICINE

## 2025-04-18 PROCEDURE — 36415 COLL VENOUS BLD VENIPUNCTURE: CPT | Performed by: INTERNAL MEDICINE

## 2025-04-18 PROCEDURE — 96372 THER/PROPH/DIAG INJ SC/IM: CPT

## 2025-04-18 PROCEDURE — 6350000001 HC RX 635 EPOETIN >10,000 UNITS: Mod: JZ,TB,EC | Performed by: INTERNAL MEDICINE

## 2025-04-18 RX ORDER — DIPHENHYDRAMINE HYDROCHLORIDE 50 MG/ML
50 INJECTION, SOLUTION INTRAMUSCULAR; INTRAVENOUS AS NEEDED
Status: DISCONTINUED | OUTPATIENT
Start: 2025-04-18 | End: 2025-04-18 | Stop reason: HOSPADM

## 2025-04-18 RX ORDER — EPINEPHRINE 0.3 MG/.3ML
0.3 INJECTION SUBCUTANEOUS EVERY 5 MIN PRN
OUTPATIENT
Start: 2025-05-02

## 2025-04-18 RX ORDER — FAMOTIDINE 10 MG/ML
20 INJECTION, SOLUTION INTRAVENOUS ONCE AS NEEDED
Status: DISCONTINUED | OUTPATIENT
Start: 2025-04-18 | End: 2025-04-18 | Stop reason: HOSPADM

## 2025-04-18 RX ORDER — FAMOTIDINE 10 MG/ML
20 INJECTION, SOLUTION INTRAVENOUS ONCE AS NEEDED
OUTPATIENT
Start: 2025-05-02

## 2025-04-18 RX ORDER — ALBUTEROL SULFATE 0.83 MG/ML
3 SOLUTION RESPIRATORY (INHALATION) AS NEEDED
OUTPATIENT
Start: 2025-05-02

## 2025-04-18 RX ORDER — DIPHENHYDRAMINE HYDROCHLORIDE 50 MG/ML
50 INJECTION, SOLUTION INTRAMUSCULAR; INTRAVENOUS AS NEEDED
OUTPATIENT
Start: 2025-05-02

## 2025-04-18 RX ORDER — ALBUTEROL SULFATE 0.83 MG/ML
3 SOLUTION RESPIRATORY (INHALATION) AS NEEDED
Status: DISCONTINUED | OUTPATIENT
Start: 2025-04-18 | End: 2025-04-18 | Stop reason: HOSPADM

## 2025-04-18 RX ORDER — EPINEPHRINE 0.3 MG/.3ML
0.3 INJECTION SUBCUTANEOUS EVERY 5 MIN PRN
Status: DISCONTINUED | OUTPATIENT
Start: 2025-04-18 | End: 2025-04-18 | Stop reason: HOSPADM

## 2025-04-18 RX ADMIN — EPOETIN ALFA-EPBX 10000 UNITS: 10000 INJECTION, SOLUTION INTRAVENOUS; SUBCUTANEOUS at 12:28

## 2025-04-18 ASSESSMENT — PAIN SCALES - GENERAL: PAINLEVEL_OUTOF10: 0-NO PAIN

## 2025-04-18 NOTE — PROGRESS NOTES
Patient tolerated retacrit treatment well, discharged to home with follow up appointments in place. Patient agreed to plan and verbalized understanding using teach back method.

## 2025-04-22 DIAGNOSIS — E11.9 TYPE 2 DIABETES MELLITUS WITH INSULIN THERAPY (MULTI): Primary | ICD-10-CM

## 2025-04-22 DIAGNOSIS — Z79.4 TYPE 2 DIABETES MELLITUS WITH INSULIN THERAPY (MULTI): Primary | ICD-10-CM

## 2025-04-22 PROCEDURE — RXMED WILLOW AMBULATORY MEDICATION CHARGE

## 2025-04-22 RX ORDER — INSULIN ASPART 100 [IU]/ML
INJECTION, SOLUTION INTRAVENOUS; SUBCUTANEOUS
Qty: 15 ML | Refills: 11 | Status: SHIPPED | OUTPATIENT
Start: 2025-04-22

## 2025-04-26 ENCOUNTER — PHARMACY VISIT (OUTPATIENT)
Dept: PHARMACY | Facility: CLINIC | Age: 65
End: 2025-04-26
Payer: COMMERCIAL

## 2025-06-17 ENCOUNTER — APPOINTMENT (OUTPATIENT)
Dept: NEPHROLOGY | Facility: CLINIC | Age: 65
End: 2025-06-17
Payer: MEDICARE

## 2025-07-07 NOTE — PROGRESS NOTES
CM called and spoke with pt to address any final questions or concerns regarding hospitalization.  Pt reports doing well and has no concerns at this time.  Pt given my contact info  in the event questions should arise.   Patient has met target of no readmissions for  90 days and has graduated from TCM Program.       [FreeTextEntry1] : The colonoscopy to the anastomotic site, s/p right sukh-colectomy performed at the [facility] on October 19, 2023 revealed a sigmoid colon polyp, a normal appearing anastomotic site, s/p right sukh-colectomy, severe left sided diverticulosis, a rigid, thickened, hypertrophied sigmoid colon secondary to diverticular disease and small internal hemorrhoids.  The colonic polyp was snared and removed.  There was no bleeding post polypectomy. There were no other polyps, masses, AVMs or colitis noted.  The colonic pathology performed on October 19, 2023 revealed hyperplastic polyp with no evidence of dysplasia or malignancy in the submitted tissue (sigmoid colon polyp at 35 cm).    [de-identified] : The CAT scan of the chest, abdomen and pelvis with and without IV contrast performed on June 28, 2024 revealed no CT evidence of occult malignancy in the visualized regions, moderate to severe atherosclerotic disease including coronary artery disease, moderate soft plaque in the thoracic aorta, moderate aortic valve leaflet calcifications raising the possibility of aortic stenosis, tiny nonobstructing left renal calculus, T10 compression deformity suggesting abnormal decreased bone mineral density, deflated right breast implant suggesting implant rupture and severe osteoarthritic changes in the right hip.   The CAT scan of the abdomen pelvis with IV contrast performed on September 12, 2023 revealed no acute pathology and diverticulosis without evidence of diverticulitis.  Also noted with degenerative changes of the bone.  Gastric fundal diverticulum, moderate stool in the ascending and transverse colon, left parapelvic cysts and lower partially visualized left breast implant surgical clips at the GE junction.

## 2025-07-16 ENCOUNTER — APPOINTMENT (OUTPATIENT)
Dept: PRIMARY CARE | Facility: CLINIC | Age: 65
End: 2025-07-16
Payer: MEDICARE

## 2025-07-16 VITALS
DIASTOLIC BLOOD PRESSURE: 59 MMHG | TEMPERATURE: 97.3 F | HEART RATE: 75 BPM | WEIGHT: 218 LBS | OXYGEN SATURATION: 98 % | SYSTOLIC BLOOD PRESSURE: 111 MMHG | BODY MASS INDEX: 33.04 KG/M2 | HEIGHT: 68 IN

## 2025-07-16 DIAGNOSIS — Z79.4 TYPE 2 DIABETES MELLITUS WITH INSULIN THERAPY (MULTI): ICD-10-CM

## 2025-07-16 DIAGNOSIS — E11.21 TYPE 2 DIABETES MELLITUS WITH DIABETIC NEPHROPATHY, WITHOUT LONG-TERM CURRENT USE OF INSULIN: ICD-10-CM

## 2025-07-16 DIAGNOSIS — M35.00 SJOGREN'S SYNDROME, WITH UNSPECIFIED ORGAN INVOLVEMENT (MULTI): ICD-10-CM

## 2025-07-16 DIAGNOSIS — N18.4 CHRONIC KIDNEY DISEASE, STAGE 4 (SEVERE) (MULTI): ICD-10-CM

## 2025-07-16 DIAGNOSIS — Z00.00 MEDICARE ANNUAL WELLNESS VISIT, SUBSEQUENT: Primary | ICD-10-CM

## 2025-07-16 DIAGNOSIS — E11.9 TYPE 2 DIABETES MELLITUS WITH INSULIN THERAPY (MULTI): ICD-10-CM

## 2025-07-16 DIAGNOSIS — L97.509 NON-PRESSURE CHRONIC ULCER OF OTHER PART OF UNSPECIFIED FOOT WITH UNSPECIFIED SEVERITY (MULTI): ICD-10-CM

## 2025-07-16 LAB — POC HEMOGLOBIN A1C: 6.4 % (ref 4.2–6.5)

## 2025-07-16 PROCEDURE — 1159F MED LIST DOCD IN RCRD: CPT | Performed by: PHYSICIAN ASSISTANT

## 2025-07-16 PROCEDURE — 4010F ACE/ARB THERAPY RXD/TAKEN: CPT | Performed by: PHYSICIAN ASSISTANT

## 2025-07-16 PROCEDURE — 3074F SYST BP LT 130 MM HG: CPT | Performed by: PHYSICIAN ASSISTANT

## 2025-07-16 PROCEDURE — 1036F TOBACCO NON-USER: CPT | Performed by: PHYSICIAN ASSISTANT

## 2025-07-16 PROCEDURE — 1160F RVW MEDS BY RX/DR IN RCRD: CPT | Performed by: PHYSICIAN ASSISTANT

## 2025-07-16 PROCEDURE — 83036 HEMOGLOBIN GLYCOSYLATED A1C: CPT | Performed by: PHYSICIAN ASSISTANT

## 2025-07-16 PROCEDURE — 3044F HG A1C LEVEL LT 7.0%: CPT | Performed by: PHYSICIAN ASSISTANT

## 2025-07-16 PROCEDURE — 1170F FXNL STATUS ASSESSED: CPT | Performed by: PHYSICIAN ASSISTANT

## 2025-07-16 PROCEDURE — 3078F DIAST BP <80 MM HG: CPT | Performed by: PHYSICIAN ASSISTANT

## 2025-07-16 PROCEDURE — G0439 PPPS, SUBSEQ VISIT: HCPCS | Performed by: PHYSICIAN ASSISTANT

## 2025-07-16 PROCEDURE — 3008F BODY MASS INDEX DOCD: CPT | Performed by: PHYSICIAN ASSISTANT

## 2025-07-16 ASSESSMENT — ACTIVITIES OF DAILY LIVING (ADL)
MANAGING_FINANCES: INDEPENDENT
GROCERY_SHOPPING: INDEPENDENT
DRESSING: INDEPENDENT
BATHING: INDEPENDENT
DOING_HOUSEWORK: INDEPENDENT
TAKING_MEDICATION: INDEPENDENT

## 2025-07-16 ASSESSMENT — ENCOUNTER SYMPTOMS
DEPRESSION: 0
OCCASIONAL FEELINGS OF UNSTEADINESS: 0
LOSS OF SENSATION IN FEET: 0

## 2025-07-16 NOTE — PROGRESS NOTES
Subjective   HPI   Gregorio North JR is a 64 y.o. year old male patient with presenting to clinic with concern for Medicare Visit     Chief Complaint   Patient presents with    Medicare Annual Wellness Visit Subsequent     Medications causing dizziness depending on when he takes them, fatigue for the last two weeks       Fatigue and dizziness,  better taking amlodipine later in the day vs early in the morning.     Lab Results   Component Value Date    HCT 32.8 (L) 07/10/2025     Lab Results   Component Value Date    HGB 10.8 (L) 07/10/2025     Lab Results   Component Value Date    CREATININE 3.08 (H) 05/31/2025    BUN 76 (H) 05/31/2025     05/31/2025    K 4.9 05/31/2025     05/31/2025    CO2 24 05/31/2025       Procrit does ok, but redacrit causes deep bone pain and severe malaise and fatigue for 2 weeks. Difficulty getting out of bed and less able to do activities of daily living.        HTN  -lisinopril 40  -amlodipine 10  BP Readings from Last 5 Encounters:   07/16/25 111/59   07/10/25 114/70   06/13/25 123/70   06/03/25 127/76   05/16/25 99/64       HLD  - atorvastatin  Lab Results   Component Value Date    LDLCALC 51 05/22/2024       CHF- Diastolic dysfunction   Dr Campbell  Lasix dose alternating 40mg qd  Coreg 6.25 bid  - ECHO: 5/6/2022  1. The left ventricular systolic function is normal with a 55-60% estimated ejection fraction.  2. Spectral Doppler shows an abnormal pattern of left ventricular diastolic filling.  3. There is mild mitral and tricuspid regurgitation.     PAD hx stent  -Plavix        DM 2   Clinical Pharmacy Narda Freeman  CGM FreeStyle Kar   Stopped d/t improved glucose  -Semglee- (formulary change from Lantus) 26 units at bedtime should be taking, but had lows. Staying off now   -Jardiance 10 mg daily   -Mounjaro 7.5 mg once weekly   -Kerendia 10 mg daily (CKD) Dr Kaplan  Lab Results   Component Value Date    HGBA1C 6.4 07/16/2025    HGBA1C 6.7 (A) 10/01/2024    HGBA1C 6.7  (H) 06/27/2024     Lab Results   Component Value Date    LDLCALC 51 05/22/2024    CREATININE 3.08 (H) 05/31/2025     Diabetes Composite Score: 5   Values used to calculate this score:    Points  Metrics       1        Blood Pressure: 114/70       1        Prescribed Statins: N/A - patient does not meet statin therapy criteria       1        Hemoglobin A1c: 6.7%       1        Smokes Tobacco: No       1        Prescribed Aspirin: Yes   neuropathy, hx diabetic foot wounds w osteomyelitis      Podiatry Dr Jacques, Charcot foot. Feet look good, went around new year  Ophtho Dr Bell- macular degeneration        CKD IV- history of kidney cancer, per nephro -most likely diabetic nephropathy   Dr Kaplan  chronic iron deficiency anemia  -Retacrit infusions  Hyperkalemia   currently off, not needed per nephro K is ok  Secondary hyperparathyroidism  -monitoring phosphorus  Hypomagnesemia  -monitoring magnesium  Metabolic acidosis  -sodium bicarb  -Lasix bicarb  -allopurinol 100     BPH-   Lab Results   Component Value Date    PSA 1.3 08/30/2021     Anxiety     Endoscopy 6/3/24     Chronic ulcer of Left foot is currently healed and follows with Dr Sawyer prado 10 weeks. Doing very well. Wearing charcot boot.    Wt Readings from Last 10 Encounters:   07/16/25 98.9 kg (218 lb)   07/10/25 101 kg (221 lb 9 oz)   06/13/25 103 kg (227 lb 6.5 oz)   06/03/25 102 kg (225 lb 12.8 oz)   05/16/25 104 kg (230 lb 6.1 oz)   04/18/25 104 kg (229 lb 4.5 oz)   04/04/25 107 kg (236 lb 15.9 oz)   03/20/25 109 kg (240 lb 15.4 oz)   03/07/25 108 kg (237 lb 7 oz)   02/07/25 110 kg (242 lb 2.8 oz)         Patient Care Team:  Latisha Vines PA-C as PCP - General (Family Medicine)  Latisha Vines PA-C as PCP [place] [facility] [place]  Imani Kaplan MD as Consulting Physician (Nephrology)  Edie MccartyD as Pharmacist (Pharmacy)     Specialists    Past Medical, Surgical, and Family History reviewed and updated in  "chart.    Reviewed all medications by prescribing practitioner or clinical pharmacist (such as prescriptions, OTCs, herbal therapies and supplements) and documented in the medical record.     Preventative Health   Health Maintenance Due   Topic Date Due    MMR Vaccine (1 of 1 - Standard series) Never done    Pneumococcal Vaccination (1 of 2 - PCV) Never done    DTaP/Tdap/Td Vaccines (1 - Tdap) Never done    Diabetic Eye Exam  01/27/2018    Lipid (cholesterol) test  05/22/2025            Topic Date Due    MMR Vaccines (1 of 1 - Standard series) Never done    DTaP/Tdap/Td Vaccines (1 - Tdap) Never done        Immunizations Reviewed    There is no immunization history on file for this patient.     Problem List Reviewed  Problem List[1]    Medical History[2]   Surgical History[3]   Family History[4]   Social History     Tobacco Use    Smoking status: Never    Smokeless tobacco: Never   Substance Use Topics    Alcohol use: Not Currently        Medications Reviewed  Medications Ordered Prior to Encounter[5]     Review of Systems  Constitutional: Denies fever  HEENT: Denies ST, earache  CVS: Denies Chest pain  Pulmonary: Denies wheezing, SOB  GI: Denies N/V  : Denies dysuria  Musculoskeletal:  Denies myalgia  Neuro: Denies focal weakness or numbness.  Skin: Denies Rashes.  *Review of Systems is negative unless otherwise mentioned in HPI or ROS above.      @OBJECTIVEBEGIN  /59   Pulse 75   Temp 36.3 °C (97.3 °F)   Ht 1.716 m (5' 7.56\")   Wt 98.9 kg (218 lb)   SpO2 98%   BMI 33.58 kg/m²  reviewed Body mass index is 33.58 kg/m².     Physical Exam  Constitutional: NAD. Afebrile. Resting comfortably.  ENT: Nasal mucosa and oropharynx: moist oral mucosa. Posterior oropharynx nonerythematous. No posterior pharyngeal streaking.  Eyes: PERRLA. EOM intact. No vertical or circular nystagmus.  Lymph: No anterior cervical chain or submandibular lymphadenopathy. No sentinel lymph nodes.  Cardiac: Regular rate & rhythm. No " murmur, gallops, or rubs.  Pulmonary: Lungs clear to auscultation bilaterally with good aeration. No wheezes, rhonchi, or rales. Normal WOB.  GI: Soft, Nontender, nondistended. No guarding. Normal BS x4.  : No suprapubic tenderness. No CVA tenderness to percussion.   Musculoskeletal: No peripheral edema.   Skin: No evidence of trauma. No rashes  Neuro: No focal neuro deficits. Normal gait without assistive devices.  Psych: Intact judgement and insight.    MDM        .Assessment/Plan   Problem List Items Addressed This Visit           ICD-10-CM    Diabetes mellitus (Multi) E11.9    Relevant Orders    POCT glycosylated hemoglobin (Hb A1C) manually resulted (Completed)    Chronic kidney disease, stage 4 (severe) (Multi) N18.4    Sjogren's syndrome M35.00    Non-pressure chronic ulcer of other part of unspecified foot with unspecified severity (Multi) L97.509    Type 2 diabetes mellitus with insulin therapy (Multi) E11.9, Z79.4     Other Visit Diagnoses         Codes      Medicare annual wellness visit, subsequent    -  Primary Z00.00                 [1]   Patient Active Problem List  Diagnosis    Benign essential hypertension    Diabetes mellitus (Multi)    Anemia    Chronic kidney disease, stage 4 (severe) (Multi)    Dyslipidemia    Benign prostatic hyperplasia    Anxiety    Fatigue    Hypokalemia    HLD (hyperlipidemia)    Macular exudate    Nystagmus    Optic atrophy    Atherosclerosis of native arteries of extremities with rest pain, bilateral legs    Pulmonary edema    Vitamin D deficiency    Unvaccinated for covid-19    CHF (congestive heart failure)    Tremor    Swelling of upper extremity    Papilledema    Palpitations    Obesity with body mass index 30 or greater    Muscle pain    Arthralgia    Impairment of balance    History of hypertension    History of endocrine disorder    Headache    Class 3 severe obesity due to excess calories in adult    Sjogren's syndrome    Shortness of breath    Proteinuria     History of kidney cancer    Lyme disease    Iron deficiency anemia    Chronic pulmonary edema    Non-pressure chronic ulcer of other part of unspecified foot with unspecified severity (Multi)    Type 2 diabetes mellitus with insulin therapy (Multi)    Renal insufficiency   [2]   Past Medical History:  Diagnosis Date    Acute kidney injury superimposed on CKD 01/31/2024    CHF (congestive heart failure)     Chronic osteomyelitis with draining sinus, right ankle and foot (Multi) 12/21/2023    CKD (chronic kidney disease)     COVID-19 virus infection 09/18/2023    Deficiency of other specified B group vitamins 06/09/2021    Folate deficiency    Diabetic foot ulcer (Multi) 05/06/2022    Focal chorioretinal inflammation, macular or paramacular, 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    Acute macular neuroretinitis of left eye    Focal chorioretinal inflammation, macular or paramacular, left eye 11/05/2014    Acute macular neuroretinitis of left eye    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    Ischemic optic neuropathy, 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    Lyme disease 09/08/2014    History of palpitations    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 (Multi) 12/21/2023    Other specified cataract 01/27/2016    Cataract, mature    Other specified retinal disorders 01/27/2016    Macular exudate    Respiratory failure 10/10/2023    Sebaceous cyst 11/20/2018    Infected sebaceous cyst of skin    Unspecified cataract 01/27/2016    Total cataract   [3]   Past Surgical History:  Procedure Laterality Date    CT GUIDED PERCUTANEOUS BIOPSY BONE DEEP  08/29/2022    CT GUIDED PERCUTANEOUS BIOPSY BONE DEEP 8/29/2022 [facility]    FOOT SURGERY Left 03/31/2016    Foot Surgery    MR HEAD ANGIO WO IV CONTRAST  09/17/2014    MR HEAD ANGIO WO IV CONTRAST 9/17/2014 [facility]LL[facility]    OTHER SURGICAL HISTORY  02/04/2015    Eye Surgery Results [facility]    [4]   Family History  Problem Relation Name Age of Onset    No Known Problems Mother      Heart attack Father      Diabetes Father     [5]   Current Outpatient Medications on File Prior to Visit   Medication Sig Dispense Refill    alcohol swabs (Alcohol Pads) pads, medicated Use as directed to check blood sugar up to 3-4 times daily and inject insulin 4 times daily 300 each 0    allopurinol (Zyloprim) 100 mg tablet TAKE ONE TABLET BY MOUTH ONCE DAILY 90 tablet 3    amLODIPine (Norvasc) 10 mg tablet Take 1 tablet (10 mg) by mouth once daily. 90 tablet 3    atorvastatin (Lipitor) 20 mg tablet TAKE ONE TABLET BY MOUTH ONCE DAILY AT BEDTIME 90 tablet 3    blood sugar diagnostic (Blood Glucose Test) strip Use as directed to check blood sugar up to 3-4 times daily 100 each 0    blood-glucose meter misc Use as directed to check blood sugar up to 3-4 times daily 1 each 0    blood-glucose sensor (FreeStyle [place] 3 Plus Sensor) device Use as instructed to check blood sugar. Change sensor every 15 days. 6 each 3    cholecalciferol (Vitamin D-3) 125 MCG (5000 UT) capsule Take 1 capsule (5,000 Units) by mouth once daily.      clopidogrel (Plavix) 75 mg tablet TAKE ONE TABLET BY MOUTH ONCE DAILY 90 tablet 3    empagliflozin (Jardiance) 10 mg tablet Take 1 tablet (10 mg) by mouth once daily in the morning. 90 tablet 3    finerenone (Kerendia) 10 mg tablet tablet Take 1 tablet (10 mg) by mouth once daily. 90 tablet 3    folic acid (Folvite) 1 mg tablet Take 5 tablets by mouth once daily. 450 tablet 3    FreeStyle [place] 3 [place] misc Use as instructed to check blood sugar throughout the day 1 each 0    furosemide (Lasix) 40 mg tablet Take 1 tablet (40 mg) by mouth once daily. 90 tablet 3    lancets (Accu-Chek Softclix Lancets) misc Use as directed  to check blood pressure 3 to 4 times a day 100 each 0    lisinopril 40 mg tablet TAKE ONE TABLET BY MOUTH ONCE DAILY IN THE MORNING 90 tablet 3    MAGNESIUM GLYCINATE ORAL Take 420 mg  "by mouth once daily.      pen needle, diabetic 32 gauge x 5/32\" needle Use pen needle with insulin pen 4 times daily as directed 400 each 3    sodium bicarbonate 650 mg tablet Take 1 tablet (650 mg) by mouth 2 times a day. 180 tablet 3    tirzepatide (Mounjaro) 7.5 mg/0.5 mL pen injector Inject 7.5 mg under the skin 1 (one) time per week. 2 mL 11    famotidine (Pepcid) 40 mg tablet Take 1 tablet (40 mg) by mouth once daily. (Patient not taking: No sig reported) 30 tablet 0    insulin glargine-yfgn (Semglee,insulin glarg-yfgn,Pen) 100 unit/mL (3 mL) pen Inject 26 Units under the skin once daily at bedtime. (Patient not taking: Reported on 7/16/2025) 15 mL 3    [DISCONTINUED] HumaLOG KwikPen Insulin 100 unit/mL pen Inject 8 Units under the skin 3 times a day before meals. (Patient not taking: Reported on 7/16/2025) 15 mL 3    [DISCONTINUED] insulin aspart (NovoLOG Flexpen U-100 Insulin) 100 unit/mL (3 mL) pen Inject 8 units under the skin 3 times a day before meals (Patient not taking: Reported on 7/16/2025) 15 mL 11     No current facility-administered medications on file prior to visit.     "